# Patient Record
Sex: FEMALE | Race: WHITE | NOT HISPANIC OR LATINO | Employment: OTHER | URBAN - NONMETROPOLITAN AREA
[De-identification: names, ages, dates, MRNs, and addresses within clinical notes are randomized per-mention and may not be internally consistent; named-entity substitution may affect disease eponyms.]

---

## 2023-01-18 PROBLEM — M79.18 GLUTEAL PAIN: Status: ACTIVE | Noted: 2023-01-18

## 2023-01-18 PROBLEM — Z86.39 HISTORY OF VITAMIN D DEFICIENCY: Status: ACTIVE | Noted: 2023-01-18

## 2023-01-18 PROBLEM — B35.1 ONYCHOMYCOSIS: Status: ACTIVE | Noted: 2023-01-18

## 2023-01-18 PROBLEM — G57.00 PIRIFORMIS SYNDROME: Status: ACTIVE | Noted: 2023-01-18

## 2023-01-18 PROBLEM — M19.019 OSTEOARTHRITIS OF SHOULDER: Status: ACTIVE | Noted: 2023-01-18

## 2023-01-18 PROBLEM — M17.11 PRIMARY OSTEOARTHRITIS OF RIGHT KNEE: Status: ACTIVE | Noted: 2023-01-18

## 2023-01-18 PROBLEM — Z96.652 HISTORY OF TOTAL LEFT KNEE REPLACEMENT: Status: ACTIVE | Noted: 2023-01-18

## 2023-01-18 PROBLEM — K22.70 BARRETT'S ESOPHAGUS: Status: ACTIVE | Noted: 2023-01-18

## 2023-01-18 PROBLEM — G56.03 BILATERAL CARPAL TUNNEL SYNDROME: Status: ACTIVE | Noted: 2023-01-18

## 2023-01-18 PROBLEM — G89.29 CHRONIC PAIN: Status: ACTIVE | Noted: 2023-01-18

## 2023-01-18 PROBLEM — M54.50 LOW BACK PAIN: Status: ACTIVE | Noted: 2023-01-18

## 2023-01-18 PROBLEM — I10 ESSENTIAL HYPERTENSION: Status: ACTIVE | Noted: 2023-01-18

## 2023-01-18 PROBLEM — E66.812 CLASS 2 OBESITY WITH BODY MASS INDEX (BMI) OF 38.0 TO 38.9 IN ADULT: Status: ACTIVE | Noted: 2023-01-18

## 2023-01-18 PROBLEM — G47.33 OBSTRUCTIVE SLEEP APNEA HYPOPNEA, SEVERE: Status: ACTIVE | Noted: 2023-01-18

## 2023-01-18 PROBLEM — I70.0 ATHEROSCLEROSIS OF AORTA (CMS-HCC): Status: ACTIVE | Noted: 2023-01-18

## 2023-01-18 PROBLEM — M51.26 LUMBAR DISC HERNIATION: Status: ACTIVE | Noted: 2023-01-18

## 2023-01-18 PROBLEM — M19.90 ARTHRITIS: Status: ACTIVE | Noted: 2023-01-18

## 2023-01-18 PROBLEM — C50.912 BILATERAL BREAST CANCER (MULTI): Status: ACTIVE | Noted: 2023-01-18

## 2023-01-18 PROBLEM — E03.9 HYPOTHYROIDISM: Status: ACTIVE | Noted: 2023-01-18

## 2023-01-18 PROBLEM — M43.16 SPONDYLOLISTHESIS OF LUMBAR REGION: Status: ACTIVE | Noted: 2023-01-18

## 2023-01-18 PROBLEM — M25.541 JOINT PAIN IN BOTH HANDS: Status: ACTIVE | Noted: 2023-01-18

## 2023-01-18 PROBLEM — Z15.09 BRCA2 GENETIC CARRIER: Status: ACTIVE | Noted: 2023-01-18

## 2023-01-18 PROBLEM — M17.0 ARTHRITIS OF BOTH KNEES: Status: ACTIVE | Noted: 2023-01-18

## 2023-01-18 PROBLEM — E66.9 CLASS 2 OBESITY WITH BODY MASS INDEX (BMI) OF 38.0 TO 38.9 IN ADULT: Status: ACTIVE | Noted: 2023-01-18

## 2023-01-18 PROBLEM — C50.911 BILATERAL BREAST CANCER (MULTI): Status: ACTIVE | Noted: 2023-01-18

## 2023-01-18 PROBLEM — I50.30 (HFPEF) HEART FAILURE WITH PRESERVED EJECTION FRACTION (MULTI): Status: ACTIVE | Noted: 2023-01-18

## 2023-01-18 PROBLEM — Z15.01 BRCA2 GENETIC CARRIER: Status: ACTIVE | Noted: 2023-01-18

## 2023-01-18 PROBLEM — I25.10 ARTERIOSCLEROTIC CORONARY ARTERY DISEASE: Status: ACTIVE | Noted: 2023-01-18

## 2023-01-18 PROBLEM — T81.81XA COMPLICATION OF VENTILATION THERAPY: Status: ACTIVE | Noted: 2023-01-18

## 2023-01-18 PROBLEM — E11.9 DIABETES MELLITUS (MULTI): Status: ACTIVE | Noted: 2023-01-18

## 2023-01-18 PROBLEM — R94.31 ABNORMAL EKG: Status: ACTIVE | Noted: 2023-01-18

## 2023-01-18 PROBLEM — E78.5 HYPERLIPIDEMIA: Status: ACTIVE | Noted: 2023-01-18

## 2023-01-18 PROBLEM — M25.542 JOINT PAIN IN BOTH HANDS: Status: ACTIVE | Noted: 2023-01-18

## 2023-01-18 RX ORDER — CICLOPIROX OLAMINE 7.7 MG/100ML
SUSPENSION TOPICAL
COMMUNITY
Start: 2021-11-17 | End: 2023-06-02 | Stop reason: WASHOUT

## 2023-01-18 RX ORDER — ROSUVASTATIN CALCIUM 5 MG/1
1 TABLET, COATED ORAL DAILY
COMMUNITY
Start: 2019-06-21 | End: 2023-12-06

## 2023-01-18 RX ORDER — HYDROCHLOROTHIAZIDE 12.5 MG/1
2 TABLET ORAL DAILY
COMMUNITY
Start: 2022-03-10 | End: 2023-04-10

## 2023-01-18 RX ORDER — DULOXETIN HYDROCHLORIDE 30 MG/1
2 CAPSULE, DELAYED RELEASE ORAL DAILY
COMMUNITY
Start: 2019-07-01 | End: 2023-05-16 | Stop reason: SDUPTHER

## 2023-01-18 RX ORDER — CLOTRIMAZOLE AND BETAMETHASONE DIPROPIONATE 10; .64 MG/G; MG/G
CREAM TOPICAL
COMMUNITY
Start: 2022-04-20 | End: 2023-06-02 | Stop reason: WASHOUT

## 2023-01-18 RX ORDER — ESOMEPRAZOLE MAGNESIUM 40 MG/1
1 CAPSULE, DELAYED RELEASE ORAL DAILY
COMMUNITY
End: 2024-03-22 | Stop reason: WASHOUT

## 2023-01-18 RX ORDER — LISINOPRIL 10 MG/1
1 TABLET ORAL 2 TIMES DAILY
COMMUNITY
Start: 2019-10-22 | End: 2023-06-09

## 2023-01-18 RX ORDER — ASPIRIN 81 MG/1
1 TABLET ORAL DAILY
COMMUNITY
End: 2023-06-02 | Stop reason: WASHOUT

## 2023-01-18 RX ORDER — BLOOD SUGAR DIAGNOSTIC
1 STRIP MISCELLANEOUS DAILY
COMMUNITY
Start: 2022-03-01

## 2023-01-18 RX ORDER — LEVOTHYROXINE SODIUM 125 UG/1
1 TABLET ORAL DAILY
COMMUNITY
Start: 2014-11-07

## 2023-01-18 RX ORDER — CYCLOBENZAPRINE HCL 5 MG
1-2 TABLET ORAL NIGHTLY PRN
COMMUNITY
Start: 2022-08-11 | End: 2023-06-02 | Stop reason: WASHOUT

## 2023-01-18 RX ORDER — GABAPENTIN 600 MG/1
1 TABLET ORAL 3 TIMES DAILY
COMMUNITY
Start: 2021-09-08 | End: 2024-03-22 | Stop reason: WASHOUT

## 2023-01-18 RX ORDER — HYDROCORTISONE 25 MG/G
OINTMENT TOPICAL
COMMUNITY
Start: 2021-06-03 | End: 2023-06-02 | Stop reason: WASHOUT

## 2023-01-18 RX ORDER — LANCETS
2 EACH MISCELLANEOUS 2 TIMES DAILY
COMMUNITY

## 2023-01-18 RX ORDER — EXEMESTANE 25 MG/1
1 TABLET ORAL DAILY
COMMUNITY
End: 2023-06-02 | Stop reason: WASHOUT

## 2023-02-23 PROBLEM — M25.541 JOINT PAIN IN BOTH HANDS: Status: RESOLVED | Noted: 2023-01-18 | Resolved: 2023-02-23

## 2023-02-23 PROBLEM — M25.542 JOINT PAIN IN BOTH HANDS: Status: RESOLVED | Noted: 2023-01-18 | Resolved: 2023-02-23

## 2023-02-23 PROBLEM — I50.22 CHRONIC SYSTOLIC CONGESTIVE HEART FAILURE (MULTI): Status: ACTIVE | Noted: 2023-02-23

## 2023-02-23 PROBLEM — M79.18 GLUTEAL PAIN: Status: RESOLVED | Noted: 2023-01-18 | Resolved: 2023-02-23

## 2023-03-06 ENCOUNTER — OFFICE VISIT (OUTPATIENT)
Dept: PRIMARY CARE | Facility: CLINIC | Age: 73
End: 2023-03-06
Payer: MEDICARE

## 2023-03-06 VITALS
HEIGHT: 60 IN | SYSTOLIC BLOOD PRESSURE: 102 MMHG | RESPIRATION RATE: 14 BRPM | OXYGEN SATURATION: 96 % | DIASTOLIC BLOOD PRESSURE: 69 MMHG | TEMPERATURE: 97.2 F | WEIGHT: 174.7 LBS | HEART RATE: 77 BPM | BODY MASS INDEX: 34.3 KG/M2

## 2023-03-06 DIAGNOSIS — M43.16 SPONDYLOLISTHESIS OF LUMBAR REGION: ICD-10-CM

## 2023-03-06 DIAGNOSIS — I25.10 ARTERIOSCLEROTIC CORONARY ARTERY DISEASE: ICD-10-CM

## 2023-03-06 DIAGNOSIS — E11.9 TYPE 2 DIABETES MELLITUS WITHOUT COMPLICATION, WITHOUT LONG-TERM CURRENT USE OF INSULIN (MULTI): Primary | ICD-10-CM

## 2023-03-06 DIAGNOSIS — I10 ESSENTIAL HYPERTENSION: ICD-10-CM

## 2023-03-06 DIAGNOSIS — E03.8 OTHER SPECIFIED HYPOTHYROIDISM: ICD-10-CM

## 2023-03-06 PROBLEM — Z86.39 HISTORY OF VITAMIN D DEFICIENCY: Status: RESOLVED | Noted: 2023-01-18 | Resolved: 2023-03-06

## 2023-03-06 LAB — POC HEMOGLOBIN A1C: 6.1 % (ref 4.2–6.5)

## 2023-03-06 PROCEDURE — 1036F TOBACCO NON-USER: CPT | Performed by: FAMILY MEDICINE

## 2023-03-06 PROCEDURE — 3074F SYST BP LT 130 MM HG: CPT | Performed by: FAMILY MEDICINE

## 2023-03-06 PROCEDURE — 83036 HEMOGLOBIN GLYCOSYLATED A1C: CPT | Performed by: FAMILY MEDICINE

## 2023-03-06 PROCEDURE — 4010F ACE/ARB THERAPY RXD/TAKEN: CPT | Performed by: FAMILY MEDICINE

## 2023-03-06 PROCEDURE — 99214 OFFICE O/P EST MOD 30 MIN: CPT | Performed by: FAMILY MEDICINE

## 2023-03-06 PROCEDURE — 3078F DIAST BP <80 MM HG: CPT | Performed by: FAMILY MEDICINE

## 2023-03-06 PROCEDURE — 1159F MED LIST DOCD IN RCRD: CPT | Performed by: FAMILY MEDICINE

## 2023-03-06 NOTE — PROGRESS NOTES
"Subjective   Patient ID: Raiza Nguyễn is a 72 y.o. female who presents for Coronary Artery Disease, Diabetes, Depression, GERD, Hypothyroidism, Hypertension, and Anxiety.    HPI   Concerns: burning in fingers and wants to know if she should be talking vit D and vit C  Review of Systems    Objective   /69 (BP Location: Right arm, Patient Position: Sitting, BP Cuff Size: Large adult)   Pulse 77   Temp 36.2 °C (97.2 °F) (Temporal)   Resp 14   Ht 1.511 m (4' 11.5\")   Wt 79.2 kg (174 lb 11.2 oz)   SpO2 96%   BMI 34.69 kg/m²     Physical Exam    Assessment/Plan   {Assess/PlanSmartLinks:61537}       "

## 2023-03-06 NOTE — PROGRESS NOTES
"Subjective   Reason for Visit: Raiza Nguyễn is an 72 y.o. female here for a routine follow up visit     Past Medical, Surgical, and Family History reviewed and updated in chart.         HPI     DM -  no numbness, no polyuria, doing well                         Patient Care Team:  Shoshana Olivarez DO as PCP - General  Shoshana Olivarez DO as PCP - [facility] Attributed Provider     Review of Systems    Objective   Vitals:  /69 (BP Location: Right arm, Patient Position: Sitting, BP Cuff Size: Large adult)   Pulse 77   Temp 36.2 °C (97.2 °F) (Temporal)   Resp 14   Ht 1.511 m (4' 11.5\")   Wt 79.2 kg (174 lb 11.2 oz)   SpO2 96%   BMI 34.69 kg/m²       Physical Exam    Assessment/Plan   Problem List Items Addressed This Visit          Endocrine/Metabolic    Type 2 diabetes mellitus, without long-term current use of insulin (CMS/[facility]) - Primary    Relevant Orders    POCT glycosylated hemoglobin (Hb A1C) manually resulted (Completed)          "

## 2023-03-27 ENCOUNTER — TELEMEDICINE (OUTPATIENT)
Dept: PRIMARY CARE | Facility: CLINIC | Age: 73
End: 2023-03-27
Payer: MEDICARE

## 2023-03-27 DIAGNOSIS — R19.7 DIARRHEA, UNSPECIFIED TYPE: Primary | ICD-10-CM

## 2023-03-27 DIAGNOSIS — R10.9 ABDOMINAL CRAMPING: ICD-10-CM

## 2023-03-27 PROCEDURE — 99213 OFFICE O/P EST LOW 20 MIN: CPT | Performed by: FAMILY MEDICINE

## 2023-03-27 RX ORDER — DICYCLOMINE HYDROCHLORIDE 10 MG/1
10 CAPSULE ORAL 3 TIMES DAILY PRN
Qty: 15 CAPSULE | Refills: 0 | Status: SHIPPED | OUTPATIENT
Start: 2023-03-27 | End: 2023-04-01

## 2023-03-27 ASSESSMENT — ENCOUNTER SYMPTOMS
ARTHRALGIAS: 0
HEADACHES: 0
HEMATURIA: 0
ABDOMINAL PAIN: 1
VOMITING: 0
ANOREXIA: 1
DYSURIA: 0
FEVER: 0
FLATUS: 1
DIARRHEA: 1
CONSTIPATION: 0
WEIGHT LOSS: 0
BELCHING: 1
FREQUENCY: 0
HEMATOCHEZIA: 0
MYALGIAS: 0
NAUSEA: 1

## 2023-03-27 NOTE — PROGRESS NOTES
Abdominal Pain  The problem has been unchanged. The pain is located in the generalized abdominal region. The pain is at a severity of 5/10. The quality of the pain is cramping. The abdominal pain does not radiate. Associated symptoms include anorexia, belching, diarrhea, flatus and nausea. Pertinent negatives include no arthralgias, constipation, dysuria, fever, frequency, headaches, hematochezia, hematuria, melena, myalgias, vomiting or weight loss. Nothing aggravates the pain. The pain is relieved by Nothing.      Answers submitted by the patient for this visit:  Abdominal Pain Questionnaire (Submitted on 3/27/2023)  Chief Complaint: Abdominal pain  Progression since onset: unchanged  Pain location: generalized abdominal region  Pain - numeric: 5/10  Pain quality: cramping  Radiates to: does not radiate  anorexia: Yes  arthralgias: No  belching: Yes  constipation: No  diarrhea: Yes  dysuria: No  fever: No  flatus: Yes  frequency: No  headaches: No  hematochezia: No  hematuria: No  melena: No  myalgias: No  nausea: Yes  weight loss: No  vomiting: No  Aggravated by: nothing  Relieved by: nothing    Patient reports no vomiting. Woke up with no generalized abdominal pain today but had a piece of toast and started having cramping and discomfort. Discomfort is generalized,not localized.   Has not taken a covid-19 test yet and no sick contacts  Has taken Tylenol without relief, never taken bentyl before  She reports 6-8 diarrhea episodes/day, no blood  Has been able to hydrate well with water, but is concerned because her symptoms are lasting longer than they typical GI bug of 24 hours      Review of Systems   Constitutional:  Negative for fever and weight loss.   Gastrointestinal:  Positive for abdominal pain, anorexia, diarrhea, flatus and nausea. Negative for constipation, hematochezia, melena and vomiting.   Genitourinary:  Negative for dysuria, frequency and hematuria.   Musculoskeletal:  Negative for  arthralgias and myalgias.   Neurological:  Negative for headaches.       Objective   There were no vitals taken for this visit.    Physical Exam  Constitutional: Well developed, well nourished, alert and in no acute distress   Eyes: Normal external exam.  Pulmonary: No respiratory distress  Skin: Warm, well perfused, normal skin turgor and color.   Neurologic: Cranial nerves II-XII grossly intact.   Psychiatric: Mood calm and affect normal. Answers questions appropriately.       Assessment/Plan     We discussed being sure to hydrate well and if you are not able to keep up with hydration to please proceed to the ED.    We also discussed that if your pain worsens or localizes that you need to be evaluated either in our office or the ED in person.     Continue to follow a bland diet    Avoid drinks high in sugar    START Bentyl (antispasmodic) 20-30 minutes prior to meals. This medication may cause constipation.

## 2023-04-10 DIAGNOSIS — I10 ESSENTIAL HYPERTENSION: Primary | ICD-10-CM

## 2023-04-10 RX ORDER — HYDROCHLOROTHIAZIDE 12.5 MG/1
TABLET ORAL
Qty: 180 TABLET | Refills: 3 | Status: SHIPPED | OUTPATIENT
Start: 2023-04-10 | End: 2023-09-01 | Stop reason: SDUPTHER

## 2023-05-16 DIAGNOSIS — G89.29 OTHER CHRONIC PAIN: ICD-10-CM

## 2023-05-16 RX ORDER — DULOXETIN HYDROCHLORIDE 30 MG/1
30 CAPSULE, DELAYED RELEASE ORAL 2 TIMES DAILY
Qty: 180 CAPSULE | Refills: 3 | Status: SHIPPED | OUTPATIENT
Start: 2023-05-16 | End: 2024-01-18 | Stop reason: SDUPTHER

## 2023-05-16 NOTE — TELEPHONE ENCOUNTER
Pt calling needs refill of Duloxetine 30 mg 2 daily   one in the am and one at night.   Medication was from Dr Wilson but she is no longer there. Please advise?

## 2023-05-31 ENCOUNTER — TELEPHONE (OUTPATIENT)
Dept: PRIMARY CARE | Facility: CLINIC | Age: 73
End: 2023-05-31
Payer: MEDICARE

## 2023-05-31 PROBLEM — M81.0 OSTEOPOROSIS: Status: ACTIVE | Noted: 2023-05-31

## 2023-05-31 PROBLEM — M54.12 BRACHIAL NEURITIS: Status: ACTIVE | Noted: 2023-05-31

## 2023-05-31 PROBLEM — R26.81 GAIT INSTABILITY: Status: ACTIVE | Noted: 2023-05-31

## 2023-05-31 PROBLEM — M46.1 SACROILIITIS, NOT ELSEWHERE CLASSIFIED (CMS-HCC): Status: ACTIVE | Noted: 2023-05-31

## 2023-05-31 PROBLEM — M47.817 LUMBOSACRAL SPONDYLOSIS: Status: ACTIVE | Noted: 2023-05-31

## 2023-05-31 PROBLEM — M25.511 BILATERAL SHOULDER PAIN: Status: ACTIVE | Noted: 2023-05-31

## 2023-05-31 PROBLEM — Z85.3 HISTORY OF CANCER OF LEFT BREAST: Status: ACTIVE | Noted: 2023-05-31

## 2023-05-31 PROBLEM — M54.2 CERVICALGIA: Status: ACTIVE | Noted: 2023-05-31

## 2023-05-31 PROBLEM — M47.812 CERVICAL SPONDYLOSIS: Status: ACTIVE | Noted: 2023-05-31

## 2023-05-31 PROBLEM — R79.83 HOMOCYSTINEMIA: Status: ACTIVE | Noted: 2023-05-31

## 2023-05-31 PROBLEM — M25.512 BILATERAL SHOULDER PAIN: Status: ACTIVE | Noted: 2023-05-31

## 2023-05-31 PROBLEM — M48.02 CERVICAL SPINAL STENOSIS: Status: ACTIVE | Noted: 2023-05-31

## 2023-05-31 PROBLEM — M54.17 LUMBOSACRAL NEURITIS: Status: ACTIVE | Noted: 2023-05-31

## 2023-05-31 PROBLEM — R92.8 ABNORMAL MRI, BREAST: Status: ACTIVE | Noted: 2023-05-31

## 2023-05-31 NOTE — TELEPHONE ENCOUNTER
Patient fell three weeks ago, injuring her left forearm.  She states there was a scab there, but it has gone away.  She is experiencing pain still however.    Would you like her to make

## 2023-05-31 NOTE — PROGRESS NOTES
Subjective:    Raiza Nguyễn is a 72 y.o. female who presents for   Chief Complaint   Patient presents with    Fall     Fell about a month ago, LT arm was hurting. As of two days ago her pain has subsided    Does have pain in her belly button      SVN pt     HPI:      What concern/ problem/pain/symptom  brings you here today? Originally came in for pain and tenderness in her left forearm post fall but this has now since subsided     Denies any pain or problems with her left arm     Pt does want to talk about pain at her umbilicus       how long has pt had sxs?  3 weeks     describe symptoms-  Redness  Pain - burning inside   No itching     how often do symptoms occur?  Redness comes and goes I having pain currently     has pt tried anything for current symptoms, including medications (OTC or prescription)  ?    Has tried amerigel which helped with the redness but still burning on the inside       has pt been seen recently for this problem ( within past 2-3 weeks) ? no        No fever    No injuery       Social History     Tobacco Use   Smoking Status Former    Packs/day: 1.00    Years: 15.00    Pack years: 15.00    Types: Cigarettes    Quit date: 1983    Years since quittin.4    Passive exposure: Past   Smokeless Tobacco Never   Vaping Use   Vaping Status Never Used         Review of Systems:      Objective:    Vitals:    23 1005   BP: 134/77   Pulse: 78   Temp: 36.8 °C (98.2 °F)   SpO2: 97%   Weight: 79.5 kg (175 lb 3.2 oz)       Patient is alert and oriented x 3 , NAD  SKIN- red area marginal umbilicus and central , no vesicles, no drainage , no swelling                 Assessment/Plan:    1. Rash  ketoconazole (NIZOral) 2 % cream          No orders of the defined types were placed in this encounter.                Call if no better or if symptoms worsen

## 2023-06-02 ENCOUNTER — OFFICE VISIT (OUTPATIENT)
Dept: PRIMARY CARE | Facility: CLINIC | Age: 73
End: 2023-06-02
Payer: MEDICARE

## 2023-06-02 VITALS
WEIGHT: 175.2 LBS | HEART RATE: 78 BPM | OXYGEN SATURATION: 97 % | BODY MASS INDEX: 34.79 KG/M2 | TEMPERATURE: 98.2 F | DIASTOLIC BLOOD PRESSURE: 77 MMHG | SYSTOLIC BLOOD PRESSURE: 134 MMHG

## 2023-06-02 DIAGNOSIS — R21 RASH: Primary | ICD-10-CM

## 2023-06-02 PROCEDURE — 1036F TOBACCO NON-USER: CPT | Performed by: FAMILY MEDICINE

## 2023-06-02 PROCEDURE — 1160F RVW MEDS BY RX/DR IN RCRD: CPT | Performed by: FAMILY MEDICINE

## 2023-06-02 PROCEDURE — 3075F SYST BP GE 130 - 139MM HG: CPT | Performed by: FAMILY MEDICINE

## 2023-06-02 PROCEDURE — 1159F MED LIST DOCD IN RCRD: CPT | Performed by: FAMILY MEDICINE

## 2023-06-02 PROCEDURE — 3078F DIAST BP <80 MM HG: CPT | Performed by: FAMILY MEDICINE

## 2023-06-02 PROCEDURE — 99212 OFFICE O/P EST SF 10 MIN: CPT | Performed by: FAMILY MEDICINE

## 2023-06-02 PROCEDURE — 4010F ACE/ARB THERAPY RXD/TAKEN: CPT | Performed by: FAMILY MEDICINE

## 2023-06-02 RX ORDER — KETOCONAZOLE 20 MG/G
CREAM TOPICAL 2 TIMES DAILY
Qty: 30 G | Refills: 0 | Status: SHIPPED | OUTPATIENT
Start: 2023-06-02 | End: 2023-06-12

## 2023-06-08 DIAGNOSIS — I10 ESSENTIAL HYPERTENSION: ICD-10-CM

## 2023-06-09 RX ORDER — LISINOPRIL 10 MG/1
TABLET ORAL
Qty: 180 TABLET | Refills: 3 | Status: SHIPPED | OUTPATIENT
Start: 2023-06-09

## 2023-07-03 ENCOUNTER — TELEPHONE (OUTPATIENT)
Dept: PRIMARY CARE | Facility: CLINIC | Age: 73
End: 2023-07-03
Payer: MEDICARE

## 2023-07-03 NOTE — TELEPHONE ENCOUNTER
Patient was seen by Dr. Beebe on 6/2 for a rash and given Ketoconazole. Told that she has a fungal infection in her naval.  The patient states that the area is not getting any better. The pain is more internal.  She also saw Derm and was given Hydrocortizone but not helping.  The patient is aware the office is full today, and closed tomorrow.  She is asking what PCP would suggest.

## 2023-07-06 ENCOUNTER — OFFICE VISIT (OUTPATIENT)
Dept: PRIMARY CARE | Facility: CLINIC | Age: 73
End: 2023-07-06
Payer: MEDICARE

## 2023-07-06 ENCOUNTER — LAB (OUTPATIENT)
Dept: LAB | Facility: LAB | Age: 73
End: 2023-07-06
Payer: MEDICARE

## 2023-07-06 VITALS
DIASTOLIC BLOOD PRESSURE: 70 MMHG | WEIGHT: 178.4 LBS | HEART RATE: 80 BPM | OXYGEN SATURATION: 96 % | SYSTOLIC BLOOD PRESSURE: 111 MMHG | RESPIRATION RATE: 12 BRPM | BODY MASS INDEX: 35.43 KG/M2 | TEMPERATURE: 96.1 F

## 2023-07-06 DIAGNOSIS — E11.9 CONTROLLED TYPE 2 DIABETES MELLITUS WITHOUT COMPLICATION, WITHOUT LONG-TERM CURRENT USE OF INSULIN (MULTI): ICD-10-CM

## 2023-07-06 DIAGNOSIS — Z00.00 HEALTHCARE MAINTENANCE: ICD-10-CM

## 2023-07-06 DIAGNOSIS — R16.0 HEPATOMEGALY: ICD-10-CM

## 2023-07-06 DIAGNOSIS — J31.0 CHRONIC RHINITIS: ICD-10-CM

## 2023-07-06 DIAGNOSIS — R68.81 EARLY SATIETY: ICD-10-CM

## 2023-07-06 DIAGNOSIS — R10.84 GENERALIZED ABDOMINAL PAIN: ICD-10-CM

## 2023-07-06 DIAGNOSIS — E11.9 TYPE 2 DIABETES MELLITUS WITHOUT COMPLICATION, WITHOUT LONG-TERM CURRENT USE OF INSULIN (MULTI): ICD-10-CM

## 2023-07-06 DIAGNOSIS — R53.81 MALAISE: ICD-10-CM

## 2023-07-06 DIAGNOSIS — R10.84 GENERALIZED ABDOMINAL PAIN: Primary | ICD-10-CM

## 2023-07-06 LAB
ALANINE AMINOTRANSFERASE (SGPT) (U/L) IN SER/PLAS: 18 U/L (ref 7–45)
ALBUMIN (G/DL) IN SER/PLAS: 4.2 G/DL (ref 3.4–5)
ALKALINE PHOSPHATASE (U/L) IN SER/PLAS: 66 U/L (ref 33–136)
ANION GAP IN SER/PLAS: 13 MMOL/L (ref 10–20)
ASPARTATE AMINOTRANSFERASE (SGOT) (U/L) IN SER/PLAS: 20 U/L (ref 9–39)
BILIRUBIN TOTAL (MG/DL) IN SER/PLAS: 0.5 MG/DL (ref 0–1.2)
CALCIUM (MG/DL) IN SER/PLAS: 10 MG/DL (ref 8.6–10.6)
CARBON DIOXIDE, TOTAL (MMOL/L) IN SER/PLAS: 31 MMOL/L (ref 21–32)
CHLORIDE (MMOL/L) IN SER/PLAS: 102 MMOL/L (ref 98–107)
CREATININE (MG/DL) IN SER/PLAS: 0.78 MG/DL (ref 0.5–1.05)
GFR FEMALE: 81 ML/MIN/1.73M2
GLUCOSE (MG/DL) IN SER/PLAS: 120 MG/DL (ref 74–99)
LIPASE (U/L) IN SER/PLAS: 36 U/L (ref 9–82)
POTASSIUM (MMOL/L) IN SER/PLAS: 4.5 MMOL/L (ref 3.5–5.3)
PROTEIN TOTAL: 6.6 G/DL (ref 6.4–8.2)
SODIUM (MMOL/L) IN SER/PLAS: 141 MMOL/L (ref 136–145)
UREA NITROGEN (MG/DL) IN SER/PLAS: 15 MG/DL (ref 6–23)

## 2023-07-06 PROCEDURE — 80053 COMPREHEN METABOLIC PANEL: CPT

## 2023-07-06 PROCEDURE — 4010F ACE/ARB THERAPY RXD/TAKEN: CPT | Performed by: FAMILY MEDICINE

## 2023-07-06 PROCEDURE — 36415 COLL VENOUS BLD VENIPUNCTURE: CPT

## 2023-07-06 PROCEDURE — 83690 ASSAY OF LIPASE: CPT

## 2023-07-06 PROCEDURE — 1160F RVW MEDS BY RX/DR IN RCRD: CPT | Performed by: FAMILY MEDICINE

## 2023-07-06 PROCEDURE — 1036F TOBACCO NON-USER: CPT | Performed by: FAMILY MEDICINE

## 2023-07-06 PROCEDURE — 3074F SYST BP LT 130 MM HG: CPT | Performed by: FAMILY MEDICINE

## 2023-07-06 PROCEDURE — 83036 HEMOGLOBIN GLYCOSYLATED A1C: CPT

## 2023-07-06 PROCEDURE — 99214 OFFICE O/P EST MOD 30 MIN: CPT | Performed by: FAMILY MEDICINE

## 2023-07-06 PROCEDURE — 85025 COMPLETE CBC W/AUTO DIFF WBC: CPT

## 2023-07-06 PROCEDURE — 1159F MED LIST DOCD IN RCRD: CPT | Performed by: FAMILY MEDICINE

## 2023-07-06 PROCEDURE — 3078F DIAST BP <80 MM HG: CPT | Performed by: FAMILY MEDICINE

## 2023-07-06 RX ORDER — MONTELUKAST SODIUM 10 MG/1
10 TABLET ORAL NIGHTLY
Qty: 30 TABLET | Refills: 5 | Status: SHIPPED | OUTPATIENT
Start: 2023-07-06 | End: 2023-09-01

## 2023-07-06 RX ORDER — EXEMESTANE 25 MG/1
25 TABLET ORAL DAILY
COMMUNITY
End: 2024-03-27

## 2023-07-06 RX ORDER — HYDROCORTISONE 25 MG/G
CREAM TOPICAL
COMMUNITY
Start: 2023-06-14 | End: 2023-09-01 | Stop reason: WASHOUT

## 2023-07-06 NOTE — PROGRESS NOTES
Subjective   Patient ID: Raiza Nguyễn is a 72 y.o. female who presents for Rash (Pt states that she has had this rash for about 6 weeks, and has been using hydrocortisone cream- states that this is not helping at all, rash in in her naval, dull, achy feeling, red, irritated, feels like it is pulling like it is attached to something on the inside ).    HPI     Noticed a sensation of discomfort in umbilicus about 6 weeks ago.      Going for [facility] screening next month (an aside)     Felt like tugging in her umbilicus,   derm gave ketoconazole and hydrocortisone      Neither worked     Feels like something's not right overall            Feels like when her bowels are moving it feels painful under umbilicus.       Does not have blood in stool,  bowels always messed up,   at her baseline of messed up (constip, diarrhea)     No unexplained wt change, swollen glands, night sweats.      Feels different than usual     Will be doing lower scope as a routine screen     Hx abd surg:  logan     Had oophorectomy        Abdominal pain, burning       Does have early satiety     Weight slightly down, but has been trying     No first degree relatives with colon cancer/ abdominal cancers     Review of Systems    Allergies poorly controlled on flonase and antihistamine      Has never been on singulair         Objective   /70 (BP Location: Right arm, Patient Position: Sitting, BP Cuff Size: Small adult)   Pulse 80   Temp 35.6 °C (96.1 °F) (Temporal)   Resp 12   Wt 80.9 kg (178 lb 6.4 oz)   SpO2 96%   BMI 35.43 kg/m²     Physical Exam  Constitutional:       General: She is not in acute distress.     Appearance: Normal appearance. She is not ill-appearing.   HENT:      Mouth/Throat:      Mouth: Mucous membranes are moist.      Pharynx: Oropharynx is clear. No oropharyngeal exudate or posterior oropharyngeal erythema.   Eyes:      Comments: Conjunctival injection bilat    Cardiovascular:      Rate and Rhythm:  Normal rate and regular rhythm.      Heart sounds: Normal heart sounds. No murmur heard.  Pulmonary:      Effort: No respiratory distress.      Breath sounds: No wheezing, rhonchi or rales.   Abdominal:      Comments: Mild generalized abdominal discomfort    Abdomen soft, positive bowel sounds.    Fullness right upper quadrant, mildly tender, some guarding.  Negative Almodovar sign.    Tender to palpation left lower quadrant, guarding present.    More superficial feeling mass epigastric region, feels subcutaneous, freely mobile, nontender     Musculoskeletal:      Cervical back: No tenderness.   Lymphadenopathy:      Cervical: No cervical adenopathy.   Skin:     Comments: No rash observed on umbilicus   Neurological:      Mental Status: She is alert.         Assessment/Plan   Problem List Items Addressed This Visit       Diabetes mellitus (CMS/HCC)     A1c ordered          Generalized abdominal pain - Primary     X 6 weeks with some early satiety and malaise;    due to tenderness with some guarding in ruq and llq will obtain ct abd pelvis with contrast     Labs ordered     Differential could include neoplasm, adhesions, hepatitis, diverticulitis, constipation, other.         Relevant Orders    CT abdomen pelvis w IV contrast    CBC and Auto Differential    Lipase    Early satiety     CT abd/pelvis          Relevant Orders    CT abdomen pelvis w IV contrast    Chronic rhinitis     Cont flonase, antihist, add singulair (sent in)          Relevant Medications    montelukast (Singulair) 10 mg tablet     Other Visit Diagnoses       Hepatomegaly        Relevant Orders    CT abdomen pelvis w IV contrast    Comprehensive Metabolic Panel    Malaise        Relevant Orders    CT abdomen pelvis w IV contrast    CBC and Auto Differential    Healthcare maintenance        Relevant Orders    CBC and Auto Differential    Controlled type 2 diabetes mellitus without complication, without long-term current use of insulin (CMS/HCC)         Relevant Orders    Hemoglobin A1C

## 2023-07-06 NOTE — ASSESSMENT & PLAN NOTE
X 6 weeks with some early satiety and malaise;    due to tenderness with some guarding in ruq and llq will obtain ct abd pelvis with contrast     Labs ordered     Differential could include neoplasm, adhesions, hepatitis, diverticulitis, constipation, other.

## 2023-07-07 LAB
BASOPHILS (10*3/UL) IN BLOOD BY AUTOMATED COUNT: 0.05 X10E9/L (ref 0–0.1)
BASOPHILS/100 LEUKOCYTES IN BLOOD BY AUTOMATED COUNT: 0.9 % (ref 0–2)
EOSINOPHILS (10*3/UL) IN BLOOD BY AUTOMATED COUNT: 0.46 X10E9/L (ref 0–0.7)
EOSINOPHILS/100 LEUKOCYTES IN BLOOD BY AUTOMATED COUNT: 8.2 % (ref 0–6)
ERYTHROCYTE DISTRIBUTION WIDTH (RATIO) BY AUTOMATED COUNT: 12.8 % (ref 11.5–14.5)
ERYTHROCYTE MEAN CORPUSCULAR HEMOGLOBIN CONCENTRATION (G/DL) BY AUTOMATED: 32.9 G/DL (ref 32–36)
ERYTHROCYTE MEAN CORPUSCULAR VOLUME (FL) BY AUTOMATED COUNT: 98 FL (ref 80–100)
ERYTHROCYTES (10*6/UL) IN BLOOD BY AUTOMATED COUNT: 4.4 X10E12/L (ref 4–5.2)
ESTIMATED AVERAGE GLUCOSE FOR HBA1C: 131 MG/DL
HEMATOCRIT (%) IN BLOOD BY AUTOMATED COUNT: 42.9 % (ref 36–46)
HEMOGLOBIN (G/DL) IN BLOOD: 14.1 G/DL (ref 12–16)
HEMOGLOBIN A1C/HEMOGLOBIN TOTAL IN BLOOD: 6.2 %
IMMATURE GRANULOCYTES/100 LEUKOCYTES IN BLOOD BY AUTOMATED COUNT: 0.2 % (ref 0–0.9)
LEUKOCYTES (10*3/UL) IN BLOOD BY AUTOMATED COUNT: 5.6 X10E9/L (ref 4.4–11.3)
LYMPHOCYTES (10*3/UL) IN BLOOD BY AUTOMATED COUNT: 1.73 X10E9/L (ref 1.2–4.8)
LYMPHOCYTES/100 LEUKOCYTES IN BLOOD BY AUTOMATED COUNT: 30.8 % (ref 13–44)
MONOCYTES (10*3/UL) IN BLOOD BY AUTOMATED COUNT: 0.56 X10E9/L (ref 0.1–1)
MONOCYTES/100 LEUKOCYTES IN BLOOD BY AUTOMATED COUNT: 10 % (ref 2–10)
NEUTROPHILS (10*3/UL) IN BLOOD BY AUTOMATED COUNT: 2.8 X10E9/L (ref 1.2–7.7)
NEUTROPHILS/100 LEUKOCYTES IN BLOOD BY AUTOMATED COUNT: 49.9 % (ref 40–80)
NRBC (PER 100 WBCS) BY AUTOMATED COUNT: 0 /100 WBC (ref 0–0)
PLATELETS (10*3/UL) IN BLOOD AUTOMATED COUNT: 205 X10E9/L (ref 150–450)

## 2023-08-28 ENCOUNTER — APPOINTMENT (OUTPATIENT)
Dept: PRIMARY CARE | Facility: CLINIC | Age: 73
End: 2023-08-28
Payer: MEDICARE

## 2023-08-31 RX ORDER — INFLUENZA A VIRUS A/VICTORIA/4897/2022 IVR-238 (H1N1) ANTIGEN (FORMALDEHYDE INACTIVATED), INFLUENZA A VIRUS A/DARWIN/9/2021 SAN-010 (H3N2) ANTIGEN (FORMALDEHYDE INACTIVATED), INFLUENZA B VIRUS B/PHUKET/3073/2013 ANTIGEN (FORMALDEHYDE INACTIVATED), AND INFLUENZA B VIRUS B/MICHIGAN/01/2021 ANTIGEN (FORMALDEHYDE INACTIVATED) 60; 60; 60; 60 UG/.7ML; UG/.7ML; UG/.7ML; UG/.7ML
0.7 INJECTION, SUSPENSION INTRAMUSCULAR ONCE
Qty: 0.7 ML | Refills: 0 | Status: CANCELLED | OUTPATIENT
Start: 2023-08-31 | End: 2023-08-31

## 2023-08-31 NOTE — PROGRESS NOTES
Subjective   Patient ID: Raiza Nguyễn is a 72 y.o. female who presents for Follow-up (Pt presents for 6 month follow up- pt states no concerns at this time. ) and Flu Vaccine (Pt declines flu vaccine at this time. ).    HPI     LAST MWV DONE 2022 -- CAN ADD TO NEXT OV    Patient presents today for routine 6 month follow-up.  Patient declines flu vaccine.    Reports sister has moved out her house, living 6 minutes away.  Ex- recently passed while at home recovering from knee surgery.  Pt found him  in his appt   attemped cpr     She is doing well overall given these circumstances.  She is feeling very tired, fatigued.      CHRONIC CONDITIONS:  -Chronic pain  Taking Duloxetine 60 mg + Gabapentin daily.  S/p evaluation with [facility] rheumatology.  Plan per rheum pre 10/2022 OV NOTE:  --For Pseudogout flares of the wrist - Prednisone 12 day taper  --Continue Gapabentin 600 mg three times a day  --Tylenol 1000 mg up to 3x/day  --Voltaren gel up to 4x/day  --Continue home occupational therapy exercises   --Follow up as needed      -Hypothyroidism  CURRENT DOSE: Synthroid 125 mcg daily.  2022 TSH normal.    Medication is being taken as directed and is well-tolerated.   Patient denies heat or cold intolerance, diarrhea or constipation, coarsening of hair, and palpitations.      -DM  Lifestyle managed, A1c most recently in prediabetic range    Hgb A1c: 6.2 in 2023, 6.1 in 3/2023, 6.9 in 2022  Albumin: negative 2022  Foot exam: 2023    The patient denies polyuria, polydipsia, or polyphagia.   The patient denies numbness and tingling in their feet.     -HTN  Taking Lisinopril 10 mg BID + HCTZ 12.5 mg daily.  She reports HCTZ written at BID but she does not always take 2 unless swelling is bad.    Medications are being taken and tolerated well.   No side effects are reported.  Patient is taking blood pressure outside of office and reports good control.  Patient denies chest pain, shortness of breath  with exertion, palpitations, headache, or dizziness.     -HLD/CAD  Taking Rosuvastatin 5 mg daily.  10/2020 CT A/P showed mild aortic atherosclerosis, no CACS.  7/2022 TC/HDL ratio of 2.7    -GERD  Taking Esomeprazole 40 mg daily.    -BOLIVAR,   12/2021 sleep study (severe with SpO2 88% and AHI 36.1).  Intolerant to CPAP x several tries  5/2022 referral to sleep medicine placed.  Today, reports will be seeing ENT that specializes in sleep medicine.     -Former smoker  Smoked ~20 years, no longer smoking but around second hand smoke.  Previously lived with sister who smokes inside.    -Allergies  Prescribed Singulair 10 mg.  Taking Allegra as needed.  Uses Flonase occasionally.    -History of melanoma, early stage  Managed/followed by dermatology.     -BRCA2+ breast cancer  Followed by heme/onc  & breast surgeon (Dr. Jeniffer Langley)  Stage IB right breast cancer 7/2018.   Oncotype 17. S/P WBRT 10/11/2018 - 11/1/2018.   Presently taking Exemestane.  Stage II left breast cancer in 2003. L.pm/ALND. ACx4 + Tx4. WBRT.  Anastrozole x 5yr.  BRCA2+, recommend MRI screening  4/2023 DEXA was normal  Dr. Langley manages imaging. Stable at this time.    Review of Systems   All other systems reviewed and are negative.      Objective   /73 (BP Location: Right arm, Patient Position: Sitting, BP Cuff Size: Small adult)   Pulse 80   Temp 36.2 °C (97.1 °F) (Temporal)   Resp 12   Wt 81.1 kg (178 lb 12.8 oz)   SpO2 97%   BMI 35.51 kg/m²     Physical Exam  Vitals and nursing note reviewed.   Constitutional:       General: She is not in acute distress.     Appearance: Normal appearance. She is not toxic-appearing.   HENT:      Head: Normocephalic and atraumatic.   Eyes:      Extraocular Movements: Extraocular movements intact.      Pupils: Pupils are equal, round, and reactive to light.   Neck:      Thyroid: No thyromegaly.      Vascular: No hepatojugular reflux or JVD.   Cardiovascular:      Rate and Rhythm: Normal rate and  regular rhythm.      Heart sounds: No murmur heard.     No friction rub. No gallop.   Pulmonary:      Effort: Pulmonary effort is normal.      Breath sounds: Normal breath sounds. No wheezing, rhonchi or rales.   Abdominal:      General: Bowel sounds are normal. There is no distension.      Palpations: Abdomen is soft. There is no mass.      Tenderness: There is no abdominal tenderness. There is no guarding.   Musculoskeletal:      Right lower leg: No edema.      Left lower leg: No edema.   Feet:      Comments:   Diabetic Foot Exam:  Right Foot Findings: Normal visual exam. Toes were normal. No skin breakdown.  Left Foot Findings: Normal visual exam. Toes were normal. No skin breakdown.  Monofilament Testing: Normal tactile sensation with monofilament testing throughout both feet.  Lymphadenopathy:      Cervical: No cervical adenopathy.   Skin:     General: Skin is warm and dry.   Neurological:      General: No focal deficit present.      Mental Status: She is alert and oriented to person, place, and time.   Psychiatric:         Mood and Affect: Mood normal.         Behavior: Behavior normal.         Assessment/Plan   Problem List Items Addressed This Visit       Atherosclerosis of aorta (CMS/HCC)     10/2020 CT A/P showed mild aortic atherosclerosis, no CACS.  7/2022 TC/HDL ratio of 2.7, good control, continue Rosuvastatin 5 mg daily.  Lipid panel to be monitored routinely.         Bilateral breast cancer (CMS/[facility])     Followed by heme/onc (Dr. Wilson) & breast surgeon (Dr. Jeniffer Langley)  Stage IB right breast cancer 7/2018.   Oncotype 17. S/P WBRT 10/11/2018 - 11/1/2018.   Presently taking Exemestane.  Stage II left breast cancer in 2003. L.pm/ALND. ACx4 + Tx4. WBRT.  Anastrozole x 5yr.  BRCA2+, recommend MRI screening  4/2023 DEXA was normal  Dr. Langley manages imaging. Stable at this time.         Chronic pain     Stable, continue Duloxetine 60 mg + Gabapentin daily.  Continue to follow-up with specialist as  recommended.         Diabetes mellitus (CMS/[facility])     Lifestyle managed, A1c most recently in prediabetic range, good control.  Goal A1c is 7.0 or less.    Hgb A1c: 6.2 in 7/2023, 6.1 in 3/2023, 6.9 in 7/2022  Albumin: negative 2/2022  Foot exam: 9/2023         Essential hypertension     BP is 113/73 in office today, good control.  Goal BP is 130/80 or less, ideally 120/80 or less.   Continue Lisinopril 10 mg BID   Continue HCTZ 12.5 mg 11-2 tablets daily.         Relevant Medications    hydroCHLOROthiazide (HYDRODiuril) 12.5 mg tablet    Hyperlipidemia     10/2020 CT A/P showed mild aortic atherosclerosis, no CACS.  7/2022 TC/HDL ratio of 2.7, good control, continue Rosuvastatin 5 mg daily.  Lipid panel to be monitored routinely.         Hypothyroidism     CURRENT DOSE: Synthroid 125 mcg daily.  7/2022 TSH normal, repeat TSH with reflex to be done prior to next visit.         Obstructive sleep apnea hypopnea, severe - Primary     12/2021 sleep study (severe with SpO2 88% and AHI 36.1).  Intolerant to CPAP x several tries.  8/2023, reports will be seeing ENT that specializes in sleep medicine.         Complication of ventilation therapy    Class 2 obesity without serious comorbidity with body mass index (BMI) of 35.0 to 35.9 in adult    Chronic rhinitis     No change with Singulair, patient may discontinue this.  Continue OTC Flonase or Nasacort - 1 squirt each nostril twice daily  Consider using Navage or Netipot to irrigate sinuses after possible exposures.            Follow-up in 6 months for routine care + MWV.  Labs to be done prior.   Call for sooner follow-up if needed.     Scribe Attestation  By signing my name below, I, John Ely   attest that this documentation has been prepared under the direction and in the presence of Shoshana Olivarez DO.

## 2023-09-01 ENCOUNTER — OFFICE VISIT (OUTPATIENT)
Dept: PRIMARY CARE | Facility: CLINIC | Age: 73
End: 2023-09-01
Payer: MEDICARE

## 2023-09-01 VITALS
RESPIRATION RATE: 12 BRPM | HEART RATE: 80 BPM | OXYGEN SATURATION: 97 % | SYSTOLIC BLOOD PRESSURE: 113 MMHG | WEIGHT: 178.8 LBS | TEMPERATURE: 97.1 F | DIASTOLIC BLOOD PRESSURE: 73 MMHG | BODY MASS INDEX: 35.51 KG/M2

## 2023-09-01 DIAGNOSIS — T81.81XS: ICD-10-CM

## 2023-09-01 DIAGNOSIS — E03.9 HYPOTHYROIDISM, UNSPECIFIED TYPE: ICD-10-CM

## 2023-09-01 DIAGNOSIS — G89.29 OTHER CHRONIC PAIN: ICD-10-CM

## 2023-09-01 DIAGNOSIS — E66.9 CLASS 2 OBESITY WITHOUT SERIOUS COMORBIDITY WITH BODY MASS INDEX (BMI) OF 35.0 TO 35.9 IN ADULT, UNSPECIFIED OBESITY TYPE: ICD-10-CM

## 2023-09-01 DIAGNOSIS — C50.912 BILATERAL MALIGNANT NEOPLASM OF BREAST IN FEMALE, UNSPECIFIED ESTROGEN RECEPTOR STATUS, UNSPECIFIED SITE OF BREAST (MULTI): ICD-10-CM

## 2023-09-01 DIAGNOSIS — G47.33 OBSTRUCTIVE SLEEP APNEA HYPOPNEA, SEVERE: Primary | ICD-10-CM

## 2023-09-01 DIAGNOSIS — C50.911 BILATERAL MALIGNANT NEOPLASM OF BREAST IN FEMALE, UNSPECIFIED ESTROGEN RECEPTOR STATUS, UNSPECIFIED SITE OF BREAST (MULTI): ICD-10-CM

## 2023-09-01 DIAGNOSIS — E78.5 HYPERLIPIDEMIA, UNSPECIFIED HYPERLIPIDEMIA TYPE: ICD-10-CM

## 2023-09-01 DIAGNOSIS — J31.0 CHRONIC RHINITIS: ICD-10-CM

## 2023-09-01 DIAGNOSIS — E11.9 TYPE 2 DIABETES MELLITUS WITHOUT COMPLICATION, WITHOUT LONG-TERM CURRENT USE OF INSULIN (MULTI): ICD-10-CM

## 2023-09-01 DIAGNOSIS — I70.0 ATHEROSCLEROSIS OF AORTA (CMS-HCC): ICD-10-CM

## 2023-09-01 DIAGNOSIS — I10 ESSENTIAL HYPERTENSION: ICD-10-CM

## 2023-09-01 PROBLEM — I50.22 CHRONIC SYSTOLIC CONGESTIVE HEART FAILURE (MULTI): Status: RESOLVED | Noted: 2023-02-23 | Resolved: 2023-09-01

## 2023-09-01 PROBLEM — I50.30 (HFPEF) HEART FAILURE WITH PRESERVED EJECTION FRACTION (MULTI): Status: RESOLVED | Noted: 2023-01-18 | Resolved: 2023-09-01

## 2023-09-01 PROCEDURE — 4010F ACE/ARB THERAPY RXD/TAKEN: CPT | Performed by: FAMILY MEDICINE

## 2023-09-01 PROCEDURE — 3008F BODY MASS INDEX DOCD: CPT | Performed by: FAMILY MEDICINE

## 2023-09-01 PROCEDURE — 99214 OFFICE O/P EST MOD 30 MIN: CPT | Performed by: FAMILY MEDICINE

## 2023-09-01 PROCEDURE — 3044F HG A1C LEVEL LT 7.0%: CPT | Performed by: FAMILY MEDICINE

## 2023-09-01 PROCEDURE — 1159F MED LIST DOCD IN RCRD: CPT | Performed by: FAMILY MEDICINE

## 2023-09-01 PROCEDURE — 1160F RVW MEDS BY RX/DR IN RCRD: CPT | Performed by: FAMILY MEDICINE

## 2023-09-01 PROCEDURE — 3074F SYST BP LT 130 MM HG: CPT | Performed by: FAMILY MEDICINE

## 2023-09-01 PROCEDURE — 1036F TOBACCO NON-USER: CPT | Performed by: FAMILY MEDICINE

## 2023-09-01 PROCEDURE — 3078F DIAST BP <80 MM HG: CPT | Performed by: FAMILY MEDICINE

## 2023-09-01 RX ORDER — HYDROCHLOROTHIAZIDE 12.5 MG/1
25 TABLET ORAL DAILY
Qty: 180 TABLET | Refills: 3
Start: 2023-09-01 | End: 2024-02-28 | Stop reason: DRUGHIGH

## 2023-09-01 NOTE — ASSESSMENT & PLAN NOTE
No change with Singulair, patient may discontinue this.  Continue OTC Flonase or Nasacort - 1 squirt each nostril twice daily  Consider using Navage or Netipot to irrigate sinuses after possible exposures.

## 2023-09-01 NOTE — ASSESSMENT & PLAN NOTE
CURRENT DOSE: Synthroid 125 mcg daily.  7/2022 TSH normal, repeat TSH with reflex to be done prior to next visit.

## 2023-09-01 NOTE — ASSESSMENT & PLAN NOTE
12/2021 sleep study (severe with SpO2 88% and AHI 36.1).  Intolerant to CPAP x several tries.  8/2023, reports will be seeing ENT that specializes in sleep medicine.

## 2023-09-01 NOTE — ASSESSMENT & PLAN NOTE
Stable, continue Duloxetine 60 mg + Gabapentin daily.  Continue to follow-up with specialist as recommended.

## 2023-09-01 NOTE — ASSESSMENT & PLAN NOTE
Followed by heme/onc (Dr. Wilson) & breast surgeon (Dr. Jeniffer Langley)  Stage IB right breast cancer 7/2018.   Oncotype 17. S/P WBRT 10/11/2018 - 11/1/2018.   Presently taking Exemestane.  Stage II left breast cancer in 2003. L.pm/ALND. ACx4 + Tx4. WBRT.  Anastrozole x 5yr.  BRCA2+, recommend MRI screening  4/2023 DEXA was normal  Dr. Langley manages imaging. Stable at this time.

## 2023-09-01 NOTE — ASSESSMENT & PLAN NOTE
10/2020 CT A/P showed mild aortic atherosclerosis, no CACS.  7/2022 TC/HDL ratio of 2.7, good control, continue Rosuvastatin 5 mg daily.  Lipid panel to be monitored routinely.

## 2023-09-01 NOTE — ASSESSMENT & PLAN NOTE
Lifestyle managed, A1c most recently in prediabetic range, good control.  Goal A1c is 7.0 or less.    Hgb A1c: 6.2 in 7/2023, 6.1 in 3/2023, 6.9 in 7/2022  Albumin: negative 2/2022  Foot exam: 9/2023

## 2023-09-01 NOTE — ASSESSMENT & PLAN NOTE
BP is 113/73 in office today, good control.  Goal BP is 130/80 or less, ideally 120/80 or less.   Continue Lisinopril 10 mg BID   Continue HCTZ 12.5 mg 11-2 tablets daily.

## 2023-09-06 ENCOUNTER — APPOINTMENT (OUTPATIENT)
Dept: PRIMARY CARE | Facility: CLINIC | Age: 73
End: 2023-09-06
Payer: MEDICARE

## 2023-09-16 ENCOUNTER — HOSPITAL ENCOUNTER (OUTPATIENT)
Facility: HOSPITAL | Age: 73
Setting detail: OUTPATIENT SURGERY
End: 2023-09-16
Payer: MEDICARE

## 2023-10-04 ENCOUNTER — APPOINTMENT (OUTPATIENT)
Dept: PREADMISSION TESTING | Facility: HOSPITAL | Age: 73
End: 2023-10-04
Payer: MEDICARE

## 2023-10-04 ENCOUNTER — OFFICE VISIT (OUTPATIENT)
Dept: PRIMARY CARE | Facility: CLINIC | Age: 73
End: 2023-10-04
Payer: MEDICARE

## 2023-10-04 VITALS
TEMPERATURE: 97.9 F | SYSTOLIC BLOOD PRESSURE: 109 MMHG | OXYGEN SATURATION: 96 % | DIASTOLIC BLOOD PRESSURE: 69 MMHG | HEART RATE: 85 BPM | BODY MASS INDEX: 35.67 KG/M2 | WEIGHT: 179.6 LBS | RESPIRATION RATE: 16 BRPM

## 2023-10-04 DIAGNOSIS — Z23 IMMUNIZATION DUE: ICD-10-CM

## 2023-10-04 DIAGNOSIS — L03.011 PARONYCHIA OF RIGHT INDEX FINGER: Primary | ICD-10-CM

## 2023-10-04 PROCEDURE — 3044F HG A1C LEVEL LT 7.0%: CPT | Performed by: FAMILY MEDICINE

## 2023-10-04 PROCEDURE — 1160F RVW MEDS BY RX/DR IN RCRD: CPT | Performed by: FAMILY MEDICINE

## 2023-10-04 PROCEDURE — G0008 ADMIN INFLUENZA VIRUS VAC: HCPCS | Performed by: FAMILY MEDICINE

## 2023-10-04 PROCEDURE — 1036F TOBACCO NON-USER: CPT | Performed by: FAMILY MEDICINE

## 2023-10-04 PROCEDURE — 3008F BODY MASS INDEX DOCD: CPT | Performed by: FAMILY MEDICINE

## 2023-10-04 PROCEDURE — 3078F DIAST BP <80 MM HG: CPT | Performed by: FAMILY MEDICINE

## 2023-10-04 PROCEDURE — 1159F MED LIST DOCD IN RCRD: CPT | Performed by: FAMILY MEDICINE

## 2023-10-04 PROCEDURE — 99213 OFFICE O/P EST LOW 20 MIN: CPT | Performed by: FAMILY MEDICINE

## 2023-10-04 PROCEDURE — 1126F AMNT PAIN NOTED NONE PRSNT: CPT | Performed by: FAMILY MEDICINE

## 2023-10-04 PROCEDURE — 90662 IIV NO PRSV INCREASED AG IM: CPT | Performed by: FAMILY MEDICINE

## 2023-10-04 PROCEDURE — 3074F SYST BP LT 130 MM HG: CPT | Performed by: FAMILY MEDICINE

## 2023-10-04 PROCEDURE — 4010F ACE/ARB THERAPY RXD/TAKEN: CPT | Performed by: FAMILY MEDICINE

## 2023-10-04 RX ORDER — TRAMADOL HYDROCHLORIDE 50 MG/1
TABLET ORAL
COMMUNITY
Start: 2023-09-19 | End: 2024-05-29 | Stop reason: ALTCHOICE

## 2023-10-04 RX ORDER — AMOXICILLIN 875 MG/1
875 TABLET, FILM COATED ORAL 2 TIMES DAILY
Qty: 10 TABLET | Refills: 0 | Status: SHIPPED | OUTPATIENT
Start: 2023-10-04 | End: 2023-10-09

## 2023-10-04 ASSESSMENT — PAIN SCALES - GENERAL: PAINLEVEL: 0-NO PAIN

## 2023-10-04 NOTE — PROGRESS NOTES
Subjective   Patient ID: Raiza Nguyễn is a 72 y.o. female who presents for Infection (Possible fingernail infection on right hand index finger).    HPI   Raiza was seen today with concerns over a possible right index finger infection.  Within the last day or so.  He has no feeling in that particular finger she has had neuropathy since chemotherapy.  She has no fevers, other constitutional symptoms.  Medication(s) are being taken and tolerated as prescribed, without concerns, list reconciled today.    Review of Systems  The full, 10+ multi-organ review of systems, is within normal limits with the exception of what is noted above in HPI.  Objective   /69 (BP Location: Right arm, Patient Position: Sitting, BP Cuff Size: Adult)   Pulse 85   Temp 36.6 °C (97.9 °F) (Temporal)   Resp 16   Wt 81.5 kg (179 lb 9.6 oz)   SpO2 96%   BMI 35.67 kg/m²     Physical Exam  Constitutional/General appearance: alert, oriented, well-appearing, in no distress  Head and face exam is normal  No scleral icterus or conjunctival erythema present  Hearing is grossly normal  Respiratory effort is normal, no dyspnea noted  Cortical function is normal  Mood, affect, are pleasant, appropriate, and interactive.  Insight is normal  Right index finger reveals the distal third of her nail partly removed, erythema and edema at the proximal nail bed, no discharge present.  Subungual skin is erythematou  Assessment/Plan        Infected right index finger/paronychia, soaks discussed, antibiotics as prescribed, she should do fine, call if symptoms do not improve sufficiently.    **Portions of this medical record have been created using voice recognition software and may have minor errors which are inherent in voice recognition systems. It has not been fully edited for typographical or grammatical errors**

## 2023-10-24 ENCOUNTER — TELEPHONE (OUTPATIENT)
Dept: OTOLARYNGOLOGY | Facility: HOSPITAL | Age: 73
End: 2023-10-24
Payer: MEDICARE

## 2023-10-24 NOTE — TELEPHONE ENCOUNTER
Topic: Pt called stating she was told she could not use her right arm after Inspire surgery.    Dr Stephens aware and clarified that pt can use her arm for daily care, just cannot lift more than 10lbs with the right arm.  I called pt back and gave her this message. Pt verbalized understanding. Pt states she recently lost her sister and she will be calling the office to reschedule her appts.

## 2023-10-27 ENCOUNTER — APPOINTMENT (OUTPATIENT)
Dept: OTOLARYNGOLOGY | Facility: CLINIC | Age: 73
End: 2023-10-27
Payer: MEDICARE

## 2023-11-06 ENCOUNTER — OFFICE VISIT (OUTPATIENT)
Dept: DERMATOLOGY | Facility: CLINIC | Age: 73
End: 2023-11-06
Payer: MEDICARE

## 2023-11-06 DIAGNOSIS — L03.011 PARONYCHIA OF FINGER OF RIGHT HAND: Primary | ICD-10-CM

## 2023-11-06 PROCEDURE — 1036F TOBACCO NON-USER: CPT | Performed by: NURSE PRACTITIONER

## 2023-11-06 PROCEDURE — 99213 OFFICE O/P EST LOW 20 MIN: CPT | Performed by: NURSE PRACTITIONER

## 2023-11-06 PROCEDURE — 1160F RVW MEDS BY RX/DR IN RCRD: CPT | Performed by: NURSE PRACTITIONER

## 2023-11-06 PROCEDURE — 1159F MED LIST DOCD IN RCRD: CPT | Performed by: NURSE PRACTITIONER

## 2023-11-06 PROCEDURE — 3078F DIAST BP <80 MM HG: CPT | Performed by: NURSE PRACTITIONER

## 2023-11-06 PROCEDURE — 1126F AMNT PAIN NOTED NONE PRSNT: CPT | Performed by: NURSE PRACTITIONER

## 2023-11-06 PROCEDURE — 3074F SYST BP LT 130 MM HG: CPT | Performed by: NURSE PRACTITIONER

## 2023-11-06 PROCEDURE — 3008F BODY MASS INDEX DOCD: CPT | Performed by: NURSE PRACTITIONER

## 2023-11-06 PROCEDURE — 4010F ACE/ARB THERAPY RXD/TAKEN: CPT | Performed by: NURSE PRACTITIONER

## 2023-11-06 PROCEDURE — 3044F HG A1C LEVEL LT 7.0%: CPT | Performed by: NURSE PRACTITIONER

## 2023-11-06 RX ORDER — BETAMETHASONE DIPROPIONATE 0.5 MG/G
CREAM TOPICAL 2 TIMES DAILY
Qty: 45 G | Refills: 2 | Status: SHIPPED | OUTPATIENT
Start: 2023-11-06 | End: 2024-04-11 | Stop reason: WASHOUT

## 2023-11-06 NOTE — PROGRESS NOTES
Subjective     Raiza Nguyễn is a 72 y.o. female who presents for the following: Skin Check (Presents to office today for examination of right hand, 2nd digit. TX oral ABX x 5 days. Onset 8 weeks ago. Lymph node removal on right side 2/2 breast cancer.).     Review of Systems:  No other skin or systemic complaints other than what is documented elsewhere in the note.    The following portions of the chart were reviewed this encounter and updated as appropriate:  Tobacco  Allergies  Problems  Med Hx  Surg Hx  Fam Hx         Skin Cancer History  No skin cancer on file.      Specialty Problems          Dermatology Problems    Onychomycosis        Objective   Well appearing patient in no apparent distress; mood and affect are within normal limits.    A full examination was performed including scalp, head, eyes, ears, nose, lips, neck, chest, axillae, abdomen, back, buttocks, bilateral upper extremities, bilateral lower extremities, hands, feet, fingers, toes, fingernails, and toenails. All findings within normal limits unless otherwise noted below.    Assessment/Plan   1. Paronychia of finger of right hand  Right 2nd Finger Nail Plate  Mild erythema of nailfold, appears to be healing well. PMH of breast CA, mild lymphedema on right side. No signs of infection.     - Initial injury opening a can of cat food. Improved with oral abx, but not fully healed.   - Discussed delayed healing with lymphedema.   - Start betamethasone, use as directed.   - Please call the office if the condition worsens.   - Return in one month for re-evaluation.     Follow Up In Dermatology - Established Patient - Right 2nd Finger Nail Plate

## 2023-11-14 ENCOUNTER — APPOINTMENT (OUTPATIENT)
Dept: DERMATOLOGY | Facility: CLINIC | Age: 73
End: 2023-11-14
Payer: MEDICARE

## 2023-12-06 ENCOUNTER — TELEPHONE (OUTPATIENT)
Dept: PRIMARY CARE | Facility: CLINIC | Age: 73
End: 2023-12-06

## 2023-12-06 DIAGNOSIS — U07.1 COVID-19: Primary | ICD-10-CM

## 2023-12-06 NOTE — TELEPHONE ENCOUNTER
Onset of Covid illness- 12/4  Date & Location of positive covid 19 test-12/5 at  home  Vaccinated for covid 19- Yes and Boosters   Current Symptoms  - Cough, Congestion, Body aches, Lose of Taste, Drainage   Temperature-N/A  Current Pulse O2 - N/A     I have asked patient and they report they have NO increased SOB, No CP, YES confusion, No change in skin color ( blue or gray).   Pt is calling to see if she qualifies for Paxlovid.     Provider if you can please review patient's chart and medical history and provide detailed instructions on next steps in this task.   Thank you

## 2024-01-03 VITALS
TEMPERATURE: 97.6 F | DIASTOLIC BLOOD PRESSURE: 62 MMHG | SYSTOLIC BLOOD PRESSURE: 126 MMHG | HEIGHT: 59 IN | HEART RATE: 86 BPM | SYSTOLIC BLOOD PRESSURE: 100 MMHG | RESPIRATION RATE: 8 BRPM | DIASTOLIC BLOOD PRESSURE: 64 MMHG | HEART RATE: 76 BPM | OXYGEN SATURATION: 100 % | WEIGHT: 175 LBS | HEART RATE: 109 BPM | SYSTOLIC BLOOD PRESSURE: 124 MMHG | RESPIRATION RATE: 16 BRPM | RESPIRATION RATE: 7 BRPM | SYSTOLIC BLOOD PRESSURE: 131 MMHG | SYSTOLIC BLOOD PRESSURE: 124 MMHG | HEART RATE: 111 BPM | TEMPERATURE: 97.6 F | SYSTOLIC BLOOD PRESSURE: 100 MMHG | HEART RATE: 103 BPM | RESPIRATION RATE: 4 BRPM | HEART RATE: 117 BPM | DIASTOLIC BLOOD PRESSURE: 108 MMHG | HEART RATE: 98 BPM | RESPIRATION RATE: 13 BRPM | HEART RATE: 111 BPM | SYSTOLIC BLOOD PRESSURE: 132 MMHG | RESPIRATION RATE: 14 BRPM | DIASTOLIC BLOOD PRESSURE: 76 MMHG | OXYGEN SATURATION: 98 % | HEIGHT: 59 IN | RESPIRATION RATE: 13 BRPM | RESPIRATION RATE: 12 BRPM | DIASTOLIC BLOOD PRESSURE: 62 MMHG | OXYGEN SATURATION: 97 % | RESPIRATION RATE: 7 BRPM | RESPIRATION RATE: 11 BRPM | RESPIRATION RATE: 4 BRPM | OXYGEN SATURATION: 97 % | WEIGHT: 175 LBS | DIASTOLIC BLOOD PRESSURE: 80 MMHG | HEART RATE: 86 BPM | SYSTOLIC BLOOD PRESSURE: 164 MMHG | OXYGEN SATURATION: 100 % | DIASTOLIC BLOOD PRESSURE: 64 MMHG | OXYGEN SATURATION: 100 % | OXYGEN SATURATION: 98 % | RESPIRATION RATE: 16 BRPM | SYSTOLIC BLOOD PRESSURE: 131 MMHG | OXYGEN SATURATION: 96 % | RESPIRATION RATE: 8 BRPM | SYSTOLIC BLOOD PRESSURE: 132 MMHG | HEART RATE: 100 BPM | DIASTOLIC BLOOD PRESSURE: 108 MMHG | OXYGEN SATURATION: 93 % | HEART RATE: 109 BPM | OXYGEN SATURATION: 98 % | DIASTOLIC BLOOD PRESSURE: 108 MMHG | OXYGEN SATURATION: 94 % | HEART RATE: 98 BPM | DIASTOLIC BLOOD PRESSURE: 62 MMHG | RESPIRATION RATE: 12 BRPM | RESPIRATION RATE: 14 BRPM | HEART RATE: 103 BPM | SYSTOLIC BLOOD PRESSURE: 140 MMHG | DIASTOLIC BLOOD PRESSURE: 64 MMHG | HEART RATE: 98 BPM | DIASTOLIC BLOOD PRESSURE: 77 MMHG | DIASTOLIC BLOOD PRESSURE: 80 MMHG | OXYGEN SATURATION: 96 % | SYSTOLIC BLOOD PRESSURE: 140 MMHG | SYSTOLIC BLOOD PRESSURE: 131 MMHG | RESPIRATION RATE: 11 BRPM | HEART RATE: 94 BPM | SYSTOLIC BLOOD PRESSURE: 132 MMHG | DIASTOLIC BLOOD PRESSURE: 92 MMHG | DIASTOLIC BLOOD PRESSURE: 80 MMHG | DIASTOLIC BLOOD PRESSURE: 77 MMHG | DIASTOLIC BLOOD PRESSURE: 68 MMHG | HEART RATE: 94 BPM | DIASTOLIC BLOOD PRESSURE: 92 MMHG | HEART RATE: 76 BPM | RESPIRATION RATE: 14 BRPM | DIASTOLIC BLOOD PRESSURE: 92 MMHG | OXYGEN SATURATION: 96 % | RESPIRATION RATE: 13 BRPM | WEIGHT: 175 LBS | HEART RATE: 103 BPM | OXYGEN SATURATION: 94 % | RESPIRATION RATE: 4 BRPM | DIASTOLIC BLOOD PRESSURE: 68 MMHG | OXYGEN SATURATION: 94 % | HEART RATE: 111 BPM | HEIGHT: 59 IN | HEART RATE: 117 BPM | SYSTOLIC BLOOD PRESSURE: 164 MMHG | DIASTOLIC BLOOD PRESSURE: 76 MMHG | HEART RATE: 86 BPM | SYSTOLIC BLOOD PRESSURE: 100 MMHG | RESPIRATION RATE: 16 BRPM | DIASTOLIC BLOOD PRESSURE: 76 MMHG | HEART RATE: 76 BPM | HEART RATE: 94 BPM | DIASTOLIC BLOOD PRESSURE: 77 MMHG | RESPIRATION RATE: 11 BRPM | SYSTOLIC BLOOD PRESSURE: 126 MMHG | RESPIRATION RATE: 8 BRPM | TEMPERATURE: 97.6 F | SYSTOLIC BLOOD PRESSURE: 140 MMHG | OXYGEN SATURATION: 97 % | RESPIRATION RATE: 7 BRPM | DIASTOLIC BLOOD PRESSURE: 68 MMHG | HEART RATE: 100 BPM | HEART RATE: 100 BPM | HEART RATE: 109 BPM | HEART RATE: 117 BPM | SYSTOLIC BLOOD PRESSURE: 164 MMHG | OXYGEN SATURATION: 93 % | OXYGEN SATURATION: 93 % | SYSTOLIC BLOOD PRESSURE: 124 MMHG | RESPIRATION RATE: 12 BRPM | SYSTOLIC BLOOD PRESSURE: 126 MMHG

## 2024-01-10 ENCOUNTER — AMBULATORY SURGICAL CENTER (OUTPATIENT)
Dept: URBAN - METROPOLITAN AREA SURGERY 12 | Facility: SURGERY | Age: 74
End: 2024-01-10
Payer: MEDICARE

## 2024-01-10 DIAGNOSIS — Z80.0 FAMILY HISTORY OF MALIGNANT NEOPLASM OF DIGESTIVE ORGANS: ICD-10-CM

## 2024-01-10 DIAGNOSIS — K57.30 DIVERTICULOSIS OF LARGE INTESTINE WITHOUT PERFORATION OR ABS: ICD-10-CM

## 2024-01-10 DIAGNOSIS — Z09 ENCOUNTER FOR FOLLOW-UP EXAMINATION AFTER COMPLETED TREATMEN: ICD-10-CM

## 2024-01-10 DIAGNOSIS — K64.8 OTHER HEMORRHOIDS: ICD-10-CM

## 2024-01-10 DIAGNOSIS — Z86.010 PERSONAL HISTORY OF COLONIC POLYPS: ICD-10-CM

## 2024-01-10 DIAGNOSIS — E78.5 HYPERLIPIDEMIA, UNSPECIFIED HYPERLIPIDEMIA TYPE: ICD-10-CM

## 2024-01-10 PROCEDURE — G0105 COLORECTAL SCRN; HI RISK IND: HCPCS | Performed by: INTERNAL MEDICINE

## 2024-01-11 RX ORDER — ROSUVASTATIN CALCIUM 5 MG/1
5 TABLET, COATED ORAL DAILY
Qty: 90 TABLET | Refills: 0 | Status: SHIPPED | OUTPATIENT
Start: 2024-01-11 | End: 2024-04-07

## 2024-01-18 DIAGNOSIS — E11.9 TYPE 2 DIABETES MELLITUS WITHOUT COMPLICATION, WITHOUT LONG-TERM CURRENT USE OF INSULIN (MULTI): Primary | ICD-10-CM

## 2024-01-18 DIAGNOSIS — G89.29 OTHER CHRONIC PAIN: ICD-10-CM

## 2024-01-18 RX ORDER — DULOXETIN HYDROCHLORIDE 30 MG/1
CAPSULE, DELAYED RELEASE ORAL
Qty: 270 CAPSULE | Refills: 0 | Status: SHIPPED | OUTPATIENT
Start: 2024-01-18 | End: 2024-04-11 | Stop reason: SDUPTHER

## 2024-01-30 ENCOUNTER — LAB (OUTPATIENT)
Dept: LAB | Facility: LAB | Age: 74
End: 2024-01-30
Payer: MEDICARE

## 2024-01-30 ENCOUNTER — OFFICE VISIT (OUTPATIENT)
Dept: DERMATOLOGY | Facility: CLINIC | Age: 74
End: 2024-01-30
Payer: MEDICARE

## 2024-01-30 DIAGNOSIS — I10 ESSENTIAL (PRIMARY) HYPERTENSION: ICD-10-CM

## 2024-01-30 DIAGNOSIS — E11.9 TYPE 2 DIABETES MELLITUS WITHOUT COMPLICATION, WITHOUT LONG-TERM CURRENT USE OF INSULIN (MULTI): ICD-10-CM

## 2024-01-30 DIAGNOSIS — L03.011 PARONYCHIA OF FINGER OF RIGHT HAND: Primary | ICD-10-CM

## 2024-01-30 DIAGNOSIS — R00.2 PALPITATIONS: Primary | ICD-10-CM

## 2024-01-30 DIAGNOSIS — L60.9 NAIL PROBLEM: ICD-10-CM

## 2024-01-30 PROCEDURE — 1157F ADVNC CARE PLAN IN RCRD: CPT | Performed by: NURSE PRACTITIONER

## 2024-01-30 PROCEDURE — 1160F RVW MEDS BY RX/DR IN RCRD: CPT | Performed by: NURSE PRACTITIONER

## 2024-01-30 PROCEDURE — 99213 OFFICE O/P EST LOW 20 MIN: CPT | Performed by: NURSE PRACTITIONER

## 2024-01-30 PROCEDURE — 1159F MED LIST DOCD IN RCRD: CPT | Performed by: NURSE PRACTITIONER

## 2024-01-30 PROCEDURE — 80048 BASIC METABOLIC PNL TOTAL CA: CPT

## 2024-01-30 PROCEDURE — 83036 HEMOGLOBIN GLYCOSYLATED A1C: CPT

## 2024-01-30 PROCEDURE — 1126F AMNT PAIN NOTED NONE PRSNT: CPT | Performed by: NURSE PRACTITIONER

## 2024-01-30 PROCEDURE — 3008F BODY MASS INDEX DOCD: CPT | Performed by: NURSE PRACTITIONER

## 2024-01-30 PROCEDURE — 1036F TOBACCO NON-USER: CPT | Performed by: NURSE PRACTITIONER

## 2024-01-30 PROCEDURE — 36415 COLL VENOUS BLD VENIPUNCTURE: CPT

## 2024-01-30 PROCEDURE — 84443 ASSAY THYROID STIM HORMONE: CPT

## 2024-01-30 PROCEDURE — 4010F ACE/ARB THERAPY RXD/TAKEN: CPT | Performed by: NURSE PRACTITIONER

## 2024-01-30 PROCEDURE — 83735 ASSAY OF MAGNESIUM: CPT

## 2024-01-30 NOTE — PROGRESS NOTES
Subjective     Raiza Nguyễn is a 73 y.o. female who presents for the following: Suspicious Skin Lesion (Repeat examination of index fingers, notes improvement - nail is now discolored, looking for reassurance. ).     Review of Systems:  No other skin or systemic complaints other than what is documented elsewhere in the note.    The following portions of the chart were reviewed this encounter and updated as appropriate:   Tobacco  Allergies  Meds  Problems  Med Hx  Surg Hx  Fam Hx         Skin Cancer History  No skin cancer on file.      Specialty Problems          Dermatology Problems    Onychomycosis        Objective   Well appearing patient in no apparent distress; mood and affect are within normal limits.    A focused skin examination was performed. All findings within normal limits unless otherwise noted below.    Assessment/Plan   1. Paronychia of finger of right hand  Right 2nd Finger Proximal Nail Fold  Mild erythema, improved from last visit. Fissure of lateral DIP. Hobbies include watercolors.     - The condition has improved, will continue to monitor.   - Please call the office if the condition flares.   - Can continue betamethasone on your right hand as needed for inflammation/irritation.     Related Medications  betamethasone dipropionate 0.05 % cream  Apply topically 2 times a day.    2. Nail problem    Related Procedures  Follow Up In Dermatology - Established Patient

## 2024-01-31 LAB
ANION GAP SERPL CALC-SCNC: 14 MMOL/L (ref 10–20)
BUN SERPL-MCNC: 20 MG/DL (ref 6–23)
CALCIUM SERPL-MCNC: 10 MG/DL (ref 8.6–10.6)
CHLORIDE SERPL-SCNC: 100 MMOL/L (ref 98–107)
CO2 SERPL-SCNC: 30 MMOL/L (ref 21–32)
CREAT SERPL-MCNC: 0.82 MG/DL (ref 0.5–1.05)
EGFRCR SERPLBLD CKD-EPI 2021: 77 ML/MIN/1.73M*2
EST. AVERAGE GLUCOSE BLD GHB EST-MCNC: 140 MG/DL
GLUCOSE SERPL-MCNC: 121 MG/DL (ref 74–99)
HBA1C MFR BLD: 6.5 %
MAGNESIUM SERPL-MCNC: 1.9 MG/DL (ref 1.6–2.4)
POTASSIUM SERPL-SCNC: 4.2 MMOL/L (ref 3.5–5.3)
SODIUM SERPL-SCNC: 140 MMOL/L (ref 136–145)
TSH SERPL-ACNC: 3.57 MIU/L (ref 0.44–3.98)

## 2024-02-02 ENCOUNTER — APPOINTMENT (OUTPATIENT)
Dept: RADIOLOGY | Facility: CLINIC | Age: 74
End: 2024-02-02
Payer: MEDICARE

## 2024-02-02 DIAGNOSIS — Z15.01 GENETIC SUSCEPTIBILITY TO MALIGNANT NEOPLASM OF BREAST: ICD-10-CM

## 2024-02-02 DIAGNOSIS — Z15.09 BRCA2 POSITIVE: ICD-10-CM

## 2024-02-02 DIAGNOSIS — Z85.3 PERSONAL HISTORY OF MALIGNANT NEOPLASM OF BREAST: ICD-10-CM

## 2024-02-02 DIAGNOSIS — Z15.01 BRCA2 POSITIVE: ICD-10-CM

## 2024-02-19 ENCOUNTER — APPOINTMENT (OUTPATIENT)
Dept: RADIOLOGY | Facility: CLINIC | Age: 74
End: 2024-02-19
Payer: MEDICARE

## 2024-02-19 ENCOUNTER — APPOINTMENT (OUTPATIENT)
Dept: HEMATOLOGY/ONCOLOGY | Facility: CLINIC | Age: 74
End: 2024-02-19
Payer: MEDICARE

## 2024-02-23 ENCOUNTER — OFFICE VISIT (OUTPATIENT)
Dept: SURGICAL ONCOLOGY | Facility: HOSPITAL | Age: 74
End: 2024-02-23
Payer: MEDICARE

## 2024-02-23 VITALS
SYSTOLIC BLOOD PRESSURE: 118 MMHG | BODY MASS INDEX: 36.29 KG/M2 | HEIGHT: 59 IN | HEART RATE: 80 BPM | WEIGHT: 180 LBS | RESPIRATION RATE: 16 BRPM | DIASTOLIC BLOOD PRESSURE: 80 MMHG

## 2024-02-23 DIAGNOSIS — Z15.09 BRCA2 POSITIVE: ICD-10-CM

## 2024-02-23 DIAGNOSIS — C50.911 INVASIVE DUCTAL CARCINOMA OF BREAST, RIGHT (MULTI): ICD-10-CM

## 2024-02-23 DIAGNOSIS — Z15.01 BRCA2 POSITIVE: ICD-10-CM

## 2024-02-23 DIAGNOSIS — Z12.39 BREAST CANCER SCREENING, HIGH RISK PATIENT: Primary | ICD-10-CM

## 2024-02-23 DIAGNOSIS — Z12.31 BREAST CANCER SCREENING BY MAMMOGRAM: ICD-10-CM

## 2024-02-23 PROCEDURE — 1126F AMNT PAIN NOTED NONE PRSNT: CPT | Performed by: NURSE PRACTITIONER

## 2024-02-23 PROCEDURE — 3079F DIAST BP 80-89 MM HG: CPT | Performed by: NURSE PRACTITIONER

## 2024-02-23 PROCEDURE — 3074F SYST BP LT 130 MM HG: CPT | Performed by: NURSE PRACTITIONER

## 2024-02-23 PROCEDURE — 3008F BODY MASS INDEX DOCD: CPT | Performed by: NURSE PRACTITIONER

## 2024-02-23 PROCEDURE — 4010F ACE/ARB THERAPY RXD/TAKEN: CPT | Performed by: NURSE PRACTITIONER

## 2024-02-23 PROCEDURE — 1160F RVW MEDS BY RX/DR IN RCRD: CPT | Performed by: NURSE PRACTITIONER

## 2024-02-23 PROCEDURE — 99213 OFFICE O/P EST LOW 20 MIN: CPT | Performed by: NURSE PRACTITIONER

## 2024-02-23 PROCEDURE — 1157F ADVNC CARE PLAN IN RCRD: CPT | Performed by: NURSE PRACTITIONER

## 2024-02-23 PROCEDURE — 1036F TOBACCO NON-USER: CPT | Performed by: NURSE PRACTITIONER

## 2024-02-23 PROCEDURE — 1159F MED LIST DOCD IN RCRD: CPT | Performed by: NURSE PRACTITIONER

## 2024-02-23 PROCEDURE — 3044F HG A1C LEVEL LT 7.0%: CPT | Performed by: NURSE PRACTITIONER

## 2024-02-23 NOTE — PROGRESS NOTES
[facility] [place]  Raiza Nguyễn female   1950 73 y.o.   [id number]      Chief Complaint  Follow up, right breast changes, history bilateral breast cancer.    History Of Present Illness  Raiza Nguyễn is a 73 y.o. female diagnosed in  with left breast invasive ductal carcinoma (IDC), grade 3, ER+/HI+/HER2 not defined, s/p left partial mastectomy and lymph node dissection with positive lymph nodes, completed chemotherapy, radiation, and 5 years of Anastrozole. She was found to have the BRCA2 mutation. 2018 she was diagnosed with right breast IDC, grade 2, ER+95%, HI+95%, HER2-. On 2018 Dr. Marcia Griffin performed a right magseed partial mastectomy and sentinel lymph node biopsy (2mi/3) and negative margins. Oncotype low score of 17. She completed adjuvant whole breast radiation 10/11/2018- 2018 and was started on Exemestane and tolerating it well (10 year plan) and sees heme/onc. She has a personal history of melanoma and follows with dermatology. She presents today for concern of the right breast. She feels it doesn't look right and would like it evaluated. No skin lesions, nipple discharge, annual mammogram and exam. She notice that her skin puckers for the past month.  Right breast Stage IA [id number]  Left breast Stage II in      BREAST IMAGING: 3/2023 Bilateral full breast MRI, indicates BI-RADS Category 2. Indeterminate enhancing lesion posterior to left pectoralis muscle and rec FU CT chest. 2023 CT chest and mediastinum, no CT evidence of metastatic disease. If further evaluation is desired, a PET/CT may be considered. 2023 Bilateral diagnostic mammogram with right breast ultrasound, indicates BI-RADS Category 2.     REPRODUCTIVE HISTORY: menarche age 11, , first birth age 25,  x 3 months, OCP's x 1.5 years, natural menopause age 52, no HRT, scattered fibroglandular tissue     FAMILY CANCER HISTORY:  Mother with breast cancer age 36, became  metastatic,  age 62.  Father with lung cancer, smoker  MGM with ovarian cancer  Mat uncle with stomach cancer  Mat uncle with colon cancer  Mat cousin with stomach cancer      Review of Systems  Constitutional:  Negative for appetite change, fatigue, fever and unexpected weight change.   HENT:  Negative for ear pain, hearing loss, nosebleeds, sore throat and trouble swallowing.    Eyes:  Negative for discharge, itching and visual disturbance.   Breast: As stated in HPI.  Respiratory:  Negative for cough, chest tightness and shortness of breath.    Cardiovascular:  Negative for chest pain, palpitations and leg swelling.   Gastrointestinal:  Negative for abdominal pain, constipation, diarrhea and nausea.   Endocrine: Negative for cold intolerance and heat intolerance.   Genitourinary:  Negative for dysuria, frequency, hematuria, pelvic pain and vaginal bleeding.   Musculoskeletal:  Negative for arthralgias, back pain, gait problem, joint swelling and myalgias.   Skin:  Negative for color change and rash.   Allergic/Immunologic: Negative for environmental allergies and food allergies.   Neurological:  Negative for dizziness, tremors, speech difficulty, weakness, numbness and headaches.   Hematological:  Does not bruise/bleed easily.   Psychiatric/Behavioral:  Negative for agitation, dysphoric mood and sleep disturbance. The patient is not nervous/anxious.      Past Medical History  She has a past medical history of Anxiety, Depression, Diabetes mellitus (CMS/[facility]), GERD (gastroesophageal reflux disease), Heart disease, Hypersomnia, unspecified (2021), Other conditions influencing health status, Other intervertebral disc degeneration, lumbar region (2014), Other intervertebral disc displacement, lumbar region (2014), Personal history of malignant melanoma of skin (10/16/2019), Personal history of other diseases of the digestive system, Personal history of other endocrine, nutritional and metabolic  disease (02/20/2022), Personal history of other endocrine, nutritional and metabolic disease, and Thyrotoxicosis, unspecified without thyrotoxic crisis or storm.    Surgical History  She has a past surgical history that includes Breast lumpectomy (07/17/2013); Ovary surgery (07/17/2013); Other surgical history (05/14/2021); and Cholecystectomy (10/03/2017).    Family History  Cancer-related family history includes Breast cancer in her mother; Colon cancer in an other family member; Lung cancer in her father; Stomach cancer in her maternal cousin and another family member.     Social History  She reports that she quit smoking about 41 years ago. Her smoking use included cigarettes. She has a 15.00 pack-year smoking history. She has been exposed to tobacco smoke. She has never used smokeless tobacco. She reports current alcohol use. She reports that she does not use drugs.    Allergies  Adhesive, Cephalexin, Erythromycin, Glucosamine, and Tetracycline    Medications  Current Outpatient Medications   Medication Instructions   • betamethasone dipropionate 0.05 % cream Topical, 2 times daily   • CALCIUM ORAL 1 capsule, oral, Daily   • DULoxetine (Cymbalta) 30 mg DR capsule Take 3 capsules all at once,once daily   • esomeprazole (NexIUM) 40 mg DR capsule 1 capsule, oral, Daily   • exemestane (AROMASIN) 25 mg, oral, Daily   • gabapentin (Neurontin) 600 mg tablet 1 tablet, oral, 3 times daily   • hydroCHLOROthiazide (HYDRODIURIL) 25 mg, oral, Daily   • lancets misc 2 Lancets, 2 times daily   • levothyroxine (Synthroid, Levoxyl) 125 mcg tablet 1 tablet, oral, Daily   • lisinopril 10 mg tablet TAKE 1 TABLET TWICE DAILY   • OneTouch Ultra Test strip 1 strip, in vitro, Daily   • rosuvastatin (CRESTOR) 5 mg, oral, Daily   • traMADol (Ultram) 50 mg tablet TAKE 1 TABLET BY MOUTH TWICE A DAY AS NEEDED FOR MODERATE TO SEVERE PAIN FOR 7 DAYS       Last Recorded Vitals  Vitals:    02/23/24 0809   BP: 118/80   Pulse: 80   Resp: 16         Physical Exam  Chest:       Patient is alert and oriented x3 and in a relaxed and appropriate mood. Her gait is steady and hand grasps are equal. Sclera is clear. The breasts are nearly symmetrical. Bilateral breasts have well healed partial mastectomy incisions with axillary incisions. The tissue is soft without palpable abnormalities, discrete nodules or masses. The skin and nipples appear normal. There are 2 right chest wall incisions s/p port placement. There is no cervical, supraclavicular or axillary lymphadenopathy. Heart rate and rhythm normal, S1 and S2 appreciated. The lungs are clear to auscultation bilaterally. Abdomen is soft and non-tender.     Orders  Orders Placed This Encounter   Procedures   • BI mammo bilateral screening tomosynthesis     Standing Status:   Future     Standing Expiration Date:   4/23/2025     Order Specific Question:   Reason for exam:     Answer:   annual screening mammogram, history right breast cancer     Order Specific Question:   Radiologist to Determine Optimal Study     Answer:   Yes     Order Specific Question:   Release result to [employer]     Answer:   Immediate     Order Specific Question:   Is this exam part of a Research Study? If Yes, link this order to the research study     Answer:   No   • MR breast bilateral w contrast full protocol     Standing Status:   Future     Standing Expiration Date:   4/23/2025     Order Specific Question:   Has the Patient had a prior MRI with contrast and had an allergic reaction?     Answer:   No     Order Specific Question:   Reason for exam:     Answer:   history right breast cancer, high risk surveillance care, BRCA2+     Order Specific Question:   Is the patient pregnant or breast feeding?     Answer:   No     Order Specific Question:   What is the patient's sedation requirement?     Answer:   No Sedation     Order Specific Question:   Does the patient have a Cochlear Implant, Pacemaker, Defibrillator, Pacing Wire, Brain Aneurysm  Clip, Implanted Nerve or Bone Graft Simulator, Implanted Breast Tissue Expander, Glucose Monitor or Neulasta Device?     Answer:   No     Order Specific Question:   Radiologist to Determine Optimal Study     Answer:   Yes     Order Specific Question:   Release result to [employer]     Answer:   Immediate     Order Specific Question:   Is this exam part of a Research Study? If Yes, link this order to the research study     Answer:   No       Visit Diagnosis  1. Breast cancer screening, high risk patient  Clinic Appointment Request Follow Up    MR breast bilateral w contrast full protocol      2. BRCA2 positive  MR breast bilateral w contrast full protocol      3. Invasive ductal carcinoma of breast, right (CMS/HCC)  MR breast bilateral w contrast full protocol      4. Breast cancer screening by mammogram  BI mammo bilateral screening tomosynthesis        Assessment/Plan  Breast cancer surveillance, normal clinical exam, remote history of hormone positive left breast cancer, s/p left partial mastectomy, completed adjuvant chemotherapy, radiation, and 5 years of Anastrazole, history of right breast cancer, hormone positive, s/p right partial mastectomy, completed adjuvant radiation, took Exemestane therapy, BRCA2+, family history of breast cancer, scattered fibroglandular tissue     Plan:  Return September 2024 for bilateral screening mammogram and office visit. Proceed with full breast MRI March 2024.     Patient Discussion/Summary  Your clinical examination and imaging are normal. Please return in September 2024 for mammogram and office visit or sooner if you have any problems or concerns.     You can see your health information, review clinical summaries from office visits & test results online when you follow your health with MY  Chart, a personal health record. To sign up go to [email or web address]. If you need assistance with signing up or trouble getting into your account call [employer] Patient Line 24/7 at  [phone].    My office phone number is [phone] if you need to get in touch with me or have additional questions or concerns. Thank you for choosing [facility] and trusting me as your healthcare provider. I look forward to seeing you again at your next office visit. I am honored to be a provider on your health care team and I remain dedicated to helping you achieve your health goals.    Arielle Mcdermott, AARON-CNP

## 2024-02-26 ENCOUNTER — OFFICE VISIT (OUTPATIENT)
Dept: HEMATOLOGY/ONCOLOGY | Facility: CLINIC | Age: 74
End: 2024-02-26
Payer: MEDICARE

## 2024-02-26 VITALS
HEART RATE: 91 BPM | TEMPERATURE: 97.3 F | SYSTOLIC BLOOD PRESSURE: 161 MMHG | DIASTOLIC BLOOD PRESSURE: 88 MMHG | BODY MASS INDEX: 37.4 KG/M2 | RESPIRATION RATE: 14 BRPM | WEIGHT: 185.19 LBS | OXYGEN SATURATION: 95 %

## 2024-02-26 DIAGNOSIS — Z85.3 HISTORY OF CANCER OF LEFT BREAST: Primary | ICD-10-CM

## 2024-02-26 DIAGNOSIS — I10 ESSENTIAL HYPERTENSION: ICD-10-CM

## 2024-02-26 PROBLEM — K57.30 DIVERTICULOSIS OF LARGE INTESTINE WITHOUT PERFORATION OR ABSCESS WITHOUT BLEEDING: Status: ACTIVE | Noted: 2023-07-07

## 2024-02-26 PROBLEM — D22.39 MELANOCYTIC NEVI OF OTHER PARTS OF FACE: Status: ACTIVE | Noted: 2023-06-14

## 2024-02-26 PROBLEM — L85.3 XEROSIS CUTIS: Status: ACTIVE | Noted: 2023-03-08

## 2024-02-26 PROBLEM — I95.9 HYPOTENSION: Status: ACTIVE | Noted: 2024-02-26

## 2024-02-26 PROBLEM — M79.89 SWELLING OF LOWER EXTREMITY: Status: ACTIVE | Noted: 2024-02-26

## 2024-02-26 PROBLEM — D22.9 MELANOCYTIC NEVI, UNSPECIFIED: Status: ACTIVE | Noted: 2023-06-14

## 2024-02-26 PROBLEM — C50.919 MALIGNANT NEOPLASM OF BREAST (MULTI): Status: ACTIVE | Noted: 2023-01-18

## 2024-02-26 PROBLEM — N63.0 MASS OF BREAST: Status: ACTIVE | Noted: 2024-02-26

## 2024-02-26 PROBLEM — D22.20 MELANOCYTIC NEVI OF EAR AND EXTERNAL AURICULAR CANAL: Status: ACTIVE | Noted: 2023-06-14

## 2024-02-26 PROBLEM — L60.9 NAIL PROBLEM: Status: ACTIVE | Noted: 2024-02-26

## 2024-02-26 PROBLEM — R21 RASH: Status: ACTIVE | Noted: 2021-09-28

## 2024-02-26 PROBLEM — Z00.00 MEDICARE ANNUAL WELLNESS VISIT, INITIAL: Status: ACTIVE | Noted: 2024-02-26

## 2024-02-26 PROBLEM — R00.2 PALPITATIONS: Status: ACTIVE | Noted: 2024-01-30

## 2024-02-26 PROBLEM — D18.01 HEMANGIOMA OF SKIN AND SUBCUTANEOUS TISSUE: Status: ACTIVE | Noted: 2023-06-14

## 2024-02-26 PROBLEM — R06.81 APNEA: Status: ACTIVE | Noted: 2024-02-26

## 2024-02-26 PROBLEM — B07.8 OTHER VIRAL WARTS: Status: ACTIVE | Noted: 2023-06-14

## 2024-02-26 PROBLEM — E66.9 OBESITY WITH BODY MASS INDEX 30 OR GREATER: Status: ACTIVE | Noted: 2024-02-26

## 2024-02-26 PROBLEM — D22.5 MELANOCYTIC NEVI OF TRUNK: Status: ACTIVE | Noted: 2023-06-14

## 2024-02-26 PROBLEM — Z01.818 PREOPERATIVE CLEARANCE: Status: ACTIVE | Noted: 2024-02-26

## 2024-02-26 PROBLEM — R22.32 MASS OF LEFT AXILLA: Status: ACTIVE | Noted: 2024-02-26

## 2024-02-26 PROBLEM — R16.0 HEPATOMEGALY: Status: ACTIVE | Noted: 2023-07-06

## 2024-02-26 PROBLEM — F41.8 MIXED ANXIETY DEPRESSIVE DISORDER: Status: ACTIVE | Noted: 2024-02-26

## 2024-02-26 PROBLEM — B35.1 TINEA UNGUIUM: Status: ACTIVE | Noted: 2023-06-14

## 2024-02-26 PROBLEM — Z15.01 GENETIC SUSCEPTIBILITY TO MALIGNANT NEOPLASM OF BREAST: Status: ACTIVE | Noted: 2023-08-14

## 2024-02-26 PROBLEM — R22.2 MASS ON BACK: Status: ACTIVE | Noted: 2024-02-26

## 2024-02-26 PROBLEM — B07.9 VIRAL WART, UNSPECIFIED: Status: ACTIVE | Noted: 2023-06-14

## 2024-02-26 PROBLEM — D72.819 LEUKOPENIA: Status: ACTIVE | Noted: 2024-02-26

## 2024-02-26 PROBLEM — E05.90 HYPERTHYROIDISM: Status: ACTIVE | Noted: 2023-01-18

## 2024-02-26 PROBLEM — Z85.820 HISTORY OF MALIGNANT MELANOMA OF SKIN: Status: ACTIVE | Noted: 2023-06-14

## 2024-02-26 PROBLEM — R26.9 ABNORMAL GAIT: Status: ACTIVE | Noted: 2023-05-31

## 2024-02-26 PROBLEM — Z13.29 SCREENING FOR ENDOCRINE, METABOLIC, AND IMMUNITY DISORDER: Status: ACTIVE | Noted: 2024-02-26

## 2024-02-26 PROBLEM — Z17.0 ESTROGEN RECEPTOR POSITIVE STATUS (ER+): Status: ACTIVE | Noted: 2023-04-10

## 2024-02-26 PROBLEM — L03.119 CELLULITIS OF LOWER EXTREMITY: Status: ACTIVE | Noted: 2024-02-26

## 2024-02-26 PROBLEM — S40.029A: Status: ACTIVE | Noted: 2024-02-26

## 2024-02-26 PROBLEM — B07.8 OTHER VIRAL WARTS: Status: ACTIVE | Noted: 2022-01-05

## 2024-02-26 PROBLEM — M25.569 KNEE PAIN: Status: ACTIVE | Noted: 2024-02-26

## 2024-02-26 PROBLEM — R93.89 ABNORMAL FINDING OF DIAGNOSTIC IMAGING: Status: ACTIVE | Noted: 2024-02-26

## 2024-02-26 PROBLEM — Z13.0 SCREENING FOR ENDOCRINE, METABOLIC, AND IMMUNITY DISORDER: Status: ACTIVE | Noted: 2024-02-26

## 2024-02-26 PROBLEM — E55.9 VITAMIN D DEFICIENCY: Status: ACTIVE | Noted: 2024-02-26

## 2024-02-26 PROBLEM — Z15.01 BRCA2 GENE MUTATION POSITIVE: Status: ACTIVE | Noted: 2023-01-18

## 2024-02-26 PROBLEM — M48.062 SPINAL STENOSIS OF LUMBAR REGION WITH NEUROGENIC CLAUDICATION: Status: ACTIVE | Noted: 2023-06-13

## 2024-02-26 PROBLEM — M17.9 OSTEOARTHRITIS OF KNEE: Status: ACTIVE | Noted: 2024-02-26

## 2024-02-26 PROBLEM — Z85.820 PERSONAL HISTORY OF MALIGNANT MELANOMA OF SKIN: Status: ACTIVE | Noted: 2023-06-14

## 2024-02-26 PROBLEM — R53.83 FATIGUE: Status: ACTIVE | Noted: 2023-07-06

## 2024-02-26 PROBLEM — G47.9 DIFFICULTY SLEEPING: Status: ACTIVE | Noted: 2024-02-26

## 2024-02-26 PROBLEM — E66.9 OBESITY: Status: ACTIVE | Noted: 2023-01-18

## 2024-02-26 PROBLEM — N81.4 CYSTOCELE WITH PROLAPSE: Status: ACTIVE | Noted: 2024-02-26

## 2024-02-26 PROBLEM — Z15.09 BRCA2 GENE MUTATION POSITIVE: Status: ACTIVE | Noted: 2023-01-18

## 2024-02-26 PROBLEM — D48.5 NEOPLASM OF UNCERTAIN BEHAVIOR OF SKIN: Status: ACTIVE | Noted: 2021-09-28

## 2024-02-26 PROBLEM — M25.519 SHOULDER PAIN: Status: ACTIVE | Noted: 2023-05-31

## 2024-02-26 PROBLEM — L65.9 LOSS OF HAIR: Status: ACTIVE | Noted: 2024-02-26

## 2024-02-26 PROBLEM — Z79.811 LONG TERM (CURRENT) USE OF AROMATASE INHIBITORS: Status: ACTIVE | Noted: 2023-04-10

## 2024-02-26 PROBLEM — Z23 IMMUNIZATION DUE: Status: ACTIVE | Noted: 2024-02-26

## 2024-02-26 PROBLEM — R40.0 DAYTIME SOMNOLENCE: Status: ACTIVE | Noted: 2024-02-26

## 2024-02-26 PROBLEM — L85.3 DRY SKIN: Status: ACTIVE | Noted: 2024-02-26

## 2024-02-26 PROBLEM — R41.3 AMNESIA: Status: ACTIVE | Noted: 2024-02-26

## 2024-02-26 PROBLEM — Z96.659 HISTORY OF TOTAL KNEE REPLACEMENT: Status: ACTIVE | Noted: 2024-02-26

## 2024-02-26 PROBLEM — M17.11 OSTEOARTHRITIS OF RIGHT KNEE: Status: ACTIVE | Noted: 2024-02-26

## 2024-02-26 PROBLEM — M25.539 PAIN IN WRIST: Status: ACTIVE | Noted: 2024-02-26

## 2024-02-26 PROBLEM — I25.10 ARTERIOSCLEROSIS OF CORONARY ARTERY: Status: ACTIVE | Noted: 2023-01-18

## 2024-02-26 PROBLEM — K80.20 CHOLELITHIASIS: Status: ACTIVE | Noted: 2024-02-26

## 2024-02-26 PROBLEM — G47.33 OBSTRUCTIVE SLEEP APNEA SYNDROME: Status: ACTIVE | Noted: 2023-01-18

## 2024-02-26 PROBLEM — J01.00 ACUTE MAXILLARY SINUSITIS: Status: ACTIVE | Noted: 2024-02-26

## 2024-02-26 PROBLEM — G56.00 CARPAL TUNNEL SYNDROME: Status: ACTIVE | Noted: 2024-02-26

## 2024-02-26 PROBLEM — L30.4 ERYTHEMA INTERTRIGO: Status: ACTIVE | Noted: 2023-06-14

## 2024-02-26 PROBLEM — M65.312 TRIGGER FINGER OF LEFT THUMB: Status: ACTIVE | Noted: 2024-02-26

## 2024-02-26 PROBLEM — L57.8 OTHER SKIN CHANGES DUE TO CHRONIC EXPOSURE TO NONIONIZING RADIATION: Status: ACTIVE | Noted: 2023-06-14

## 2024-02-26 PROBLEM — Z96.60 HISTORY OF ARTIFICIAL JOINT: Status: ACTIVE | Noted: 2024-02-26

## 2024-02-26 PROBLEM — L57.0 ACTINIC KERATOSIS: Status: ACTIVE | Noted: 2023-03-08

## 2024-02-26 PROBLEM — M51.379 DDD (DEGENERATIVE DISC DISEASE), LUMBOSACRAL: Status: ACTIVE | Noted: 2023-06-13

## 2024-02-26 PROBLEM — L24.89 IRRITANT CONTACT DERMATITIS DUE TO OTHER AGENTS: Status: ACTIVE | Noted: 2023-06-14

## 2024-02-26 PROBLEM — Z78.0 ASYMPTOMATIC POSTMENOPAUSAL STATUS: Status: ACTIVE | Noted: 2024-02-26

## 2024-02-26 PROBLEM — Z12.39 BREAST SCREENING: Status: ACTIVE | Noted: 2023-01-18

## 2024-02-26 PROBLEM — G47.30 BREATHING-RELATED SLEEP DISORDER: Status: ACTIVE | Noted: 2024-02-26

## 2024-02-26 PROBLEM — Z13.228 SCREENING FOR ENDOCRINE, METABOLIC, AND IMMUNITY DISORDER: Status: ACTIVE | Noted: 2024-02-26

## 2024-02-26 PROBLEM — M51.37 DDD (DEGENERATIVE DISC DISEASE), LUMBOSACRAL: Status: ACTIVE | Noted: 2023-06-13

## 2024-02-26 PROBLEM — M17.10 ARTHRITIS OF KNEE: Status: ACTIVE | Noted: 2024-02-26

## 2024-02-26 PROBLEM — L57.9 SKIN CHANGES DUE TO CHRONIC EXPOSURE TO NONIONIZING RADIATION: Status: ACTIVE | Noted: 2022-01-31

## 2024-02-26 PROBLEM — G54.1 LUMBOSACRAL PLEXUS LESION: Status: ACTIVE | Noted: 2020-04-01

## 2024-02-26 PROCEDURE — 3044F HG A1C LEVEL LT 7.0%: CPT | Performed by: STUDENT IN AN ORGANIZED HEALTH CARE EDUCATION/TRAINING PROGRAM

## 2024-02-26 PROCEDURE — 3079F DIAST BP 80-89 MM HG: CPT | Performed by: STUDENT IN AN ORGANIZED HEALTH CARE EDUCATION/TRAINING PROGRAM

## 2024-02-26 PROCEDURE — 1160F RVW MEDS BY RX/DR IN RCRD: CPT | Performed by: STUDENT IN AN ORGANIZED HEALTH CARE EDUCATION/TRAINING PROGRAM

## 2024-02-26 PROCEDURE — 99215 OFFICE O/P EST HI 40 MIN: CPT | Performed by: STUDENT IN AN ORGANIZED HEALTH CARE EDUCATION/TRAINING PROGRAM

## 2024-02-26 PROCEDURE — 1159F MED LIST DOCD IN RCRD: CPT | Performed by: STUDENT IN AN ORGANIZED HEALTH CARE EDUCATION/TRAINING PROGRAM

## 2024-02-26 PROCEDURE — 1157F ADVNC CARE PLAN IN RCRD: CPT | Performed by: STUDENT IN AN ORGANIZED HEALTH CARE EDUCATION/TRAINING PROGRAM

## 2024-02-26 PROCEDURE — 3008F BODY MASS INDEX DOCD: CPT | Performed by: STUDENT IN AN ORGANIZED HEALTH CARE EDUCATION/TRAINING PROGRAM

## 2024-02-26 PROCEDURE — 3077F SYST BP >= 140 MM HG: CPT | Performed by: STUDENT IN AN ORGANIZED HEALTH CARE EDUCATION/TRAINING PROGRAM

## 2024-02-26 PROCEDURE — 4010F ACE/ARB THERAPY RXD/TAKEN: CPT | Performed by: STUDENT IN AN ORGANIZED HEALTH CARE EDUCATION/TRAINING PROGRAM

## 2024-02-26 PROCEDURE — 1036F TOBACCO NON-USER: CPT | Performed by: STUDENT IN AN ORGANIZED HEALTH CARE EDUCATION/TRAINING PROGRAM

## 2024-02-26 PROCEDURE — 1125F AMNT PAIN NOTED PAIN PRSNT: CPT | Performed by: STUDENT IN AN ORGANIZED HEALTH CARE EDUCATION/TRAINING PROGRAM

## 2024-02-26 RX ORDER — GABAPENTIN 100 MG/1
CAPSULE ORAL
COMMUNITY
Start: 2024-01-10 | End: 2024-05-13

## 2024-02-26 RX ORDER — HYDROCHLOROTHIAZIDE 12.5 MG/1
TABLET ORAL
Qty: 180 TABLET | Refills: 0 | OUTPATIENT
Start: 2024-02-26

## 2024-02-26 RX ORDER — GABAPENTIN 300 MG/1
600 CAPSULE ORAL 3 TIMES DAILY
COMMUNITY
Start: 2023-11-22 | End: 2024-05-13 | Stop reason: SDUPTHER

## 2024-02-26 ASSESSMENT — PAIN SCALES - GENERAL: PAINLEVEL: 2

## 2024-02-26 NOTE — PROGRESS NOTES
Breast Medical Oncology Clinic  Location: [place]    Visit Type: New Patient Visit    Oncologic History:    She was diagnosed in January 2003 with a left-sided 1.5 cm infiltrating ductal carcinoma with positive axillary lymph nodes and extranodal extension. It was a grade 3 tumor with hormone receptors positive and HER-2/anabell not defined.   Four cycles of Adriamycin and cyclophosphamide were followed by 4 cycles of paclitaxel in March 2004.   Left lumpectomy and axillary node dissection followed by left breast radiation is also completed in 2004.   She had bilateral oophorectomies in May 2008.   Anastrozole through March 2009.   She had a lesion in her left lower back removed, and confirmed to be a 0.35 mm melanoma. It was not ulcerative and she did not require a sentinel node evaluation. She has since had multiple excisions of atypical myelomonocytic lesions without disease  discovered.   She did well until screening mammogram 8/2018 confirmed finding on PET (done related melanoma dx) .  She had excision of 1.5cm IDC with 1/2 SN with microscopic involvement. She did not want to receive chemotherapy and the lesion was strongly ER and OR  positive, HER 2 negative.   Started on exemestane November 2018.   She is BRCA2 positive     Subjective: History of Present Illness    Patient presents today for follow-up new to doctor visit.    Denies interval events since last follow-up- no recent hospitalizations, new medical diagnoses, or new medications.     Takes cymbalta and gabapentin for chronic pain.     Remains on exemestane. Tolerating well. Rare hot flashes. Has gained 10 lbs. Feels more stiffness in core, feels related to weight gain.     Chronic constipation- recent colonoscopy; due for EGD in march, GERD    Mammogram and MRI scheduled.    Denies weight loss, changes in the breast and/or chest wall, new aches or pains, changes in appetite or energy level. No current concerns at this time.     Pertinent Family  history:    Mother having bilateral breast cancer and a first cousin having breast cancer later in life. Her sister had a GYN malignancy, now metastatic and a maternal aunt with stomach cancer. A maternal uncle had colon  cancer.     Social History  Raiza Nguyễn  reports that she quit smoking about 41 years ago. Her smoking use included cigarettes. She has a 15.00 pack-year smoking history. She has been exposed to tobacco smoke. She has never used smokeless tobacco.  She  reports current alcohol use.  She  reports no history of drug use.    ROS:     Review of Systems   All other systems reviewed and are negative.       Physical Examination:    /88   Pulse 91   Temp 36.3 °C (97.3 °F)   Resp 14   Wt 84 kg (185 lb 3 oz)   SpO2 95%   BMI 37.40 kg/m²     Physical Exam  Vitals and nursing note reviewed.   Constitutional:       General: She is not in acute distress.     Appearance: Normal appearance. She is not toxic-appearing.   HENT:      Head: Normocephalic and atraumatic.   Eyes:      Conjunctiva/sclera: Conjunctivae normal.   Cardiovascular:      Rate and Rhythm: Normal rate.   Pulmonary:      Effort: Pulmonary effort is normal. No respiratory distress.   Abdominal:      General: Abdomen is flat.      Palpations: Abdomen is soft.   Musculoskeletal:         General: No swelling. Normal range of motion.      Cervical back: Normal range of motion.   Skin:     General: Skin is warm and dry.   Neurological:      General: No focal deficit present.      Mental Status: She is alert.   Psychiatric:         Mood and Affect: Mood normal.         Breast Examination:    Left breast: 2:00 6 CM from under incision nodularity unchanged per patient, presumed scar tissue.   Right breast: No masses, skin or nipple changes  Axillary: No significant examination findings    ECOG Performance Status:     [x] 0 Fully active, able to carry on all pre-disease performance without restriction  [] 1 Restricted in physically  strenuous activity but ambulatory and able to carry out work of a light or sedentary nature, e.g., light house work, office work  [] 2 Ambulatory and capable of all selfcare but unable to carry out any work activities; up and about more than 50% of waking hours  [] 3 Capable of only limited selfcare; confined to bed or chair more than 50% of waking hours  [] 4 Completely disabled; cannot carry on any selfcare; totally confined to bed or chair  [] 5 Dead     Results:    Labs:      Lab Results   Component Value Date    WBC 5.6 07/06/2023    HGB 14.1 07/06/2023    HCT 42.9 07/06/2023    MCV 98 07/06/2023     07/06/2023       Chemistry    Lab Results   Component Value Date/Time     01/30/2024 1020    K 4.2 01/30/2024 1020     01/30/2024 1020    CO2 30 01/30/2024 1020    BUN 20 01/30/2024 1020    CREATININE 0.82 01/30/2024 1020    Lab Results   Component Value Date/Time    CALCIUM 10.0 01/30/2024 1020    ALKPHOS 66 07/06/2023 0842    AST 20 07/06/2023 0842    ALT 18 07/06/2023 0842    BILITOT 0.5 07/06/2023 0842             Imaging:  No new pertinent results since last visit    Pathology:  Reviewed above in Onc History    Assessment:     2003: Stage II Left breast cancer in 2003, grade 3 IDC with positive LNs and NASH, ER+ W[place]+; S/p L PM and SLNBx; S/p AC+T; S/p radiation; anastrozole x5 years    2018: Stage IB (Prognostic Stage IA), [id number], grade 2 IDC, ER+95% [place]+95% HER2 equivocal, FISH-. S/p R PM and SLNBx; Oncotype 17; S/p radiation; Exemestane since 11/2028    BRCA2 germline mutation      Plan:    Continue exemestane  DEXA due 4/2015  Yearly mammogram and MRI- ordered by breast surgery team  Referral to genetics for gBRCA2 mutation- pt reports is she s/p BSO  No evidence of breast cancer recurrence per patient history, physical examination and imaging findings.     Follow-up: 1 year    Patient expressed understanding of the plan outlined above. She had ample time to ask questions. She understands  she can contact us should she have additional questions or issues arise in the interim.

## 2024-02-27 ENCOUNTER — APPOINTMENT (OUTPATIENT)
Dept: DERMATOLOGY | Facility: CLINIC | Age: 74
End: 2024-02-27
Payer: MEDICARE

## 2024-02-27 DIAGNOSIS — I10 ESSENTIAL HYPERTENSION: ICD-10-CM

## 2024-02-27 RX ORDER — HYDROCHLOROTHIAZIDE 12.5 MG/1
25 TABLET ORAL DAILY
Qty: 180 TABLET | Refills: 3 | Status: CANCELLED | OUTPATIENT
Start: 2024-02-27

## 2024-02-28 RX ORDER — HYDROCHLOROTHIAZIDE 25 MG/1
25 TABLET ORAL DAILY
Qty: 90 TABLET | Refills: 3 | Status: SHIPPED | OUTPATIENT
Start: 2024-02-28 | End: 2024-04-29 | Stop reason: SDUPTHER

## 2024-03-04 ENCOUNTER — APPOINTMENT (OUTPATIENT)
Dept: PRIMARY CARE | Facility: CLINIC | Age: 74
End: 2024-03-04
Payer: MEDICARE

## 2024-03-22 ENCOUNTER — APPOINTMENT (OUTPATIENT)
Dept: SURGICAL ONCOLOGY | Facility: HOSPITAL | Age: 74
End: 2024-03-22
Payer: MEDICARE

## 2024-03-22 ENCOUNTER — OFFICE VISIT (OUTPATIENT)
Dept: PRIMARY CARE | Facility: CLINIC | Age: 74
End: 2024-03-22
Payer: MEDICARE

## 2024-03-22 VITALS
OXYGEN SATURATION: 98 % | HEART RATE: 83 BPM | TEMPERATURE: 96.7 F | WEIGHT: 182.6 LBS | SYSTOLIC BLOOD PRESSURE: 107 MMHG | DIASTOLIC BLOOD PRESSURE: 70 MMHG | BODY MASS INDEX: 36.88 KG/M2

## 2024-03-22 DIAGNOSIS — L72.9 SUBCUTANEOUS CYST: Primary | ICD-10-CM

## 2024-03-22 PROCEDURE — 99213 OFFICE O/P EST LOW 20 MIN: CPT | Performed by: FAMILY MEDICINE

## 2024-03-22 PROCEDURE — 3008F BODY MASS INDEX DOCD: CPT | Performed by: FAMILY MEDICINE

## 2024-03-22 PROCEDURE — 1036F TOBACCO NON-USER: CPT | Performed by: FAMILY MEDICINE

## 2024-03-22 PROCEDURE — 3078F DIAST BP <80 MM HG: CPT | Performed by: FAMILY MEDICINE

## 2024-03-22 PROCEDURE — 1159F MED LIST DOCD IN RCRD: CPT | Performed by: FAMILY MEDICINE

## 2024-03-22 PROCEDURE — 3074F SYST BP LT 130 MM HG: CPT | Performed by: FAMILY MEDICINE

## 2024-03-22 PROCEDURE — 1160F RVW MEDS BY RX/DR IN RCRD: CPT | Performed by: FAMILY MEDICINE

## 2024-03-22 PROCEDURE — 3044F HG A1C LEVEL LT 7.0%: CPT | Performed by: FAMILY MEDICINE

## 2024-03-22 PROCEDURE — 1157F ADVNC CARE PLAN IN RCRD: CPT | Performed by: FAMILY MEDICINE

## 2024-03-22 PROCEDURE — 4010F ACE/ARB THERAPY RXD/TAKEN: CPT | Performed by: FAMILY MEDICINE

## 2024-03-22 RX ORDER — HYDROCORTISONE 25 MG/G
CREAM TOPICAL
COMMUNITY
Start: 2024-03-12

## 2024-03-22 NOTE — PROGRESS NOTES
Subjective   Patient ID: Raiza Nguyễn is a 73 y.o. female who presents for Mass (Lump under the skin that pt states is pea size and itches. Lump is hard. Pt just found it yesterday. ).    HPI  Pt noticed lump on left forearm . No pain  . Can move it around , no overlaying bruising/ skin lesion  Wanted to have it checked.      Review of Systems    Objective   /70 (BP Location: Right arm, Patient Position: Sitting, BP Cuff Size: Adult)   Pulse 83   Temp 35.9 °C (96.7 °F) (Temporal)   Wt 82.8 kg (182 lb 9.6 oz)   SpO2 98%   BMI 36.88 kg/m²     Physical Exam    Dry skin  , bilateral arms .  Evidence of scratching.  Small soft mobile subuct nodule . Size of half a pea    Assessment/Plan   Problem List Items Addressed This Visit    None  Visit Diagnoses       Subcutaneous cyst    -  Primary          Benign.  Reassurance.      ALBERT Eldridge MD

## 2024-04-05 DIAGNOSIS — E78.5 HYPERLIPIDEMIA, UNSPECIFIED HYPERLIPIDEMIA TYPE: ICD-10-CM

## 2024-04-07 RX ORDER — ROSUVASTATIN CALCIUM 5 MG/1
5 TABLET, COATED ORAL DAILY
Qty: 90 TABLET | Refills: 3 | Status: SHIPPED | OUTPATIENT
Start: 2024-04-07

## 2024-04-10 PROBLEM — D22.39 MELANOCYTIC NEVI OF OTHER PARTS OF FACE: Status: RESOLVED | Noted: 2023-06-14 | Resolved: 2024-04-10

## 2024-04-10 PROBLEM — R21 RASH: Status: RESOLVED | Noted: 2021-09-28 | Resolved: 2024-04-10

## 2024-04-10 PROBLEM — N63.0 MASS OF BREAST: Status: RESOLVED | Noted: 2024-02-26 | Resolved: 2024-04-10

## 2024-04-10 PROBLEM — L60.9 NAIL PROBLEM: Status: RESOLVED | Noted: 2024-02-26 | Resolved: 2024-04-10

## 2024-04-10 PROBLEM — D22.20 MELANOCYTIC NEVI OF EAR AND EXTERNAL AURICULAR CANAL: Status: RESOLVED | Noted: 2023-06-14 | Resolved: 2024-04-10

## 2024-04-10 PROBLEM — D22.5 MELANOCYTIC NEVI OF TRUNK: Status: RESOLVED | Noted: 2023-06-14 | Resolved: 2024-04-10

## 2024-04-10 PROBLEM — D22.9 MELANOCYTIC NEVI, UNSPECIFIED: Status: RESOLVED | Noted: 2023-06-14 | Resolved: 2024-04-10

## 2024-04-10 PROBLEM — Z00.00 MEDICARE ANNUAL WELLNESS VISIT, INITIAL: Status: RESOLVED | Noted: 2024-02-26 | Resolved: 2024-04-10

## 2024-04-10 PROBLEM — D48.5 NEOPLASM OF UNCERTAIN BEHAVIOR OF SKIN: Status: RESOLVED | Noted: 2021-09-28 | Resolved: 2024-04-10

## 2024-04-10 PROBLEM — R22.32 MASS OF LEFT AXILLA: Status: RESOLVED | Noted: 2024-02-26 | Resolved: 2024-04-10

## 2024-04-10 PROBLEM — Z01.818 PREOPERATIVE CLEARANCE: Status: RESOLVED | Noted: 2024-02-26 | Resolved: 2024-04-10

## 2024-04-10 PROBLEM — R22.2 MASS ON BACK: Status: RESOLVED | Noted: 2024-02-26 | Resolved: 2024-04-10

## 2024-04-10 PROBLEM — B07.8 OTHER VIRAL WARTS: Status: RESOLVED | Noted: 2022-01-05 | Resolved: 2024-04-10

## 2024-04-10 NOTE — PROGRESS NOTES
Subjective   Patient ID: Raiza Nguyễn is a 73 y.o. female who presents for Follow-up (Pt presents for 6 month follow up- pt states no new concerns at this time. ).    HPI     CONDENSE PROBLEM LIST    Patient presents today for routine 6 month follow-up.  Patient is doing well overall, they have no new concerns or issues.     States she went in counseling and this was helping for her relationships and establishing that her feelings are valid.    She notes she continues to gain weight. Currently some sort of activity about 3 days per week.      ROUTINE VISIT  CHRONIC CONDITIONS:     Diabetes mellitus, type 2  Lifestyle managed, A1c most recently in prediabetic range in past but occasionally in diabetic range but below 7.0    Hgb A1c: 6.5 in 1/2024, 6.2 in 7/2023, 6.1 in 3/2023, 6.9 in 7/2022  Albumin: 2/2022 negative  Foot exam: 9/2023 via PCP    Essential hypertension  Taking Lisinopril 10 mg BID + HCTZ 25 mg 2 tablets daily.  She confirms she is taking the HCTZ 25 mg 2 tablets every day.    Medications are being taken and tolerated well.   No side effects are reported.    Patient is taking blood pressure outside of office every few weeks.  States sometimes low, sometimes in 130s.  Reports some dizziness.  Patient denies chest pain, shortness of breath with exertion, palpitations, lower extremity edema, headache.     Hyperlipidemia/CAD/Atherosclerosis of aorta  Taking Rosuvastatin 5 mg daily.  10/2020 CT A/P showed mild aortic atherosclerosis, no CACS.  7/2022 TC/HDL ratio of 2.7    Bilateral breast cancer  Followed by heme/onc (Dr. Wilson)  Followed by breast surgeon (Dr. Jeniffer Langley)    Stage IB right breast cancer 7/2018.   Oncotype 17. S/P WBRT 10/11/2018 - 11/1/2018.   Presently taking Exemestane.  Stage II left breast cancer in 2003. L.pm/ALND. ACx4 + Tx4. WBRT.  Anastrozole x 5yr.  BRCA2+, recommend MRI screening  4/2023 DEXA was normal  Dr. Langley manages imaging. Stable at this time.    Chronic  pain  Taking Duloxetine 60 mg + Gabapentin daily.  S/p evaluation with [facility] rheumatology.  Plan per rheum pre 10/2022 OV NOTE:  --For Pseudogout flares of the wrist - Prednisone 12 day taper  --Continue Gapabentin 600 mg three times a day  --Tylenol 1000 mg up to 3x/day  --Voltaren gel up to 4x/day  --Continue home occupational therapy exercises   --Follow up as needed     She follows with spinal specialist @ [facility], seeing him in 2 weeks  Not currently using assistive device  He is aware of limited mobility/ambulation  They have discussed she will need new imaging.  She states she is unable to stay active at this level of mobility  Her lack of ability to be as active as she wishes is depressing some.    Ortho advised that she needed a new knee replacement but deferred due to things ongoing in her life.    7/2023 CT A/P showed multilevel degenerative changes of the lower cervical and thoracic spine are present, with likely at least mild-to-moderate spinal canal narrowing present in the thoracic spine,: The least moderate to severe spinal canal stenosis present at L3-L4 and L4-L5 due to bulging disc, hypertrophic facet changes and ligamentum flavum thickening, incompletely characterized on current exam.     Hypothyroidism  CURRENT DOSE: Synthroid 125 mcg daily.  1/2024 TSH normal, to be checked annually unless symptomatic.    Obstructive sleep apnea hypopnea, severe  12/2021 sleep study (severe with SpO2 88% and AHI 36.1).  Intolerant to CPAP x several tries.  8/2023, reports will be seeing ENT that specializes in sleep medicine.    She had inspire consult with ENT Dr. Stephens in September 2023 and was found to be a candidate for the inspire device. However she joined a group with people who had the inspire and based off this experience she chose not to proceed with this.      Review of Systems   All other systems reviewed and are negative.      Objective   /68 (BP Location: Right arm, Patient Position:  Sitting, BP Cuff Size: Adult)   Pulse 96   Temp 36.2 °C (97.2 °F) (Temporal)   Resp 14   Wt 83.2 kg (183 lb 6.4 oz)   SpO2 96%   BMI 37.04 kg/m²     Physical Exam  Vitals and nursing note reviewed.   Constitutional:       General: She is not in acute distress.     Appearance: Normal appearance. She is not toxic-appearing.   HENT:      Head: Normocephalic and atraumatic.   Eyes:      Extraocular Movements: Extraocular movements intact.      Pupils: Pupils are equal, round, and reactive to light.   Neck:      Thyroid: No thyromegaly.      Vascular: No hepatojugular reflux or JVD.   Cardiovascular:      Rate and Rhythm: Normal rate and regular rhythm.      Heart sounds: No murmur heard.     No friction rub. No gallop.   Pulmonary:      Effort: Pulmonary effort is normal.      Breath sounds: Normal breath sounds. No wheezing, rhonchi or rales.   Abdominal:      General: Bowel sounds are normal. There is no distension.      Palpations: Abdomen is soft. There is no mass.      Tenderness: There is no abdominal tenderness. There is no guarding.   Musculoskeletal:      Right lower leg: No edema.      Left lower leg: No edema.   Lymphadenopathy:      Cervical: No cervical adenopathy.   Skin:     General: Skin is warm and dry.   Neurological:      General: No focal deficit present.      Mental Status: She is alert and oriented to person, place, and time.   Psychiatric:         Mood and Affect: Mood normal.         Behavior: Behavior normal.         Assessment/Plan   Problem List Items Addressed This Visit             ICD-10-CM    Atherosclerosis of aorta (CMS/HCC) I70.0     10/2020 CT A/P showed mild aortic atherosclerosis, no CACS.  7/2022 TC/HDL ratio of 2.7, good control, continue Rosuvastatin 5 mg daily.  Lipid panel to be monitored routinely.         Bilateral breast cancer (CMS/HCC) C50.911, C50.912     Previously followed by heme/onc (Dr. Wilson), no longer at .  Follows with breast surgeon (Dr. Swift  Shivam)  Stage IB right breast cancer 7/2018.   Oncotype 17. S/P WBRT 10/11/2018 - 11/1/2018.   Presently taking Exemestane.  Stage II left breast cancer in 2003. L.pm/ALND. ACx4 + Tx4. WBRT.  Anastrozole x 5yr.  BRCA2+, recommend MRI screening  4/2023 DEXA was normal  Dr. Langley manages imaging. Stable at this time.         Chronic pain G89.29     Patient with limited mobility secondary to low back pain, multiple ortho issues.    Reviewed CT A/P pelvis from July 2023 as noted below. Patient advised to follow-up with her ortho @ [facility] regarding latest imaging and current state of mobility as it is worsening and very limiting for ADLs. Copy of CT A/P provided for patient to take to her ortho to review as well. She has appointment with ortho in 2 weeks per patient. Continue current medication regimen as previously prescribed other than the diabetic meds that were added today.         Diabetes mellitus (CMS/[facility]) E11.9     Hgb A1c: 6.5 in 1/2024, 6.2 in 7/2023, 6.1 in 3/2023, 6.9 in 7/2022  Albumin: 2/2022 negative  Foot exam: 9/2023 via PCP    Most recent A1c has progressed into diabetic range, currently lifestyle managed. Given trend upward and comorbidities I have advised that she start on medications for better control of sugars.    Start Metformin 500 mg daily, take WITH your biggest meal.   -->R/B discussion held, side effects reviewed, most common are GI side effects.  -->Patient reports has tried this in past but was stopped due to sugars going low.    Start GLP-1 injectable (Mounjaro) once weekly.   -->Injection given once weekly and titrated up monthly if tolerating well.  -->R/B discussion held, side effects reviewed, most common nausea, constipation, and other GI side effects.  -->Given progression into diabetes and comorbidities of obesity, hypertension, hyperlipidemia, coronary artery disease, chronic pain due to numerous ortho issues, and obstructive sleep apnea intolerant to CPAP I do feel patient  would benefit from GPL-1 for both better control of sugars and weight reduction.         Relevant Medications    tirzepatide (Mounjaro) 2.5 mg/0.5 mL pen injector (Start on 4/14/2024)    metFORMIN XR (Glucophage-XR) 500 mg 24 hr tablet    Essential hypertension - Primary I10     BP is 105/68 in office today, goal BP is 130/80 or less, ideally 120/80 or less.   BP adequately controlled in office, however due to being on lower end and reports of dizziness we will decrease her dose of HCTZ    Continue Lisinopril 10 mg BID   Decrease HCTZ from 25 mg 2 tablets daily to 1 tablet daily.    Please check you BP a few times per week and keep a BP diary  If you find you are consistently running 140/80 you can go back to the HCTZ 25 mg 2 tabs daily.    64 oz of water is recommended daily minimum.    Patient to check BP regularly at home and keep a diary - DO THIS 1 HOUR AFTER TAKING MEDS.   This should be taken after sitting with feet flat on floor and resting for 5 minutes.   Arm should be level with your heart.   New guidelines recommend goal for blood pressure less than 130/80.   Ideally for stroke and heart attack risk reduction the systolic, or top, blood pressure number should be in the 110's or 120's.    PLEASE BRING BP CUFF IN TO NEXT VISIT FOR VALIDATION - TAKE YOUR BP @ HOME BEFORE YOU COME!          Hyperlipidemia E78.5     10/2020 CT A/P showed mild aortic atherosclerosis, no CACS.  7/2022 TC/HDL ratio of 2.7, good control, continue Rosuvastatin 5 mg daily.  Lipid panel to be monitored routinely.         Hypothyroidism E03.9     CURRENT DOSE: Synthroid 125 mcg daily.  1/2024 TSH normal, to be checked annually unless symptomatic.         Obstructive sleep apnea hypopnea, severe G47.33     12/2021 sleep study (severe with SpO2 88% and AHI 36.1).  Intolerant to CPAP x several tries.    Had consult for inspire device but has opted to pursue conservative measure of weight reduction prior to having this done.    Given  progression into diabetes and comorbidities of obesity, hypertension, hyperlipidemia, coronary artery disease, chronic pain due to numerous ortho issues, and obstructive sleep apnea intolerant to CPAP I do feel patient would benefit from GPL-1 for both better control of sugars and weight reduction.     Prescription for mounjaro sent to pharmacy, see DM section    Diet and exercise recommendations revisited.         Complication of ventilation therapy T81.81XA       Follow-up in 3 months for recheck DM/ med tolerance.  Call for sooner follow-up if needed.         Scribe Attestation  By signing my name below, IClaudia Scribe   attest that this documentation has been prepared under the direction and in the presence of Shoshana Olivarez DO.

## 2024-04-11 ENCOUNTER — OFFICE VISIT (OUTPATIENT)
Dept: PRIMARY CARE | Facility: CLINIC | Age: 74
End: 2024-04-11
Payer: MEDICARE

## 2024-04-11 VITALS
WEIGHT: 183.4 LBS | TEMPERATURE: 97.2 F | DIASTOLIC BLOOD PRESSURE: 68 MMHG | RESPIRATION RATE: 14 BRPM | SYSTOLIC BLOOD PRESSURE: 105 MMHG | HEART RATE: 96 BPM | BODY MASS INDEX: 37.04 KG/M2 | OXYGEN SATURATION: 96 %

## 2024-04-11 DIAGNOSIS — E03.9 HYPOTHYROIDISM, UNSPECIFIED TYPE: ICD-10-CM

## 2024-04-11 DIAGNOSIS — I10 ESSENTIAL HYPERTENSION: Primary | ICD-10-CM

## 2024-04-11 DIAGNOSIS — I70.0 ATHEROSCLEROSIS OF AORTA (CMS-HCC): ICD-10-CM

## 2024-04-11 DIAGNOSIS — T81.81XS: ICD-10-CM

## 2024-04-11 DIAGNOSIS — G89.29 OTHER CHRONIC PAIN: ICD-10-CM

## 2024-04-11 DIAGNOSIS — E78.5 HYPERLIPIDEMIA, UNSPECIFIED HYPERLIPIDEMIA TYPE: ICD-10-CM

## 2024-04-11 DIAGNOSIS — G47.33 OBSTRUCTIVE SLEEP APNEA HYPOPNEA, SEVERE: ICD-10-CM

## 2024-04-11 DIAGNOSIS — C50.912 BILATERAL MALIGNANT NEOPLASM OF BREAST IN FEMALE, UNSPECIFIED ESTROGEN RECEPTOR STATUS, UNSPECIFIED SITE OF BREAST (MULTI): ICD-10-CM

## 2024-04-11 DIAGNOSIS — C50.911 BILATERAL MALIGNANT NEOPLASM OF BREAST IN FEMALE, UNSPECIFIED ESTROGEN RECEPTOR STATUS, UNSPECIFIED SITE OF BREAST (MULTI): ICD-10-CM

## 2024-04-11 DIAGNOSIS — E11.9 TYPE 2 DIABETES MELLITUS WITHOUT COMPLICATION, WITHOUT LONG-TERM CURRENT USE OF INSULIN (MULTI): ICD-10-CM

## 2024-04-11 PROBLEM — M17.9 OSTEOARTHRITIS OF KNEE: Status: RESOLVED | Noted: 2024-02-26 | Resolved: 2024-04-11

## 2024-04-11 PROBLEM — R41.3 AMNESIA: Status: RESOLVED | Noted: 2024-02-26 | Resolved: 2024-04-11

## 2024-04-11 PROBLEM — M51.26 LUMBAR DISC HERNIATION: Status: RESOLVED | Noted: 2023-01-18 | Resolved: 2024-04-11

## 2024-04-11 PROBLEM — M17.11 OSTEOARTHRITIS OF RIGHT KNEE: Status: RESOLVED | Noted: 2024-02-26 | Resolved: 2024-04-11

## 2024-04-11 PROBLEM — Z13.0 SCREENING FOR ENDOCRINE, METABOLIC, AND IMMUNITY DISORDER: Status: RESOLVED | Noted: 2024-02-26 | Resolved: 2024-04-11

## 2024-04-11 PROBLEM — Z12.39 BREAST SCREENING: Status: RESOLVED | Noted: 2023-01-18 | Resolved: 2024-04-11

## 2024-04-11 PROBLEM — G57.00 PIRIFORMIS SYNDROME: Status: RESOLVED | Noted: 2023-01-18 | Resolved: 2024-04-11

## 2024-04-11 PROBLEM — B35.1 TINEA UNGUIUM: Status: RESOLVED | Noted: 2023-06-14 | Resolved: 2024-04-11

## 2024-04-11 PROBLEM — M54.2 CERVICALGIA: Status: RESOLVED | Noted: 2023-05-31 | Resolved: 2024-04-11

## 2024-04-11 PROBLEM — L24.89 IRRITANT CONTACT DERMATITIS DUE TO OTHER AGENTS: Status: RESOLVED | Noted: 2023-06-14 | Resolved: 2024-04-11

## 2024-04-11 PROBLEM — Z23 IMMUNIZATION DUE: Status: RESOLVED | Noted: 2024-02-26 | Resolved: 2024-04-11

## 2024-04-11 PROBLEM — M17.11 PRIMARY OSTEOARTHRITIS OF RIGHT KNEE: Status: RESOLVED | Noted: 2023-01-18 | Resolved: 2024-04-11

## 2024-04-11 PROBLEM — M25.512 BILATERAL SHOULDER PAIN: Status: RESOLVED | Noted: 2023-05-31 | Resolved: 2024-04-11

## 2024-04-11 PROBLEM — M46.1 SACROILIITIS, NOT ELSEWHERE CLASSIFIED (CMS-HCC): Status: RESOLVED | Noted: 2023-05-31 | Resolved: 2024-04-11

## 2024-04-11 PROBLEM — R26.9 ABNORMAL GAIT: Status: RESOLVED | Noted: 2023-05-31 | Resolved: 2024-04-11

## 2024-04-11 PROBLEM — R26.81 GAIT INSTABILITY: Status: RESOLVED | Noted: 2023-05-31 | Resolved: 2024-04-11

## 2024-04-11 PROBLEM — L03.119 CELLULITIS OF LOWER EXTREMITY: Status: RESOLVED | Noted: 2024-02-26 | Resolved: 2024-04-11

## 2024-04-11 PROBLEM — E05.90 HYPERTHYROIDISM: Status: RESOLVED | Noted: 2023-01-18 | Resolved: 2024-04-11

## 2024-04-11 PROBLEM — M19.90 ARTHRITIS: Status: RESOLVED | Noted: 2023-01-18 | Resolved: 2024-04-11

## 2024-04-11 PROBLEM — Z13.29 SCREENING FOR ENDOCRINE, METABOLIC, AND IMMUNITY DISORDER: Status: RESOLVED | Noted: 2024-02-26 | Resolved: 2024-04-11

## 2024-04-11 PROBLEM — L65.9 LOSS OF HAIR: Status: RESOLVED | Noted: 2024-02-26 | Resolved: 2024-04-11

## 2024-04-11 PROBLEM — S40.029A: Status: RESOLVED | Noted: 2024-02-26 | Resolved: 2024-04-11

## 2024-04-11 PROBLEM — M25.519 SHOULDER PAIN: Status: RESOLVED | Noted: 2023-05-31 | Resolved: 2024-04-11

## 2024-04-11 PROBLEM — G47.30 BREATHING-RELATED SLEEP DISORDER: Status: RESOLVED | Noted: 2024-02-26 | Resolved: 2024-04-11

## 2024-04-11 PROBLEM — M25.511 BILATERAL SHOULDER PAIN: Status: RESOLVED | Noted: 2023-05-31 | Resolved: 2024-04-11

## 2024-04-11 PROBLEM — J01.00 ACUTE MAXILLARY SINUSITIS: Status: RESOLVED | Noted: 2024-02-26 | Resolved: 2024-04-11

## 2024-04-11 PROBLEM — Z13.228 SCREENING FOR ENDOCRINE, METABOLIC, AND IMMUNITY DISORDER: Status: RESOLVED | Noted: 2024-02-26 | Resolved: 2024-04-11

## 2024-04-11 PROBLEM — D18.01 HEMANGIOMA OF SKIN AND SUBCUTANEOUS TISSUE: Status: RESOLVED | Noted: 2023-06-14 | Resolved: 2024-04-11

## 2024-04-11 PROBLEM — E55.9 VITAMIN D DEFICIENCY: Status: RESOLVED | Noted: 2024-02-26 | Resolved: 2024-04-11

## 2024-04-11 PROBLEM — L85.3 XEROSIS CUTIS: Status: RESOLVED | Noted: 2023-03-08 | Resolved: 2024-04-11

## 2024-04-11 PROBLEM — M79.89 SWELLING OF LOWER EXTREMITY: Status: RESOLVED | Noted: 2024-02-26 | Resolved: 2024-04-11

## 2024-04-11 PROBLEM — R53.83 FATIGUE: Status: RESOLVED | Noted: 2023-07-06 | Resolved: 2024-04-11

## 2024-04-11 PROBLEM — Z96.60 HISTORY OF ARTIFICIAL JOINT: Status: RESOLVED | Noted: 2024-02-26 | Resolved: 2024-04-11

## 2024-04-11 PROBLEM — R93.89 ABNORMAL FINDING OF DIAGNOSTIC IMAGING: Status: RESOLVED | Noted: 2024-02-26 | Resolved: 2024-04-11

## 2024-04-11 PROBLEM — R06.81 APNEA: Status: RESOLVED | Noted: 2024-02-26 | Resolved: 2024-04-11

## 2024-04-11 PROBLEM — L30.4 ERYTHEMA INTERTRIGO: Status: RESOLVED | Noted: 2023-06-14 | Resolved: 2024-04-11

## 2024-04-11 PROBLEM — B07.9 VIRAL WART, UNSPECIFIED: Status: RESOLVED | Noted: 2023-06-14 | Resolved: 2024-04-11

## 2024-04-11 PROBLEM — M25.539 PAIN IN WRIST: Status: RESOLVED | Noted: 2024-02-26 | Resolved: 2024-04-11

## 2024-04-11 PROBLEM — M54.17 LUMBOSACRAL NEURITIS: Status: RESOLVED | Noted: 2023-05-31 | Resolved: 2024-04-11

## 2024-04-11 PROBLEM — R40.0 DAYTIME SOMNOLENCE: Status: RESOLVED | Noted: 2024-02-26 | Resolved: 2024-04-11

## 2024-04-11 PROBLEM — R68.81 EARLY SATIETY: Status: RESOLVED | Noted: 2023-07-06 | Resolved: 2024-04-11

## 2024-04-11 PROBLEM — R10.84 GENERALIZED ABDOMINAL PAIN: Status: RESOLVED | Noted: 2023-07-06 | Resolved: 2024-04-11

## 2024-04-11 PROBLEM — L85.3 DRY SKIN: Status: RESOLVED | Noted: 2024-02-26 | Resolved: 2024-04-11

## 2024-04-11 PROBLEM — G47.9 DIFFICULTY SLEEPING: Status: RESOLVED | Noted: 2024-02-26 | Resolved: 2024-04-11

## 2024-04-11 PROBLEM — I95.9 HYPOTENSION: Status: RESOLVED | Noted: 2024-02-26 | Resolved: 2024-04-11

## 2024-04-11 PROBLEM — L57.0 ACTINIC KERATOSIS: Status: RESOLVED | Noted: 2023-03-08 | Resolved: 2024-04-11

## 2024-04-11 PROBLEM — D72.819 LEUKOPENIA: Status: RESOLVED | Noted: 2024-02-26 | Resolved: 2024-04-11

## 2024-04-11 PROBLEM — B35.1 ONYCHOMYCOSIS: Status: RESOLVED | Noted: 2023-01-18 | Resolved: 2024-04-11

## 2024-04-11 PROCEDURE — 1160F RVW MEDS BY RX/DR IN RCRD: CPT | Performed by: FAMILY MEDICINE

## 2024-04-11 PROCEDURE — 1159F MED LIST DOCD IN RCRD: CPT | Performed by: FAMILY MEDICINE

## 2024-04-11 PROCEDURE — 99214 OFFICE O/P EST MOD 30 MIN: CPT | Performed by: FAMILY MEDICINE

## 2024-04-11 PROCEDURE — 1157F ADVNC CARE PLAN IN RCRD: CPT | Performed by: FAMILY MEDICINE

## 2024-04-11 PROCEDURE — 3008F BODY MASS INDEX DOCD: CPT | Performed by: FAMILY MEDICINE

## 2024-04-11 PROCEDURE — 3074F SYST BP LT 130 MM HG: CPT | Performed by: FAMILY MEDICINE

## 2024-04-11 PROCEDURE — 1036F TOBACCO NON-USER: CPT | Performed by: FAMILY MEDICINE

## 2024-04-11 PROCEDURE — 3044F HG A1C LEVEL LT 7.0%: CPT | Performed by: FAMILY MEDICINE

## 2024-04-11 PROCEDURE — 4010F ACE/ARB THERAPY RXD/TAKEN: CPT | Performed by: FAMILY MEDICINE

## 2024-04-11 PROCEDURE — 3078F DIAST BP <80 MM HG: CPT | Performed by: FAMILY MEDICINE

## 2024-04-11 RX ORDER — TIRZEPATIDE 2.5 MG/.5ML
2.5 INJECTION, SOLUTION SUBCUTANEOUS
Qty: 2 ML | Refills: 3 | Status: SHIPPED | OUTPATIENT
Start: 2024-04-14 | End: 2024-04-23 | Stop reason: WASHOUT

## 2024-04-11 RX ORDER — DULOXETIN HYDROCHLORIDE 30 MG/1
30 CAPSULE, DELAYED RELEASE ORAL 2 TIMES DAILY
Start: 2024-04-11

## 2024-04-11 RX ORDER — METFORMIN HYDROCHLORIDE 500 MG/1
500 TABLET, EXTENDED RELEASE ORAL
Qty: 100 TABLET | Refills: 3 | Status: SHIPPED | OUTPATIENT
Start: 2024-04-11 | End: 2025-05-16

## 2024-04-11 ASSESSMENT — PATIENT HEALTH QUESTIONNAIRE - PHQ9
SUM OF ALL RESPONSES TO PHQ9 QUESTIONS 1 AND 2: 0
2. FEELING DOWN, DEPRESSED OR HOPELESS: NOT AT ALL
1. LITTLE INTEREST OR PLEASURE IN DOING THINGS: NOT AT ALL

## 2024-04-11 NOTE — ASSESSMENT & PLAN NOTE
BP is 105/68 in office today, goal BP is 130/80 or less, ideally 120/80 or less.   BP adequately controlled in office, however due to being on lower end and reports of dizziness we will decrease her dose of HCTZ    Continue Lisinopril 10 mg BID   Decrease HCTZ from 25 mg 2 tablets daily to 1 tablet daily.    Please check you BP a few times per week and keep a BP diary  If you find you are consistently running 140/80 you can go back to the HCTZ 25 mg 2 tabs daily.    64 oz of water is recommended daily minimum.    Patient to check BP regularly at home and keep a diary - DO THIS 1 HOUR AFTER TAKING MEDS.   This should be taken after sitting with feet flat on floor and resting for 5 minutes.   Arm should be level with your heart.   New guidelines recommend goal for blood pressure less than 130/80.   Ideally for stroke and heart attack risk reduction the systolic, or top, blood pressure number should be in the 110's or 120's.    PLEASE BRING BP CUFF IN TO NEXT VISIT FOR VALIDATION - TAKE YOUR BP @ HOME BEFORE YOU COME!

## 2024-04-11 NOTE — ASSESSMENT & PLAN NOTE
CURRENT DOSE: Synthroid 125 mcg daily.  1/2024 TSH normal, to be checked annually unless symptomatic.

## 2024-04-11 NOTE — ASSESSMENT & PLAN NOTE
Previously followed by heme/onc (Dr. Wilson), no longer at .  Follows with breast surgeon (Dr. Jeniffer Langley)  Stage IB right breast cancer 7/2018.   Oncotype 17. S/P WBRT 10/11/2018 - 11/1/2018.   Presently taking Exemestane.  Stage II left breast cancer in 2003. L.pm/ALND. ACx4 + Tx4. WBRT.  Anastrozole x 5yr.  BRCA2+, recommend MRI screening  4/2023 DEXA was normal  Dr. Langley manages imaging. Stable at this time.

## 2024-04-11 NOTE — ASSESSMENT & PLAN NOTE
Patient with limited mobility secondary to low back pain, multiple ortho issues.    Reviewed CT A/P pelvis from July 2023 as noted below. Patient advised to follow-up with her ortho @ [facility] regarding latest imaging and current state of mobility as it is worsening and very limiting for ADLs. Copy of CT A/P provided for patient to take to her ortho to review as well. She has appointment with ortho in 2 weeks per patient. Continue current medication regimen as previously prescribed other than the diabetic meds that were added today.

## 2024-04-11 NOTE — ASSESSMENT & PLAN NOTE
Hgb A1c: 6.5 in 1/2024, 6.2 in 7/2023, 6.1 in 3/2023, 6.9 in 7/2022  Albumin: 2/2022 negative  Foot exam: 9/2023 via PCP    Most recent A1c has progressed into diabetic range, currently lifestyle managed. Given trend upward and comorbidities I have advised that she start on medications for better control of sugars.    Start Metformin 500 mg daily, take WITH your biggest meal.   -->R/B discussion held, side effects reviewed, most common are GI side effects.  -->Patient reports has tried this in past but was stopped due to sugars going low.    Start GLP-1 injectable (Mounjaro) once weekly.   -->Injection given once weekly and titrated up monthly if tolerating well.  -->R/B discussion held, side effects reviewed, most common nausea, constipation, and other GI side effects.  -->Given progression into diabetes and comorbidities of obesity, hypertension, hyperlipidemia, coronary artery disease, chronic pain due to numerous ortho issues, and obstructive sleep apnea intolerant to CPAP I do feel patient would benefit from GPL-1 for both better control of sugars and weight reduction.

## 2024-04-11 NOTE — PATIENT INSTRUCTIONS
Decrease hydrochlorothiazide 25 mg from 2 tabs daily to 1 tab daily due to dizziness,     resume if sbp over 140's on one tab  only    Diabetes -   sent in metformin and mounjaro      Follow up for A1C in 3 mo       Low back/ hip/ gait instability -    CT printed, pls take to your ortho   see if he wants new mri

## 2024-04-11 NOTE — ASSESSMENT & PLAN NOTE
12/2021 sleep study (severe with SpO2 88% and AHI 36.1).  Intolerant to CPAP x several tries.    Had consult for inspire device but has opted to pursue conservative measure of weight reduction prior to having this done.    Given progression into diabetes and comorbidities of obesity, hypertension, hyperlipidemia, coronary artery disease, chronic pain due to numerous ortho issues, and obstructive sleep apnea intolerant to CPAP I do feel patient would benefit from GPL-1 for both better control of sugars and weight reduction.     Prescription for mounjaro sent to pharmacy, see DM section    Diet and exercise recommendations revisited.

## 2024-04-18 ENCOUNTER — HOSPITAL ENCOUNTER (OUTPATIENT)
Dept: RADIOLOGY | Facility: HOSPITAL | Age: 74
Discharge: HOME | End: 2024-04-18
Payer: MEDICARE

## 2024-04-18 DIAGNOSIS — C50.911 INVASIVE DUCTAL CARCINOMA OF BREAST, RIGHT (MULTI): ICD-10-CM

## 2024-04-18 DIAGNOSIS — Z15.09 BRCA2 POSITIVE: ICD-10-CM

## 2024-04-18 DIAGNOSIS — Z12.39 BREAST CANCER SCREENING, HIGH RISK PATIENT: ICD-10-CM

## 2024-04-18 DIAGNOSIS — Z15.01 BRCA2 POSITIVE: ICD-10-CM

## 2024-04-18 PROCEDURE — A9575 INJ GADOTERATE MEGLUMI 0.1ML: HCPCS | Performed by: NURSE PRACTITIONER

## 2024-04-18 PROCEDURE — 77049 MRI BREAST C-+ W/CAD BI: CPT | Performed by: RADIOLOGY

## 2024-04-18 PROCEDURE — 2500000004 HC RX 250 GENERAL PHARMACY W/ HCPCS (ALT 636 FOR OP/ED): Performed by: NURSE PRACTITIONER

## 2024-04-18 PROCEDURE — 77049 MRI BREAST C-+ W/CAD BI: CPT

## 2024-04-18 RX ORDER — GADOTERATE MEGLUMINE 376.9 MG/ML
17 INJECTION INTRAVENOUS
Status: COMPLETED | OUTPATIENT
Start: 2024-04-18 | End: 2024-04-18

## 2024-04-18 RX ADMIN — GADOTERATE MEGLUMINE 17 ML: 376.9 INJECTION INTRAVENOUS at 12:43

## 2024-04-19 ENCOUNTER — TELEPHONE (OUTPATIENT)
Dept: SURGICAL ONCOLOGY | Facility: HOSPITAL | Age: 74
End: 2024-04-19
Payer: MEDICARE

## 2024-04-19 NOTE — TELEPHONE ENCOUNTER
Result Communication    Patient has her second look ultrasounds scheduled for 4/23/2024    Resulted Orders   MR breast bilateral w contrast full protocol    Narrative    Interpreted By:  Afia Dewitt  and Lonnie Barrett   STUDY:  BI MR BREAST BILATERAL WITH CONTRAST FULL PROTOCOL;  4/18/2024 12:42  pm      ACCESSION NUMBER(S):  [id number]      ORDERING CLINICIAN:  LORETA DAVIS      INDICATION:  High-risk screening. BRCA 2 gene mutation. Remote history of left  breast invasive ductal carcinoma in 2003 status post lumpectomy,  axillary lymph node dissection, chemoradiation, and 5 years of  anastrozole. Diagnosed with right breast invasive ductal carcinoma in  2018 status post lumpectomy, sentinel lymph node biopsy, radiation,  and currently on Exemestane. Strong family history of breast cancer  with her mother diagnosed at age 36.      COMPARISON:  Comparison to MRI 10/03/2016 and correlation with mammogram  10/03/2016.      TECHNIQUE:  Using a dedicated breast coil, STIR axial and T1-weighted fat  saturation axial images of the breasts were obtained, the latter both  before and after intravenous administration of Gadolinium DTPA. On an  independent workstation, 3-D images were formulated using Oilex  including time enhancement curves, subtraction images and MIP images.      Intravenous contrast:  17 mL of Dotarem      FINDINGS:  There is symmetric minimal bilateral background enhancement.      Scattered fibroglandular tissue.      RIGHT BREAST: Postsurgical scarring with associated susceptibility  artifact is seen in the superomedial breast from middle to posterior  depth and axilla. No suspicious mass or nonmass enhancement is  identified.      No axillary or internal mammary lymphadenopathy is appreciated.      LEFT BREAST: An irregular rim enhancing mass with irregular margins  measuring 0.8 x 0.8 x 0.9 cm is seen in the superolateral breast at  middle depth (series 100, image 164 of 232).       Postsurgical scarring with multiple foci of susceptibility artifact  seen in the superolateral breast at anterior depth and within the  axilla.      A prominent level III axillary lymph node measures 1.3 x 0.9 x 0.8 cm  (series 401, image 44 of 232), and may have slightly increased in  size when compared to breast MRI 10/03/2016 (previously measuring 1.0  x 0.8 x 0.8 cm). An internal mammary lymph node measuring 0.4 cm is  noted and demonstrates a fatty hilum (series 401, image 136 of 232).  This was not definitively seen on the prior MRI.      NON-BREAST FINDINGS:  None.        Impression    1. Suspicious enhancing left breast mass. A surgical consultation and  MRI directed ultrasound is recommended for possible biopsy under  ultrasound guidance. If not already recently obtained, a bilateral  mammogram should also be performed. A message was sent to the  referring practitioner at the time of this dictation regarding these  critical findings using the Epic notification system. A pre-procedure  form was filled out.  2. Indeterminate left level III axillary lymph node with possible  increase in size when compared to the prior MRI. An MRI directed  ultrasound is recommended for further evaluation.  3. Right post treatment changes. No MRI evidence of malignancy in the  right breast.      Method of Detection: Category Sdbt - 3D Screening      BI-RADS CATEGORY:  BI-RADS Category:  4 Suspicious.  Recommendation:  Surgical Consultation and Biopsy.  Recommended Date:  Immediate.  Laterality:  Left.      For any future breast imaging appointments, please call [phone]).      I personally reviewed the images/study and I agree with the findings  as stated by fellow physician, Dr. Arnold Fleming.      MACRO:  Critical Finding:  Breast Imaging Abnormality. Notification was  initiated on 4/18/2024 at 2:33 pm by Dr. Arnold Fleming.  (**-[facility]-**)  Instructions:  Surgical Consultation and Imaging Guided Biopsy.      Signed by:  Afia Dewitt 4/18/2024 3:05 PM  Dictation workstation:   [id number]       1:53 PM

## 2024-04-22 ENCOUNTER — HOSPITAL ENCOUNTER (OUTPATIENT)
Dept: RADIOLOGY | Facility: EXTERNAL LOCATION | Age: 74
Discharge: HOME | End: 2024-04-22
Payer: MEDICARE

## 2024-04-22 NOTE — PROGRESS NOTES
Raiza Nguyễn female   1950 73 y.o.   [id number]      Chief Complaint  Biopsy consultation    History Of Present Illness  Raiza Nguyễn is a 73 y.o. female diagnosed in  with left breast invasive ductal carcinoma (IDC), grade 3, ER+/TX+/HER2 not defined, s/p left partial mastectomy and lymph node dissection with positive lymph nodes, completed chemotherapy, radiation, and 5 years of Anastrozole. She was found to have the BRCA2 mutation. 2018 she was diagnosed with right breast IDC, grade 2, ER+95%, TX+95%, HER2-. On 2018 Dr. Marcia Griffin performed a right magseed partial mastectomy and sentinel lymph node biopsy (2mi/3) and negative margins. Oncotype low score of 17. She completed adjuvant whole breast radiation 10/11/2018- 2018 and was started on Exemestane and tolerating it well (10 year plan). She follows with heme/onc. She has a personal history of melanoma and follows with Dermatology. She presents today for biopsy consultation following abnormal MRI.   Right breast Stage IA [id number]  Left breast Stage II in      BREAST IMAGIN2024 Bilateral full breast MRI, indicates BI-RADS Category 4. Left breast mass and left axillary level III lymph nodes indeterminate and increased in size when compared to previous MRI. MRI directed ultrasound and biopsy as indicated is recommended. 2024 Left breast and left axillary ultrasound, indicates BI-RADS Category 4. 2023 Bilateral diagnostic mammogram with right breast ultrasound, indicates BI-RADS Category 2.      REPRODUCTIVE HISTORY: menarche age 11, , first birth age 25,  x 3 months, OCP's x 1.5 years, natural menopause age 52, no HRT, scattered fibroglandular tissue     FAMILY CANCER HISTORY:  Mother with breast cancer age 36, became metastatic,  age 62.  Father with lung cancer, smoker  MGM with ovarian cancer  Mat uncle with stomach cancer  Mat uncle with colon cancer  Mat cousin with stomach cancer      Surgical History  She has a past surgical history that includes Breast lumpectomy (07/17/2013); Ovary surgery (07/17/2013); Other surgical history (05/14/2021); and Cholecystectomy (10/03/2017).     Social History  She reports that she quit smoking about 41 years ago. Her smoking use included cigarettes. She started smoking about 56 years ago. She has a 15 pack-year smoking history. She has been exposed to tobacco smoke. She has never used smokeless tobacco. She reports that she does not currently use alcohol. She reports that she does not use drugs.    Family History  Family History   Problem Relation Name Age of Onset    Breast cancer Mother      Lung cancer Father      Ovarian cysts Maternal Grandmother      Colon cancer Other Materal Uncle     Stomach cancer Other Materal Uncle     Stomach cancer Maternal Cousin          Allergies  Adhesive, Cephalexin, Erythromycin, Glucosamine, and Tetracycline    Medications  Current Outpatient Medications   Medication Instructions    CALCIUM ORAL 1 capsule, oral, Daily    DULoxetine (CYMBALTA) 30 mg, oral, 2 times daily    exemestane (AROMASIN) 25 mg, oral, Daily    gabapentin (Neurontin) 100 mg capsule     gabapentin (Neurontin) 300 mg capsule Take 2 capsules (600 mg) by mouth 2 times a day.    hydroCHLOROthiazide (HYDRODIURIL) 25 mg, oral, Daily    hydrocortisone (Anusol-HC) 2.5 % rectal cream APPLY AT BEDTIME FOR 2 WEEKS THEN AS NEEDED    lancets misc 2 Lancets, 2 times daily    levothyroxine (Synthroid, Levoxyl) 125 mcg tablet 1 tablet, oral, Daily    lisinopril 10 mg tablet TAKE 1 TABLET TWICE DAILY    metFORMIN XR (GLUCOPHAGE-XR) 500 mg, oral, Daily with breakfast, Do not crush, chew, or split.    Mounjaro 2.5 mg, subcutaneous, Once Weekly    multivitamin (DAILY VITAMIN FORMULA ORAL) oral, Daily RT    OneTouch Ultra Test strip 1 strip, in vitro, Daily    rosuvastatin (CRESTOR) 5 mg, oral, Daily    traMADol (Ultram) 50 mg tablet TAKE 1 TABLET BY MOUTH TWICE A DAY AS  NEEDED FOR MODERATE TO SEVERE PAIN FOR 7 DAYS         REVIEW OF SYSTEMS    Constitutional:  Negative for appetite change, fatigue, fever and unexpected weight change.   HENT:  Negative for ear pain, hearing loss, nosebleeds, sore throat and trouble swallowing.    Eyes:  Negative for discharge, itching and visual disturbance.   Respiratory:  Negative for cough, chest tightness and shortness of breath.    Cardiovascular:  Negative for chest pain, palpitations and leg swelling.   Breast: as indicated in HPI  Gastrointestinal:  Negative for abdominal pain, constipation, diarrhea and nausea.   Endocrine: Negative for cold intolerance and heat intolerance.   Genitourinary:  Negative for dysuria, frequency, hematuria, pelvic pain and vaginal bleeding.   Musculoskeletal:  Negative for arthralgias, back pain, gait problem, joint swelling and myalgias.   Skin:  Negative for color change and rash.   Allergic/Immunologic: Negative for environmental allergies and food allergies.   Neurological:  Negative for dizziness, tremors, speech difficulty, weakness, numbness and headaches.   Hematological:  Does not bruise/bleed easily.   Psychiatric/Behavioral:  Negative for agitation, dysphoric mood and sleep disturbance. The patient is not nervous/anxious.         Past Medical History  She has a past medical history of Amnesia (02/26/2024), Anxiety, Depression, Diabetes mellitus (Multi), GERD (gastroesophageal reflux disease), Heart disease, Hypersomnia, unspecified (11/30/2021), Lumbar disc herniation (01/18/2023), Other conditions influencing health status, Other intervertebral disc degeneration, lumbar region (06/27/2014), Other intervertebral disc displacement, lumbar region (06/27/2014), Personal history of malignant melanoma of skin (10/16/2019), Personal history of other diseases of the digestive system, Personal history of other endocrine, nutritional and metabolic disease (02/20/2022), Personal history of other endocrine,  nutritional and metabolic disease, and Thyrotoxicosis, unspecified without thyrotoxic crisis or storm.     Physical Exam  Patient is alert and oriented x3 and in a relaxed and appropriate mood. Her gait is steady and hand grasps are equal. Sclera is clear. The breasts are nearly symmetrical. Bilateral breasts have well healed partial mastectomy incisions with axillary incisions. The tissue is soft without palpable abnormalities, discrete nodules or masses. The skin and nipples appear normal. There are 2 right chest wall incisions s/p port placement. There is no cervical, supraclavicular or axillary lymphadenopathy. Heart rate and rhythm normal, S1 and S2 appreciated. The lungs are clear to auscultation bilaterally. Abdomen is soft and non-tender.     Physical Exam     Last Recorded Vitals  There were no vitals filed for this visit.    Relevant Results   Time was spent discussing digital images of the radiology testing with the patient. I explained the results in depth, along with suggested explanation for follow up recommendations based on the testing results. BI-RADS Category 4    Imaging      MR breast bilateral w contrast full protocol 04/18/2024    Narrative  Interpreted By:  Afia Dewitt  and Lonnie Barrett  STUDY:  BI MR BREAST BILATERAL WITH CONTRAST FULL PROTOCOL;  4/18/2024 12:42  pm    ACCESSION NUMBER(S):  [id number]    ORDERING CLINICIAN:  LORETA DAVIS    INDICATION:  High-risk screening. BRCA 2 gene mutation. Remote history of left  breast invasive ductal carcinoma in 2003 status post lumpectomy,  axillary lymph node dissection, chemoradiation, and 5 years of  anastrozole. Diagnosed with right breast invasive ductal carcinoma in  2018 status post lumpectomy, sentinel lymph node biopsy, radiation,  and currently on Exemestane. Strong family history of breast cancer  with her mother diagnosed at age 36.    COMPARISON:  Comparison to MRI 10/03/2016 and correlation with  mammogram  10/03/2016.    TECHNIQUE:  Using a dedicated breast coil, STIR axial and T1-weighted fat  saturation axial images of the breasts were obtained, the latter both  before and after intravenous administration of Gadolinium DTPA. On an  independent workstation, 3-D images were formulated using [employer]  including time enhancement curves, subtraction images and MIP images.    Intravenous contrast:  17 mL of Dotarem    FINDINGS:  There is symmetric minimal bilateral background enhancement.    Scattered fibroglandular tissue.    RIGHT BREAST: Postsurgical scarring with associated susceptibility  artifact is seen in the superomedial breast from middle to posterior  depth and axilla. No suspicious mass or nonmass enhancement is  identified.    No axillary or internal mammary lymphadenopathy is appreciated.    LEFT BREAST: An irregular rim enhancing mass with irregular margins  measuring 0.8 x 0.8 x 0.9 cm is seen in the superolateral breast at  middle depth (series 100, image 164 of 232).    Postsurgical scarring with multiple foci of susceptibility artifact  seen in the superolateral breast at anterior depth and within the  axilla.    A prominent level III axillary lymph node measures 1.3 x 0.9 x 0.8 cm  (series 401, image 44 of 232), and may have slightly increased in  size when compared to breast MRI 10/03/2016 (previously measuring 1.0  x 0.8 x 0.8 cm). An internal mammary lymph node measuring 0.4 cm is  noted and demonstrates a fatty hilum (series 401, image 136 of 232).  This was not definitively seen on the prior MRI.    NON-BREAST FINDINGS:  None.    Impression  1. Suspicious enhancing left breast mass. A surgical consultation and  MRI directed ultrasound is recommended for possible biopsy under  ultrasound guidance. If not already recently obtained, a bilateral  mammogram should also be performed. A message was sent to the  referring practitioner at the time of this dictation regarding these  critical  findings using the Epic notification system. A pre-procedure  form was filled out.  2. Indeterminate left level III axillary lymph node with possible  increase in size when compared to the prior MRI. An MRI directed  ultrasound is recommended for further evaluation.  3. Right post treatment changes. No MRI evidence of malignancy in the  right breast.    Method of Detection: Category Sdbt - 3D Screening    BI-RADS CATEGORY:  BI-RADS Category:  4 Suspicious.  Recommendation:  Surgical Consultation and Biopsy.  Recommended Date:  Immediate.  Laterality:  Left.    For any future breast imaging appointments, please call [phone]  [phone]).    I personally reviewed the images/study and I agree with the findings  as stated by fellow physician, Dr. Arnold Fleming.    MACRO:  Critical Finding:  Breast Imaging Abnormality. Notification was  initiated on 4/18/2024 at 2:33 pm by Dr. Arnold Fleming.  (**-YCF-**)  Instructions:  Surgical Consultation and Imaging Guided Biopsy.    Signed by: Afia Dewitt 4/18/2024 3:05 PM  Dictation workstation:   [id number]       Assessment/Plan   Abnormal MRI, left breast mass, history of bilateral breast cancer, BRCA 2 positive, Exemestane therapy, family history of breast cancer, scattered fibroglandular tissue    Plan: Left breast ultrasound guided biopsy    Patient Discussion/Summary  I recommend a left breast ultrasound guided biopsy. A breast radiology physician will perform the procedure. Possible diagnoses include benign, atypia or cancer. Bruising and mild discomfort after the biopsy is normal and will improve. I typically have results in 3-5 business days. I will call you with the results, please have your phone handy to take my call. If you receive medical information from [facility] Personal Health Record, it is possible to view or see results of your biopsy or procedure before I contact you directly. I will provide recommendations for future follow up based on your biopsy results.      You can see your health information, review clinical summaries from office visits & test results online when you follow your health with MY  Chart, a personal health record. To sign up go to [email or web address]. If you need assistance with signing up or trouble getting into your account call [employer] Patient Line 24/7 at [phone].    Should you have any questions or concerns after biopsy, please do not hesitate to call my office at [phone]. If it has been more than a week since your biopsy was performed and you have not received results, please call my office at [phone]. Thank you for choosing [facility] and trusting me as your healthcare provider. I am honored to be a provider on your health care team and I remain dedicated to helping you achieve your health goals.      Ember Moe, APRN-CNP

## 2024-04-23 ENCOUNTER — HOSPITAL ENCOUNTER (OUTPATIENT)
Dept: RADIOLOGY | Facility: HOSPITAL | Age: 74
Discharge: HOME | End: 2024-04-23
Payer: MEDICARE

## 2024-04-23 ENCOUNTER — APPOINTMENT (OUTPATIENT)
Dept: SURGICAL ONCOLOGY | Facility: HOSPITAL | Age: 74
End: 2024-04-23
Payer: MEDICARE

## 2024-04-23 ENCOUNTER — OFFICE VISIT (OUTPATIENT)
Dept: SURGICAL ONCOLOGY | Facility: HOSPITAL | Age: 74
End: 2024-04-23
Payer: MEDICARE

## 2024-04-23 VITALS
DIASTOLIC BLOOD PRESSURE: 72 MMHG | HEIGHT: 59 IN | HEART RATE: 72 BPM | RESPIRATION RATE: 16 BRPM | WEIGHT: 184 LBS | SYSTOLIC BLOOD PRESSURE: 120 MMHG | BODY MASS INDEX: 37.09 KG/M2

## 2024-04-23 DIAGNOSIS — N63.21 MASS OF UPPER OUTER QUADRANT OF LEFT BREAST: ICD-10-CM

## 2024-04-23 DIAGNOSIS — R92.8 ABNORMAL MRI, BREAST: Primary | ICD-10-CM

## 2024-04-23 DIAGNOSIS — R92.8 ABNORMAL MAMMOGRAM: ICD-10-CM

## 2024-04-23 DIAGNOSIS — R93.89 ABNORMAL MRI: ICD-10-CM

## 2024-04-23 DIAGNOSIS — N63.0 PALPABLE MASS OF BREAST: ICD-10-CM

## 2024-04-23 PROCEDURE — 99214 OFFICE O/P EST MOD 30 MIN: CPT | Performed by: NURSE PRACTITIONER

## 2024-04-23 PROCEDURE — 77065 DX MAMMO INCL CAD UNI: CPT | Mod: LEFT SIDE | Performed by: STUDENT IN AN ORGANIZED HEALTH CARE EDUCATION/TRAINING PROGRAM

## 2024-04-23 PROCEDURE — 77065 DX MAMMO INCL CAD UNI: CPT | Mod: LT

## 2024-04-23 PROCEDURE — 3044F HG A1C LEVEL LT 7.0%: CPT | Performed by: NURSE PRACTITIONER

## 2024-04-23 PROCEDURE — 3008F BODY MASS INDEX DOCD: CPT | Performed by: NURSE PRACTITIONER

## 2024-04-23 PROCEDURE — 1159F MED LIST DOCD IN RCRD: CPT | Performed by: NURSE PRACTITIONER

## 2024-04-23 PROCEDURE — C1819 TISSUE LOCALIZATION-EXCISION: HCPCS

## 2024-04-23 PROCEDURE — 1157F ADVNC CARE PLAN IN RCRD: CPT | Performed by: NURSE PRACTITIONER

## 2024-04-23 PROCEDURE — 88305 TISSUE EXAM BY PATHOLOGIST: CPT | Performed by: PATHOLOGY

## 2024-04-23 PROCEDURE — 4010F ACE/ARB THERAPY RXD/TAKEN: CPT | Performed by: NURSE PRACTITIONER

## 2024-04-23 PROCEDURE — 2720000007 HC OR 272 NO HCPCS

## 2024-04-23 PROCEDURE — 19083 BX BREAST 1ST LESION US IMAG: CPT | Mod: LT

## 2024-04-23 PROCEDURE — 3074F SYST BP LT 130 MM HG: CPT | Performed by: NURSE PRACTITIONER

## 2024-04-23 PROCEDURE — 88360 TUMOR IMMUNOHISTOCHEM/MANUAL: CPT | Performed by: PATHOLOGY

## 2024-04-23 PROCEDURE — 2500000005 HC RX 250 GENERAL PHARMACY W/O HCPCS: Performed by: NURSE PRACTITIONER

## 2024-04-23 PROCEDURE — 1160F RVW MEDS BY RX/DR IN RCRD: CPT | Performed by: NURSE PRACTITIONER

## 2024-04-23 PROCEDURE — A4648 IMPLANTABLE TISSUE MARKER: HCPCS

## 2024-04-23 PROCEDURE — 19083 BX BREAST 1ST LESION US IMAG: CPT | Mod: LEFT SIDE | Performed by: STUDENT IN AN ORGANIZED HEALTH CARE EDUCATION/TRAINING PROGRAM

## 2024-04-23 PROCEDURE — 2780000003 HC OR 278 NO HCPCS

## 2024-04-23 PROCEDURE — 19084 BX BREAST ADD LESION US IMAG: CPT | Mod: LEFT SIDE | Performed by: STUDENT IN AN ORGANIZED HEALTH CARE EDUCATION/TRAINING PROGRAM

## 2024-04-23 PROCEDURE — 76642 ULTRASOUND BREAST LIMITED: CPT | Mod: LEFT SIDE | Performed by: STUDENT IN AN ORGANIZED HEALTH CARE EDUCATION/TRAINING PROGRAM

## 2024-04-23 PROCEDURE — 88305 TISSUE EXAM BY PATHOLOGIST: CPT | Mod: 59 | Performed by: NURSE PRACTITIONER

## 2024-04-23 PROCEDURE — 76982 USE 1ST TARGET LESION: CPT | Mod: LT

## 2024-04-23 PROCEDURE — 19084 BX BREAST ADD LESION US IMAG: CPT | Mod: LT

## 2024-04-23 PROCEDURE — 76642 ULTRASOUND BREAST LIMITED: CPT | Mod: LT

## 2024-04-23 PROCEDURE — 3078F DIAST BP <80 MM HG: CPT | Performed by: NURSE PRACTITIONER

## 2024-04-23 PROCEDURE — 1036F TOBACCO NON-USER: CPT | Performed by: NURSE PRACTITIONER

## 2024-04-23 RX ADMIN — Medication 20 ML: at 14:40

## 2024-04-23 NOTE — PROGRESS NOTES
Raiza Nguyễn female   1950 73 y.o.   [id number]      Chief Complaint  Biopsy consultation    History Of Present Illness  Raiza Nguyễn is a pleasant 73 y.o. female diagnosed in  with left breast invasive ductal carcinoma (IDC), grade 3, ER+/MA+/HER2 not defined, s/p left partial mastectomy and lymph node dissection with positive lymph nodes, completed chemotherapy, radiation, and 5 years of Anastrozole. She was found to have the BRCA2 mutation. 2018 she was diagnosed with right breast IDC, grade 2, ER+95%, MA+95%, HER2-. On 2018 Dr. Marcia Griffin performed a right magseed partial mastectomy and sentinel lymph node biopsy (2mi/3) and negative margins. Oncotype low score of 17. She completed adjuvant whole breast radiation 10/11/2018- 2018 and was started on Exemestane and tolerating it well (10 year plan). She follows with heme/onc. She has a personal history of melanoma and follows with Dermatology. She presents today for biopsy consultation following abnormal MRI.   Right breast Stage IA [id number]  Left breast Stage II in      BREAST IMAGIN2024 Bilateral full breast MRI, indicates BI-RADS Category 4. Left breast mass and left axillary level III lymph nodes indeterminate and increased in size when compared to previous MRI. MRI directed ultrasound and biopsy as indicated is recommended. 2023 Bilateral diagnostic mammogram with right breast ultrasound, indicates BI-RADS Category 2.      REPRODUCTIVE HISTORY: menarche age 11, , first birth age 25,  x 3 months, OCP's x 1.5 years, natural menopause age 52, no HRT, scattered fibroglandular tissue     FAMILY CANCER HISTORY:  Mother with breast cancer age 36, became metastatic,  age 62.  Father with lung cancer, smoker  MGM with ovarian cancer  Mat uncle with stomach cancer  Mat uncle with colon cancer  Mat cousin with stomach cancer     Surgical History  She has a past surgical history that includes Breast  lumpectomy (07/17/2013); Ovary surgery (07/17/2013); Other surgical history (05/14/2021); and Cholecystectomy (10/03/2017).     Social History  She reports that she quit smoking about 41 years ago. Her smoking use included cigarettes. She started smoking about 56 years ago. She has a 15 pack-year smoking history. She has been exposed to tobacco smoke. She has never used smokeless tobacco. She reports that she does not currently use alcohol. She reports that she does not use drugs.    Family History  Family History   Problem Relation Name Age of Onset    Breast cancer Mother      Lung cancer Father      Ovarian cysts Maternal Grandmother      Colon cancer Other Materal Uncle     Stomach cancer Other Materal Uncle     Stomach cancer Maternal Cousin          Allergies  Adhesive, Cephalexin, Erythromycin, Glucosamine, and Tetracycline    Medications  Current Outpatient Medications   Medication Instructions    CALCIUM ORAL 1 capsule, oral, Daily    DULoxetine (CYMBALTA) 30 mg, oral, 2 times daily    exemestane (AROMASIN) 25 mg, oral, Daily    gabapentin (Neurontin) 100 mg capsule     gabapentin (Neurontin) 300 mg capsule Take 2 capsules (600 mg) by mouth 2 times a day.    hydroCHLOROthiazide (HYDRODIURIL) 25 mg, oral, Daily    hydrocortisone (Anusol-HC) 2.5 % rectal cream APPLY AT BEDTIME FOR 2 WEEKS THEN AS NEEDED    lancets misc 2 Lancets, 2 times daily    levothyroxine (Synthroid, Levoxyl) 125 mcg tablet 1 tablet, oral, Daily    lisinopril 10 mg tablet TAKE 1 TABLET TWICE DAILY    metFORMIN XR (GLUCOPHAGE-XR) 500 mg, oral, Daily with breakfast, Do not crush, chew, or split.    Mounjaro 2.5 mg, subcutaneous, Once Weekly    multivitamin (DAILY VITAMIN FORMULA ORAL) oral, Daily RT    OneTouch Ultra Test strip 1 strip, in vitro, Daily    rosuvastatin (CRESTOR) 5 mg, oral, Daily    traMADol (Ultram) 50 mg tablet TAKE 1 TABLET BY MOUTH TWICE A DAY AS NEEDED FOR MODERATE TO SEVERE PAIN FOR 7 DAYS         REVIEW OF  SYSTEMS    Constitutional:  Negative for appetite change, fatigue, fever and unexpected weight change.   HENT:  Negative for ear pain, hearing loss, nosebleeds, sore throat and trouble swallowing.    Eyes:  Negative for discharge, itching and visual disturbance.   Respiratory:  Negative for cough, chest tightness and shortness of breath.    Cardiovascular:  Negative for chest pain, palpitations and leg swelling.   Breast: as indicated in HPI  Gastrointestinal:  Negative for abdominal pain, constipation, diarrhea and nausea.   Endocrine: Negative for cold intolerance and heat intolerance.   Genitourinary:  Negative for dysuria, frequency, hematuria, pelvic pain and vaginal bleeding.   Musculoskeletal:  Negative for arthralgias, back pain, gait problem, joint swelling and myalgias.   Skin:  Negative for color change and rash.   Allergic/Immunologic: Negative for environmental allergies and food allergies.   Neurological:  Negative for dizziness, tremors, speech difficulty, weakness, numbness and headaches.   Hematological:  Does not bruise/bleed easily.   Psychiatric/Behavioral:  Negative for agitation, dysphoric mood and sleep disturbance. The patient is not nervous/anxious.         Past Medical History  She has a past medical history of Amnesia (02/26/2024), Anxiety, Depression, Diabetes mellitus (Multi), GERD (gastroesophageal reflux disease), Heart disease, Hypersomnia, unspecified (11/30/2021), Lumbar disc herniation (01/18/2023), Other conditions influencing health status, Other intervertebral disc degeneration, lumbar region (06/27/2014), Other intervertebral disc displacement, lumbar region (06/27/2014), Personal history of malignant melanoma of skin (10/16/2019), Personal history of other diseases of the digestive system, Personal history of other endocrine, nutritional and metabolic disease (02/20/2022), Personal history of other endocrine, nutritional and metabolic disease, and Thyrotoxicosis, unspecified  without thyrotoxic crisis or storm.     Physical Exam  Patient is alert and oriented x3 and in a relaxed and appropriate mood. Her gait is steady and hand grasps are equal. Sclera is clear. The breasts are nearly symmetrical. Bilateral breasts have well-healed partial mastectomy incisions and well-healed axillary incisions. The right breast tissue is soft without palpable abnormalities, discrete nodules or masses. The left breast, 2:00, 5 cm from the nipple, overlying incision, 1 x 1 cm round hard mass, possibly calcified fat necrosis. The remaining left breast tissue is soft without palpable abnormalities, discrete nodules or masses. The skin and nipples appear normal. There are two right chest wall incisions from previous port placement. There is no cervical, supraclavicular or palpable axillary lymphadenopathy. Heart rate and rhythm normal, S1 and S2 appreciated. The lungs are clear to auscultation bilaterally. Abdomen is soft and non-tender.     Physical Exam  Chest:              Last Recorded Vitals  Vitals:    04/23/24 1224   BP: 120/72   Pulse: 72   Resp: 16       Relevant Results   Time was spent discussing digital images of the radiology testing with the patient. I explained the results in depth, along with suggested explanation for follow up recommendations based on the testing results. BI-RADS Category 4    Imaging  MR breast bilateral w contrast full protocol 04/18/2024    Narrative  Interpreted By:  Afia Dewitt and Avery Ross  STUDY:  BI MR BREAST BILATERAL WITH CONTRAST FULL PROTOCOL;  4/18/2024 12:42  pm    ACCESSION NUMBER(S):  [id number]    ORDERING CLINICIAN:  LORETA DAVIS    INDICATION:  High-risk screening. BRCA 2 gene mutation. Remote history of left  breast invasive ductal carcinoma in 2003 status post lumpectomy,  axillary lymph node dissection, chemoradiation, and 5 years of  anastrozole. Diagnosed with right breast invasive ductal carcinoma in  2018 status post lumpectomy,  sentinel lymph node biopsy, radiation,  and currently on Exemestane. Strong family history of breast cancer  with her mother diagnosed at age 36.    COMPARISON:  Comparison to MRI 10/03/2016 and correlation with mammogram  10/03/2016.    TECHNIQUE:  Using a dedicated breast coil, STIR axial and T1-weighted fat  saturation axial images of the breasts were obtained, the latter both  before and after intravenous administration of Gadolinium DTPA. On an  independent workstation, 3-D images were formulated using Cogn[employer]  including time enhancement curves, subtraction images and MIP images.    Intravenous contrast:  17 mL of Dotarem    FINDINGS:  There is symmetric minimal bilateral background enhancement.    Scattered fibroglandular tissue.    RIGHT BREAST: Postsurgical scarring with associated susceptibility  artifact is seen in the superomedial breast from middle to posterior  depth and axilla. No suspicious mass or nonmass enhancement is  identified.    No axillary or internal mammary lymphadenopathy is appreciated.    LEFT BREAST: An irregular rim enhancing mass with irregular margins  measuring 0.8 x 0.8 x 0.9 cm is seen in the superolateral breast at  middle depth (series 100, image 164 of 232).    Postsurgical scarring with multiple foci of susceptibility artifact  seen in the superolateral breast at anterior depth and within the  axilla.    A prominent level III axillary lymph node measures 1.3 x 0.9 x 0.8 cm  (series 401, image 44 of 232), and may have slightly increased in  size when compared to breast MRI 10/03/2016 (previously measuring 1.0  x 0.8 x 0.8 cm). An internal mammary lymph node measuring 0.4 cm is  noted and demonstrates a fatty hilum (series 401, image 136 of 232).  This was not definitively seen on the prior MRI.    NON-BREAST FINDINGS:  None.    Impression  1. Suspicious enhancing left breast mass. A surgical consultation and  MRI directed ultrasound is recommended for possible biopsy  under  ultrasound guidance. If not already recently obtained, a bilateral  mammogram should also be performed. A message was sent to the  referring practitioner at the time of this dictation regarding these  critical findings using the Epic notification system. A pre-procedure  form was filled out.  2. Indeterminate left level III axillary lymph node with possible  increase in size when compared to the prior MRI. An MRI directed  ultrasound is recommended for further evaluation.  3. Right post treatment changes. No MRI evidence of malignancy in the  right breast.    Method of Detection: Category Sdbt - 3D Screening    BI-RADS CATEGORY:  BI-RADS Category:  4 Suspicious.  Recommendation:  Surgical Consultation and Biopsy.  Recommended Date:  Immediate.  Laterality:  Left.    For any future breast imaging appointments, please call [phone] ([phone]).    I personally reviewed the images/study and I agree with the findings  as stated by fellow physician, Dr. Arnold Fleming.    MACRO:  Critical Finding:  Breast Imaging Abnormality. Notification was  initiated on 4/18/2024 at 2:33 pm by Dr. Arnold Fleming.  (**-YCF-**)  Instructions:  Surgical Consultation and Imaging Guided Biopsy.    Signed by: Afia Dewitt 4/18/2024 3:05 PM  Dictation workstation:   [id number]       Assessment/Plan   Abnormal MRI, left breast mass, history of bilateral breast cancer, BRCA 2 positive, Exemestane therapy, family history of breast cancer, scattered fibroglandular tissue     Plan: Left breast and left axillary ultrasound and biopsy as indicated    Patient Discussion/Summary  I recommend a left breast and left axillary ultrasound and biopsy as indicated. A breast radiology physician will perform the procedure. Possible diagnoses include benign, atypia or cancer. Bruising and mild discomfort after the biopsy is normal and will improve. I typically have results in 3-5 business days. I will call you with the results, please have your phone  handy to take my call. If you receive medical information from [facility] Personal Health Record, it is possible to view or see results of your biopsy or procedure before I contact you directly. I will provide recommendations for future follow up based on your biopsy results.      You can see your health information, review clinical summaries from office visits & test results online when you follow your health with MY  Chart, a personal health record. To sign up go to [email or web address]. If you need assistance with signing up or trouble getting into your account call [employer] Patient Line 24/7 at [phone].     Should you have any questions or concerns after biopsy, please do not hesitate to call my office at [phone]. If it has been more than a week since your biopsy was performed and you have not received results, please call my office at [phone]. Thank you for choosing [facility] and trusting me as your healthcare provider. I am honored to be a provider on your health care team and I remain dedicated to helping you achieve your health goals.      Ember Moe, APRN-CNP

## 2024-04-23 NOTE — PATIENT INSTRUCTIONS
I recommend a left breast and left axillary ultrasound and biopsy as indicated. A breast radiology physician will perform the procedure. Possible diagnoses include benign, atypia or cancer. Bruising and mild discomfort after the biopsy is normal and will improve. I typically have results in 3-5 business days. I will call you with the results, please have your phone handy to take my call. If you receive medical information from [facility] Personal Health Record, it is possible to view or see results of your biopsy or procedure before I contact you directly. I will provide recommendations for future follow up based on your biopsy results.      You can see your health information, review clinical summaries from office visits & test results online when you follow your health with MY  Chart, a personal health record. To sign up go to [email or web address]. If you need assistance with signing up or trouble getting into your account call [employer] Patient Line 24/7 at [phone].     Should you have any questions or concerns after biopsy, please do not hesitate to call my office at [phone]. If it has been more than a week since your biopsy was performed and you have not received results, please call my office at [phone]. Thank you for choosing [facility] and trusting me as your healthcare provider. I am honored to be a provider on your health care team and I remain dedicated to helping you achieve your health goals.

## 2024-04-29 DIAGNOSIS — I10 ESSENTIAL HYPERTENSION: ICD-10-CM

## 2024-04-29 RX ORDER — HYDROCHLOROTHIAZIDE 25 MG/1
25 TABLET ORAL 2 TIMES DAILY
Qty: 180 TABLET | Refills: 0 | Status: SHIPPED | OUTPATIENT
Start: 2024-04-29 | End: 2024-07-28

## 2024-04-30 LAB
LAB AP ASR DISCLAIMER: NORMAL
LABORATORY COMMENT REPORT: NORMAL
PATH REPORT.ADDENDUM SPEC: NORMAL
PATH REPORT.FINAL DX SPEC: NORMAL
PATH REPORT.GROSS SPEC: NORMAL
PATH REPORT.RELEVANT HX SPEC: NORMAL
PATH REPORT.TOTAL CANCER: NORMAL

## 2024-05-01 ENCOUNTER — APPOINTMENT (OUTPATIENT)
Dept: SURGICAL ONCOLOGY | Facility: HOSPITAL | Age: 74
End: 2024-05-01
Payer: MEDICARE

## 2024-05-01 ENCOUNTER — TELEPHONE (OUTPATIENT)
Dept: SURGERY | Facility: HOSPITAL | Age: 74
End: 2024-05-01
Payer: MEDICARE

## 2024-05-01 NOTE — TELEPHONE ENCOUNTER
Spoke with Angie regarding left breast biopsy results. Surgical excision recommended. Referred to Jeniffer Langley.

## 2024-05-03 ENCOUNTER — TELEPHONE (OUTPATIENT)
Dept: HEMATOLOGY/ONCOLOGY | Facility: CLINIC | Age: 74
End: 2024-05-03
Payer: MEDICARE

## 2024-05-03 NOTE — TELEPHONE ENCOUNTER
"Patient given number of Dr. Moy's office by PCP to schedule consult for recent breast diagnosis.  [facility] because she would like to be seen there.  This  advised that Dr. Moy only sees at [facility] in [place].  Patient would like to be seen downtown.  This  provided patient scheduling number for [facility] in order to schedule this visit.  Patient aware and appreciative.  "

## 2024-05-03 NOTE — TELEPHONE ENCOUNTER
Patient called Dr. Moy's office back and left voicemail- asking to schedule with Dr. Moy.    This  called patient back. Patient saw Dr. Fuller for breast cancer in February at [facility].  Advised patient that she would follow up with this provider since she is established with her.  Patient states she was confused with all of the different providers.  This  asked patient if she was refusing to see Dr. Fuller and asking to see Dr. Moy, but patient stated she would do more investigating and call office back.  This  advised patient that this office would have to get an approval to switch doctors within system if that is her preference.

## 2024-05-06 DIAGNOSIS — C50.911 MALIGNANT NEOPLASM OF BOTH BREASTS IN FEMALE, ESTROGEN RECEPTOR NEGATIVE, UNSPECIFIED SITE OF BREAST (MULTI): Primary | ICD-10-CM

## 2024-05-06 DIAGNOSIS — C50.912 MALIGNANT NEOPLASM OF BOTH BREASTS IN FEMALE, ESTROGEN RECEPTOR NEGATIVE, UNSPECIFIED SITE OF BREAST (MULTI): Primary | ICD-10-CM

## 2024-05-06 DIAGNOSIS — Z17.1 MALIGNANT NEOPLASM OF BOTH BREASTS IN FEMALE, ESTROGEN RECEPTOR NEGATIVE, UNSPECIFIED SITE OF BREAST (MULTI): Primary | ICD-10-CM

## 2024-05-06 NOTE — PROGRESS NOTES
BREAST SURGICAL ONCOLOGY NEW CANCER DIAGNOSIS    Assessment/Plan     Left breast IDC g3 ER0% CO 0% HER2 positive at 3:00 10cmFN measuring 0.9cm on MRI  -concerning level 3 lymph node not amenable to biopsy.  PET CT recommended for further assessment by radiology.    2.  History of bilateral breast cancer and known BRCA2 mutation    Stage IB (Prognostic Stage IA) Right breast cancer 7/2018, [id number], grade 2 IDC, ER+95% CO+95% HER2 equivocal, FISH-.   - s/p R. magseed pm / SLNB (2mi/3) on 8/27/2018 with a 1.5cm IDC and negative margins. She had 2 out of 3 LNs with micrometastases measuring 0.4mm and 1.1mm, no NASH.   - Oncotype 17   - s/p WBRT 10/11/2018 - 11/1/2018   - taking Exemestane      Stage II Left breast cancer in 2003, 1.5 cm grade 3 IDC with positive LNs and NASH, ER+ CO+ HER2 not defined.   - L.pm/ALND   - ACx4 + Tx4   - WBRT   - Anastrozole x5yr      Concern for level 3 lymph node involvement and PET was recommended for further evaluation.  Will also present at tumor board for recommendations.    Discussed options for surgery- appears to be a small mass in breast.  If breast conservation were pursued she would need to be evaluated for the possibility of re-irradiation.  Raiza has indicated she would most likely pursue mastectomy.    Will follow up after PET CT and tumor board discussion with recommendations for next steps.     Subjective   Raiza Nguyễn is a 73 y.o. female presents today for evaluation of recently diagnosed carcinoma of the left breast.     The patient was initially referred for evaluation of  abnormal breast MRI  on 4/18/2024 with a mass in the left lateral breat and an abnormal level 3 lymph node.    Follow-up diagnostic imaging on 4/23/2024 reveals a mass at 3:00 10cmFN measuring 0.9cm corresponding to the MRI mass. There is also an enlarged lymph node under the pectoralis muscle inferior to the clavicle with 4mm of cortical thickening.  Due to proximity of surrounding vessels  this node does not appear amenable to biopsy.     ultrasound guided biopsy was performed on 4/23/2024:    FINAL DIAGNOSIS   A. Left breast mass, 3:00, 10 cm from nipple, core needle biopsy:  -- Invasive ductal carcinoma, grade 3, see note.     Note:  In this limited sample, the invasive carcinoma measures up to 5.0 mm in greatest dimension.  ER, [place] and HER2 will be reported in an addendum.     B. Left breast mass, 2:00, 5 cm from nipple, core needle biopsy:  -- Fibrous scar with associated calcifications, see note.     Estrogen Receptor (clone SP1):                     Negative                                                                               Progesterone Receptor (clone SP2):              Negative        HER2 (clone 4B5):                                          Positive (3+)    Review of Systems   Constitutional symptoms: Denies generalized fatigue.  Denies weight change, fevers/chills, difficulty sleeping   Eyes: Denies double vision, glaucoma, cataracts.  Ear/nose/throat/mouth: Denies hearing changes, sore throat, sinus problems.  Cardiovascular: No chest pain.  Denies irregular heartbeat.  Denies ankle swelling.  Respiratory: No wheezing, cough, or shortness of breath.  Gastrointestinal: No abdominal pain,  No nausea/vomiting.  No indigestion/heartburn.  No change in bowel habits.  No constipation or diarrhea.   Genitourinary: No urinary incontinence.  No urinary frequency.  No painful urination.  Musculoskeletal: No bone pain, no muscle pain, no joint pain.   Integumentary: No rash. No masses.  No changes in moles.  No easy bruising.  Neurological: No headaches.  No tremors. No numbness/tingling.  Psychiatric: No anxiety. No depression.  Endocrine: No excessive thirst.  Not too hot or too cold.  Not tired or fatigued.    Hematological/lymphatic: No swollen glands or blood clotting problems.  No bruising.    Objective   Physical Exam  General: Alert and oriented x 3.  Mood and affect are  appropriate.  HEENT: EOMI, PERRLA.   Neck: supple, no masses, no cervical adenopathy.  Cardiovascular: no lower extremity edema.  Pulmonary: breathing non labored on room air.  GI: Abdomen soft, no masses. No hepatomegaly or splenomegaly.  Lymph nodes: No supraclavicular or axillary adenopathy bilaterally.  Musculoskeletal: Full range of motion in the upper extremities bilaterally.  Neuro: denies dizziness, tremors    Breasts: Right breast periareolar incision without abnormality. Left breast slightly smaller than right. Lumpectomy scar noted in Left lateral breast with some lumpy fibroglandular tissue noted bilaterally.  Axillae: No palpable adenopathy in either axilla    Radiology review: All images and reports were personally reviewed.         Jeniffer Langley, DO

## 2024-05-07 ENCOUNTER — OFFICE VISIT (OUTPATIENT)
Dept: SURGICAL ONCOLOGY | Facility: CLINIC | Age: 74
End: 2024-05-07
Payer: MEDICARE

## 2024-05-07 DIAGNOSIS — Z17.1 MALIGNANT NEOPLASM OF CENTRAL PORTION OF LEFT BREAST IN FEMALE, ESTROGEN RECEPTOR NEGATIVE (MULTI): ICD-10-CM

## 2024-05-07 DIAGNOSIS — C50.112 MALIGNANT NEOPLASM OF CENTRAL PORTION OF LEFT BREAST IN FEMALE, ESTROGEN RECEPTOR NEGATIVE (MULTI): ICD-10-CM

## 2024-05-07 PROCEDURE — 4010F ACE/ARB THERAPY RXD/TAKEN: CPT | Performed by: SURGERY

## 2024-05-07 PROCEDURE — 3008F BODY MASS INDEX DOCD: CPT | Performed by: SURGERY

## 2024-05-07 PROCEDURE — 99215 OFFICE O/P EST HI 40 MIN: CPT | Performed by: SURGERY

## 2024-05-07 PROCEDURE — 1159F MED LIST DOCD IN RCRD: CPT | Performed by: SURGERY

## 2024-05-07 PROCEDURE — 1157F ADVNC CARE PLAN IN RCRD: CPT | Performed by: SURGERY

## 2024-05-07 PROCEDURE — 1160F RVW MEDS BY RX/DR IN RCRD: CPT | Performed by: SURGERY

## 2024-05-07 PROCEDURE — 3044F HG A1C LEVEL LT 7.0%: CPT | Performed by: SURGERY

## 2024-05-08 ENCOUNTER — PATIENT MESSAGE (OUTPATIENT)
Dept: SURGICAL ONCOLOGY | Facility: CLINIC | Age: 74
End: 2024-05-08
Payer: MEDICARE

## 2024-05-08 ENCOUNTER — HOSPITAL ENCOUNTER (OUTPATIENT)
Dept: RADIOLOGY | Facility: HOSPITAL | Age: 74
Discharge: HOME | End: 2024-05-08
Payer: MEDICARE

## 2024-05-08 DIAGNOSIS — C50.112 MALIGNANT NEOPLASM OF CENTRAL PORTION OF LEFT BREAST IN FEMALE, ESTROGEN RECEPTOR NEGATIVE (MULTI): ICD-10-CM

## 2024-05-08 DIAGNOSIS — Z17.1 MALIGNANT NEOPLASM OF CENTRAL PORTION OF LEFT BREAST IN FEMALE, ESTROGEN RECEPTOR NEGATIVE (MULTI): ICD-10-CM

## 2024-05-08 LAB — GLUCOSE BLD MANUAL STRIP-MCNC: 149 MG/DL (ref 74–99)

## 2024-05-08 PROCEDURE — A9552 F18 FDG: HCPCS | Performed by: SURGERY

## 2024-05-08 PROCEDURE — 82947 ASSAY GLUCOSE BLOOD QUANT: CPT

## 2024-05-08 PROCEDURE — 3430000001 HC RX 343 DIAGNOSTIC RADIOPHARMACEUTICALS: Performed by: SURGERY

## 2024-05-08 PROCEDURE — 78816 PET IMAGE W/CT FULL BODY: CPT | Mod: PS

## 2024-05-08 RX ORDER — FLUDEOXYGLUCOSE F 18 200 MCI/ML
10.19 INJECTION, SOLUTION INTRAVENOUS
Status: COMPLETED | OUTPATIENT
Start: 2024-05-08 | End: 2024-05-08

## 2024-05-08 RX ADMIN — FLUDEOXYGLUCOSE F 18 10.19 MILLICURIE: 200 INJECTION, SOLUTION INTRAVENOUS at 08:24

## 2024-05-09 ENCOUNTER — TUMOR BOARD CONFERENCE (OUTPATIENT)
Dept: HEMATOLOGY/ONCOLOGY | Facility: HOSPITAL | Age: 74
End: 2024-05-09
Payer: MEDICARE

## 2024-05-13 DIAGNOSIS — G89.29 OTHER CHRONIC PAIN: Primary | ICD-10-CM

## 2024-05-13 DIAGNOSIS — M47.817 SPONDYLOSIS OF LUMBOSACRAL REGION, UNSPECIFIED SPINAL OSTEOARTHRITIS COMPLICATION STATUS: ICD-10-CM

## 2024-05-14 DIAGNOSIS — Z15.09 BRCA2 GENE MUTATION POSITIVE IN FEMALE: ICD-10-CM

## 2024-05-14 DIAGNOSIS — Z15.02 BRCA2 GENE MUTATION POSITIVE IN FEMALE: ICD-10-CM

## 2024-05-14 DIAGNOSIS — C50.412 MALIGNANT NEOPLASM OF UPPER-OUTER QUADRANT OF LEFT BREAST IN FEMALE, ESTROGEN RECEPTOR NEGATIVE (MULTI): ICD-10-CM

## 2024-05-14 DIAGNOSIS — Z15.01 BRCA2 GENE MUTATION POSITIVE IN FEMALE: ICD-10-CM

## 2024-05-14 DIAGNOSIS — Z17.1 MALIGNANT NEOPLASM OF UPPER-OUTER QUADRANT OF LEFT BREAST IN FEMALE, ESTROGEN RECEPTOR NEGATIVE (MULTI): ICD-10-CM

## 2024-05-15 ENCOUNTER — PREP FOR PROCEDURE (OUTPATIENT)
Dept: SURGICAL ONCOLOGY | Facility: HOSPITAL | Age: 74
End: 2024-05-15
Payer: MEDICARE

## 2024-05-15 DIAGNOSIS — C50.412 MALIGNANT NEOPLASM OF UPPER-OUTER QUADRANT OF LEFT BREAST IN FEMALE, ESTROGEN RECEPTOR NEGATIVE (MULTI): ICD-10-CM

## 2024-05-15 DIAGNOSIS — Z17.1 MALIGNANT NEOPLASM OF UPPER-OUTER QUADRANT OF LEFT BREAST IN FEMALE, ESTROGEN RECEPTOR NEGATIVE (MULTI): ICD-10-CM

## 2024-05-16 ENCOUNTER — TELEPHONE (OUTPATIENT)
Dept: PRIMARY CARE | Facility: CLINIC | Age: 74
End: 2024-05-16

## 2024-05-16 ENCOUNTER — OFFICE VISIT (OUTPATIENT)
Dept: HEMATOLOGY/ONCOLOGY | Facility: CLINIC | Age: 74
End: 2024-05-16
Payer: MEDICARE

## 2024-05-16 ENCOUNTER — LAB (OUTPATIENT)
Dept: LAB | Facility: LAB | Age: 74
End: 2024-05-16
Payer: MEDICARE

## 2024-05-16 VITALS
DIASTOLIC BLOOD PRESSURE: 77 MMHG | SYSTOLIC BLOOD PRESSURE: 115 MMHG | BODY MASS INDEX: 37.09 KG/M2 | HEIGHT: 59 IN | WEIGHT: 184 LBS | HEART RATE: 105 BPM

## 2024-05-16 DIAGNOSIS — C50.112 MALIGNANT NEOPLASM OF CENTRAL PORTION OF LEFT BREAST IN FEMALE, ESTROGEN RECEPTOR NEGATIVE (MULTI): ICD-10-CM

## 2024-05-16 DIAGNOSIS — Z17.1 MALIGNANT NEOPLASM OF CENTRAL PORTION OF LEFT BREAST IN FEMALE, ESTROGEN RECEPTOR NEGATIVE (MULTI): ICD-10-CM

## 2024-05-16 DIAGNOSIS — Z85.3 HISTORY OF CANCER OF LEFT BREAST: ICD-10-CM

## 2024-05-16 DIAGNOSIS — Z13.6 ENCOUNTER FOR SCREENING FOR CARDIOVASCULAR DISORDERS: ICD-10-CM

## 2024-05-16 DIAGNOSIS — E11.9 TYPE 2 DIABETES MELLITUS WITHOUT COMPLICATION, WITHOUT LONG-TERM CURRENT USE OF INSULIN (MULTI): Primary | ICD-10-CM

## 2024-05-16 LAB
ALBUMIN SERPL BCP-MCNC: 5.1 G/DL (ref 3.4–5)
ALP SERPL-CCNC: 70 U/L (ref 33–136)
ALT SERPL W P-5'-P-CCNC: 19 U/L (ref 7–45)
AMYLASE SERPL-CCNC: 46 U/L (ref 29–103)
ANION GAP SERPL CALC-SCNC: 14 MMOL/L (ref 10–20)
AST SERPL W P-5'-P-CCNC: 19 U/L (ref 9–39)
BASOPHILS # BLD AUTO: 0.04 X10*3/UL (ref 0–0.1)
BASOPHILS NFR BLD AUTO: 0.7 %
BILIRUB SERPL-MCNC: 0.6 MG/DL (ref 0–1.2)
BUN SERPL-MCNC: 29 MG/DL (ref 6–23)
CALCIUM SERPL-MCNC: 9.8 MG/DL (ref 8.6–10.3)
CHLORIDE SERPL-SCNC: 97 MMOL/L (ref 98–107)
CO2 SERPL-SCNC: 29 MMOL/L (ref 21–32)
CREAT SERPL-MCNC: 0.82 MG/DL (ref 0.5–1.05)
EGFRCR SERPLBLD CKD-EPI 2021: 77 ML/MIN/1.73M*2
EOSINOPHIL # BLD AUTO: 0.19 X10*3/UL (ref 0–0.4)
EOSINOPHIL NFR BLD AUTO: 3.4 %
ERYTHROCYTE [DISTWIDTH] IN BLOOD BY AUTOMATED COUNT: 12.6 % (ref 11.5–14.5)
GLUCOSE SERPL-MCNC: 131 MG/DL (ref 74–99)
HCT VFR BLD AUTO: 42.8 % (ref 36–46)
HGB BLD-MCNC: 14.6 G/DL (ref 12–16)
IMM GRANULOCYTES # BLD AUTO: 0.02 X10*3/UL (ref 0–0.5)
IMM GRANULOCYTES NFR BLD AUTO: 0.4 % (ref 0–0.9)
LIPASE SERPL-CCNC: 42 U/L (ref 9–82)
LYMPHOCYTES # BLD AUTO: 1.79 X10*3/UL (ref 0.8–3)
LYMPHOCYTES NFR BLD AUTO: 32.1 %
MCH RBC QN AUTO: 31.9 PG (ref 26–34)
MCHC RBC AUTO-ENTMCNC: 34.1 G/DL (ref 32–36)
MCV RBC AUTO: 93 FL (ref 80–100)
MONOCYTES # BLD AUTO: 0.56 X10*3/UL (ref 0.05–0.8)
MONOCYTES NFR BLD AUTO: 10 %
NEUTROPHILS # BLD AUTO: 2.98 X10*3/UL (ref 1.6–5.5)
NEUTROPHILS NFR BLD AUTO: 53.4 %
NRBC BLD-RTO: 0 /100 WBCS (ref 0–0)
PLATELET # BLD AUTO: 239 X10*3/UL (ref 150–450)
POTASSIUM SERPL-SCNC: 4.4 MMOL/L (ref 3.5–5.3)
PROT SERPL-MCNC: 7.1 G/DL (ref 6.4–8.2)
RBC # BLD AUTO: 4.58 X10*6/UL (ref 4–5.2)
SODIUM SERPL-SCNC: 136 MMOL/L (ref 136–145)
WBC # BLD AUTO: 5.6 X10*3/UL (ref 4.4–11.3)

## 2024-05-16 PROCEDURE — 82150 ASSAY OF AMYLASE: CPT

## 2024-05-16 PROCEDURE — 3008F BODY MASS INDEX DOCD: CPT | Performed by: STUDENT IN AN ORGANIZED HEALTH CARE EDUCATION/TRAINING PROGRAM

## 2024-05-16 PROCEDURE — 3078F DIAST BP <80 MM HG: CPT | Performed by: STUDENT IN AN ORGANIZED HEALTH CARE EDUCATION/TRAINING PROGRAM

## 2024-05-16 PROCEDURE — 85025 COMPLETE CBC W/AUTO DIFF WBC: CPT

## 2024-05-16 PROCEDURE — 1157F ADVNC CARE PLAN IN RCRD: CPT | Performed by: STUDENT IN AN ORGANIZED HEALTH CARE EDUCATION/TRAINING PROGRAM

## 2024-05-16 PROCEDURE — 1159F MED LIST DOCD IN RCRD: CPT | Performed by: STUDENT IN AN ORGANIZED HEALTH CARE EDUCATION/TRAINING PROGRAM

## 2024-05-16 PROCEDURE — 1126F AMNT PAIN NOTED NONE PRSNT: CPT | Performed by: STUDENT IN AN ORGANIZED HEALTH CARE EDUCATION/TRAINING PROGRAM

## 2024-05-16 PROCEDURE — 4010F ACE/ARB THERAPY RXD/TAKEN: CPT | Performed by: STUDENT IN AN ORGANIZED HEALTH CARE EDUCATION/TRAINING PROGRAM

## 2024-05-16 PROCEDURE — 99215 OFFICE O/P EST HI 40 MIN: CPT | Performed by: STUDENT IN AN ORGANIZED HEALTH CARE EDUCATION/TRAINING PROGRAM

## 2024-05-16 PROCEDURE — 83690 ASSAY OF LIPASE: CPT

## 2024-05-16 PROCEDURE — 1160F RVW MEDS BY RX/DR IN RCRD: CPT | Performed by: STUDENT IN AN ORGANIZED HEALTH CARE EDUCATION/TRAINING PROGRAM

## 2024-05-16 PROCEDURE — 3074F SYST BP LT 130 MM HG: CPT | Performed by: STUDENT IN AN ORGANIZED HEALTH CARE EDUCATION/TRAINING PROGRAM

## 2024-05-16 PROCEDURE — 36415 COLL VENOUS BLD VENIPUNCTURE: CPT

## 2024-05-16 PROCEDURE — 3044F HG A1C LEVEL LT 7.0%: CPT | Performed by: STUDENT IN AN ORGANIZED HEALTH CARE EDUCATION/TRAINING PROGRAM

## 2024-05-16 PROCEDURE — 80053 COMPREHEN METABOLIC PANEL: CPT

## 2024-05-16 RX ORDER — TIRZEPATIDE 2.5 MG/.5ML
2.5 INJECTION, SOLUTION SUBCUTANEOUS
Qty: 2 ML | Refills: 3 | Status: SHIPPED | OUTPATIENT
Start: 2024-05-19

## 2024-05-16 ASSESSMENT — PAIN SCALES - GENERAL: PAINLEVEL: 0-NO PAIN

## 2024-05-16 NOTE — TELEPHONE ENCOUNTER
----- Message from Raiza Nguyễn sent at 5/12/2024  8:32 PM EDT -----  Regarding: HEMATOLOGY   Contact: [phone]  Hi Shoshana  I'm glad you're getting the treatment you need it has been a long time for you to be heal and not worry about everything.  I wish you the very best of luck and the best skill of the doctor all lead by the Lord. I will miss your messages, ideas and encouragement.    My scan results are in but I haven't heard from Dr. Langley for her to explain them. I understand she's busy and I'm not the only one but I'm disappointed.    I will definitely take some time to explore while I'm in [place].  You have given me a few great ideas and looking forward to doing them. I am having a terrible time with pain in my feet and even the gabapentin isn't helping much. I don't understand what is happening with my body maybe its my back getting worse.  Dr. Langley had me stop the exemestane since this new cancer is not hormone receptive.  Do you think I could start the Mounjaro at this time or should I wait? I had a great mothers day we celebrated as if it was our last because none if us know for certain.  Hope you had a great one yourself and I'm off to hopefully catch the northern lights again.  Talk soon and you're in my prayers and thoughts as always.   Kath

## 2024-05-16 NOTE — PATIENT INSTRUCTIONS
Have labs drawn today.  Schedule MRI and echocardiogram.  Referrals placed to Onco Cardiology, Nutrition, Genetics and Integrative Oncology.  Follow up with Dr. Bourne on 7/2/24.  Please call [phone] with questions or concerns.

## 2024-05-16 NOTE — TELEPHONE ENCOUNTER
Pls call pt mounjaro sent in     Rf at same dose provided-   if tolerating well can call in to office to send 5 mg (if any available,  pharmacy can let pt know,      out of stock many places)    Will be following her chart as she follows up on her breast cancer     Sending my love!

## 2024-05-16 NOTE — PROGRESS NOTES
Breast Medical Oncology Clinic  Location: [facility]    Visit Type: Follow-up Visit    Oncologic History:    She was diagnosed in January 2003 with a left-sided 1.5 cm infiltrating ductal carcinoma with positive axillary lymph nodes and extranodal extension. It was a grade 3 tumor with hormone receptors positive and HER-2/anabell not defined.   Four cycles of Adriamycin and cyclophosphamide were followed by 4 cycles of paclitaxel in March 2004.   Left lumpectomy and axillary node dissection followed by left breast radiation is also completed in 2004.   She had bilateral oophorectomies in May 2008.   Anastrozole through March 2009.   She had a lesion in her left lower back removed, and confirmed to be a 0.35 mm melanoma. It was not ulcerative and she did not require a sentinel node evaluation. She has since had multiple excisions of atypical myelomonocytic lesions without disease  discovered.   She did well until screening mammogram 8/2018 confirmed finding on PET (done related melanoma dx) .  She had excision of 1.5cm IDC with 1/2 SN with microscopic involvement. She did not want to receive chemotherapy and the lesion was strongly ER and CA  positive, HER 2 negative.   Started on exemestane November 2018.   She is BRCA2 positive   4/18/24: MRI breast screening: An irregular rim enhancing mass with irregular margins measuring 0.8 x 0.8 x 0.9 cm is seen in the superolateral breast at middle depth A prominent level III axillary lymph node measures 1.3 x 0.9 x 0.8 cm, and may have slightly increased in size when compared to breast MRI 10/03/2016 (previously measuring 1.0 x 0.8 x 0.8 cm). An internal mammary lymph node measuring 0.4 cm is noted and demonstrates a fatty hilum (series 401, image 136 of 232). This was not definitively seen on the prior MRI.  4/23/24: L breast mass 3:00 bx IDC G3, ER/CA negative, HER2 positive. L breast mass 2:00 bx: fibrous scar with associated calcifications.   5/8/24: PET scan: mildly  "hypermetabolic soft tissue density measuring 1.6 x 0.8 cm at left outer breast.  Mild heterogeneous activity is noted in the region of the liver with a suggestion of a small focus along the medial tip of segment 2 of the left hepatic lobe.   5/9/24: TB discussion: Plan for surgery first approach.     Subjective: Interval History    Patient presents today for treatment planning after recently diagnosed L breast cancer. She has met with her surgeon and tentative plan for bilateral mastectomy in place 6/12.     Interval events reviewed. Has otherwise been in her usual state of health.     Reports after her PET scan has had abdominal discomfort. Denies nausea/vomiting. Reports thought it was related to constipation but did not resolve after a bowel movement.     Denies weight loss, changes in appetite or energy level.     Pertinent Family history:    Mother having bilateral breast cancer and a first cousin having breast cancer later in life. Her sister had a GYN malignancy, now metastatic and a maternal aunt with stomach cancer. A maternal uncle had colon  cancer.     Social History  Raiza Schustersergeyniles  reports that she quit smoking about 41 years ago. Her smoking use included cigarettes. She started smoking about 56 years ago. She has a 15 pack-year smoking history. She has been exposed to tobacco smoke. She has never used smokeless tobacco.  She  reports that she does not currently use alcohol.  She  reports no history of drug use.    ROS:     Review of Systems   All other systems reviewed and are negative.       Physical Examination:    /77 (BP Location: Right arm, Patient Position: Sitting, BP Cuff Size: Adult)   Pulse 105   Ht (S) 1.504 m (4' 11.21\")   Wt 83.5 kg (184 lb)   BMI 36.90 kg/m²     Physical Exam  Vitals and nursing note reviewed.   Constitutional:       General: She is not in acute distress.     Appearance: Normal appearance. She is not toxic-appearing.   HENT:      Head: Normocephalic and " atraumatic.   Eyes:      Conjunctiva/sclera: Conjunctivae normal.   Cardiovascular:      Rate and Rhythm: Normal rate.   Pulmonary:      Effort: Pulmonary effort is normal. No respiratory distress.   Abdominal:      General: Abdomen is flat.      Palpations: Abdomen is soft.      Tenderness: There is abdominal tenderness (diffuse).   Musculoskeletal:         General: No swelling. Normal range of motion.      Cervical back: Normal range of motion.   Skin:     General: Skin is warm and dry.   Neurological:      General: No focal deficit present.      Mental Status: She is alert.   Psychiatric:         Mood and Affect: Mood normal.         Breast Examination:    Left breast: 2:00 6 CM from under incision there is 2 areas of nodularity  Right breast: No masses, skin or nipple changes  Axillary: No significant examination findings    ECOG Performance Status:     [x] 0 Fully active, able to carry on all pre-disease performance without restriction  [] 1 Restricted in physically strenuous activity but ambulatory and able to carry out work of a light or sedentary nature, e.g., light house work, office work  [] 2 Ambulatory and capable of all selfcare but unable to carry out any work activities; up and about more than 50% of waking hours  [] 3 Capable of only limited selfcare; confined to bed or chair more than 50% of waking hours  [] 4 Completely disabled; cannot carry on any selfcare; totally confined to bed or chair  [] 5 Dead     Results:    Labs:      Lab Results   Component Value Date    WBC 5.6 07/06/2023    HGB 14.1 07/06/2023    HCT 42.9 07/06/2023    MCV 98 07/06/2023     07/06/2023       Chemistry    Lab Results   Component Value Date/Time     01/30/2024 1020    K 4.2 01/30/2024 1020     01/30/2024 1020    CO2 30 01/30/2024 1020    BUN 20 01/30/2024 1020    CREATININE 0.82 01/30/2024 1020    Lab Results   Component Value Date/Time    CALCIUM 10.0 01/30/2024 1020    ALKPHOS 66 07/06/2023 0842     AST 20 07/06/2023 0842    ALT 18 07/06/2023 0842    BILITOT 0.5 07/06/2023 0842             Imaging:  Reviewed above in Onc History    Pathology:  Reviewed above in Onc History    Assessment:     2003: Stage II Left IDC grade 3 with positive LNs and NASH, ER+ OH+; S/p L PM and SLNBx; S/p AC+T; S/p radiation; anastrozole x5 years  2018: Stage IB (Prognostic Stage IA), [id number], grade 2 IDC, ER+95% OH+95% HER2 equivocal, FISH-. S/p R PM and SLNBx; Oncotype 17; S/p radiation; Exemestane since 11/2028, currently on hold  2024: cT1 cN0 L IDC, G3, ER/OH negative and HER2 positive. Surgical planning underway  BRCA2 germline mutation      Plan:    MRI liver obtained to further characterize findings in liver noted on staging PET scan, will call patient with results once this is available.  Bilateral mastectomy planned for 6/12/24 with Dr. Langley as per discussion at breast multidisciplinary tumor board  Referral to onco-cardiology and onco-echocardiogram in anticipation of HER2-directed therapy. She will require echocardiogram every 3 months while receiving HER2 directed therapy.   We briefly discussed possible treatment regimens, however final plan will be guided by her surgical pathology.   Referral to integrative oncology  Referral to nutrition services  Referral to genetics for gBRCA2 mutation- pt reports is she s/p BSO  Baseline labs obtained today including amylase/lipase. She is describing abdominal discomfort since her PET scan. Will assess on above imaging ordered. Patient also instructed to call should this worsen or not improve.     Follow-up: 2-3 post-op for systemic treatment planning    Patient expressed understanding of the plan outlined above. She had ample time to ask questions. She understands she can contact us should she have additional questions or issues arise in the interim.

## 2024-05-17 ENCOUNTER — HOSPITAL ENCOUNTER (OUTPATIENT)
Dept: RADIOLOGY | Facility: HOSPITAL | Age: 74
Discharge: HOME | End: 2024-05-17
Payer: MEDICARE

## 2024-05-17 ENCOUNTER — TELEPHONE (OUTPATIENT)
Dept: HEMATOLOGY/ONCOLOGY | Facility: CLINIC | Age: 74
End: 2024-05-17
Payer: MEDICARE

## 2024-05-17 DIAGNOSIS — C50.112 MALIGNANT NEOPLASM OF CENTRAL PORTION OF LEFT BREAST IN FEMALE, ESTROGEN RECEPTOR NEGATIVE (MULTI): ICD-10-CM

## 2024-05-17 DIAGNOSIS — Z17.1 MALIGNANT NEOPLASM OF CENTRAL PORTION OF LEFT BREAST IN FEMALE, ESTROGEN RECEPTOR NEGATIVE (MULTI): ICD-10-CM

## 2024-05-17 DIAGNOSIS — Z15.01 BRCA2 POSITIVE: Primary | ICD-10-CM

## 2024-05-17 DIAGNOSIS — Z15.09 BRCA2 POSITIVE: Primary | ICD-10-CM

## 2024-05-17 PROCEDURE — 2500000004 HC RX 250 GENERAL PHARMACY W/ HCPCS (ALT 636 FOR OP/ED): Performed by: STUDENT IN AN ORGANIZED HEALTH CARE EDUCATION/TRAINING PROGRAM

## 2024-05-17 PROCEDURE — 74183 MRI ABD W/O CNTR FLWD CNTR: CPT

## 2024-05-17 PROCEDURE — A9575 INJ GADOTERATE MEGLUMI 0.1ML: HCPCS | Performed by: STUDENT IN AN ORGANIZED HEALTH CARE EDUCATION/TRAINING PROGRAM

## 2024-05-17 PROCEDURE — 74183 MRI ABD W/O CNTR FLWD CNTR: CPT | Performed by: STUDENT IN AN ORGANIZED HEALTH CARE EDUCATION/TRAINING PROGRAM

## 2024-05-17 RX ORDER — GADOTERATE MEGLUMINE 376.9 MG/ML
17 INJECTION INTRAVENOUS
Status: COMPLETED | OUTPATIENT
Start: 2024-05-17 | End: 2024-05-17

## 2024-05-17 RX ADMIN — GADOTERATE MEGLUMINE 17 ML: 376.9 INJECTION INTRAVENOUS at 08:02

## 2024-05-17 NOTE — TELEPHONE ENCOUNTER
ASSESMENT AND PLAN:  Diagnoses and all orders for this visit:    Type 2 diabetes mellitus with hyperosmolarity without coma, unspecified whether long term insulin use (H)  They have had a lot of difficulty getting the right coverage of his diabetes monitoring supplies.  Clarified as detailed below.  Will be due for A1c again in August.  Reviewed the note from diabetes education, he has made some significant progress on his blood sugar control.  -     blood glucose test strips; Use 1 each As Directed 3 (three) times a day. dispence one touch ultra meter with test strips and lancets for three times a day testing for uncontrolled type II diabetes, on insulin  Dispense: 100 strip; Refill: 11    Bipolar Disorder  There is some signs that he might be stabilizing.  Please see previous notes for details, we have tried multiple treatment therapies and multiple efforts to convince him to see a psychiatrist.  Continue quetiapine for now and recheck again in about 2 months.    Chronic bilateral low back pain without sciatica  Again reviewed options with the patient, offered him referral to a spine specialist, he refuses.    Coronary artery disease involving native coronary artery of native heart without angina pectoris  Stable.  Medical management.  Medication review and counseling done with the patient and his wife over the telephone today.Continue aspirin, atorvastatin, isosorbide, and atenolol.      Reviewed the risks and benefits of the treatment plan with the patient and/or caregiver and we discussed indications for routine and emergent follow-up.    Duration of telephone call 20 minutes.      SUBJECTIVE: 75-year-old male with a telephone visit for follow-up on multiple issues.  He is able to carry on a fairly normal conversation with me over the phone today.  The tangential thinking and perseveration and paranoid thinking that he had displayed severely in the past has improved.  His wife reports that he is sleeping very  I called patient to review results of MRI Liver. We discussed these findings in detail. I have recommended GI referral for monitoring pancreatic cystic lesions in light of her BRCA2 mutation. Liver lesion seen on PET was not visualized on this MRI. There is note of a uterine lesion partially visualized thought to be a fibroid but not seen on PET- this can be monitored in the future. Patient was appreciative of the call. All of the patient's questions were answered and concerns were addressed. The patient has no further needs at this time. Follow-up with me post-op breast surgery.      well at night, there have been no problems at night but he continues to get some agitation during the daytime.  Patient has had some good improvement in his blood sugar since his diabetes education visits.  No hypoglycemia.  They continue to have problems with the insurance not covering the type of blood sugar monitoring supplies that have been prescribed.  Patient reports that he still gets an intermittent feeling of diffuse heaviness in his chest.  This is not changed over the last few years, this has been stable and-see previous notes for details-he has been on ongoing medical management for his coronary artery disease.  Back pain continues to be across the low back without radiation down the legs.  Waxes and wanes.    Past Medical History:   Diagnosis Date     Arthritis      Asthma      Bipolar affective disorder (H)      BPH (benign prostatic hyperplasia) 01/06/2015     CAD (coronary artery disease) 07/20/2015     Diabetes mellitus (H)      Dyslipidemia, goal LDL below 70 07/20/2015     Eczema      Essential hypertension      Tardive dyskinesia      Patient Active Problem List   Diagnosis     Subacute dyskinesia due to drug     Dyslipidemia, goal LDL below 70     Essential hypertension     Type 2 diabetes mellitus (H)     Bipolar Disorder     Enlarged prostate with urinary obstruction     Coronary artery disease     Erectile dysfunction     Gait abnormality     Gastroesophageal reflux disease without esophagitis     Chronic back pain     Neuropathic pain     Primary osteoarthritis involving multiple joints     Obesity (BMI 35.0-39.9) with comorbidity (H)     Schizoaffective disorder, bipolar type (H)     Chronic bilateral low back pain without sciatica     Current Outpatient Medications   Medication Sig Dispense Refill     acetaminophen (TYLENOL) 500 MG tablet Take 500 mg by mouth 2 (two) times a day.       aspirin 81 MG EC tablet Take 81 mg by mouth daily.       atenoloL (TENORMIN) 50 MG tablet TAKE ONE TABLET  BY MOUTH EVERY NIGHT AT BEDTIME. 90 tablet 3     atorvastatin (LIPITOR) 20 MG tablet Take 1 tablet by mouth daily.  90 tablet 3     blood glucose meter (GLUCOMETER) OneTouch Ultra. Dispense glucometer brand per patient's insurance at pharmacy discretion. Pt testing 3 times per day. 1 each 0     blood glucose test strips Use 1 each As Directed 3 (three) times a day. dispence one touch ultra meter with test strips and lancets for three times a day testing for uncontrolled type II diabetes, on insulin 100 strip 11     cholecalciferol, vitamin D3, 1,000 unit tablet Take 2,000 Units by mouth every other day. Takes at 1200       diclofenac sodium (VOLTAREN) 1 % Gel Apply 4 GRAMS three times a day as needed TO RIGHT KNEE 100 g 4     flash glucose scanning reader (FREESTYLE SALUD 14 DAY READER) Misc Use 1 Units As Directed every 8 (eight) hours. 6 each 6     FREESTYLE SALUD 14 DAY SENSOR Kit Use 1 Units As Directed every 14 (fourteen) days. 1 kit 11     generic lancets Dispense brand per patient's insurance at pharmacy discretion. TESTS 3 times per day 350 each 3     insulin needles, disposable, 31 Ndle 3 (three) times a day. For insulin injections.       insulin NPH-insulin regular (NOVOLIN 70/30 U-100 INSULIN) 100 unit/mL (70-30) injection Inject 85-90 Units under the skin 2 (two) times a day before meals. 50 mL 11     insulin syringe-needle U-100 1 mL 31 gauge x 5/16 Syrg USE AS DIRECTED ONCE DAILY 100 each 2     isosorbide mononitrate (IMDUR) 30 MG 24 hr tablet Take 1 tablet (30 mg total) by mouth daily. 90 tablet 3     lisinopriL (PRINIVIL,ZESTRIL) 40 MG tablet Take 1 tablet (40 mg total) by mouth daily. 90 tablet 3     metFORMIN (GLUCOPHAGE) 1000 MG tablet TAKE ONE TABLET BY MOUTH 2 TIMES A DAY WITH MEALS. 180 tablet 3     nitroglycerin (NITROSTAT) 0.4 MG SL tablet PLACE ONE TABLET UNDER THE TONGUE EVERY 5 MINUTES AS NEEDED FOR CHEST PAIN.  MAXIMUM DOSE OF 3 TABLETS IN 15 MINUTES. 25 tablet 3     omeprazole (PRILOSEC)  20 MG capsule Take 1 capsule (20 mg total) by mouth daily before breakfast. 90 capsule 3     pregabalin (LYRICA) 50 MG capsule Take 1 capsule (50 mg total) by mouth 2 (two) times a day. 60 capsule 1     QUEtiapine (SEROQUEL) 100 MG tablet Take 1-3 tablets (100-300 mg total) by mouth every morning. Continue to take 400 mg at bedtime (seperate rx) 90 tablet 11     QUEtiapine (SEROQUEL) 400 MG tablet TAKE ONE TABLET BY MOUTH AT BEDTIME  30 tablet 11     tamsulosin (FLOMAX) 0.4 mg cap Take 1 capsule (0.4 mg total) by mouth daily. 90 capsule 3     No current facility-administered medications for this visit.      Social History     Tobacco Use   Smoking Status Never Smoker   Smokeless Tobacco Never Used   Tobacco Comment    no passive exposure       OBJECTICE: There were no vitals taken for this visit.     No results found for this or any previous visit (from the past 24 hour(s)).       Mahad Morales

## 2024-05-21 ENCOUNTER — APPOINTMENT (OUTPATIENT)
Dept: HEMATOLOGY/ONCOLOGY | Facility: CLINIC | Age: 74
End: 2024-05-21
Payer: MEDICARE

## 2024-05-22 ENCOUNTER — OFFICE VISIT (OUTPATIENT)
Dept: PRIMARY CARE | Facility: CLINIC | Age: 74
End: 2024-05-22
Payer: MEDICARE

## 2024-05-22 ENCOUNTER — PATIENT MESSAGE (OUTPATIENT)
Dept: SURGICAL ONCOLOGY | Facility: CLINIC | Age: 74
End: 2024-05-22

## 2024-05-22 ENCOUNTER — APPOINTMENT (OUTPATIENT)
Dept: CARDIOLOGY | Facility: CLINIC | Age: 74
End: 2024-05-22
Payer: MEDICARE

## 2024-05-22 VITALS
OXYGEN SATURATION: 97 % | RESPIRATION RATE: 16 BRPM | HEART RATE: 86 BPM | SYSTOLIC BLOOD PRESSURE: 100 MMHG | WEIGHT: 185.1 LBS | BODY MASS INDEX: 37.12 KG/M2 | TEMPERATURE: 98.1 F | DIASTOLIC BLOOD PRESSURE: 61 MMHG

## 2024-05-22 DIAGNOSIS — R07.89 ACUTE CHEST WALL PAIN: Primary | ICD-10-CM

## 2024-05-22 PROCEDURE — 99213 OFFICE O/P EST LOW 20 MIN: CPT | Performed by: FAMILY MEDICINE

## 2024-05-22 PROCEDURE — 3044F HG A1C LEVEL LT 7.0%: CPT | Performed by: FAMILY MEDICINE

## 2024-05-22 PROCEDURE — 1159F MED LIST DOCD IN RCRD: CPT | Performed by: FAMILY MEDICINE

## 2024-05-22 PROCEDURE — 1157F ADVNC CARE PLAN IN RCRD: CPT | Performed by: FAMILY MEDICINE

## 2024-05-22 PROCEDURE — 1160F RVW MEDS BY RX/DR IN RCRD: CPT | Performed by: FAMILY MEDICINE

## 2024-05-22 PROCEDURE — 3074F SYST BP LT 130 MM HG: CPT | Performed by: FAMILY MEDICINE

## 2024-05-22 PROCEDURE — 3008F BODY MASS INDEX DOCD: CPT | Performed by: FAMILY MEDICINE

## 2024-05-22 PROCEDURE — 3078F DIAST BP <80 MM HG: CPT | Performed by: FAMILY MEDICINE

## 2024-05-22 PROCEDURE — 1125F AMNT PAIN NOTED PAIN PRSNT: CPT | Performed by: FAMILY MEDICINE

## 2024-05-22 PROCEDURE — 4010F ACE/ARB THERAPY RXD/TAKEN: CPT | Performed by: FAMILY MEDICINE

## 2024-05-22 PROCEDURE — 1036F TOBACCO NON-USER: CPT | Performed by: FAMILY MEDICINE

## 2024-05-22 RX ORDER — LIDOCAINE 50 MG/G
1 PATCH TOPICAL DAILY
Qty: 14 PATCH | Refills: 0 | Status: SHIPPED | OUTPATIENT
Start: 2024-05-22 | End: 2024-05-29

## 2024-05-22 ASSESSMENT — ENCOUNTER SYMPTOMS
SHORTNESS OF BREATH: 0
ABDOMINAL PAIN: 1
NAUSEA: 0
DYSURIA: 0
COUGH: 0
BACK PAIN: 1
CHEST TIGHTNESS: 0

## 2024-05-22 ASSESSMENT — PAIN SCALES - GENERAL: PAINLEVEL: 7

## 2024-05-22 NOTE — TELEPHONE ENCOUNTER
From: Raiza Nguyễn  To: Jeniffer Langley  Sent: 5/22/2024 2:43 PM EDT  Subject: MOUNJARO    I have not begun using it yet. Is it ok from your standpoint? Dr. GREENBERG said to check w you. Thank you

## 2024-05-22 NOTE — PROGRESS NOTES
Subjective   Patient ID: Raiza Nguyễn is a 73 y.o. female who presents for lower abdominal pain  and Back Pain. Patient states that there was no injury that occurred. Patient does not have any UTI symptoms at this time had imaging (CT/ MRI) due to upcoming breast surgery and nothing was noted.     HPI   Right upper chest/rib pain radiates into back x 2 weeks  Denies shortness of breath, cough, fevers, recent illness  No longer has her gallbladder  Denies urinary symptoms  Patient has h/o breast cancer (BRCA gene 2 mutation), pending surgery in June  Cscope-1/10/2024 (diverticulosis and internal hemorrhoids)  Patient reports the pain is constant, but varies in intensity with position  No changes with eating currently, but did feel better when she ate  Reports acid reflux-taking esomeprazole (takes this consistently BID)  GI specialist-next appt end of July   Nothing else worsens or improves the pain  Tylenol does improve the pain  Pain is worse with leaning forward and holding her weight when positioning to get out of bed  Pain started after having arms up for an hour in an imaging exam 2 weeks ago, noticed the pain the next day  She does have tenderness to touch along her chest/rib, side and back but no specific back pain    Review of Systems   Respiratory:  Negative for cough, chest tightness and shortness of breath.    Cardiovascular:  Negative for chest pain.   Gastrointestinal:  Positive for abdominal pain. Negative for nausea.   Genitourinary:  Negative for dysuria.   Musculoskeletal:  Positive for back pain.     Objective   /61 (BP Location: Right arm, Patient Position: Sitting, BP Cuff Size: Large adult)   Pulse 86   Temp 36.7 °C (98.1 °F) (Temporal)   Resp 16   Wt 84 kg (185 lb 1.6 oz)   SpO2 97%   BMI 37.12 kg/m²     Physical Exam  Constitutional: Well developed, well nourished, alert and in no acute distress   Eyes: Normal external exam.   Cardiovascular: Regular rate and rhythm, normal S1  and S2, no murmurs, gallops, or rubs. Radial pulses normal. No peripheral edema.  Pulmonary: No respiratory distress, lungs clear to auscultation bilaterally. No wheezes, rhonchi, rales.  Chest: normal shape and expansion, +TTP anterior/lateral/posterior right rib 7  Skin: Warm, well perfused, normal skin turgor and color. No rashes.   Neurologic: Cranial nerves II-XII grossly intact.   Psychiatric: Mood calm and affect normal.    Assessment/Plan   Apply heat to affected area for 15 minutes as needed     May take tylenol per the bottle as needed,OTC    Do gentle stretching as we discussed     Apply lidocaine patch on affected area as needed    Patient declines muscle relaxer    Please contact us if your symptoms do not change

## 2024-05-24 ENCOUNTER — NUTRITION (OUTPATIENT)
Dept: HEMATOLOGY/ONCOLOGY | Facility: CLINIC | Age: 74
End: 2024-05-24
Payer: MEDICARE

## 2024-05-24 NOTE — PROGRESS NOTES
NUTRITION COMMUNICATION NOTE    Raiza Nguyễn     REASON FOR COMMUNICATION: Patient phoned RDN as she is interested in weight loss counseling.    Provided referral to [facility] dietitian Kelsey Jesus for nutrition survivorship guidelines and weight loss teaching.  Reviewed with patient that if she is to resume treatment, here at [facility], will follow her at that time.   Also discussed that Kelsey will reach out to her to set up an appointment in-person or virtual.  Reviewed  with pt that Kelsey also provides group classes and cooking demonstrations.   Patient appreciative

## 2024-05-27 RX ORDER — GABAPENTIN 300 MG/1
600 CAPSULE ORAL 3 TIMES DAILY
Qty: 180 CAPSULE | Refills: 1 | Status: SHIPPED | OUTPATIENT
Start: 2024-05-27

## 2024-05-27 RX ORDER — TIRZEPATIDE 2.5 MG/.5ML
2.5 INJECTION, SOLUTION SUBCUTANEOUS
Qty: 2 ML | Refills: 1 | Status: SHIPPED | OUTPATIENT
Start: 2024-06-02

## 2024-05-29 ENCOUNTER — LAB (OUTPATIENT)
Dept: LAB | Facility: LAB | Age: 74
End: 2024-05-29
Payer: MEDICARE

## 2024-05-29 ENCOUNTER — PRE-ADMISSION TESTING (OUTPATIENT)
Dept: PREADMISSION TESTING | Facility: HOSPITAL | Age: 74
End: 2024-05-29
Payer: MEDICARE

## 2024-05-29 VITALS
DIASTOLIC BLOOD PRESSURE: 81 MMHG | HEIGHT: 59 IN | HEART RATE: 80 BPM | BODY MASS INDEX: 37.33 KG/M2 | RESPIRATION RATE: 15 BRPM | SYSTOLIC BLOOD PRESSURE: 142 MMHG | OXYGEN SATURATION: 98 % | TEMPERATURE: 97.5 F | WEIGHT: 185.19 LBS

## 2024-05-29 DIAGNOSIS — Z15.02 BRCA2 GENE MUTATION POSITIVE IN FEMALE: ICD-10-CM

## 2024-05-29 DIAGNOSIS — Z15.01 BRCA2 GENE MUTATION POSITIVE IN FEMALE: ICD-10-CM

## 2024-05-29 DIAGNOSIS — Z15.09 BRCA2 GENE MUTATION POSITIVE IN FEMALE: ICD-10-CM

## 2024-05-29 DIAGNOSIS — C50.412 MALIGNANT NEOPLASM OF UPPER-OUTER QUADRANT OF LEFT BREAST IN FEMALE, ESTROGEN RECEPTOR NEGATIVE (MULTI): ICD-10-CM

## 2024-05-29 DIAGNOSIS — E11.69 TYPE 2 DIABETES MELLITUS WITH OTHER SPECIFIED COMPLICATION, WITHOUT LONG-TERM CURRENT USE OF INSULIN (MULTI): ICD-10-CM

## 2024-05-29 DIAGNOSIS — Z17.1 MALIGNANT NEOPLASM OF UPPER-OUTER QUADRANT OF LEFT BREAST IN FEMALE, ESTROGEN RECEPTOR NEGATIVE (MULTI): ICD-10-CM

## 2024-05-29 DIAGNOSIS — Z01.818 PRE-OP TESTING: ICD-10-CM

## 2024-05-29 DIAGNOSIS — Z01.818 PRE-OP TESTING: Primary | ICD-10-CM

## 2024-05-29 LAB
EST. AVERAGE GLUCOSE BLD GHB EST-MCNC: 151 MG/DL
HBA1C MFR BLD: 6.9 %

## 2024-05-29 PROCEDURE — 93005 ELECTROCARDIOGRAM TRACING: CPT

## 2024-05-29 PROCEDURE — 87081 CULTURE SCREEN ONLY: CPT | Mod: STJLAB | Performed by: SURGERY

## 2024-05-29 PROCEDURE — 36415 COLL VENOUS BLD VENIPUNCTURE: CPT

## 2024-05-29 PROCEDURE — 83036 HEMOGLOBIN GLYCOSYLATED A1C: CPT

## 2024-05-29 PROCEDURE — 93010 ELECTROCARDIOGRAM REPORT: CPT | Performed by: INTERNAL MEDICINE

## 2024-05-29 RX ORDER — ASPIRIN 81 MG/1
81 TABLET ORAL EVERY EVENING
COMMUNITY

## 2024-05-29 RX ORDER — CHLORHEXIDINE GLUCONATE ORAL RINSE 1.2 MG/ML
SOLUTION DENTAL
Qty: 473 ML | Refills: 0 | Status: SHIPPED | OUTPATIENT
Start: 2024-05-29

## 2024-05-29 ASSESSMENT — DUKE ACTIVITY SCORE INDEX (DASI)
CAN YOU DO LIGHT WORK AROUND THE HOUSE LIKE DUSTING OR WASHING DISHES: YES
CAN YOU WALK A BLOCK OR TWO ON LEVEL GROUND: NO
CAN YOU HAVE SEXUAL RELATIONS: NO
CAN YOU CLIMB A FLIGHT OF STAIRS OR WALK UP A HILL: NO
CAN YOU DO HEAVY WORK AROUND THE HOUSE LIKE SCRUBBING FLOORS OR LIFTING AND MOVING HEAVY FURNITURE: NO
CAN YOU PARTICIPATE IN MODERATE RECREATIONAL ACTIVITIES LIKE GOLF, BOWLING, DANCING, DOUBLES TENNIS OR THROWING A BASEBALL OR FOOTBALL: NO
CAN YOU RUN A SHORT DISTANCE: NO
DASI METS SCORE: 4.1
CAN YOU DO MODERATE WORK AROUND THE HOUSE LIKE VACUUMING, SWEEPING FLOORS OR CARRYING GROCERIES: YES
TOTAL_SCORE: 10.7
CAN YOU WALK INDOORS, SUCH AS AROUND YOUR HOUSE: YES
CAN YOU PARTICIPATE IN STRENOUS SPORTS LIKE SWIMMING, SINGLES TENNIS, FOOTBALL, BASKETBALL, OR SKIING: NO
CAN YOU TAKE CARE OF YOURSELF (EAT, DRESS, BATHE, OR USE TOILET): YES
CAN YOU DO YARD WORK LIKE RAKING LEAVES, WEEDING OR PUSHING A MOWER: NO

## 2024-05-29 ASSESSMENT — LIFESTYLE VARIABLES: SMOKING_STATUS: NONSMOKER

## 2024-05-29 ASSESSMENT — ACTIVITIES OF DAILY LIVING (ADL): ADL_SCORE: 1

## 2024-05-29 NOTE — H&P (VIEW-ONLY)
CPM/PAT Evaluation       Name: Raiza Nguyễn (Raiza Nguyễn)  /Age: 1950/73 y.o.     In-Person       Chief Complaint: recurrent breast cancer    HPI     is a 74 yo female who has had recurrent history of breast cancer- initially had left breast cancer in  with chemotherapy and lumpectomy, took anastrozole for 5 years, then right breast cancer in 2018 with lumpectomy and chemotherapy and BRCA2+.  Now she underwent recent routine mammogram  in 2024 with abnormal findings found in left breast followed by PET scan with suggestion of small focus on left hepatic lobe. She has followed up with onc/hem breast surgeon and options discussed- subsequently scheduled for bilateral simple mastectomy. Overall she feels okay, it is a lot to take in, but denies any recent illness, fever/chills, chest pains ans shortness of breath.     Past Medical History:   Diagnosis Date    Amnesia 2024    Anxiety     Arthritis     Smith esophagus 2015    Breast cancer (Multi)     Cancer (Multi)     Chronic pain disorder     Colon polyp 2016    Coronary artery disease     Depression     Diabetes mellitus (Multi)     Diverticulitis of colon     Diverticulosis     GERD (gastroesophageal reflux disease)     Heart disease     Heart failure (Multi)     Hyperlipidemia     Hypersomnia, unspecified 2021    Hypersomnolence disorder, persistent    Hypertension     Hypothyroid     Lumbar disc herniation 2023    History of    Melanoma (Multi) 2016    Osteoporosis     Other conditions influencing health status     Breast Cancer    Other intervertebral disc degeneration, lumbar region 2014    Disc degeneration, lumbar    Other intervertebral disc displacement, lumbar region 2014    Lumbar disc herniation    Personal history of malignant melanoma of skin 10/16/2019    History of malignant melanoma of skin    Personal history of other diseases of the digestive system     History of  esophageal reflux    Personal history of other endocrine, nutritional and metabolic disease 02/20/2022    History of vitamin D deficiency    Personal history of other endocrine, nutritional and metabolic disease     History of hyperglycemia    PONV (postoperative nausea and vomiting) 2002    They give me a shot to prevent vomiting    Sleep apnea     Thrombosis 2004    [facility] [place] [facility] [place]    Thyrotoxicosis, unspecified without thyrotoxic crisis or storm     Hyperthyroidism    Type 2 diabetes mellitus (Multi)      PCP: Dr. Olivarez   Onc/hem (breast): Dr. Bourne   Cards: Dr. PHI Rubio on 6/5/2024 (She will require echocardiogram every 3 months while receiving HER2 directed therapy)  Pain management: Dr. Cabral    Stress test in 2022  IMPRESSION:  1. No nuclear evidence of pharmacologic stress-induced ischemia.  Apical thinning artifact suggested.  2. Vigorous LV function, EF 84% with no significant wall motion  abnormality.  3. Based on these findings low likelihood of flow-limiting coronary  artery disease.     Hemoglobin A1C   Date Value Ref Range Status   01/30/2024 6.5 (H) see below % Final        Past Surgical History:   Procedure Laterality Date    APPENDECTOMY      BREAST BIOPSY  2010    BREAST LUMPECTOMY  07/17/2013    Breast Surgery Lumpectomy    CHOLECYSTECTOMY  10/03/2017    Cholecystectomy Laparoscopic    COLONOSCOPY      LYMPH NODE BIOPSY  2004    OTHER SURGICAL HISTORY  05/14/2021    Knee replacement    OVARY SURGERY  07/17/2013    Ovarian Surgery    SKIN CANCER EXCISION         Patient  reports that she is not currently sexually active and has had partner(s) who are male.    Family History   Problem Relation Name Age of Onset    Breast cancer Mother Dorothea     Alcohol abuse Mother Dorothea     Lung cancer Father Yuriy     Cancer Father Yuriy     Ovarian cysts Maternal Grandmother Maddy Ferrara     Diabetes Maternal Grandmother Maddy Ferrara     Colon cancer Other Materal Uncle     Stomach  cancer Other Materal Uncle     Stomach cancer Maternal Cousin      Cancer Mother's Brother Kyrie Valadez     Cancer Sister Korin     Colon cancer Mother's Brother Igor Valadez        Allergies   Allergen Reactions    Adhesive Itching and Other     Per pt develops itching and redness.    Cephalexin Unknown    Erythromycin GI bleeding    Glucosamine Unknown    Tetracycline GI bleeding       Prior to Admission medications    Medication Sig Start Date End Date Taking? Authorizing Provider   CALCIUM ORAL Take 1 capsule by mouth in the morning.    Historical Provider, MD   DULoxetine (Cymbalta) 30 mg DR capsule Take 1 capsule (30 mg) by mouth 2 times a day. 4/11/24   Shoshana Olivarez, DO   gabapentin (Neurontin) 300 mg capsule Take 2 capsules (600 mg) by mouth 3 times a day. 5/27/24   Babak Hung MD   hydroCHLOROthiazide (HYDRODiuril) 25 mg tablet Take 1 tablet (25 mg) by mouth 2 times a day. 4/29/24 7/28/24  Shoshana Olivarez DO   hydrocortisone (Anusol-HC) 2.5 % rectal cream APPLY AT BEDTIME FOR 2 WEEKS THEN AS NEEDED 3/12/24   Historical Provider, MD   lancets misc 2 Lancets in the morning and at bedtime.    Historical Provider, MD   levothyroxine (Synthroid, Levoxyl) 125 mcg tablet Take 1 tablet (125 mcg) by mouth once daily. 11/7/14   Historical Provider, MD   lidocaine (Lidoderm) 5 % patch Place 1 patch over 12 hours on the skin once daily for 7 days. Apply to painful area 12 hours per day, remove for 12 hours. 5/22/24 5/29/24  Kelsey Larkin, DO   lisinopril 10 mg tablet TAKE 1 TABLET TWICE DAILY 6/9/23   Shoshana Olivarez, DO   metFORMIN XR (Glucophage-XR) 500 mg 24 hr tablet Take 1 tablet (500 mg) by mouth once daily with breakfast. Do not crush, chew, or split. 4/11/24 5/16/25  Shoshana Olivarez, DO   multivitamin (DAILY VITAMIN FORMULA ORAL) Take by mouth once daily.    Historical Provider, MD   OneTouch Ultra Test strip 1 strip by in vitro route 1 (one) time each day. 3/1/22    Historical Provider, MD   rosuvastatin (Crestor) 5 mg tablet TAKE 1 TABLET ONE TIME DAILY 4/7/24   Shoshana Olivarez DO   tirzepatide (Mounjaro) 2.5 mg/0.5 mL pen injector Inject 2.5 mg under the skin 1 (one) time per week. 6/2/24   Babak Hung MD   tirzepatide (Mounjaro) 2.5 mg/0.5 mL pen injector Inject 2.5 mg under the skin 1 (one) time per week. 5/19/24   Shoshana Olivarez DO   traMADol (Ultram) 50 mg tablet TAKE 1 TABLET BY MOUTH TWICE A DAY AS NEEDED FOR MODERATE TO SEVERE PAIN FOR 7 DAYS 9/19/23   Historical Provider, MD ABDIRIZAK MORAN   Constitutional: Negative for fever, chills, or sweats   ENMT: Negative for nasal discharge, congestion, ear pain, mouth pain, throat pain   Respiratory: Negative for cough, wheezing, shortness of breath   Cardiac: Negative for chest pain, dyspnea on exertion, palpitations   Gastrointestinal: Negative for nausea, vomiting, diarrhea, constipation, abdominal pain  Genitourinary: Negative for dysuria, flank pain, frequency, hematuria     Musculoskeletal: Negative for decreased ROM, swelling;   + for weakness and pains in spine, she uses a cane     Neurological: Negative for dizziness, confusion, headache  Psychiatric: Negative for mood changes   Skin: Negative for itching, rash, ulcer    Hematologic/Lymph: Negative for bruising, easy bleeding  Allergic/Immunologic: Negative itching, sneezing, swelling      Physical Exam  Constitutional:       Appearance: Normal appearance. She is obese.   HENT:      Head: Normocephalic.      Mouth/Throat:      Mouth: Mucous membranes are moist.   Eyes:      Extraocular Movements: Extraocular movements intact.   Cardiovascular:      Rate and Rhythm: Normal rate and regular rhythm.   Pulmonary:      Effort: Pulmonary effort is normal.      Breath sounds: Normal breath sounds.   Abdominal:      General: Abdomen is flat.      Palpations: Abdomen is soft.   Musculoskeletal:         General: Normal range of motion.      Cervical back: Normal  range of motion.   Skin:     General: Skin is warm and dry.   Neurological:      General: No focal deficit present.      Mental Status: She is alert.   Psychiatric:         Mood and Affect: Mood normal.        PAT AIRWAY:   Airway:     Neck ROM::  Full  normal      Anesthesia:  Patient endorses PONV    Visit Vitals  /81   Pulse 80   Temp 36.4 °C (97.5 °F) (Temporal)   Resp 15       DASI Risk Score    No data to display       Caprini DVT Assessment      Flowsheet Row Most Recent Value   DVT Score 13   Current Status Major surgery planned, lasting 2-3 hours, Present cancer or chemotherapy   History Prior major surgery, Previous malignancy   Age 60-75 years   BMI 31-40 (Obesity)          Modified Frailty Index    No data to display       CHADS2 Stroke Risk  Current as of 31 minutes ago        N/A 3 to 100%: High Risk   2 to < 3%: Medium Risk   0 to < 2%: Low Risk     Last Change: N/A          This score determines the patient's risk of having a stroke if the patient has atrial fibrillation.        This score is not applicable to this patient. Components are not calculated.          Revised Cardiac Risk Index    No data to display       Apfel Simplified Score    No data to display       Risk Analysis Index Results This Encounter    No data found in the last 1 encounters.       Stop Bang Score      Flowsheet Row Most Recent Value   Do you snore loudly? 1   Do you often feel tired or fatigued after your sleep? 1   Has anyone ever observed you stop breathing in your sleep? 1   Do you have or are you being treated for high blood pressure? 1   Recent BMI (Calculated) 37.4   Is BMI greater than 35 kg/m2? 1=Yes   Age older than 50 years old? 1=Yes   Is your neck circumference greater than 17 inches (Male) or 16 inches (Female)? 0   Gender - Male 0=No   STOP-BANG Total Score 6            Assessment and Plan:     74 yo female scheduled for bilateral simple mastectomy on 6/12/2024 with Dr. Langley. Blood work and MRSA  ordered- oral chlorahexidine prescribed. EKG shows NSR with abnormalities in anterior leads, v rate of 71 bpm- comparable EKG on file. Otherwise no further orders indicated.     HTN/ HLD/ CAD- has upcoming appointment with Dr. PHI Rubio on 6/5/2024  -managed on aspirin, ace-I, hydrochlorothiazide,   -will require echocardiogram every 3 months while receiving HER2 directed therapy    DM type 2  Hgba1c 6.4% from Jan 2024  -managed with metformin; has not started on Mounjaro    Hypothyroidism  CURRENT DOSE: Synthroid 125 mcg daily.  1/2024 TSH normal, to be checked annually unless symptomatic.     Obstructive sleep apnea hypopnea, severe  Intolerant to CPAP     Bilateral breast cancer  Followed by heme/onc (Dr. Wilson)  Followed by breast surgeon (Dr. Jeniffer Langley)  - initial left breast cancer in 2003 then right breast cancer in 2018, took anastrozole for 5 years  - BRCA2+    Chronic pain  -follows with rheumatology [facility]  -managing on Tylenol prn, Duloxetine, Neurontin, Voltaren gel     Anesthesia:  Patient endorses PONV  ASA 3- A to review    See risk scores as previously documented.

## 2024-05-29 NOTE — CPM/PAT H&P
CPM/PAT Evaluation       Name: Raiza Nguyễn (Raiza Nguyễn)  /Age: 1950/73 y.o.     In-Person       Chief Complaint: recurrent breast cancer    HPI     is a 74 yo female who has had recurrent history of breast cancer- initially had left breast cancer in  with chemotherapy and lumpectomy, took anastrozole for 5 years, then right breast cancer in 2018 with lumpectomy and chemotherapy and BRCA2+.  Now she underwent recent routine mammogram  in 2024 with abnormal findings found in left breast followed by PET scan with suggestion of small focus on left hepatic lobe. She has followed up with onc/hem breast surgeon and options discussed- subsequently scheduled for bilateral simple mastectomy. Overall she feels okay, it is a lot to take in, but denies any recent illness, fever/chills, chest pains ans shortness of breath.     Past Medical History:   Diagnosis Date    Amnesia 2024    Anxiety     Arthritis     Smith esophagus 2015    Breast cancer (Multi)     Cancer (Multi)     Chronic pain disorder     Colon polyp 2016    Coronary artery disease     Depression     Diabetes mellitus (Multi)     Diverticulitis of colon     Diverticulosis     GERD (gastroesophageal reflux disease)     Heart disease     Heart failure (Multi)     Hyperlipidemia     Hypersomnia, unspecified 2021    Hypersomnolence disorder, persistent    Hypertension     Hypothyroid     Lumbar disc herniation 2023    History of    Melanoma (Multi) 2016    Osteoporosis     Other conditions influencing health status     Breast Cancer    Other intervertebral disc degeneration, lumbar region 2014    Disc degeneration, lumbar    Other intervertebral disc displacement, lumbar region 2014    Lumbar disc herniation    Personal history of malignant melanoma of skin 10/16/2019    History of malignant melanoma of skin    Personal history of other diseases of the digestive system     History of  esophageal reflux    Personal history of other endocrine, nutritional and metabolic disease 02/20/2022    History of vitamin D deficiency    Personal history of other endocrine, nutritional and metabolic disease     History of hyperglycemia    PONV (postoperative nausea and vomiting) 2002    They give me a shot to prevent vomiting    Sleep apnea     Thrombosis 2004    [facility] [place] [facility]    Thyrotoxicosis, unspecified without thyrotoxic crisis or storm     Hyperthyroidism    Type 2 diabetes mellitus (Multi)      PCP: Dr. Olivarez   Onc/hem (breast): Dr. Bourne   Cards: Dr. PHI Rubio on 6/5/2024 (She will require echocardiogram every 3 months while receiving HER2 directed therapy)  Pain management: Dr. Cabral    Stress test in 2022  IMPRESSION:  1. No nuclear evidence of pharmacologic stress-induced ischemia.  Apical thinning artifact suggested.  2. Vigorous LV function, EF 84% with no significant wall motion  abnormality.  3. Based on these findings low likelihood of flow-limiting coronary  artery disease.     Hemoglobin A1C   Date Value Ref Range Status   01/30/2024 6.5 (H) see below % Final        Past Surgical History:   Procedure Laterality Date    APPENDECTOMY      BREAST BIOPSY  2010    BREAST LUMPECTOMY  07/17/2013    Breast Surgery Lumpectomy    CHOLECYSTECTOMY  10/03/2017    Cholecystectomy Laparoscopic    COLONOSCOPY      LYMPH NODE BIOPSY  2004    OTHER SURGICAL HISTORY  05/14/2021    Knee replacement    OVARY SURGERY  07/17/2013    Ovarian Surgery    SKIN CANCER EXCISION         Patient  reports that she is not currently sexually active and has had partner(s) who are male.    Family History   Problem Relation Name Age of Onset    Breast cancer Mother Dorothea     Alcohol abuse Mother Dorothea     Lung cancer Father Yuriy     Cancer Father Yuriy     Ovarian cysts Maternal Grandmother Maddy Ferrara     Diabetes Maternal Grandmother Maddy Ferrara     Colon cancer Other Materal Uncle     Stomach  cancer Other Materal Uncle     Stomach cancer Maternal Cousin      Cancer Mother's Brother Kyrie Valadez     Cancer Sister Korin     Colon cancer Mother's Brother Igor Valadez        Allergies   Allergen Reactions    Adhesive Itching and Other     Per pt develops itching and redness.    Cephalexin Unknown    Erythromycin GI bleeding    Glucosamine Unknown    Tetracycline GI bleeding       Prior to Admission medications    Medication Sig Start Date End Date Taking? Authorizing Provider   CALCIUM ORAL Take 1 capsule by mouth in the morning.    Historical Provider, MD   DULoxetine (Cymbalta) 30 mg DR capsule Take 1 capsule (30 mg) by mouth 2 times a day. 4/11/24   Shoshana Olivarez, DO   gabapentin (Neurontin) 300 mg capsule Take 2 capsules (600 mg) by mouth 3 times a day. 5/27/24   Babak Hung MD   hydroCHLOROthiazide (HYDRODiuril) 25 mg tablet Take 1 tablet (25 mg) by mouth 2 times a day. 4/29/24 7/28/24  Shoshana Olivarez DO   hydrocortisone (Anusol-HC) 2.5 % rectal cream APPLY AT BEDTIME FOR 2 WEEKS THEN AS NEEDED 3/12/24   Historical Provider, MD   lancets misc 2 Lancets in the morning and at bedtime.    Historical Provider, MD   levothyroxine (Synthroid, Levoxyl) 125 mcg tablet Take 1 tablet (125 mcg) by mouth once daily. 11/7/14   Historical Provider, MD   lidocaine (Lidoderm) 5 % patch Place 1 patch over 12 hours on the skin once daily for 7 days. Apply to painful area 12 hours per day, remove for 12 hours. 5/22/24 5/29/24  Kelsey Larkin, DO   lisinopril 10 mg tablet TAKE 1 TABLET TWICE DAILY 6/9/23   Shoshana Olivarez, DO   metFORMIN XR (Glucophage-XR) 500 mg 24 hr tablet Take 1 tablet (500 mg) by mouth once daily with breakfast. Do not crush, chew, or split. 4/11/24 5/16/25  Shoshana Olivarez, DO   multivitamin (DAILY VITAMIN FORMULA ORAL) Take by mouth once daily.    Historical Provider, MD   OneTouch Ultra Test strip 1 strip by in vitro route 1 (one) time each day. 3/1/22    Historical Provider, MD   rosuvastatin (Crestor) 5 mg tablet TAKE 1 TABLET ONE TIME DAILY 4/7/24   Shoshana Olivarez DO   tirzepatide (Mounjaro) 2.5 mg/0.5 mL pen injector Inject 2.5 mg under the skin 1 (one) time per week. 6/2/24   Babak Hung MD   tirzepatide (Mounjaro) 2.5 mg/0.5 mL pen injector Inject 2.5 mg under the skin 1 (one) time per week. 5/19/24   Shoshana Olivarez DO   traMADol (Ultram) 50 mg tablet TAKE 1 TABLET BY MOUTH TWICE A DAY AS NEEDED FOR MODERATE TO SEVERE PAIN FOR 7 DAYS 9/19/23   Historical Provider, MD ABDIRIZAK MORAN   Constitutional: Negative for fever, chills, or sweats   ENMT: Negative for nasal discharge, congestion, ear pain, mouth pain, throat pain   Respiratory: Negative for cough, wheezing, shortness of breath   Cardiac: Negative for chest pain, dyspnea on exertion, palpitations   Gastrointestinal: Negative for nausea, vomiting, diarrhea, constipation, abdominal pain  Genitourinary: Negative for dysuria, flank pain, frequency, hematuria     Musculoskeletal: Negative for decreased ROM, swelling;   + for weakness and pains in spine, she uses a cane     Neurological: Negative for dizziness, confusion, headache  Psychiatric: Negative for mood changes   Skin: Negative for itching, rash, ulcer    Hematologic/Lymph: Negative for bruising, easy bleeding  Allergic/Immunologic: Negative itching, sneezing, swelling      Physical Exam  Constitutional:       Appearance: Normal appearance. She is obese.   HENT:      Head: Normocephalic.      Mouth/Throat:      Mouth: Mucous membranes are moist.   Eyes:      Extraocular Movements: Extraocular movements intact.   Cardiovascular:      Rate and Rhythm: Normal rate and regular rhythm.   Pulmonary:      Effort: Pulmonary effort is normal.      Breath sounds: Normal breath sounds.   Abdominal:      General: Abdomen is flat.      Palpations: Abdomen is soft.   Musculoskeletal:         General: Normal range of motion.      Cervical back: Normal  range of motion.   Skin:     General: Skin is warm and dry.   Neurological:      General: No focal deficit present.      Mental Status: She is alert.   Psychiatric:         Mood and Affect: Mood normal.        PAT AIRWAY:   Airway:     Neck ROM::  Full  normal      Anesthesia:  Patient endorses PONV    Visit Vitals  /81   Pulse 80   Temp 36.4 °C (97.5 °F) (Temporal)   Resp 15       DASI Risk Score    No data to display       Caprini DVT Assessment      Flowsheet Row Most Recent Value   DVT Score 13   Current Status Major surgery planned, lasting 2-3 hours, Present cancer or chemotherapy   History Prior major surgery, Previous malignancy   Age 60-75 years   BMI 31-40 (Obesity)          Modified Frailty Index    No data to display       CHADS2 Stroke Risk  Current as of 31 minutes ago        N/A 3 to 100%: High Risk   2 to < 3%: Medium Risk   0 to < 2%: Low Risk     Last Change: N/A          This score determines the patient's risk of having a stroke if the patient has atrial fibrillation.        This score is not applicable to this patient. Components are not calculated.          Revised Cardiac Risk Index    No data to display       Apfel Simplified Score    No data to display       Risk Analysis Index Results This Encounter    No data found in the last 1 encounters.       Stop Bang Score      Flowsheet Row Most Recent Value   Do you snore loudly? 1   Do you often feel tired or fatigued after your sleep? 1   Has anyone ever observed you stop breathing in your sleep? 1   Do you have or are you being treated for high blood pressure? 1   Recent BMI (Calculated) 37.4   Is BMI greater than 35 kg/m2? 1=Yes   Age older than 50 years old? 1=Yes   Is your neck circumference greater than 17 inches (Male) or 16 inches (Female)? 0   Gender - Male 0=No   STOP-BANG Total Score 6            Assessment and Plan:     72 yo female scheduled for bilateral simple mastectomy on 6/12/2024 with Dr. Langley. Blood work and MRSA  ordered- oral chlorahexidine prescribed. EKG shows NSR with abnormalities in anterior leads, v rate of 71 bpm- comparable EKG on file. Otherwise no further orders indicated.     HTN/ HLD/ CAD- has upcoming appointment with Dr. PHI Rubio on 6/5/2024  -managed on aspirin, ace-I, hydrochlorothiazide,   -will require echocardiogram every 3 months while receiving HER2 directed therapy    DM type 2  Hgba1c 6.4% from Jan 2024  -managed with metformin; has not started on Mounjaro    Hypothyroidism  CURRENT DOSE: Synthroid 125 mcg daily.  1/2024 TSH normal, to be checked annually unless symptomatic.     Obstructive sleep apnea hypopnea, severe  Intolerant to CPAP     Bilateral breast cancer  Followed by heme/onc (Dr. Wilson)  Followed by breast surgeon (Dr. Jeniffer Langley)  - initial left breast cancer in 2003 then right breast cancer in 2018, took anastrozole for 5 years  - BRCA2+    Chronic pain  -follows with rheumatology [facility]  -managing on Tylenol prn, Duloxetine, Neurontin, Voltaren gel     Anesthesia:  Patient endorses PONV  ASA 3- A to review    See risk scores as previously documented.

## 2024-05-29 NOTE — PREPROCEDURE INSTRUCTIONS
Medication List            Accurate as of May 29, 2024 10:18 AM. Always use your most recent med list.                aspirin 81 mg EC tablet     CALCIUM ORAL  Medication Adjustments for Surgery: Stop 7 days before surgery     DAILY VITAMIN FORMULA ORAL  Medication Adjustments for Surgery: Stop 7 days before surgery     DULoxetine 30 mg DR capsule  Commonly known as: Cymbalta  Take 1 capsule (30 mg) by mouth 2 times a day.  Medication Adjustments for Surgery: Take morning of surgery with sip of water, no other fluids     gabapentin 300 mg capsule  Commonly known as: Neurontin  Take 2 capsules (600 mg) by mouth 3 times a day.     hydroCHLOROthiazide 25 mg tablet  Commonly known as: HYDRODiuril  Take 1 tablet (25 mg) by mouth 2 times a day.     hydrocortisone 2.5 % rectal cream  Commonly known as: Anusol-HC  Medication Adjustments for Surgery: Stop 7 days before surgery     lancets misc     levothyroxine 125 mcg tablet  Commonly known as: Synthroid, Levoxyl  Medication Adjustments for Surgery: Take morning of surgery with sip of water, no other fluids     lidocaine 5 % patch  Commonly known as: Lidoderm  Place 1 patch over 12 hours on the skin once daily for 7 days. Apply to painful area 12 hours per day, remove for 12 hours.  Medication Adjustments for Surgery: Continue until night before surgery     lisinopril 10 mg tablet  TAKE 1 TABLET TWICE DAILY  Medication Adjustments for Surgery: Other (Comment)  Notes to patient: HOLD any evening dose the night before the day of surgery  HOLD the day of surgery  -should be discontinued for a minimum of 24 hours before surgery     metFORMIN  mg 24 hr tablet  Commonly known as: Glucophage-XR  Take 1 tablet (500 mg) by mouth once daily with breakfast. Do not crush, chew, or split.  Medication Adjustments for Surgery: Other (Comment)  Notes to patient: HOLD any evening dose the night before the day of surgery  HOLD the day of surgery     * Mounjaro 2.5 mg/0.5 mL pen  injector  Generic drug: tirzepatide  Inject 2.5 mg under the skin 1 (one) time per week.  Medication Adjustments for Surgery: Stop 7 days before surgery     * Mounjaro 2.5 mg/0.5 mL pen injector  Generic drug: tirzepatide  Inject 2.5 mg under the skin 1 (one) time per week.  Start taking on: June 2, 2024  Medication Adjustments for Surgery: Stop 7 days before surgery     OneTouch Ultra Test strip  Generic drug: blood sugar diagnostic     rosuvastatin 5 mg tablet  Commonly known as: Crestor  TAKE 1 TABLET ONE TIME DAILY  Medication Adjustments for Surgery: Other (Comment)  Notes to patient: May take the morning of surgery if this medication is prescribed to take in the mornings     traMADol 50 mg tablet  Commonly known as: Ultram  Medication Adjustments for Surgery: Take morning of surgery with sip of water, no other fluids           * This list has 2 medication(s) that are the same as other medications prescribed for you. Read the directions carefully, and ask your doctor or other care provider to review them with you.                                               PRE-OPERATIVE INSTRUCTIONS    You will receive notification one business day prior to your procedure to confirm your arrival time. It is important that you answer your phone and/or check your messages during this time. If you do not hear from the surgery center by 5 pm. the day before your procedure, please call [phone].     Please enter the building through the Outpatient entrance and take the elevator off the lobby to the 2nd floor then check in at the Outpatient Surgery desk on the 2nd floor.    INSTRUCTIONS:  Talk to your surgeon for instructions if you should stop your aspirin, blood thinner, or diabetes medicines.  DO NOT take any multivitamins or over the counter supplements for 7-10 days before surgery.  If not being admitted, you must have an adult immediately available to drive you home after surgery. We also highly recommend you have  someone stay with you for the entire day and night of your surgery.  For children having surgery, a parent or legal guardian must accompany them to the surgery center. If this is not possible, please call [phone] to make additional arrangements.  For adults who are unable to consent or make medical decisions for themselves, a legal guardian or Power of  must accompany them to the surgery center. If this is not possible, please call [phone] to make additional arrangements.  Wear comfortable, loose fitting clothing.  All jewelry and piercings must be removed. If you are unable to remove an item or have a dermal piercing, please be sure to tell the nurse when you arrive for surgery.  Nail polish and make-up must be removed.  Avoid smoking or consuming alcohol for 24 hours before surgery.  To help prevent infection, please take a shower/bath and wash your hair the night before and/or morning of surgery (or follow other specific bathing instructions provided).    Preoperative Fasting Guidelines    Why must I stop eating and drinking near surgery time?  With sedation, food or liquid in your stomach can enter your lungs causing serious complications  Increases nausea and vomiting    When do I need to stop eating and drinking before my surgery?  Do not eat any solid food after midnight the night before your surgery/procedure unless otherwise instructed by your surgeon.   You may have up to 13.5 ounces of clear liquid until TWO hours before your instructed arrival time to the hospital.  This includes water, black tea/coffee, (no milk or cream) apple juice, and electrolyte drinks (Gatorade).   You may chew gum until TWO hours before your surgery/procedure      If applicable, notify your surgeons office immediately of any new skin changes that occur to the surgical limb.      If you have any questions or concerns, please call Pre-Admission Testing at [phone].                         Home Preoperative  Antibacterial Shower with Chlorhexidine gluconate (CHG)     What is a home preoperative antibacterial shower?  This shower is a way of cleaning the skin with a germ killing solution before surgery. The solution contains chlorhexidine gluconate, commonly known as CHG. CHG is a skin cleanser with germ killing ability. Let your doctor know if you are allergic to chlorhexidine.    Why do I need to take a preoperative antibacterial shower?  Skin is not sterile. It is best to try to make your skin as free of germs as possible before surgery. Proper cleansing with a germ killing soap before surgery can lower the number of germs on your skin. This helps to reduce the risk of infection at the surgical site. Following the instructions listed below will help you prepare your skin for surgery.    How do I use the solution?  Begin using your CHG soap the night before and again the morning of your procedure.   Do not shave the day before or day of surgery.  Remove all jewelry until after surgery. Take off rings and take out all body-piercing jewelry.  Wash your face and hair with normal soap and shampoo before you use the CHG soap.  Apply the CHG solution to a clean wet washcloth. Move away from the water to avoid premature rinsing of the CHG soap as you are applying. Firmly lather your entire body from the neck down. Do not use CHG on your face, eyes, ears, or genitals.   Pay special attention to the area where your incisions will be located.  Do not scrub your skin too hard.  It is important to allow the CHG soap to sit on your skin for 3-5 minutes.  Rinse the solution off your body completely. Do not wash with your normal soap after the CHG soap solution.  Pat yourself dry with a clean, soft towel.  Do not apply powders, lotions or deodorants as these might block how the CHG soap works.   Dress in clean clothing.  Be sure to sleep with clean, freshly laundered sheets.  Be aware that CHG can cause stains on fabric. Rinse your  washcloth and other linens that have contact with CHG completely. Use only non-chlorine detergents to launder the items used.

## 2024-05-29 NOTE — PREPROCEDURE INSTRUCTIONS
Medication List            Accurate as of May 29, 2024 10:36 AM. Always use your most recent med list.                aspirin 81 mg EC tablet  Medication Adjustments for Surgery: Stop 7 days before surgery     CALCIUM ORAL  Medication Adjustments for Surgery: Stop 7 days before surgery     chlorhexidine 0.12 % solution  Commonly known as: Peridex  Swish and spit 15 ml for 2 doses, 15mL the night before surgery and 15 ml morning of surgery - swish for 30 seconds -DO NOT SWALLOW, SPIT OUT  Medication Adjustments for Surgery: Other (Comment)  Notes to patient: As indicated     DAILY VITAMIN FORMULA ORAL  Medication Adjustments for Surgery: Stop 7 days before surgery     DULoxetine 30 mg DR capsule  Commonly known as: Cymbalta  Take 1 capsule (30 mg) by mouth 2 times a day.  Medication Adjustments for Surgery: Take morning of surgery with sip of water, no other fluids     gabapentin 300 mg capsule  Commonly known as: Neurontin  Take 2 capsules (600 mg) by mouth 3 times a day.  Medication Adjustments for Surgery: Take morning of surgery with sip of water, no other fluids     hydroCHLOROthiazide 25 mg tablet  Commonly known as: HYDRODiuril  Take 1 tablet (25 mg) by mouth 2 times a day.  Medication Adjustments for Surgery: Take morning of surgery with sip of water, no other fluids     hydrocortisone 2.5 % rectal cream  Commonly known as: Anusol-HC  Medication Adjustments for Surgery: Stop 7 days before surgery     lancets misc     levothyroxine 125 mcg tablet  Commonly known as: Synthroid, Levoxyl  Medication Adjustments for Surgery: Take morning of surgery with sip of water, no other fluids     lidocaine 5 % patch  Commonly known as: Lidoderm  Place 1 patch over 12 hours on the skin once daily for 7 days. Apply to painful area 12 hours per day, remove for 12 hours.  Medication Adjustments for Surgery: Continue until night before surgery     lisinopril 10 mg tablet  TAKE 1 TABLET TWICE DAILY  Medication Adjustments for  Surgery: Other (Comment)  Notes to patient: HOLD any evening dose the night before the day of surgery  HOLD the day of surgery  -should be discontinued for a minimum of 24 hours before surgery     metFORMIN  mg 24 hr tablet  Commonly known as: Glucophage-XR  Take 1 tablet (500 mg) by mouth once daily with breakfast. Do not crush, chew, or split.  Medication Adjustments for Surgery: Other (Comment)  Notes to patient: HOLD any evening dose the night before the day of surgery  HOLD the day of surgery     * Mounjaro 2.5 mg/0.5 mL pen injector  Generic drug: tirzepatide  Inject 2.5 mg under the skin 1 (one) time per week.  Medication Adjustments for Surgery: Other (Comment)  Notes to patient: Has not started this medication yet     * Mounjaro 2.5 mg/0.5 mL pen injector  Generic drug: tirzepatide  Inject 2.5 mg under the skin 1 (one) time per week.  Start taking on: June 2, 2024  Medication Adjustments for Surgery: Other (Comment)  Notes to patient: Has not started this medication yet     OneTouch Ultra Test strip  Generic drug: blood sugar diagnostic     rosuvastatin 5 mg tablet  Commonly known as: Crestor  TAKE 1 TABLET ONE TIME DAILY  Medication Adjustments for Surgery: Other (Comment)  Notes to patient: May take the morning of surgery if this medication is prescribed to take in the mornings           * This list has 2 medication(s) that are the same as other medications prescribed for you. Read the directions carefully, and ask your doctor or other care provider to review them with you.                                                  PRE-OPERATIVE INSTRUCTIONS    You will receive notification one business day prior to your procedure to confirm your arrival time. It is important that you answer your phone and/or check your messages during this time. If you do not hear from the surgery center by 5 pm. the day before your procedure, please call [phone].     Please enter the building through the Outpatient  entrance and take the elevator off the lobby to the 2nd floor then check in at the Outpatient Surgery desk on the 2nd floor.    INSTRUCTIONS:  Talk to your surgeon for instructions if you should stop your aspirin, blood thinner, or diabetes medicines.  DO NOT take any multivitamins or over the counter supplements for 7-10 days before surgery.  If not being admitted, you must have an adult immediately available to drive you home after surgery. We also highly recommend you have someone stay with you for the entire day and night of your surgery.  For children having surgery, a parent or legal guardian must accompany them to the surgery center. If this is not possible, please call [phone] to make additional arrangements.  For adults who are unable to consent or make medical decisions for themselves, a legal guardian or Power of  must accompany them to the surgery center. If this is not possible, please call [phone] to make additional arrangements.  Wear comfortable, loose fitting clothing.  All jewelry and piercings must be removed. If you are unable to remove an item or have a dermal piercing, please be sure to tell the nurse when you arrive for surgery.  Nail polish and make-up must be removed.  Avoid smoking or consuming alcohol for 24 hours before surgery.  To help prevent infection, please take a shower/bath and wash your hair the night before and/or morning of surgery (or follow other specific bathing instructions provided).    Preoperative Fasting Guidelines    Why must I stop eating and drinking near surgery time?  With sedation, food or liquid in your stomach can enter your lungs causing serious complications  Increases nausea and vomiting    When do I need to stop eating and drinking before my surgery?  Do not eat any solid food after midnight the night before your surgery/procedure unless otherwise instructed by your surgeon.   You may have up to 13.5 ounces of clear liquid until TWO hours  before your instructed arrival time to the hospital.  This includes water, black tea/coffee, (no milk or cream) apple juice, and electrolyte drinks (Gatorade).   You may chew gum until TWO hours before your surgery/procedure      If applicable, notify your surgeons office immediately of any new skin changes that occur to the surgical limb.      If you have any questions or concerns, please call Pre-Admission Testing at [phone].

## 2024-05-30 LAB
ATRIAL RATE: 71 BPM
P AXIS: 50 DEGREES
P OFFSET: 181 MS
P ONSET: 130 MS
PR INTERVAL: 172 MS
Q ONSET: 216 MS
QRS COUNT: 12 BEATS
QRS DURATION: 92 MS
QT INTERVAL: 392 MS
QTC CALCULATION(BAZETT): 425 MS
QTC FREDERICIA: 414 MS
R AXIS: 9 DEGREES
T AXIS: 49 DEGREES
T OFFSET: 412 MS
VENTRICULAR RATE: 71 BPM

## 2024-05-31 LAB — STAPHYLOCOCCUS SPEC CULT: ABNORMAL

## 2024-06-04 NOTE — NURSING NOTE
Received phone call from patient inquiring clarification on medication instruction prior to surgery. Assisted patient to find correct paperwork that was provided at [facility] appointment with specific instructions for medications, patient verbalized understanding and reviewed instructions in hand. Reassured patient to call back with any further questions.

## 2024-06-05 ENCOUNTER — OFFICE VISIT (OUTPATIENT)
Dept: CARDIOLOGY | Facility: CLINIC | Age: 74
End: 2024-06-05
Payer: MEDICARE

## 2024-06-05 ENCOUNTER — HOSPITAL ENCOUNTER (OUTPATIENT)
Dept: CARDIOLOGY | Facility: CLINIC | Age: 74
Discharge: HOME | End: 2024-06-05
Payer: MEDICARE

## 2024-06-05 VITALS
HEART RATE: 85 BPM | BODY MASS INDEX: 37.22 KG/M2 | RESPIRATION RATE: 16 BRPM | SYSTOLIC BLOOD PRESSURE: 124 MMHG | WEIGHT: 184.3 LBS | OXYGEN SATURATION: 97 % | DIASTOLIC BLOOD PRESSURE: 76 MMHG | TEMPERATURE: 97.5 F

## 2024-06-05 DIAGNOSIS — Z17.1 MALIGNANT NEOPLASM OF CENTRAL PORTION OF LEFT BREAST IN FEMALE, ESTROGEN RECEPTOR NEGATIVE (MULTI): ICD-10-CM

## 2024-06-05 DIAGNOSIS — Z09 ENCOUNTER FOR FOLLOW-UP SURVEILLANCE OF NEOPLASM OF UNCERTAIN BEHAVIOR: ICD-10-CM

## 2024-06-05 DIAGNOSIS — Z13.6 ENCOUNTER FOR SCREENING FOR CARDIOVASCULAR DISORDERS: ICD-10-CM

## 2024-06-05 DIAGNOSIS — Z86.03 ENCOUNTER FOR FOLLOW-UP SURVEILLANCE OF NEOPLASM OF UNCERTAIN BEHAVIOR: ICD-10-CM

## 2024-06-05 DIAGNOSIS — C50.912 MALIGNANT NEOPLASM OF UNSPECIFIED SITE OF LEFT FEMALE BREAST (MULTI): ICD-10-CM

## 2024-06-05 DIAGNOSIS — I42.8 OTHER CARDIOMYOPATHIES (MULTI): ICD-10-CM

## 2024-06-05 DIAGNOSIS — I70.0 ATHEROSCLEROSIS OF AORTA (CMS-HCC): Primary | ICD-10-CM

## 2024-06-05 DIAGNOSIS — Z01.810 PREOPERATIVE CARDIOVASCULAR EXAMINATION: ICD-10-CM

## 2024-06-05 DIAGNOSIS — C50.112 MALIGNANT NEOPLASM OF CENTRAL PORTION OF LEFT BREAST IN FEMALE, ESTROGEN RECEPTOR NEGATIVE (MULTI): ICD-10-CM

## 2024-06-05 DIAGNOSIS — Z92.21 STATUS POST ADMINISTRATION OF CARDIOTOXIC CHEMOTHERAPY: Primary | ICD-10-CM

## 2024-06-05 LAB
AORTIC VALVE PEAK VELOCITY: 0.92 M/S
AV PEAK GRADIENT: 3.4 MMHG
AVA (PEAK VEL): 3.46 CM2
EJECTION FRACTION APICAL 4 CHAMBER: 68.8
LEFT ATRIUM VOLUME AREA LENGTH INDEX BSA: 23.7 ML/M2
LEFT VENTRICLE INTERNAL DIMENSION DIASTOLE: 4.45 CM (ref 3.5–6)
LEFT VENTRICULAR OUTFLOW TRACT DIAMETER: 2.11 CM
LV EJECTION FRACTION BIPLANE: 70 %
MITRAL VALVE E/A RATIO: 0.61
MITRAL VALVE E/E' RATIO: 9.97
RIGHT VENTRICLE FREE WALL PEAK S': 13 CM/S
TRICUSPID ANNULAR PLANE SYSTOLIC EXCURSION: 2 CM

## 2024-06-05 PROCEDURE — 1159F MED LIST DOCD IN RCRD: CPT | Performed by: STUDENT IN AN ORGANIZED HEALTH CARE EDUCATION/TRAINING PROGRAM

## 2024-06-05 PROCEDURE — 99215 OFFICE O/P EST HI 40 MIN: CPT | Mod: 25 | Performed by: STUDENT IN AN ORGANIZED HEALTH CARE EDUCATION/TRAINING PROGRAM

## 2024-06-05 PROCEDURE — 93356 MYOCRD STRAIN IMG SPCKL TRCK: CPT | Performed by: INTERNAL MEDICINE

## 2024-06-05 PROCEDURE — 3078F DIAST BP <80 MM HG: CPT | Performed by: STUDENT IN AN ORGANIZED HEALTH CARE EDUCATION/TRAINING PROGRAM

## 2024-06-05 PROCEDURE — 93308 TTE F-UP OR LMTD: CPT | Performed by: INTERNAL MEDICINE

## 2024-06-05 PROCEDURE — 3074F SYST BP LT 130 MM HG: CPT | Performed by: STUDENT IN AN ORGANIZED HEALTH CARE EDUCATION/TRAINING PROGRAM

## 2024-06-05 PROCEDURE — 76376 3D RENDER W/INTRP POSTPROCES: CPT | Performed by: INTERNAL MEDICINE

## 2024-06-05 PROCEDURE — 93325 DOPPLER ECHO COLOR FLOW MAPG: CPT | Performed by: INTERNAL MEDICINE

## 2024-06-05 PROCEDURE — 3008F BODY MASS INDEX DOCD: CPT | Performed by: STUDENT IN AN ORGANIZED HEALTH CARE EDUCATION/TRAINING PROGRAM

## 2024-06-05 PROCEDURE — 1157F ADVNC CARE PLAN IN RCRD: CPT | Performed by: STUDENT IN AN ORGANIZED HEALTH CARE EDUCATION/TRAINING PROGRAM

## 2024-06-05 PROCEDURE — 4010F ACE/ARB THERAPY RXD/TAKEN: CPT | Performed by: STUDENT IN AN ORGANIZED HEALTH CARE EDUCATION/TRAINING PROGRAM

## 2024-06-05 PROCEDURE — 99205 OFFICE O/P NEW HI 60 MIN: CPT | Performed by: STUDENT IN AN ORGANIZED HEALTH CARE EDUCATION/TRAINING PROGRAM

## 2024-06-05 PROCEDURE — 93321 DOPPLER ECHO F-UP/LMTD STD: CPT | Performed by: INTERNAL MEDICINE

## 2024-06-05 PROCEDURE — 3044F HG A1C LEVEL LT 7.0%: CPT | Performed by: STUDENT IN AN ORGANIZED HEALTH CARE EDUCATION/TRAINING PROGRAM

## 2024-06-05 PROCEDURE — 76376 3D RENDER W/INTRP POSTPROCES: CPT

## 2024-06-05 PROCEDURE — 2500000004 HC RX 250 GENERAL PHARMACY W/ HCPCS (ALT 636 FOR OP/ED): Performed by: INTERNAL MEDICINE

## 2024-06-05 PROCEDURE — 1125F AMNT PAIN NOTED PAIN PRSNT: CPT | Performed by: STUDENT IN AN ORGANIZED HEALTH CARE EDUCATION/TRAINING PROGRAM

## 2024-06-05 RX ADMIN — PERFLUTREN 3 ML OF DILUTION: 6.52 INJECTION, SUSPENSION INTRAVENOUS at 11:00

## 2024-06-05 ASSESSMENT — ENCOUNTER SYMPTOMS
OCCASIONAL FEELINGS OF UNSTEADINESS: 0
LOSS OF SENSATION IN FEET: 0
DEPRESSION: 0

## 2024-06-05 ASSESSMENT — PAIN SCALES - GENERAL: PAINLEVEL: 7

## 2024-06-05 NOTE — PROGRESS NOTES
Subjective   Raiza Nguyễn is a 73 y.o. female     Cancer Diagnosis: Left Breast 2003, melanoma 2018     Treatment:   AC x 4 2004   Cumulative Dose: Assume 240 mg/m2   Radiation: Left breast 2004     Risk Factors: AC, DM, HLD, HTN   Social Hx: Former Smoker     Echo 10/27/2020 EF 60% - updated echo today   CT Chest  3/4/2022 - Nuc stress - normal   EKG 5/29/2024     BNP 10/17/2019 - 8   Lipid 7/22/2022 - Total 179, LDL 91      Briefly patient is a 73-year-old female with a history of breast cancer that was diagnosed January 2003 treated with 4 cycles of Adriamycin/cyclophosphamide followed by left lumpectomy and axillary lymph node dissection.  Had a lesion in her left lower back removed that was found to be melanoma.  Had recurrent breast cancer 2018 for which she underwent excision.  Patient is BRCA2 positive.  PET scan 5/8/2024 shows mildly hypermetabolic soft tissue density in the left outer breast and mild heterogeneous activity was noted in the region of the liver with a small focus in the left hepatic lobe.  Currently the plan is to pursue simple bilateral mastectomy.    Cardiac issues include:    Status post cardiotoxic chemotherapy  -As mentioned above has received anthracycline based chemotherapy in 2004.  Intent is to pursue Herceptin based chemotherapy in the future  -Echocardiogram from [facility], 2024 reviewed shows preserved biventricular function.  Left ventricular global longitudinal strain is -19.7%    Preoperative risk stratification  -Intent is to pursue bilateral simple mastectomy    She is mostly limited by back pain/orthopedic complaints.  Able to do water aerobics for about an hour without any exertional angina or shortness of breath.  Denies any orthopnea/PND.  Does not appear to have significant lower extremity edema    Review of Systems       Past Medical History:   Diagnosis Date    Amnesia 02/26/2024    Anxiety     Arthritis     Smith esophagus 2015    Breast cancer (Multi) 2002     Cancer (Multi)     Chronic pain disorder     Colon polyp 2016    Coronary artery disease     Depression     Diabetes mellitus (Multi)     Diverticulitis of colon 2022    Diverticulosis 2019    GERD (gastroesophageal reflux disease)     Heart disease     Heart failure (Multi)     Hyperlipidemia     Hypersomnia, unspecified 11/30/2021    Hypersomnolence disorder, persistent    Hypertension     Hypothyroid     Lumbar disc herniation 01/18/2023    History of    Melanoma (Multi) 2016    Osteoporosis     Other conditions influencing health status     Breast Cancer    Other intervertebral disc degeneration, lumbar region 06/27/2014    Disc degeneration, lumbar    Other intervertebral disc displacement, lumbar region 06/27/2014    Lumbar disc herniation    Personal history of malignant melanoma of skin 10/16/2019    History of malignant melanoma of skin    Personal history of other diseases of the digestive system     History of esophageal reflux    Personal history of other endocrine, nutritional and metabolic disease 02/20/2022    History of vitamin D deficiency    Personal history of other endocrine, nutritional and metabolic disease     History of hyperglycemia    PONV (postoperative nausea and vomiting) 2002    They give me a shot to prevent vomiting    Sleep apnea     Thrombosis 2004    [facility] to [facility]    Thyrotoxicosis, unspecified without thyrotoxic crisis or storm     Hyperthyroidism    Type 2 diabetes mellitus (Multi)        Past Surgical History:   Procedure Laterality Date    APPENDECTOMY      BREAST BIOPSY  2010    BREAST LUMPECTOMY  07/17/2013    Breast Surgery Lumpectomy    CHOLECYSTECTOMY  10/03/2017    Cholecystectomy Laparoscopic    COLONOSCOPY      LYMPH NODE BIOPSY  2004    OTHER SURGICAL HISTORY  05/14/2021    Knee replacement    OVARY SURGERY  07/17/2013    Ovarian Surgery    SKIN CANCER EXCISION         Allergies   Allergen Reactions    Adhesive Itching and Other     Per pt develops itching and  redness.    Cephalexin Unknown    Erythromycin GI bleeding    Glucosamine Unknown    Tetracycline GI bleeding       Family History   Problem Relation Name Age of Onset    Breast cancer Mother Dorothea     Alcohol abuse Mother Dorothea     Lung cancer Father Yuriy     Cancer Father Yuriy     Ovarian cysts Maternal Grandmother Maddy Ferrara     Diabetes Maternal Grandmother Maddy Ferrara     Colon cancer Other Materal Uncle     Stomach cancer Other Materal Uncle     Stomach cancer Maternal Cousin      Cancer Mother's Brother Kyrie Valadez     Cancer Sister Korin     Colon cancer Mother's Brother Igor Valadez        Objective   Visit Vitals  /76   Pulse 85   Temp 36.4 °C (97.5 °F) (Core)   Resp 16   Wt 83.6 kg (184 lb 4.9 oz)   SpO2 97%   BMI 37.22 kg/m²   OB Status Postmenopausal   Smoking Status Former   BSA 1.87 m²         General: awake, alert and oriented. No acute distress.   Skin: Skin is warm, dry and intact without rashes or lesions. Appropriate color for ethnicity. Nail beds pink with no cyanosis or clubbing  HEENT: normocephalic, atraumatic; conjunctivae are clear without exudates or hemorrhage. Sclera is non-icteric. Eyelids are normal in appearance without swelling or lesions. Hearing intact. Nares are patent bilaterally. Moist mucous membranes.   Cardiovascular: Regular. No murmurs, gallops, or rubs are auscultated. S1 and S2 are heard and are of normal intensity. No JVD, no carotid bruits  Respiratory: Thorax symmetric. CTAB, breath sounds vesicular. No crackles, wheezes or ronchi.   Gastrointestinal: soft, non-distended, BS + x 4  Genitourinary: exam deferred  Musculoskeletal: moves all extremities  Extremities: pulses palpable bilaterally; no swelling or erythema; no edema  Neurological: alert & oriented x 3; no focal deficits  Psychiatric: appropriate mood and affect    Current Outpatient Medications   Medication Instructions    aspirin 81 mg, oral, Every evening    CALCIUM ORAL 600 mg,  oral, Daily    chlorhexidine (Peridex) 0.12 % solution Swish and spit 15 ml for 2 doses, 15mL the night before surgery and 15 ml morning of surgery - swish for 30 seconds -DO NOT SWALLOW, SPIT OUT    DULoxetine (CYMBALTA) 30 mg, oral, 2 times daily    gabapentin (NEURONTIN) 600 mg, oral, 3 times daily    hydroCHLOROthiazide (HYDRODIURIL) 25 mg, oral, 2 times daily    hydrocortisone (Anusol-HC) 2.5 % rectal cream APPLY AT BEDTIME FOR 2 WEEKS THEN AS NEEDED    lancets misc 2 Lancets, 2 times daily    levothyroxine (Synthroid, Levoxyl) 125 mcg tablet 1 tablet, oral, Daily    lisinopril 10 mg tablet TAKE 1 TABLET TWICE DAILY    metFORMIN XR (GLUCOPHAGE-XR) 500 mg, oral, Daily with breakfast, Do not crush, chew, or split.    Mounjaro 2.5 mg, subcutaneous, Once Weekly    Mounjaro 2.5 mg, subcutaneous, Once Weekly    multivitamin (DAILY VITAMIN FORMULA ORAL) oral, Daily RT    OneTouch Ultra Test strip 1 strip, in vitro, Daily    rosuvastatin (CRESTOR) 5 mg, oral, Daily         Lab Review   Lab Results   Component Value Date    HGB 14.6 05/16/2024    HGB 14.1 07/06/2023    HGB 14.7 02/13/2023    HGB 14.9 07/22/2022     05/16/2024    WBC 5.6 05/16/2024     05/16/2024    K 4.4 05/16/2024    CREATININE 0.82 05/16/2024    CREATININE 0.82 01/30/2024    CREATININE 0.78 07/06/2023    CREATININE 0.80 02/13/2023    CREATININE 0.78 07/22/2022    BUN 29 (H) 05/16/2024    CALCIUM 9.8 05/16/2024    INR 1.1 10/21/2020    BNP 8 10/17/2019    LDLF 91 07/22/2022        Assessment/Plan   Briefly patient is a 73-year-old female with a history of breast cancer that was diagnosed January 2003 treated with 4 cycles of Adriamycin/cyclophosphamide followed by left lumpectomy and axillary lymph node dissection.  Had a lesion in her left lower back removed that was found to be melanoma.  Had recurrent breast cancer 2018 for which she underwent excision.  Patient is BRCA2 positive.  PET scan 5/8/2024 shows mildly hypermetabolic soft  tissue density in the left outer breast and mild heterogeneous activity was noted in the region of the liver with a small focus in the left hepatic lobe.  Currently the plan is to pursue simple bilateral mastectomy.    Cardiac issues include:    Status post cardiotoxic chemotherapy  -As mentioned above has received anthracycline based chemotherapy in 2004.  Intent is to pursue Herceptin based chemotherapy in the future  -Echocardiogram from [facility], 2024 reviewed shows preserved biventricular function.  Left ventricular global longitudinal strain is -19.7%  -No evidence of cardiotoxicity at present.  Will get baseline biomarkers troponin/BNP.  Echocardiograms every 3 months while on HER2/anabell based agents    Preoperative risk stratification  -Intent is to pursue bilateral simple mastectomy  -Moderate risk for perioperative cardiac events with surgery.  Okay to go for surgery without further cardiac testing.    Plan:  -Troponin/BNP  -Echo in 3 months with in person follow-up     Dev Rubio MD  Advanced Heart Failure/Transplant Cardiology  Cardio-Oncology  [facility]

## 2024-06-06 ENCOUNTER — PATIENT MESSAGE (OUTPATIENT)
Dept: SURGICAL ONCOLOGY | Facility: CLINIC | Age: 74
End: 2024-06-06
Payer: MEDICARE

## 2024-06-06 NOTE — TELEPHONE ENCOUNTER
From: Raiza Nguyễn  To: Jeniffer Langley  Sent: 6/6/2024 7:34 AM EDT  Subject: Paperwork     Hi Raine   Sorry to bother you but I have another paper to be filled in for my care giver.son Kurt. I will drop this off at the breast center desk. Have a safe day. I hope this surgery is not in vain but only time will tell. Thanks again for all the care.  Angie

## 2024-06-12 ENCOUNTER — ANESTHESIA (OUTPATIENT)
Dept: OPERATING ROOM | Facility: HOSPITAL | Age: 74
End: 2024-06-12
Payer: MEDICARE

## 2024-06-12 ENCOUNTER — ANESTHESIA EVENT (OUTPATIENT)
Dept: OPERATING ROOM | Facility: HOSPITAL | Age: 74
End: 2024-06-12
Payer: MEDICARE

## 2024-06-12 ENCOUNTER — HOSPITAL ENCOUNTER (OUTPATIENT)
Facility: HOSPITAL | Age: 74
Discharge: HOME | End: 2024-06-14
Attending: SURGERY | Admitting: SURGERY
Payer: MEDICARE

## 2024-06-12 DIAGNOSIS — Z15.01 BRCA2 GENE MUTATION POSITIVE IN FEMALE: ICD-10-CM

## 2024-06-12 DIAGNOSIS — Z15.09 BRCA2 GENE MUTATION POSITIVE IN FEMALE: ICD-10-CM

## 2024-06-12 DIAGNOSIS — Z17.0 MALIGNANT NEOPLASM OF UPPER-OUTER QUADRANT OF LEFT BREAST IN FEMALE, ESTROGEN RECEPTOR POSITIVE (MULTI): Primary | ICD-10-CM

## 2024-06-12 DIAGNOSIS — Z17.1 MALIGNANT NEOPLASM OF UPPER-OUTER QUADRANT OF LEFT BREAST IN FEMALE, ESTROGEN RECEPTOR NEGATIVE (MULTI): ICD-10-CM

## 2024-06-12 DIAGNOSIS — C50.412 MALIGNANT NEOPLASM OF UPPER-OUTER QUADRANT OF LEFT BREAST IN FEMALE, ESTROGEN RECEPTOR POSITIVE (MULTI): Primary | ICD-10-CM

## 2024-06-12 DIAGNOSIS — Z15.02 BRCA2 GENE MUTATION POSITIVE IN FEMALE: ICD-10-CM

## 2024-06-12 DIAGNOSIS — C50.412 MALIGNANT NEOPLASM OF UPPER-OUTER QUADRANT OF LEFT BREAST IN FEMALE, ESTROGEN RECEPTOR NEGATIVE (MULTI): ICD-10-CM

## 2024-06-12 LAB
GLUCOSE BLD MANUAL STRIP-MCNC: 132 MG/DL (ref 74–99)
GLUCOSE BLD MANUAL STRIP-MCNC: 137 MG/DL (ref 74–99)
GLUCOSE BLD MANUAL STRIP-MCNC: 234 MG/DL (ref 74–99)
GLUCOSE BLD MANUAL STRIP-MCNC: 238 MG/DL (ref 74–99)

## 2024-06-12 PROCEDURE — 2500000004 HC RX 250 GENERAL PHARMACY W/ HCPCS (ALT 636 FOR OP/ED): Performed by: ANESTHESIOLOGY

## 2024-06-12 PROCEDURE — 3600000009 HC OR TIME - EACH INCREMENTAL 1 MINUTE - PROCEDURE LEVEL FOUR: Performed by: SURGERY

## 2024-06-12 PROCEDURE — 82947 ASSAY GLUCOSE BLOOD QUANT: CPT

## 2024-06-12 PROCEDURE — 3600000004 HC OR TIME - INITIAL BASE CHARGE - PROCEDURE LEVEL FOUR: Performed by: SURGERY

## 2024-06-12 PROCEDURE — 3700000001 HC GENERAL ANESTHESIA TIME - INITIAL BASE CHARGE: Performed by: SURGERY

## 2024-06-12 PROCEDURE — 19303 MAST SIMPLE COMPLETE: CPT | Performed by: SURGERY

## 2024-06-12 PROCEDURE — 2780000003 HC OR 278 NO HCPCS: Performed by: SURGERY

## 2024-06-12 PROCEDURE — 7100000002 HC RECOVERY ROOM TIME - EACH INCREMENTAL 1 MINUTE: Performed by: SURGERY

## 2024-06-12 PROCEDURE — 2500000004 HC RX 250 GENERAL PHARMACY W/ HCPCS (ALT 636 FOR OP/ED): Performed by: SURGERY

## 2024-06-12 PROCEDURE — 88307 TISSUE EXAM BY PATHOLOGIST: CPT | Mod: TC,STJLAB | Performed by: SURGERY

## 2024-06-12 PROCEDURE — 2500000004 HC RX 250 GENERAL PHARMACY W/ HCPCS (ALT 636 FOR OP/ED): Performed by: NURSE ANESTHETIST, CERTIFIED REGISTERED

## 2024-06-12 PROCEDURE — 2500000005 HC RX 250 GENERAL PHARMACY W/O HCPCS: Performed by: ANESTHESIOLOGY

## 2024-06-12 PROCEDURE — 2500000001 HC RX 250 WO HCPCS SELF ADMINISTERED DRUGS (ALT 637 FOR MEDICARE OP): Performed by: SURGERY

## 2024-06-12 PROCEDURE — 7100000001 HC RECOVERY ROOM TIME - INITIAL BASE CHARGE: Performed by: SURGERY

## 2024-06-12 PROCEDURE — 2500000004 HC RX 250 GENERAL PHARMACY W/ HCPCS (ALT 636 FOR OP/ED): Performed by: INTERNAL MEDICINE

## 2024-06-12 PROCEDURE — 3700000002 HC GENERAL ANESTHESIA TIME - EACH INCREMENTAL 1 MINUTE: Performed by: SURGERY

## 2024-06-12 PROCEDURE — 7100000011 HC EXTENDED STAY RECOVERY HOURLY - NURSING UNIT

## 2024-06-12 PROCEDURE — 2720000007 HC OR 272 NO HCPCS: Performed by: SURGERY

## 2024-06-12 DEVICE — IMPLANTABLE DEVICE: Type: IMPLANTABLE DEVICE | Site: BREAST | Status: FUNCTIONAL

## 2024-06-12 RX ORDER — FAMOTIDINE 10 MG/ML
INJECTION INTRAVENOUS AS NEEDED
Status: DISCONTINUED | OUTPATIENT
Start: 2024-06-12 | End: 2024-06-12

## 2024-06-12 RX ORDER — HYDROCODONE BITARTRATE AND ACETAMINOPHEN 5; 325 MG/1; MG/1
1 TABLET ORAL EVERY 4 HOURS PRN
Status: DISCONTINUED | OUTPATIENT
Start: 2024-06-12 | End: 2024-06-12 | Stop reason: HOSPADM

## 2024-06-12 RX ORDER — MIDAZOLAM HYDROCHLORIDE 1 MG/ML
1 INJECTION, SOLUTION INTRAMUSCULAR; INTRAVENOUS ONCE AS NEEDED
Status: DISCONTINUED | OUTPATIENT
Start: 2024-06-12 | End: 2024-06-12 | Stop reason: HOSPADM

## 2024-06-12 RX ORDER — ONDANSETRON HYDROCHLORIDE 2 MG/ML
8 INJECTION, SOLUTION INTRAVENOUS EVERY 6 HOURS PRN
Status: DISCONTINUED | OUTPATIENT
Start: 2024-06-12 | End: 2024-06-13

## 2024-06-12 RX ORDER — MIDAZOLAM HYDROCHLORIDE 1 MG/ML
INJECTION, SOLUTION INTRAMUSCULAR; INTRAVENOUS AS NEEDED
Status: DISCONTINUED | OUTPATIENT
Start: 2024-06-12 | End: 2024-06-12

## 2024-06-12 RX ORDER — SODIUM CHLORIDE, SODIUM LACTATE, POTASSIUM CHLORIDE, CALCIUM CHLORIDE 600; 310; 30; 20 MG/100ML; MG/100ML; MG/100ML; MG/100ML
60 INJECTION, SOLUTION INTRAVENOUS CONTINUOUS
Status: DISCONTINUED | OUTPATIENT
Start: 2024-06-12 | End: 2024-06-14 | Stop reason: HOSPADM

## 2024-06-12 RX ORDER — MAGNESIUM SULFATE HEPTAHYDRATE 40 MG/ML
2 INJECTION, SOLUTION INTRAVENOUS ONCE
Status: COMPLETED | OUTPATIENT
Start: 2024-06-12 | End: 2024-06-12

## 2024-06-12 RX ORDER — SODIUM CHLORIDE, SODIUM LACTATE, POTASSIUM CHLORIDE, CALCIUM CHLORIDE 600; 310; 30; 20 MG/100ML; MG/100ML; MG/100ML; MG/100ML
INJECTION, SOLUTION INTRAVENOUS CONTINUOUS PRN
Status: DISCONTINUED | OUTPATIENT
Start: 2024-06-12 | End: 2024-06-12

## 2024-06-12 RX ORDER — PHENYLEPHRINE 10 MG/250 ML(40 MCG/ML)IN 0.9 % SOD.CHLORIDE INTRAVENOUS
CONTINUOUS PRN
Status: DISCONTINUED | OUTPATIENT
Start: 2024-06-12 | End: 2024-06-12

## 2024-06-12 RX ORDER — ACETAMINOPHEN 325 MG/1
975 TABLET ORAL ONCE
Status: DISCONTINUED | OUTPATIENT
Start: 2024-06-12 | End: 2024-06-12 | Stop reason: HOSPADM

## 2024-06-12 RX ORDER — DULOXETIN HYDROCHLORIDE 30 MG/1
30 CAPSULE, DELAYED RELEASE ORAL 2 TIMES DAILY
Status: DISCONTINUED | OUTPATIENT
Start: 2024-06-12 | End: 2024-06-14 | Stop reason: HOSPADM

## 2024-06-12 RX ORDER — PHENYLEPHRINE HCL IN 0.9% NACL 0.4MG/10ML
SYRINGE (ML) INTRAVENOUS AS NEEDED
Status: DISCONTINUED | OUTPATIENT
Start: 2024-06-12 | End: 2024-06-12

## 2024-06-12 RX ORDER — ACETAMINOPHEN 325 MG/1
650 TABLET ORAL EVERY 6 HOURS
Status: DISCONTINUED | OUTPATIENT
Start: 2024-06-12 | End: 2024-06-14 | Stop reason: HOSPADM

## 2024-06-12 RX ORDER — CALCIUM CARBONATE 600 MG
600 TABLET ORAL DAILY
COMMUNITY

## 2024-06-12 RX ORDER — OXYCODONE HYDROCHLORIDE 5 MG/1
5 TABLET ORAL EVERY 6 HOURS PRN
Status: DISCONTINUED | OUTPATIENT
Start: 2024-06-12 | End: 2024-06-14 | Stop reason: HOSPADM

## 2024-06-12 RX ORDER — DIPHENHYDRAMINE HYDROCHLORIDE 50 MG/ML
INJECTION INTRAMUSCULAR; INTRAVENOUS AS NEEDED
Status: DISCONTINUED | OUTPATIENT
Start: 2024-06-12 | End: 2024-06-12

## 2024-06-12 RX ORDER — GABAPENTIN 100 MG/1
100 CAPSULE ORAL DAILY PRN
COMMUNITY

## 2024-06-12 RX ORDER — SODIUM CHLORIDE, SODIUM LACTATE, POTASSIUM CHLORIDE, CALCIUM CHLORIDE 600; 310; 30; 20 MG/100ML; MG/100ML; MG/100ML; MG/100ML
100 INJECTION, SOLUTION INTRAVENOUS CONTINUOUS
Status: DISCONTINUED | OUTPATIENT
Start: 2024-06-12 | End: 2024-06-12 | Stop reason: HOSPADM

## 2024-06-12 RX ORDER — PROPOFOL 10 MG/ML
INJECTION, EMULSION INTRAVENOUS AS NEEDED
Status: DISCONTINUED | OUTPATIENT
Start: 2024-06-12 | End: 2024-06-12

## 2024-06-12 RX ORDER — HYDROMORPHONE HYDROCHLORIDE 0.2 MG/ML
0.1 INJECTION INTRAMUSCULAR; INTRAVENOUS; SUBCUTANEOUS EVERY 5 MIN PRN
Status: DISCONTINUED | OUTPATIENT
Start: 2024-06-12 | End: 2024-06-12 | Stop reason: HOSPADM

## 2024-06-12 RX ORDER — LIDOCAINE HYDROCHLORIDE 20 MG/ML
INJECTION, SOLUTION INFILTRATION; PERINEURAL AS NEEDED
Status: DISCONTINUED | OUTPATIENT
Start: 2024-06-12 | End: 2024-06-12

## 2024-06-12 RX ORDER — PROPOFOL 10 MG/ML
INJECTION, EMULSION INTRAVENOUS CONTINUOUS PRN
Status: DISCONTINUED | OUTPATIENT
Start: 2024-06-12 | End: 2024-06-12

## 2024-06-12 RX ORDER — MORPHINE SULFATE 4 MG/ML
4 INJECTION, SOLUTION INTRAMUSCULAR; INTRAVENOUS EVERY 4 HOURS PRN
Status: DISCONTINUED | OUTPATIENT
Start: 2024-06-12 | End: 2024-06-14 | Stop reason: HOSPADM

## 2024-06-12 RX ORDER — METFORMIN HYDROCHLORIDE 500 MG/1
500 TABLET, EXTENDED RELEASE ORAL
Status: DISCONTINUED | OUTPATIENT
Start: 2024-06-13 | End: 2024-06-14 | Stop reason: HOSPADM

## 2024-06-12 RX ORDER — HYDROMORPHONE HYDROCHLORIDE 1 MG/ML
1 INJECTION, SOLUTION INTRAMUSCULAR; INTRAVENOUS; SUBCUTANEOUS EVERY 5 MIN PRN
Status: DISCONTINUED | OUTPATIENT
Start: 2024-06-12 | End: 2024-06-12 | Stop reason: HOSPADM

## 2024-06-12 RX ORDER — ACETAMINOPHEN 325 MG/1
650 TABLET ORAL EVERY 4 HOURS PRN
Status: DISCONTINUED | OUTPATIENT
Start: 2024-06-12 | End: 2024-06-12 | Stop reason: HOSPADM

## 2024-06-12 RX ORDER — ONDANSETRON HYDROCHLORIDE 2 MG/ML
INJECTION, SOLUTION INTRAVENOUS AS NEEDED
Status: DISCONTINUED | OUTPATIENT
Start: 2024-06-12 | End: 2024-06-12

## 2024-06-12 RX ORDER — OXYCODONE HYDROCHLORIDE 10 MG/1
10 TABLET ORAL EVERY 4 HOURS PRN
Status: DISCONTINUED | OUTPATIENT
Start: 2024-06-12 | End: 2024-06-12 | Stop reason: HOSPADM

## 2024-06-12 RX ORDER — CLINDAMYCIN PHOSPHATE 900 MG/50ML
INJECTION, SOLUTION INTRAVENOUS AS NEEDED
Status: DISCONTINUED | OUTPATIENT
Start: 2024-06-12 | End: 2024-06-12

## 2024-06-12 RX ORDER — HYDRALAZINE HYDROCHLORIDE 20 MG/ML
5 INJECTION INTRAMUSCULAR; INTRAVENOUS EVERY 30 MIN PRN
Status: DISCONTINUED | OUTPATIENT
Start: 2024-06-12 | End: 2024-06-12 | Stop reason: HOSPADM

## 2024-06-12 RX ORDER — KETOROLAC TROMETHAMINE 30 MG/ML
INJECTION, SOLUTION INTRAMUSCULAR; INTRAVENOUS AS NEEDED
Status: DISCONTINUED | OUTPATIENT
Start: 2024-06-12 | End: 2024-06-12

## 2024-06-12 RX ORDER — GABAPENTIN 600 MG/1
600 TABLET ORAL 3 TIMES DAILY
Status: DISCONTINUED | OUTPATIENT
Start: 2024-06-12 | End: 2024-06-14 | Stop reason: HOSPADM

## 2024-06-12 RX ORDER — ALBUTEROL SULFATE 0.83 MG/ML
2.5 SOLUTION RESPIRATORY (INHALATION) ONCE AS NEEDED
Status: DISCONTINUED | OUTPATIENT
Start: 2024-06-12 | End: 2024-06-12 | Stop reason: HOSPADM

## 2024-06-12 RX ORDER — NALOXONE HYDROCHLORIDE 0.4 MG/ML
0.2 INJECTION, SOLUTION INTRAMUSCULAR; INTRAVENOUS; SUBCUTANEOUS EVERY 5 MIN PRN
Status: DISCONTINUED | OUTPATIENT
Start: 2024-06-12 | End: 2024-06-14 | Stop reason: HOSPADM

## 2024-06-12 RX ORDER — LEVOTHYROXINE SODIUM 125 UG/1
125 TABLET ORAL DAILY
Status: DISCONTINUED | OUTPATIENT
Start: 2024-06-13 | End: 2024-06-14 | Stop reason: HOSPADM

## 2024-06-12 RX ORDER — BUPIVACAINE HYDROCHLORIDE 2.5 MG/ML
INJECTION, SOLUTION INFILTRATION; PERINEURAL AS NEEDED
Status: DISCONTINUED | OUTPATIENT
Start: 2024-06-12 | End: 2024-06-12 | Stop reason: HOSPADM

## 2024-06-12 RX ORDER — FENTANYL CITRATE 50 UG/ML
INJECTION, SOLUTION INTRAMUSCULAR; INTRAVENOUS AS NEEDED
Status: DISCONTINUED | OUTPATIENT
Start: 2024-06-12 | End: 2024-06-12

## 2024-06-12 RX ORDER — ONDANSETRON HYDROCHLORIDE 2 MG/ML
4 INJECTION, SOLUTION INTRAVENOUS ONCE AS NEEDED
Status: DISCONTINUED | OUTPATIENT
Start: 2024-06-12 | End: 2024-06-12 | Stop reason: HOSPADM

## 2024-06-12 RX ORDER — DOCUSATE SODIUM 100 MG/1
100 CAPSULE, LIQUID FILLED ORAL 2 TIMES DAILY
Status: DISCONTINUED | OUTPATIENT
Start: 2024-06-12 | End: 2024-06-14 | Stop reason: HOSPADM

## 2024-06-12 SDOH — SOCIAL STABILITY: SOCIAL INSECURITY: ABUSE: ADULT

## 2024-06-12 SDOH — SOCIAL STABILITY: SOCIAL INSECURITY: DO YOU FEEL UNSAFE GOING BACK TO THE PLACE WHERE YOU ARE LIVING?: NO

## 2024-06-12 SDOH — HEALTH STABILITY: MENTAL HEALTH: CURRENT SMOKER: 0

## 2024-06-12 SDOH — SOCIAL STABILITY: SOCIAL INSECURITY: HAVE YOU HAD ANY THOUGHTS OF HARMING ANYONE ELSE?: NO

## 2024-06-12 SDOH — SOCIAL STABILITY: SOCIAL INSECURITY: DOES ANYONE TRY TO KEEP YOU FROM HAVING/CONTACTING OTHER FRIENDS OR DOING THINGS OUTSIDE YOUR HOME?: NO

## 2024-06-12 SDOH — SOCIAL STABILITY: SOCIAL INSECURITY: ARE THERE ANY APPARENT SIGNS OF INJURIES/BEHAVIORS THAT COULD BE RELATED TO ABUSE/NEGLECT?: NO

## 2024-06-12 SDOH — SOCIAL STABILITY: SOCIAL INSECURITY: DO YOU FEEL ANYONE HAS EXPLOITED OR TAKEN ADVANTAGE OF YOU FINANCIALLY OR OF YOUR PERSONAL PROPERTY?: NO

## 2024-06-12 SDOH — SOCIAL STABILITY: SOCIAL INSECURITY: ARE YOU OR HAVE YOU BEEN THREATENED OR ABUSED PHYSICALLY, EMOTIONALLY, OR SEXUALLY BY ANYONE?: NO

## 2024-06-12 SDOH — SOCIAL STABILITY: SOCIAL INSECURITY: HAS ANYONE EVER THREATENED TO HURT YOUR FAMILY OR YOUR PETS?: NO

## 2024-06-12 SDOH — SOCIAL STABILITY: SOCIAL INSECURITY: WERE YOU ABLE TO COMPLETE ALL THE BEHAVIORAL HEALTH SCREENINGS?: YES

## 2024-06-12 SDOH — SOCIAL STABILITY: SOCIAL INSECURITY: HAVE YOU HAD THOUGHTS OF HARMING ANYONE ELSE?: YES

## 2024-06-12 ASSESSMENT — COGNITIVE AND FUNCTIONAL STATUS - GENERAL
STANDING UP FROM CHAIR USING ARMS: A LITTLE
MOVING FROM LYING ON BACK TO SITTING ON SIDE OF FLAT BED WITH BEDRAILS: A LITTLE
TURNING FROM BACK TO SIDE WHILE IN FLAT BAD: A LITTLE
TOILETING: A LITTLE
TURNING FROM BACK TO SIDE WHILE IN FLAT BAD: A LITTLE
DRESSING REGULAR UPPER BODY CLOTHING: A LITTLE
PATIENT BASELINE BEDBOUND: NO
DAILY ACTIVITIY SCORE: 23
WALKING IN HOSPITAL ROOM: A LITTLE
DRESSING REGULAR LOWER BODY CLOTHING: A LITTLE
DAILY ACTIVITIY SCORE: 20
MOBILITY SCORE: 19
PERSONAL GROOMING: A LITTLE
MOVING TO AND FROM BED TO CHAIR: A LITTLE
CLIMB 3 TO 5 STEPS WITH RAILING: A LITTLE
DRESSING REGULAR LOWER BODY CLOTHING: A LITTLE
MOBILITY SCORE: 22

## 2024-06-12 ASSESSMENT — ACTIVITIES OF DAILY LIVING (ADL)
PATIENT'S MEMORY ADEQUATE TO SAFELY COMPLETE DAILY ACTIVITIES?: YES
HEARING - RIGHT EAR: FUNCTIONAL
GROOMING: INDEPENDENT
FEEDING YOURSELF: INDEPENDENT
LACK_OF_TRANSPORTATION: NO
JUDGMENT_ADEQUATE_SAFELY_COMPLETE_DAILY_ACTIVITIES: YES
WALKS IN HOME: INDEPENDENT
ADEQUATE_TO_COMPLETE_ADL: YES
HEARING - LEFT EAR: FUNCTIONAL
BATHING: INDEPENDENT
DRESSING YOURSELF: INDEPENDENT
TOILETING: INDEPENDENT

## 2024-06-12 ASSESSMENT — LIFESTYLE VARIABLES
HOW OFTEN DO YOU HAVE A DRINK CONTAINING ALCOHOL: NEVER
HOW OFTEN DO YOU HAVE 6 OR MORE DRINKS ON ONE OCCASION: NEVER
HOW MANY STANDARD DRINKS CONTAINING ALCOHOL DO YOU HAVE ON A TYPICAL DAY: PATIENT DOES NOT DRINK
SKIP TO QUESTIONS 9-10: 1
AUDIT-C TOTAL SCORE: 0
SUBSTANCE_ABUSE_PAST_12_MONTHS: NO
AUDIT-C TOTAL SCORE: 0
PRESCIPTION_ABUSE_PAST_12_MONTHS: NO

## 2024-06-12 ASSESSMENT — PAIN SCALES - GENERAL
PAINLEVEL_OUTOF10: 3
PAINLEVEL_OUTOF10: 0 - NO PAIN
PAINLEVEL_OUTOF10: 4
PAINLEVEL_OUTOF10: 0 - NO PAIN

## 2024-06-12 ASSESSMENT — PAIN - FUNCTIONAL ASSESSMENT
PAIN_FUNCTIONAL_ASSESSMENT: 0-10
PAIN_FUNCTIONAL_ASSESSMENT: UNABLE TO SELF-REPORT
PAIN_FUNCTIONAL_ASSESSMENT: 0-10
PAIN_FUNCTIONAL_ASSESSMENT: UNABLE TO SELF-REPORT

## 2024-06-12 ASSESSMENT — PAIN DESCRIPTION - LOCATION: LOCATION: BREAST

## 2024-06-12 ASSESSMENT — PATIENT HEALTH QUESTIONNAIRE - PHQ9
1. LITTLE INTEREST OR PLEASURE IN DOING THINGS: NOT AT ALL
SUM OF ALL RESPONSES TO PHQ9 QUESTIONS 1 & 2: 0
2. FEELING DOWN, DEPRESSED OR HOPELESS: NOT AT ALL

## 2024-06-12 ASSESSMENT — COLUMBIA-SUICIDE SEVERITY RATING SCALE - C-SSRS
1. IN THE PAST MONTH, HAVE YOU WISHED YOU WERE DEAD OR WISHED YOU COULD GO TO SLEEP AND NOT WAKE UP?: NO
2. HAVE YOU ACTUALLY HAD ANY THOUGHTS OF KILLING YOURSELF?: NO
6. HAVE YOU EVER DONE ANYTHING, STARTED TO DO ANYTHING, OR PREPARED TO DO ANYTHING TO END YOUR LIFE?: NO

## 2024-06-12 ASSESSMENT — PAIN DESCRIPTION - ORIENTATION: ORIENTATION: RIGHT;LEFT

## 2024-06-12 NOTE — OP NOTE
Date: 2024  OR Location: [facility]    Name: Raiza Nguyễn, : 1950, Age: 73 y.o., MRN: [id number], Sex: female    Diagnosis  Pre-op Diagnosis     * Malignant neoplasm of upper-outer quadrant of left breast in female, estrogen receptor negative (Multi) [C50.412, Z17.1]     * BRCA2 gene mutation positive in female [Z15.01, Z15.02, Z15.09] Post-op Diagnosis     * Malignant neoplasm of upper-outer quadrant of left breast in female, estrogen receptor negative (Multi) [C50.412, Z17.1]     * BRCA2 gene mutation positive in female [Z15.01, Z15.02, Z15.09]     Procedures  BILATERAL SIMPLE MASTECTOMY   - AL MASTECTOMY SIMPLE COMPLETE      Surgeons      * Jeniffer Langley - Primary    Resident/Fellow/Other Assistant:  Surgeons and Role:  * No surgeons found with a matching role *    Procedure Summary  Anesthesia: General  ASA: ASA status not filed in the log.  Anesthesia Staff: Anesthesiologist: Jaxon Vela MD  BHASKAR: Francis Sanabria  Estimated Blood Loss: 100mL    Staff:   Circulator: Anna Barnett RN; Taylor Bravo RN  Scrub Person: Chaparrita Mccord RN; Senait Benoit    Details of Procedure:    The patient was identified by name and birth date in pre-op and the surgical site was marked.  The bilateral breasts and axillae were prepped and draped in the usual sterile fashion.  The location of the known malignancy was marked on the skin of the left breast and an elliptical incision encompassing the nipple areolar complex was marked.  In a similar fashion an elliptical incision was marked on the right breast including the nipple areolar complex. Just prior to incision, a time-out was performed confirming patient identity, procedure and laterality.  Attention was first turned to the left. A 15-blade scalpel was used to make an elliptical incision.  Flaps were raised superiorly to the clavicle and laterally toward the axilla and latissimus dorsi muscle, medially to the sternum and inferiorly to the inframammary  fold.  The breast and pectoralis fascia were sharply dissected from the underlying pectoralis muscle.  The breast was oriented with a silk suture and the specimen was passed off the field to pathology. Meticulous hemostasis was achieved using electrocautery and suture ligatures.  Two 15 [place]ian channel drains were placed in the mastectomy bed and brought out via a lateral stab incisions and secured to the skin with a 2-0 silk sutures. The pectoralis muscle, serratus muscle and skin flaps were then infiltrated with local anesthetic to aide in post-operative analgesia. The perimeter of the incision was injected with 300mg of allograft reconstituted with 20ml of injectable saline to aide in wound healing given prior radiation history.  The skin was then temporarily re-approximated with staples.  The dermis was re-approximated with 2-0 Vicryl and the skin was closed with a running 4-0 Monocryl suture. Steri strips were applied.      Attention was then turned to the right breast.  A 15-blade scalpel was used to make an elliptical incision.  Flaps were raised superiorly to the clavicle and laterally toward the axilla and latissimus dorsi muscle, medially to the sternum and inferiorly to the inframammary fold.  The breast and pectoralis fascia were sharply dissected from the underlying pectoralis muscle.  The breast was oriented with a silk suture and the specimen was passed off the field to pathology. Meticulous hemostasis was achieved using electrocautery and suture ligatures.  Two 15 Lithuanian channel drains were placed in the mastectomy bed and brought out via a lateral stab incisions and secured to the skin with a 2-0 silk sutures. The pectoralis muscle, serratus muscle and skin flaps were then infiltrated with local anesthetic to aide in post-operative analgesia. The perimeter of the incision was injected with 300mg of allograft reconstituted with 20ml of injectable saline to aide in wound healing given prior radiation  history.  The skin was then temporarily re-approximated with staples.  The dermis was re-approximated with 2-0 Vicryl and the skin was closed with a running 4-0 Monocryl suture. Steri strips were applied.     The patient was then awoken from anesthesia and transported to the PACU in satisfactory condition.      SPECIMENS:    Left mastectomy- short suture superior, long lateral  Right mastectomy- short suture superior, long lateral    Jeniffer Langley DO

## 2024-06-12 NOTE — ANESTHESIA PREPROCEDURE EVALUATION
Patient: Raiza Nguyễn    Procedure Information       Anesthesia Start Date/Time: 06/12/24 1032    Procedure: BILATERAL SIMPLE MASTECTOMY (Bilateral)    Location: [facility] OR 07 / [facility]    Surgeons: Jeniffer Langley, DO            Relevant Problems   Cardiac   (+) Abnormal EKG   (+) Arteriosclerotic coronary artery disease   (+) Essential hypertension   (+) Hyperlipidemia      Pulmonary   (+) Obstructive sleep apnea hypopnea, severe      Neuro   (+) Bilateral carpal tunnel syndrome   (+) Brachial neuritis   (+) Lumbosacral plexus lesion   (+) Mixed anxiety depressive disorder      GI   (+) Gastroesophageal reflux disease      Liver   (+) Cholelithiasis   (+) Hepatomegaly      Endocrine   (+) Hypothyroidism      Musculoskeletal   (+) Bilateral carpal tunnel syndrome   (+) Cervical spinal stenosis   (+) Cervical spondylosis   (+) DDD (degenerative disc disease), lumbosacral   (+) Intervertebral disc disorder of lumbar region with myelopathy   (+) Lumbar stenosis   (+) Lumbosacral spondylosis   (+) Osteoarthritis of shoulder   (+) Spinal stenosis of lumbar region with neurogenic claudication      GYN   (+) Bilateral breast cancer (Multi)   (+) Malignant neoplasm of central portion of left breast in female, estrogen receptor negative (Multi)   (+) Malignant neoplasm of upper-outer quadrant of left breast in female, estrogen receptor negative (Multi)       Clinical information reviewed:   Tobacco  Allergies  Meds  Problems  Med Hx  Surg Hx   Fam Hx  Soc   Hx        NPO Detail:  NPO/Void Status  Carbohydrate Drink Given Prior to Surgery? : N  Date of Last Liquid: 06/12/24  Time of Last Liquid: 0630  Date of Last Solid: 06/11/24  Time of Last Solid: 1900  Last Intake Type: Clear fluids  Time of Last Void: 0930         Physical Exam    Airway  Mallampati: II  TM distance: >3 FB  Neck ROM: full     Cardiovascular - normal exam  Rhythm: regular  Rate: normal     Dental - normal exam     Pulmonary - normal  exam  Breath sounds clear to auscultation     Abdominal   (+) obese  Abdomen: soft  Bowel sounds: normal           Anesthesia Plan    History of general anesthesia?: yes  History of complications of general anesthesia?: yes    ASA 3     general   (Hx of PONV)  The patient is not a current smoker.  Patient was previously instructed to abstain from smoking on day of procedure.  Patient did not smoke on day of procedure.  Education provided regarding risk of obstructive sleep apnea.  intravenous induction   Postoperative administration of opioids is intended.  Anesthetic plan and risks discussed with patient.  Use of blood products discussed with patient who.    Plan discussed with CAA and CRNA.

## 2024-06-12 NOTE — NURSING NOTE
Pt arrived to the floor from pacu room [id number] pt sitting on the side of the bed, complaining of heartburn. There is some drainage from her right side of the posterior upper area on her back

## 2024-06-12 NOTE — ANESTHESIA PROCEDURE NOTES
Airway  Date/Time: 6/12/2024 10:39 AM  Urgency: elective    Airway not difficult    Staffing  Performed: attending   Authorized by: Jaxon Vela MD    Performed by: Jaxon Vela MD  Patient location during procedure: OR    Indications and Patient Condition  Indications for airway management: airway protection  Spontaneous ventilation: present  Sedation level: deep  Preoxygenated: yes  Patient position: sniffing  MILS maintained throughout  Mask difficulty assessment: 0 - not attempted    Final Airway Details  Final airway type: supraglottic airway      Successful airway: unique  Size 4     Number of attempts at approach: 1  Ventilation between attempts: spontaneous  Number of other approaches attempted: 0

## 2024-06-13 ENCOUNTER — APPOINTMENT (OUTPATIENT)
Dept: CARDIOLOGY | Facility: HOSPITAL | Age: 74
End: 2024-06-13
Payer: MEDICARE

## 2024-06-13 ENCOUNTER — HOME HEALTH ADMISSION (OUTPATIENT)
Dept: HOME HEALTH SERVICES | Facility: HOME HEALTH | Age: 74
End: 2024-06-13
Payer: MEDICARE

## 2024-06-13 ENCOUNTER — DOCUMENTATION (OUTPATIENT)
Dept: HOME HEALTH SERVICES | Facility: HOME HEALTH | Age: 74
End: 2024-06-13
Payer: MEDICARE

## 2024-06-13 LAB
GLUCOSE BLD MANUAL STRIP-MCNC: 121 MG/DL (ref 74–99)
GLUCOSE BLD MANUAL STRIP-MCNC: 128 MG/DL (ref 74–99)
GLUCOSE BLD MANUAL STRIP-MCNC: 150 MG/DL (ref 74–99)
GLUCOSE BLD MANUAL STRIP-MCNC: 158 MG/DL (ref 74–99)

## 2024-06-13 PROCEDURE — 7100000011 HC EXTENDED STAY RECOVERY HOURLY - NURSING UNIT

## 2024-06-13 PROCEDURE — 2500000001 HC RX 250 WO HCPCS SELF ADMINISTERED DRUGS (ALT 637 FOR MEDICARE OP): Performed by: SURGERY

## 2024-06-13 PROCEDURE — 2500000004 HC RX 250 GENERAL PHARMACY W/ HCPCS (ALT 636 FOR OP/ED): Performed by: SURGERY

## 2024-06-13 PROCEDURE — 82947 ASSAY GLUCOSE BLOOD QUANT: CPT | Mod: 91

## 2024-06-13 PROCEDURE — 93005 ELECTROCARDIOGRAM TRACING: CPT

## 2024-06-13 ASSESSMENT — PAIN DESCRIPTION - LOCATION: LOCATION: BREAST

## 2024-06-13 ASSESSMENT — PAIN SCALES - GENERAL
PAINLEVEL_OUTOF10: 3
PAINLEVEL_OUTOF10: 2
PAINLEVEL_OUTOF10: 5 - MODERATE PAIN
PAINLEVEL_OUTOF10: 4
PAINLEVEL_OUTOF10: 5 - MODERATE PAIN
PAINLEVEL_OUTOF10: 3
PAIN_LEVEL: 0

## 2024-06-13 ASSESSMENT — PAIN SCALES - PAIN ASSESSMENT IN ADVANCED DEMENTIA (PAINAD)
FACIALEXPRESSION: SMILING OR INEXPRESSIVE
CONSOLABILITY: NO NEED TO CONSOLE
TOTALSCORE: 0
BREATHING: NORMAL
BODYLANGUAGE: RELAXED

## 2024-06-13 ASSESSMENT — PAIN DESCRIPTION - ORIENTATION: ORIENTATION: RIGHT;LEFT

## 2024-06-13 ASSESSMENT — PAIN DESCRIPTION - DESCRIPTORS
DESCRIPTORS: ACHING
DESCRIPTORS: ACHING

## 2024-06-13 ASSESSMENT — PAIN - FUNCTIONAL ASSESSMENT
PAIN_FUNCTIONAL_ASSESSMENT: 0-10
PAIN_FUNCTIONAL_ASSESSMENT: 0-10

## 2024-06-13 ASSESSMENT — PAIN SCALES - WONG BAKER: WONGBAKER_NUMERICALRESPONSE: HURTS LITTLE MORE

## 2024-06-13 NOTE — HH CARE COORDINATION
Home Care received a Referral for Nursing. We have processed the referral for a Start of Care on 6/15.     If you have any questions or concerns, please feel free to contact us at [phone]. Follow the prompts, enter your five digit zip code, and you will be directed to your care team on [facility].

## 2024-06-13 NOTE — ANESTHESIA POSTPROCEDURE EVALUATION
Patient: Raiza Nguyễn    Procedure Summary       Date: 06/12/24 Room / Location: [facility] OR 07 / [facility]    Anesthesia Start: 1032 Anesthesia Stop: 1411    Procedure: BILATERAL SIMPLE MASTECTOMY (Bilateral: Breast) Diagnosis:       Malignant neoplasm of upper-outer quadrant of left breast in female, estrogen receptor negative (Multi)      BRCA2 gene mutation positive in female      (Malignant neoplasm of upper-outer quadrant of left breast in female, estrogen receptor negative (Multi) [C50.412, Z17.1])      (BRCA2 gene mutation positive in female [Z15.01, Z15.02, Z15.09])    Surgeons: Jeniffer Langley DO Responsible Provider: Jaxon Vela MD    Anesthesia Type: general ASA Status: 3            Anesthesia Type: general    Vitals Value Taken Time   /65 06/12/24 1545   Temp 36.2 °C (97.2 °F) 06/12/24 1500   Pulse 76 06/12/24 1553   Resp 14 06/12/24 1553   SpO2 97 % 06/12/24 1553   Vitals shown include unfiled device data.    Anesthesia Post Evaluation    Patient location during evaluation: PACU  Patient participation: complete - patient participated  Level of consciousness: sleepy but conscious, responsive to verbal stimuli, responsive to light touch, responsive to physical stimuli and sedated  Pain score: 0  Pain management: adequate  Airway patency: patent  Two or more strategies used to mitigate risk of obstructive sleep apnea  Cardiovascular status: acceptable and hemodynamically stable  Respiratory status: acceptable, unassisted, spontaneous ventilation and nonlabored ventilation  Hydration status: balanced  Postoperative Nausea and Vomiting: none        There were no known notable events for this encounter.

## 2024-06-13 NOTE — PROGRESS NOTES
06/13/24 1017   Discharge Planning   Living Arrangements Other (Comment)   Support Systems Children   Type of Residence Private residence   Home or Post Acute Services In home services   Type of Home Care Services Home nursing visits;Home PT   Patient expects to be discharged to: Home with [facility]   Does the patient need discharge transport arranged? No     Met with patient at bedside. Admitted for mastectomy. Pt lives alone and was independent PTA with no HHC. Pt has a cane. PCP is Shoshana Olivarez. Pt is able to manage her health and understands her medications. Pt plans to discharge home with [facility]. Internal referral requested. Sons will be staying with patient during recovery. Family will provide transport home.   7806 HC referral sent. HC intake notified, ADOD 6/14

## 2024-06-13 NOTE — DISCHARGE INSTR - OTHER ORDERS
Mastectomy Discharge Instructions:  Call surgeon for any problems and/or concerns.      -Walk around the house often when you get home Try to walk a little more each day  -Continue the coughing and deep breathing exercises that you learned in the hospital  -Avoid heavy lifting, straining, strenuous activity. No lifting greater than 10 pounds  -Keep the surgical incision(s) clean and dry  -If you have drainage tubes, empty the bulb when it gets full. Record the drain output on the form provided and bring to your follow-up appointment  -Avoid soaking in a bathtub, swimming pool, or hot tub until cleared by your surgeon  -Wear surgical bra as instructed; ask physician when you can resume wearing a regular bra      Call the surgeon right away for:  -Signs of infection. These include a fever of 100.4 or higher, shaking chills, wound that will not heal  -Signs of wound infection. These include swelling, redness, warmth around the wound, too much pain when touched,  yellowish, greenish, or blood discharge; foul smell coming from the incision or the incision opens up  -Drain seems clogged and there is fluid under your surgery incisions  -Stitches that hold the drain to your skin are coming loose or are missing  -Cough, shortness of breath, chest pain  -Upset stomach and vomiting that are not helped by nausea medications  -Redness, warmth, swelling, stiffness, or hardness in the arm or hand on the side where the lymph nodes were removed  -Lumps or skin changes in tissue left on the mastectomy side

## 2024-06-13 NOTE — CARE PLAN
The patient's goals for the shift include      The clinical goals for the shift include maintain hemodynamically stable

## 2024-06-13 NOTE — CARE PLAN
The patient's goals for the shift include  rest and pain control     The clinical goals for the shift include to walk to the bathroom    Over the shift, the patient did not make progress toward the following goals. Barriers to progression include. Recommendations to address these barriers include .

## 2024-06-13 NOTE — PROGRESS NOTES
"Raiza Nguyễn is a 73 y.o. female on day 0 of admission presenting with Breast cancer of upper-outer quadrant of left female breast (Multi).    Subjective   Pt with bloody drainage from axillary aspect of right breast incision.  Photos sent by nursing staff that evening.  Fullness of the right axilla noted.  This was somewhat present on table given body habitus.         Objective     Physical Exam  General: alert and oriented x 3.  No acute distress.  Resp: non labored breathing RA  CV: vital signs reviewed  Breasts:   RIGHT: surgically absent with no reconstruction.  Flaps viable.  Drains x 2 serosanguinous.  Fullness of posterior axilla at latissimus border.  Soft, no expanding hematoma. There is dependent ecchymosis.   LEFT: surgically absent with no reconstruction.  Flaps viable.  Drains x 2 serosanguinous.    Last Recorded Vitals  Blood pressure 96/53, pulse 91, temperature 36.1 °C (97 °F), temperature source Temporal, resp. rate 18, height 1.499 m (4' 11\"), weight 83.9 kg (185 lb), SpO2 97%.  Intake/Output last 3 Shifts:  I/O last 3 completed shifts:  In: 1774.4 (21.1 mL/kg) [I.V.:1724.4 (20.5 mL/kg); IV Piggyback:50]  Out: 1645 (19.6 mL/kg) [Urine:800 (0.3 mL/kg/hr); Drains:845]  Weight: 83.9 kg         Assessment/Plan   Principal Problem:    Breast cancer of upper-outer quadrant of left female breast (Multi)  Active Problems:    Malignant neoplasm of upper-outer quadrant of left breast in female, estrogen receptor negative (Multi)    BRCA2 gene mutation positive in female    Some soft blood pressures overnight responded to fluid bolus.    Fullness in the right axilla unchanged.  Circumferential ace wrap placed.    Will continue to monitor.  Resume metformin. Will engage Case Management for home health care referral to assist with drains / post op care.           Jeniffer Langley, DO      "

## 2024-06-14 VITALS
HEIGHT: 59 IN | HEART RATE: 93 BPM | WEIGHT: 185 LBS | RESPIRATION RATE: 16 BRPM | TEMPERATURE: 97 F | DIASTOLIC BLOOD PRESSURE: 54 MMHG | SYSTOLIC BLOOD PRESSURE: 104 MMHG | BODY MASS INDEX: 37.29 KG/M2 | OXYGEN SATURATION: 99 %

## 2024-06-14 PROBLEM — C50.412 BREAST CANCER OF UPPER-OUTER QUADRANT OF LEFT FEMALE BREAST (MULTI): Status: RESOLVED | Noted: 2024-06-12 | Resolved: 2024-06-14

## 2024-06-14 LAB
GLUCOSE BLD MANUAL STRIP-MCNC: 120 MG/DL (ref 74–99)
GLUCOSE BLD MANUAL STRIP-MCNC: 148 MG/DL (ref 74–99)

## 2024-06-14 PROCEDURE — 2500000004 HC RX 250 GENERAL PHARMACY W/ HCPCS (ALT 636 FOR OP/ED): Performed by: SURGERY

## 2024-06-14 PROCEDURE — 2500000001 HC RX 250 WO HCPCS SELF ADMINISTERED DRUGS (ALT 637 FOR MEDICARE OP): Performed by: SURGERY

## 2024-06-14 PROCEDURE — 82947 ASSAY GLUCOSE BLOOD QUANT: CPT | Mod: 91

## 2024-06-14 PROCEDURE — 7100000011 HC EXTENDED STAY RECOVERY HOURLY - NURSING UNIT

## 2024-06-14 RX ORDER — OXYCODONE HYDROCHLORIDE 5 MG/1
5 TABLET ORAL EVERY 6 HOURS PRN
Qty: 15 TABLET | Refills: 0 | Status: SHIPPED | OUTPATIENT
Start: 2024-06-14

## 2024-06-14 RX ORDER — DOCUSATE SODIUM 100 MG/1
100 CAPSULE, LIQUID FILLED ORAL 2 TIMES DAILY
Qty: 28 CAPSULE | Refills: 0 | Status: SHIPPED | OUTPATIENT
Start: 2024-06-14 | End: 2024-06-28

## 2024-06-14 ASSESSMENT — PAIN SCALES - GENERAL: PAINLEVEL_OUTOF10: 2

## 2024-06-14 NOTE — CARE PLAN
The patient's goals for the shift include      The clinical goals for the shift include Pt will have adequate pain control with less than 7/10 by end of shift 6/14/24    Pt met goals and will be discharged shortly

## 2024-06-14 NOTE — NURSING NOTE
1400 - Pt stable this shift. She had JOHN drains assessed and pt / family educated on how to drain them. Cups given for measurement. Pt medicated per orders for pain. Discharge instructions reviewed with pt and family. No questions at this time. Transport called for ride to main entrance

## 2024-06-14 NOTE — DISCHARGE SUMMARY
Discharge Diagnosis  Breast cancer of upper-outer quadrant of left female breast (Multi)    Issues Requiring Follow-Up  pathology    Test Results Pending At Discharge  Pending Labs       Order Current Status    Surgical Pathology Exam In process            Hospital Course   Presented on 6/12/2024 for bilateral mastectomy.  Admitted post op for monitoring and pain control.  Some increased output from right side and soft blood pressures.  Responded to fluid bolus.  Drain output normalized. Case management engaged to set up home health care to assist with post op needs. Discharged to home on POD #2.    Pertinent Physical Exam At Time of Discharge  Physical Exam  General: alert and oriented x 3.  No acute distress.  Resp: non labored breathing RA  CV: vital signs reviewed  Breasts:   RIGHT: surgically absent with no reconstruction.  Flaps viable.  Drains serosanguinous.   LEFT: surgically absent with no reconstruction.  Flaps viable.  Drains serosanguinous.    Home Medications     Medication List      START taking these medications     docusate sodium 100 mg capsule; Commonly known as: Colace; Take 1   capsule (100 mg) by mouth 2 times a day for 14 days.   oxyCODONE 5 mg immediate release tablet; Commonly known as: Roxicodone;   Take 1 tablet (5 mg) by mouth every 6 hours if needed for moderate pain (4   - 6).     CONTINUE taking these medications     aspirin 81 mg EC tablet   calcium carbonate 600 mg calcium (1,500 mg) tablet   chlorhexidine 0.12 % solution; Commonly known as: Peridex; Swish and   spit 15 ml for 2 doses, 15mL the night before surgery and 15 ml morning of   surgery - swish for 30 seconds -DO NOT SWALLOW, SPIT OUT   DULoxetine 30 mg DR capsule; Commonly known as: Cymbalta; Take 1 capsule   (30 mg) by mouth 2 times a day.   * gabapentin 100 mg capsule; Commonly known as: Neurontin   * gabapentin 300 mg capsule; Commonly known as: Neurontin; Take 2   capsules (600 mg) by mouth 3 times a day.    hydroCHLOROthiazide 25 mg tablet; Commonly known as: HYDRODiuril; Take 1   tablet (25 mg) by mouth 2 times a day.   levothyroxine 125 mcg tablet; Commonly known as: Synthroid, Levoxyl   lisinopril 10 mg tablet; TAKE 1 TABLET TWICE DAILY   metFORMIN  mg 24 hr tablet; Commonly known as: Glucophage-XR; Take   1 tablet (500 mg) by mouth once daily with breakfast. Do not crush, chew,   or split.   rosuvastatin 5 mg tablet; Commonly known as: Crestor; TAKE 1 TABLET ONE   TIME DAILY  * This list has 2 medication(s) that are the same as other medications   prescribed for you. Read the directions carefully, and ask your doctor or   other care provider to review them with you.     ASK your doctor about these medications     * Mounjaro 2.5 mg/0.5 mL pen injector; Generic drug: tirzepatide; Inject   2.5 mg under the skin 1 (one) time per week.   * Mounjaro 2.5 mg/0.5 mL pen injector; Generic drug: tirzepatide; Inject   2.5 mg under the skin 1 (one) time per week.  * This list has 2 medication(s) that are the same as other medications   prescribed for you. Read the directions carefully, and ask your doctor or   other care provider to review them with you.       Outpatient Follow-Up  Future Appointments   Date Time Provider Department Center   6/25/2024  1:00 PM Jeniffer Langley DO [id number] [place]   7/2/2024 11:10 AM Joanna Bourne MD [id number] [place]   7/8/2024  9:00 AM Ricardo Moody MD [id number] [facility]   7/23/2024  9:30 AM Shoshana Olivarez DO [id number] [facility]   7/29/2024  9:00 AM Mona Dodson MD [id number] [place]   8/28/2024 10:40 AM Verna Tabor DO [id number] [place]   9/26/2024 11:00 AM DIAMOND Teixeira STJONCS [place]   9/26/2024 12:30 PM Anaid Beverly [facility] [id number] [place]   10/2/2024  9:50 AM MIN ECHO [id number] [facility]   10/2/2024 11:15 AM Dev Rubio MD [id number] [facility]   10/17/2024  9:00 AM Shoshana Olivarez DO [id number] Anoop Langley DO

## 2024-06-14 NOTE — PROGRESS NOTES
06/14/24 1122   Discharge Planning   Home or Post Acute Services In home services   Type of Home Care Services Home nursing visits   Patient expects to be discharged to: Home with [facility]   Does the patient need discharge transport arranged? No     [facility] intake notified pt is medically ready for discharge.   1140 HC confirmed SOC 6/15

## 2024-06-15 ENCOUNTER — HOME CARE VISIT (OUTPATIENT)
Dept: HOME HEALTH SERVICES | Facility: HOME HEALTH | Age: 74
End: 2024-06-15
Payer: MEDICARE

## 2024-06-15 VITALS
RESPIRATION RATE: 18 BRPM | SYSTOLIC BLOOD PRESSURE: 120 MMHG | TEMPERATURE: 97.3 F | HEART RATE: 79 BPM | DIASTOLIC BLOOD PRESSURE: 60 MMHG | OXYGEN SATURATION: 98 %

## 2024-06-15 LAB
ATRIAL RATE: 92 BPM
ATRIAL RATE: 93 BPM
ATRIAL RATE: 96 BPM
P AXIS: 64 DEGREES
P AXIS: 64 DEGREES
P AXIS: 71 DEGREES
P OFFSET: 183 MS
P OFFSET: 186 MS
P OFFSET: 188 MS
P ONSET: 126 MS
P ONSET: 128 MS
P ONSET: 133 MS
PR INTERVAL: 166 MS
PR INTERVAL: 174 MS
PR INTERVAL: 184 MS
Q ONSET: 215 MS
Q ONSET: 216 MS
Q ONSET: 218 MS
QRS COUNT: 15 BEATS
QRS COUNT: 15 BEATS
QRS COUNT: 16 BEATS
QRS DURATION: 92 MS
QRS DURATION: 92 MS
QRS DURATION: 94 MS
QT INTERVAL: 376 MS
QT INTERVAL: 392 MS
QT INTERVAL: 410 MS
QTC CALCULATION(BAZETT): 475 MS
QTC CALCULATION(BAZETT): 487 MS
QTC CALCULATION(BAZETT): 507 MS
QTC FREDERICIA: 439 MS
QTC FREDERICIA: 454 MS
QTC FREDERICIA: 472 MS
R AXIS: -3 DEGREES
R AXIS: -3 DEGREES
R AXIS: 38 DEGREES
T AXIS: 50 DEGREES
T AXIS: 56 DEGREES
T AXIS: 63 DEGREES
T OFFSET: 403 MS
T OFFSET: 414 MS
T OFFSET: 421 MS
VENTRICULAR RATE: 92 BPM
VENTRICULAR RATE: 93 BPM
VENTRICULAR RATE: 96 BPM

## 2024-06-15 PROCEDURE — G0299 HHS/HOSPICE OF RN EA 15 MIN: HCPCS | Mod: HHH

## 2024-06-15 PROCEDURE — 169592 NO-PAY CLAIM PROCEDURE

## 2024-06-15 ASSESSMENT — ACTIVITIES OF DAILY LIVING (ADL)
OASIS_M1830: 03
ENTERING_EXITING_HOME: NEEDS ASSISTANCE

## 2024-06-15 ASSESSMENT — PAIN SCALES - PAIN ASSESSMENT IN ADVANCED DEMENTIA (PAINAD): BREATHING: 0

## 2024-06-15 ASSESSMENT — ENCOUNTER SYMPTOMS
LOWEST PAIN SEVERITY IN PAST 24 HOURS: 0/10
APPETITE LEVEL: FAIR
MUSCLE WEAKNESS: 1
HIGHEST PAIN SEVERITY IN PAST 24 HOURS: 2/10
PAIN SEVERITY GOAL: 0/10

## 2024-06-17 ENCOUNTER — OFFICE VISIT (OUTPATIENT)
Dept: SURGICAL ONCOLOGY | Facility: HOSPITAL | Age: 74
End: 2024-06-17
Payer: MEDICARE

## 2024-06-17 VITALS — WEIGHT: 185 LBS | BODY MASS INDEX: 36.32 KG/M2 | HEIGHT: 60 IN

## 2024-06-17 DIAGNOSIS — Z17.1 MALIGNANT NEOPLASM OF CENTRAL PORTION OF LEFT BREAST IN FEMALE, ESTROGEN RECEPTOR NEGATIVE (MULTI): Primary | ICD-10-CM

## 2024-06-17 DIAGNOSIS — C50.112 MALIGNANT NEOPLASM OF CENTRAL PORTION OF LEFT BREAST IN FEMALE, ESTROGEN RECEPTOR NEGATIVE (MULTI): Primary | ICD-10-CM

## 2024-06-17 PROCEDURE — 99024 POSTOP FOLLOW-UP VISIT: CPT | Performed by: SURGERY

## 2024-06-17 PROCEDURE — 1159F MED LIST DOCD IN RCRD: CPT | Performed by: SURGERY

## 2024-06-17 PROCEDURE — 3044F HG A1C LEVEL LT 7.0%: CPT | Performed by: SURGERY

## 2024-06-17 PROCEDURE — 1157F ADVNC CARE PLAN IN RCRD: CPT | Performed by: SURGERY

## 2024-06-17 PROCEDURE — 4010F ACE/ARB THERAPY RXD/TAKEN: CPT | Performed by: SURGERY

## 2024-06-17 PROCEDURE — 3008F BODY MASS INDEX DOCD: CPT | Performed by: SURGERY

## 2024-06-17 NOTE — PROGRESS NOTES
BREAST SURGERY POST OPERATIVE VISIT    Assessment/Plan     Left breast IDC g3 ER0% WA 0% HER2 positive at 3:00 10cmFN measuring 0.9cm on MRI  -concerning level 3 lymph node not amenable to biopsy.  PET CT recommended for further assessment by radiology.  PET negative.  -s/p bilateral completion mastectomy     2.  History of bilateral breast cancer and known BRCA2 mutation     Stage IB (Prognostic Stage IA) Right breast cancer 7/2018, [id number], grade 2 IDC, ER+95% WA+95% HER2 equivocal, FISH-.   - s/p R. magseed pm / SLNB (2mi/3) on 8/27/2018 with a 1.5cm IDC and negative margins. She had 2 out of 3 LNs with micrometastases measuring 0.4mm and 1.1mm, no NASH.   - Oncotype 17   - s/p WBRT 10/11/2018 - 11/1/2018   - taking Exemestane      Stage II Left breast cancer in 2003, 1.5 cm grade 3 IDC with positive LNs and NASH, ER+ WA+ HER2 not defined.   - L.pm/ALND   - ACx4 + Tx4   - WBRT   - Anastrozole x5yr    Follow up with path results.  Discussed importance of seeking evaluation if dizziness, chest pain, or shortness of breath occur.    Subjective   Raiza Nguyễn is a 73 y.o. female here for post op visit s/p bilateral mastectomy.    Issues over the weekend with drains.  1 drain on the right became dislodged.  Also endorses increased heart rate with dizziness over the weekend which has since subsided.  She did not seek medical care for this.  Date of surgery: 6/12/2024  Pathology: pending     Objective   Physical Exam  General: Alert and oriented x 3.  Mood and affect are appropriate.  HEENT: EOMI, PERRLA.   Neck: supple, no masses, no cervical adenopathy.  Cardiovascular: no lower extremity edema.  Pulmonary: breathing non labored on room air.  GI: Abdomen soft, no masses. No hepatomegaly or splenomegaly.  Lymph nodes: No supraclavicular or axillary adenopathy bilaterally.  Musculoskeletal: Full range of motion in the upper extremities bilaterally.  Neuro: denies dizziness, tremors    Breasts: The breasts were  examined in both the seated and supine positions.    RIGHT: surgically absent.  Incision well approximated.  Flaps viable. No signs of infection. 1 drain remaining with serosanguinous output.   LEFT: surgically absent.  Incision well approximated.  Flaps viable. No signs of infection. 2 drains with serosanguinous output.     Radiology review: All images and reports were personally reviewed.         Jeniffer Langley, DO

## 2024-06-18 ENCOUNTER — HOME CARE VISIT (OUTPATIENT)
Dept: HOME HEALTH SERVICES | Facility: HOME HEALTH | Age: 74
End: 2024-06-18
Payer: MEDICARE

## 2024-06-19 ENCOUNTER — HOSPITAL ENCOUNTER (OUTPATIENT)
Facility: HOSPITAL | Age: 74
Setting detail: OBSERVATION
Discharge: HOME | End: 2024-06-20
Attending: INTERNAL MEDICINE | Admitting: INTERNAL MEDICINE
Payer: MEDICARE

## 2024-06-19 ENCOUNTER — HOSPITAL ENCOUNTER (INPATIENT)
Age: 74
End: 2024-06-19
Attending: INTERNAL MEDICINE | Admitting: INTERNAL MEDICINE
Payer: MEDICARE

## 2024-06-19 DIAGNOSIS — R07.1 CHEST PAIN ON BREATHING: Primary | ICD-10-CM

## 2024-06-19 DIAGNOSIS — Z90.13 STATUS POST MASTECTOMY, BILATERAL: ICD-10-CM

## 2024-06-19 PROBLEM — R07.9 CHEST PAIN: Status: ACTIVE | Noted: 2024-06-19

## 2024-06-19 LAB
ANION GAP SERPL CALC-SCNC: 15 MMOL/L (ref 10–20)
BUN SERPL-MCNC: 19 MG/DL (ref 6–23)
CALCIUM SERPL-MCNC: 8.3 MG/DL (ref 8.6–10.3)
CARDIAC TROPONIN I PNL SERPL HS: 8 NG/L (ref 0–13)
CHLORIDE SERPL-SCNC: 101 MMOL/L (ref 98–107)
CHOLEST SERPL-MCNC: 138 MG/DL (ref 0–199)
CHOLESTEROL/HDL RATIO: 2.1
CO2 SERPL-SCNC: 22 MMOL/L (ref 21–32)
CREAT SERPL-MCNC: 0.82 MG/DL (ref 0.5–1.05)
EGFRCR SERPLBLD CKD-EPI 2021: 77 ML/MIN/1.73M*2
ERYTHROCYTE [DISTWIDTH] IN BLOOD BY AUTOMATED COUNT: 14.8 % (ref 11.5–14.5)
GLUCOSE SERPL-MCNC: 157 MG/DL (ref 74–99)
HCT VFR BLD AUTO: 30.5 % (ref 36–46)
HDLC SERPL-MCNC: 64.7 MG/DL
HGB BLD-MCNC: 9.3 G/DL (ref 12–16)
LDLC SERPL CALC-MCNC: 59 MG/DL
LEGIONELLA AG UR QL: NEGATIVE
MAGNESIUM SERPL-MCNC: 1.84 MG/DL (ref 1.6–2.4)
MCH RBC QN AUTO: 32.7 PG (ref 26–34)
MCHC RBC AUTO-ENTMCNC: 30.5 G/DL (ref 32–36)
MCV RBC AUTO: 107 FL (ref 80–100)
NON HDL CHOLESTEROL: 73 MG/DL (ref 0–149)
NRBC BLD-RTO: 0 /100 WBCS (ref 0–0)
PLATELET # BLD AUTO: 312 X10*3/UL (ref 150–450)
POTASSIUM SERPL-SCNC: 3.6 MMOL/L (ref 3.5–5.3)
PROCALCITONIN SERPL-MCNC: 0.11 NG/ML
RBC # BLD AUTO: 2.84 X10*6/UL (ref 4–5.2)
S PNEUM AG UR QL: NEGATIVE
SODIUM SERPL-SCNC: 134 MMOL/L (ref 136–145)
TRIGL SERPL-MCNC: 70 MG/DL (ref 0–149)
VLDL: 14 MG/DL (ref 0–40)
WBC # BLD AUTO: 13.1 X10*3/UL (ref 4.4–11.3)

## 2024-06-19 PROCEDURE — 84484 ASSAY OF TROPONIN QUANT: CPT | Performed by: INTERNAL MEDICINE

## 2024-06-19 PROCEDURE — 99222 1ST HOSP IP/OBS MODERATE 55: CPT | Performed by: INTERNAL MEDICINE

## 2024-06-19 PROCEDURE — 85027 COMPLETE CBC AUTOMATED: CPT | Performed by: INTERNAL MEDICINE

## 2024-06-19 PROCEDURE — 80061 LIPID PANEL: CPT | Performed by: INTERNAL MEDICINE

## 2024-06-19 PROCEDURE — 36415 COLL VENOUS BLD VENIPUNCTURE: CPT | Performed by: INTERNAL MEDICINE

## 2024-06-19 PROCEDURE — 96372 THER/PROPH/DIAG INJ SC/IM: CPT | Performed by: INTERNAL MEDICINE

## 2024-06-19 PROCEDURE — G0378 HOSPITAL OBSERVATION PER HR: HCPCS

## 2024-06-19 PROCEDURE — 2500000004 HC RX 250 GENERAL PHARMACY W/ HCPCS (ALT 636 FOR OP/ED): Performed by: INTERNAL MEDICINE

## 2024-06-19 PROCEDURE — 94640 AIRWAY INHALATION TREATMENT: CPT | Mod: MUE

## 2024-06-19 PROCEDURE — 87449 NOS EACH ORGANISM AG IA: CPT | Mod: STJLAB | Performed by: INTERNAL MEDICINE

## 2024-06-19 PROCEDURE — 87899 AGENT NOS ASSAY W/OPTIC: CPT | Mod: STJLAB | Performed by: INTERNAL MEDICINE

## 2024-06-19 PROCEDURE — 2500000001 HC RX 250 WO HCPCS SELF ADMINISTERED DRUGS (ALT 637 FOR MEDICARE OP): Performed by: INTERNAL MEDICINE

## 2024-06-19 PROCEDURE — 87040 BLOOD CULTURE FOR BACTERIA: CPT | Mod: STJLAB | Performed by: INTERNAL MEDICINE

## 2024-06-19 PROCEDURE — 83735 ASSAY OF MAGNESIUM: CPT | Performed by: INTERNAL MEDICINE

## 2024-06-19 PROCEDURE — 84145 PROCALCITONIN (PCT): CPT | Mod: STJLAB | Performed by: INTERNAL MEDICINE

## 2024-06-19 PROCEDURE — 82374 ASSAY BLOOD CARBON DIOXIDE: CPT | Performed by: INTERNAL MEDICINE

## 2024-06-19 PROCEDURE — 2500000002 HC RX 250 W HCPCS SELF ADMINISTERED DRUGS (ALT 637 FOR MEDICARE OP, ALT 636 FOR OP/ED): Performed by: INTERNAL MEDICINE

## 2024-06-19 RX ORDER — KETOROLAC TROMETHAMINE 15 MG/ML
15 INJECTION, SOLUTION INTRAMUSCULAR; INTRAVENOUS EVERY 6 HOURS PRN
Status: DISCONTINUED | OUTPATIENT
Start: 2024-06-19 | End: 2024-06-20 | Stop reason: HOSPADM

## 2024-06-19 RX ORDER — ROSUVASTATIN CALCIUM 5 MG/1
5 TABLET, COATED ORAL NIGHTLY
Status: DISCONTINUED | OUTPATIENT
Start: 2024-06-19 | End: 2024-06-20 | Stop reason: HOSPADM

## 2024-06-19 RX ORDER — POLYETHYLENE GLYCOL 3350 17 G/17G
17 POWDER, FOR SOLUTION ORAL DAILY
Status: DISCONTINUED | OUTPATIENT
Start: 2024-06-19 | End: 2024-06-20 | Stop reason: HOSPADM

## 2024-06-19 RX ORDER — DULOXETIN HYDROCHLORIDE 30 MG/1
30 CAPSULE, DELAYED RELEASE ORAL 2 TIMES DAILY
Status: DISCONTINUED | OUTPATIENT
Start: 2024-06-19 | End: 2024-06-20 | Stop reason: HOSPADM

## 2024-06-19 RX ORDER — TRAMADOL HYDROCHLORIDE 50 MG/1
1 TABLET ORAL 2 TIMES DAILY PRN
COMMUNITY

## 2024-06-19 RX ORDER — ACETAMINOPHEN 650 MG/1
650 SUPPOSITORY RECTAL EVERY 4 HOURS PRN
Status: DISCONTINUED | OUTPATIENT
Start: 2024-06-19 | End: 2024-06-20 | Stop reason: HOSPADM

## 2024-06-19 RX ORDER — MORPHINE SULFATE 2 MG/ML
2 INJECTION, SOLUTION INTRAMUSCULAR; INTRAVENOUS EVERY 5 MIN PRN
Status: DISCONTINUED | OUTPATIENT
Start: 2024-06-19 | End: 2024-06-20 | Stop reason: HOSPADM

## 2024-06-19 RX ORDER — LEVOTHYROXINE SODIUM 125 UG/1
125 TABLET ORAL DAILY
Status: DISCONTINUED | OUTPATIENT
Start: 2024-06-19 | End: 2024-06-20 | Stop reason: HOSPADM

## 2024-06-19 RX ORDER — OXYCODONE HYDROCHLORIDE 5 MG/1
5 TABLET ORAL EVERY 6 HOURS PRN
Status: DISCONTINUED | OUTPATIENT
Start: 2024-06-19 | End: 2024-06-20 | Stop reason: HOSPADM

## 2024-06-19 RX ORDER — TRAMADOL HYDROCHLORIDE 50 MG/1
50 TABLET ORAL 2 TIMES DAILY PRN
Status: DISCONTINUED | OUTPATIENT
Start: 2024-06-19 | End: 2024-06-20 | Stop reason: HOSPADM

## 2024-06-19 RX ORDER — CALCIUM CARBONATE 500(1250)
1250 TABLET ORAL DAILY
Status: DISCONTINUED | OUTPATIENT
Start: 2024-06-19 | End: 2024-06-20 | Stop reason: HOSPADM

## 2024-06-19 RX ORDER — GABAPENTIN 600 MG/1
600 TABLET ORAL 3 TIMES DAILY
Status: DISCONTINUED | OUTPATIENT
Start: 2024-06-19 | End: 2024-06-20 | Stop reason: HOSPADM

## 2024-06-19 RX ORDER — IPRATROPIUM BROMIDE AND ALBUTEROL SULFATE 2.5; .5 MG/3ML; MG/3ML
3 SOLUTION RESPIRATORY (INHALATION)
Status: DISCONTINUED | OUTPATIENT
Start: 2024-06-19 | End: 2024-06-20 | Stop reason: HOSPADM

## 2024-06-19 RX ORDER — GABAPENTIN 100 MG/1
100 CAPSULE ORAL DAILY PRN
Status: DISCONTINUED | OUTPATIENT
Start: 2024-06-19 | End: 2024-06-20 | Stop reason: HOSPADM

## 2024-06-19 RX ORDER — ACETAMINOPHEN 160 MG/5ML
650 SOLUTION ORAL EVERY 4 HOURS PRN
Status: DISCONTINUED | OUTPATIENT
Start: 2024-06-19 | End: 2024-06-20 | Stop reason: HOSPADM

## 2024-06-19 RX ORDER — ATORVASTATIN CALCIUM 80 MG/1
80 TABLET, FILM COATED ORAL NIGHTLY
Status: DISCONTINUED | OUTPATIENT
Start: 2024-06-19 | End: 2024-06-19

## 2024-06-19 RX ORDER — NITROGLYCERIN 0.4 MG/1
0.4 TABLET SUBLINGUAL EVERY 5 MIN PRN
Status: DISCONTINUED | OUTPATIENT
Start: 2024-06-19 | End: 2024-06-20 | Stop reason: HOSPADM

## 2024-06-19 RX ORDER — ENOXAPARIN SODIUM 100 MG/ML
40 INJECTION SUBCUTANEOUS EVERY 24 HOURS
Status: DISCONTINUED | OUTPATIENT
Start: 2024-06-19 | End: 2024-06-20 | Stop reason: HOSPADM

## 2024-06-19 RX ORDER — ACETAMINOPHEN 325 MG/1
650 TABLET ORAL EVERY 4 HOURS PRN
Status: DISCONTINUED | OUTPATIENT
Start: 2024-06-19 | End: 2024-06-20 | Stop reason: HOSPADM

## 2024-06-19 RX ORDER — ASPIRIN 81 MG/1
81 TABLET ORAL EVERY EVENING
Status: DISCONTINUED | OUTPATIENT
Start: 2024-06-19 | End: 2024-06-20 | Stop reason: HOSPADM

## 2024-06-19 SDOH — SOCIAL STABILITY: SOCIAL INSECURITY: ARE THERE ANY APPARENT SIGNS OF INJURIES/BEHAVIORS THAT COULD BE RELATED TO ABUSE/NEGLECT?: NO

## 2024-06-19 SDOH — ECONOMIC STABILITY: INCOME INSECURITY: IN THE LAST 12 MONTHS, WAS THERE A TIME WHEN YOU WERE NOT ABLE TO PAY THE MORTGAGE OR RENT ON TIME?: NO

## 2024-06-19 SDOH — ECONOMIC STABILITY: INCOME INSECURITY: IN THE PAST 12 MONTHS, HAS THE ELECTRIC, GAS, OIL, OR WATER COMPANY THREATENED TO SHUT OFF SERVICE IN YOUR HOME?: NO

## 2024-06-19 SDOH — SOCIAL STABILITY: SOCIAL INSECURITY: ARE YOU OR HAVE YOU BEEN THREATENED OR ABUSED PHYSICALLY, EMOTIONALLY, OR SEXUALLY BY ANYONE?: NO

## 2024-06-19 SDOH — ECONOMIC STABILITY: FOOD INSECURITY: WITHIN THE PAST 12 MONTHS, THE FOOD YOU BOUGHT JUST DIDN'T LAST AND YOU DIDN'T HAVE MONEY TO GET MORE.: NEVER TRUE

## 2024-06-19 SDOH — SOCIAL STABILITY: SOCIAL INSECURITY: WERE YOU ABLE TO COMPLETE ALL THE BEHAVIORAL HEALTH SCREENINGS?: YES

## 2024-06-19 SDOH — SOCIAL STABILITY: SOCIAL INSECURITY: HAVE YOU HAD ANY THOUGHTS OF HARMING ANYONE ELSE?: NO

## 2024-06-19 SDOH — ECONOMIC STABILITY: HOUSING INSECURITY
IN THE LAST 12 MONTHS, WAS THERE A TIME WHEN YOU DID NOT HAVE A STEADY PLACE TO SLEEP OR SLEPT IN A SHELTER (INCLUDING NOW)?: NO

## 2024-06-19 SDOH — ECONOMIC STABILITY: TRANSPORTATION INSECURITY
IN THE PAST 12 MONTHS, HAS LACK OF TRANSPORTATION KEPT YOU FROM MEETINGS, WORK, OR FROM GETTING THINGS NEEDED FOR DAILY LIVING?: NO

## 2024-06-19 SDOH — HEALTH STABILITY: MENTAL HEALTH
HOW OFTEN DO YOU NEED TO HAVE SOMEONE HELP YOU WHEN YOU READ INSTRUCTIONS, PAMPHLETS, OR OTHER WRITTEN MATERIAL FROM YOUR DOCTOR OR PHARMACY?: NEVER

## 2024-06-19 SDOH — ECONOMIC STABILITY: FOOD INSECURITY: WITHIN THE PAST 12 MONTHS, YOU WORRIED THAT YOUR FOOD WOULD RUN OUT BEFORE YOU GOT MONEY TO BUY MORE.: NEVER TRUE

## 2024-06-19 SDOH — SOCIAL STABILITY: SOCIAL INSECURITY: ABUSE: ADULT

## 2024-06-19 SDOH — ECONOMIC STABILITY: TRANSPORTATION INSECURITY
IN THE PAST 12 MONTHS, HAS THE LACK OF TRANSPORTATION KEPT YOU FROM MEDICAL APPOINTMENTS OR FROM GETTING MEDICATIONS?: NO

## 2024-06-19 SDOH — SOCIAL STABILITY: SOCIAL INSECURITY: DO YOU FEEL ANYONE HAS EXPLOITED OR TAKEN ADVANTAGE OF YOU FINANCIALLY OR OF YOUR PERSONAL PROPERTY?: NO

## 2024-06-19 SDOH — SOCIAL STABILITY: SOCIAL INSECURITY: DO YOU FEEL UNSAFE GOING BACK TO THE PLACE WHERE YOU ARE LIVING?: NO

## 2024-06-19 SDOH — ECONOMIC STABILITY: INCOME INSECURITY: HOW HARD IS IT FOR YOU TO PAY FOR THE VERY BASICS LIKE FOOD, HOUSING, MEDICAL CARE, AND HEATING?: NOT HARD AT ALL

## 2024-06-19 SDOH — SOCIAL STABILITY: SOCIAL INSECURITY: HAS ANYONE EVER THREATENED TO HURT YOUR FAMILY OR YOUR PETS?: NO

## 2024-06-19 SDOH — SOCIAL STABILITY: SOCIAL INSECURITY: DOES ANYONE TRY TO KEEP YOU FROM HAVING/CONTACTING OTHER FRIENDS OR DOING THINGS OUTSIDE YOUR HOME?: NO

## 2024-06-19 SDOH — SOCIAL STABILITY: SOCIAL INSECURITY: HAVE YOU HAD THOUGHTS OF HARMING ANYONE ELSE?: NO

## 2024-06-19 ASSESSMENT — COGNITIVE AND FUNCTIONAL STATUS - GENERAL
MOBILITY SCORE: 24
PATIENT BASELINE BEDBOUND: NO
MOBILITY SCORE: 24
DAILY ACTIVITIY SCORE: 24
MOBILITY SCORE: 24
DAILY ACTIVITIY SCORE: 24
DAILY ACTIVITIY SCORE: 24

## 2024-06-19 ASSESSMENT — ACTIVITIES OF DAILY LIVING (ADL)
TOILETING: INDEPENDENT
HEARING - LEFT EAR: FUNCTIONAL
GROOMING: INDEPENDENT
PATIENT'S MEMORY ADEQUATE TO SAFELY COMPLETE DAILY ACTIVITIES?: YES
LACK_OF_TRANSPORTATION: PATIENT DECLINED
BATHING: INDEPENDENT
WALKS IN HOME: INDEPENDENT
HEARING - RIGHT EAR: FUNCTIONAL
DRESSING YOURSELF: INDEPENDENT
FEEDING YOURSELF: INDEPENDENT
ADEQUATE_TO_COMPLETE_ADL: YES
JUDGMENT_ADEQUATE_SAFELY_COMPLETE_DAILY_ACTIVITIES: YES

## 2024-06-19 ASSESSMENT — LIFESTYLE VARIABLES
AUDIT-C TOTAL SCORE: 0
HOW MANY STANDARD DRINKS CONTAINING ALCOHOL DO YOU HAVE ON A TYPICAL DAY: PATIENT DOES NOT DRINK
SKIP TO QUESTIONS 9-10: 1
HOW OFTEN DO YOU HAVE A DRINK CONTAINING ALCOHOL: NEVER
AUDIT-C TOTAL SCORE: 0
HOW OFTEN DO YOU HAVE 6 OR MORE DRINKS ON ONE OCCASION: NEVER

## 2024-06-19 ASSESSMENT — PAIN SCALES - GENERAL
PAINLEVEL_OUTOF10: 0 - NO PAIN
PAINLEVEL_OUTOF10: 0 - NO PAIN
PAINLEVEL_OUTOF10: 5 - MODERATE PAIN
PAINLEVEL_OUTOF10: 5 - MODERATE PAIN
PAINLEVEL_OUTOF10: 4
PAINLEVEL_OUTOF10: 0 - NO PAIN

## 2024-06-19 ASSESSMENT — COLUMBIA-SUICIDE SEVERITY RATING SCALE - C-SSRS
1. IN THE PAST MONTH, HAVE YOU WISHED YOU WERE DEAD OR WISHED YOU COULD GO TO SLEEP AND NOT WAKE UP?: NO
6. HAVE YOU EVER DONE ANYTHING, STARTED TO DO ANYTHING, OR PREPARED TO DO ANYTHING TO END YOUR LIFE?: NO
2. HAVE YOU ACTUALLY HAD ANY THOUGHTS OF KILLING YOURSELF?: NO

## 2024-06-19 ASSESSMENT — PATIENT HEALTH QUESTIONNAIRE - PHQ9
SUM OF ALL RESPONSES TO PHQ9 QUESTIONS 1 & 2: 0
2. FEELING DOWN, DEPRESSED OR HOPELESS: NOT AT ALL
1. LITTLE INTEREST OR PLEASURE IN DOING THINGS: NOT AT ALL

## 2024-06-19 ASSESSMENT — PAIN SCALES - WONG BAKER
WONGBAKER_NUMERICALRESPONSE: NO HURT
WONGBAKER_NUMERICALRESPONSE: NO HURT
WONGBAKER_NUMERICALRESPONSE: HURTS LITTLE BIT

## 2024-06-19 ASSESSMENT — PAIN DESCRIPTION - ORIENTATION: ORIENTATION: LEFT

## 2024-06-19 ASSESSMENT — PAIN DESCRIPTION - LOCATION: LOCATION: BREAST

## 2024-06-19 NOTE — SIGNIFICANT EVENT
73-year-old female with recent mastectomy presented to outside hospital emergency department for rigors for 24 hours along with some chest pain.  Patient has white count of about 11.2.  She was worked up there and CT scan showed no fluid collection but slight groundglass opacity.  She had chest pain which resolved and 2 sets of cardiac enzymes along with EKG have been unremarkable.  Her last stress test was done in 2022 as per our system.  There was also concern for postop infection and Dr. Langley was included in the call med transfer center initially who did not believe that patient has postop infection at this point.  She was accepted to [facility] under me for observation telemetry for pneumonia workup and for chest pain workup.

## 2024-06-19 NOTE — H&P
History Of Present Illness  Raiza Nguyễn is a 73 y.o. female who had bilateral mastectomy on 6/12/2024 at [facility], diabetes, hypertension, hypothyroidism, hyperlipidemia presented to outside hospital for pain along with shivers.  Patient stated that despite taking her pain medication she was not getting comfortable.  She was getting worse after taking last dose of oxycodone and had sugars but no fevers.  She felt like she could not take deep breath and was having pain in her chest area.  Out side hospital performed testing and CTA was done which was negative for PE but showed some groundglass opacity and there was concern for pneumonia by them.  Patient denies any cough, fever, other systemic illness.  She had a bowel movement yesterday.  She was referred to hospitalist service for chest pain and postop pain with possible pneumonia.     Past Medical History  She has a past medical history of Amnesia (02/26/2024), Anxiety, Arthritis, Smith esophagus (2015), Breast cancer (Multi) (2002), Cancer (Multi), Chronic pain disorder, Colon polyp (2016), Coronary artery disease, Depression, Diabetes mellitus (Multi), Diverticulitis of colon (2022), Diverticulosis (2019), GERD (gastroesophageal reflux disease), Heart disease, Heart failure (Multi), Hyperlipidemia, Hypersomnia, unspecified (11/30/2021), Hypertension, Hypothyroid, Lumbar disc herniation (01/18/2023), Melanoma (Multi) (2016), Osteoporosis, Other conditions influencing health status, Other intervertebral disc degeneration, lumbar region (06/27/2014), Other intervertebral disc displacement, lumbar region (06/27/2014), Personal history of malignant melanoma of skin (10/16/2019), Personal history of other diseases of the digestive system, Personal history of other endocrine, nutritional and metabolic disease (02/20/2022), Personal history of other endocrine, nutritional and metabolic disease, PONV (postoperative nausea and vomiting) (2002),  Sleep apnea, Thrombosis (2004), Thyrotoxicosis, unspecified without thyrotoxic crisis or storm, and Type 2 diabetes mellitus (Multi).    Surgical History  She has a past surgical history that includes Breast lumpectomy (07/17/2013); Ovary surgery (07/17/2013); Other surgical history (05/14/2021); Cholecystectomy (10/03/2017); Breast biopsy (2010); Lymph node biopsy (2004); Appendectomy; Colonoscopy; Skin cancer excision; and Mastectomy complete / simple (Bilateral, 6/12/2024).     Social History  She reports that she quit smoking about 41 years ago. Her smoking use included cigarettes. She started smoking about 56 years ago. She has a 80 pack-year smoking history. She has been exposed to tobacco smoke. She has never used smokeless tobacco. She reports current alcohol use of about 1.0 standard drink of alcohol per week. She reports that she does not use drugs.    Family History  Family History   Problem Relation Name Age of Onset    Breast cancer Mother Dorothea     Alcohol abuse Mother Dorothea     Lung cancer Father Yuriy     Cancer Father Yuriy     Ovarian cysts Maternal Grandmother Maddy Ferrara     Diabetes Maternal Grandmother Maddy Ferrara     Colon cancer Other Materal Uncle     Stomach cancer Other Materal Uncle     Stomach cancer Maternal Cousin      Cancer Mother's Brother Kyrie Valadez     Cancer Sister Korin     Colon cancer Mother's Brother Igor Valadez         Allergies  Erythromycin, Tetracycline, Cephalexin, Glucosamine, and Adhesive    Scheduled medications  aspirin, 81 mg, oral, q PM  calcium carbonate, 1,250 mg, oral, Daily  DULoxetine, 30 mg, oral, BID  enoxaparin, 40 mg, subcutaneous, q24h  gabapentin, 600 mg, oral, TID  ipratropium-albuteroL, 3 mL, nebulization, q6h  levothyroxine, 125 mcg, oral, Daily  polyethylene glycol, 17 g, oral, Daily  rosuvastatin, 5 mg, oral, Nightly      Continuous medications     PRN medications  PRN medications: acetaminophen **OR** acetaminophen **OR**  acetaminophen, gabapentin, morphine, nitroglycerin, oxyCODONE      Physical Exam:  General: Alert, in mild distress.  On room air  HEENT: PERRLA, head intact and normocephalic  Neck: Normal to inspection  Lungs: Clear to auscultation, work of breathing within normal limit  Cardiac: Regular rate and rhythm  Abdomen: Soft nontender, positive bowel sounds  : Exam deferred  Skin: Bruising in the posterior and anterior area noted with drain x 3.  2 on left side and 1 on the right  Hematology: No petechia or excessive ecchymosis  Musculoskeletal: Without significant trauma  Neurological: Alert awake oriented, no focal deficit, cranial nerves grossly intact  Psych: No suicidal ideation or homicidal ideation     Last Recorded Vitals  BP 99/60 (BP Location: Right arm, Patient Position: Lying)   Pulse 102   Temp 37.7 °C (99.9 °F) (Temporal)   Resp 16   SpO2 97%     Relevant Results  Electrocardiogram, 12-lead PRN ACS symptoms    Result Date: 6/15/2024  Normal sinus rhythm Low voltage QRS Cannot rule out Inferior infarct (cited on or before 13-JUN-2024) Abnormal ECG When compared with ECG of 13-JUN-2024 05:49, (unconfirmed) No significant change was found    Electrocardiogram, 12-lead PRN ACS symptoms    Result Date: 6/15/2024  Normal sinus rhythm Low voltage QRS Possible Inferior infarct , age undetermined Abnormal ECG When compared with ECG of 12-JUN-2024 18:50, (unconfirmed) No significant change was found    ECG 12 lead    Result Date: 6/15/2024  Normal sinus rhythm Low voltage QRS Prolonged QT Abnormal ECG When compared with ECG of 29-MAY-2024 09:58, QT has lengthened    Onco-Echo Limited (Strain And 3D)    Result Date: 6/5/2024    [facility], [address] [place] [address]                      Tel [phone] and Fax [phone] TRANSTHORACIC ECHOCARDIOGRAM REPORT  Patient Name:      DULCE CHAMORRO    Reading Physician:    [id number]  Stacy Alejandro MD Study Date:        6/5/2024             Ordering Provider:    [id number] VENITA GREENBERG MRN/PID:           [id number]             Fellow: Accession#:        [id number]         Nurse:                Mia Donohue RN Date of Birth/Age: 1950 / 73      Sonographer:          Deborah Castaneda RDCS                    years Gender:            F                    Additional Staff: Height:            149.86 cm            Admit Date: Weight:            83.91 kg             Admission Status:     Outpatient BSA / BMI:         1.78 m2 / 37.37      Encounter#:           [id number]                    kg/m2                                         Department Location:  [facility]                                                               Echo Lab Blood Pressure: 139 /77 mmHg Study Type:    ONCO-ECHO LIMITED (STRAIN AND 3D) Diagnosis/ICD: Malignant neoplasm of unspecified site of left female                breast-C50.912 Indication:    Malignant neoplasm of central portion of left breast in female,                estrogen receptor negative CPT Code:      Echo Limited-44676; Doppler Limited-79519; Color Doppler-67293;                3D Rendering w/o independent workstation-[id number]; Myocardial Strain                Imaging-[id number] Patient History: Pertinent History: Breast cancer, DM, HTN, HLD. Study Detail: The following Echo studies were performed: 2D, M-Mode, Doppler,               color flow, Strain and 3D. Definity used as a contrast agent for               endocardial border definition. Total contrast used for this               procedure was 2 mL via IV push.  PHYSICIAN INTERPRETATION: Left Ventricle: The left ventricular systolic function is normal, with an estimated ejection fraction of 65-70%. There are no regional wall  motion abnormalities. The left ventricular cavity size is normal. Spectral Doppler shows an impaired relaxation pattern of left ventricular diastolic filling. GLS is borderline at -17.6%. Left Atrium: The left atrium is normal in size. Right Ventricle: The right ventricle was not well visualized. There is normal right ventricular global systolic function. Right Atrium: The right atrium was not well visualized. Aortic Valve: The aortic valve was not well visualized. There is no evidence of aortic valve regurgitation. The peak instantaneous gradient of the aortic valve is 3.4 mmHg. Mitral Valve: The mitral valve is normal in structure. There is mild mitral annular calcification. There is no evidence of mitral valve regurgitation. Tricuspid Valve: The tricuspid valve is structurally normal. There is trace tricuspid regurgitation. The right ventricular systolic pressure is unable to be estimated. Pulmonic Valve: The pulmonic valve is not well visualized. The pulmonic valve regurgitation was not well visualized. Pericardium: There is no pericardial effusion noted. Aorta: The aortic root is normal. In comparison to the previous echocardiogram(s): Compared with the prior exam from 10/27/2024, there are no significant changes. Note GLS was not assessed on the prior exam.  ONCO-CARDIOLOGY: Vital Signs: The patients heart rate during the study was 81 beats per minute. The patients blood pressure was 139/77 during the study. Machine: This study was performed on the Unified Color.  Onco-Cardiology Measurements: Current Measurements 2D EF (Biplane)                     70% Global Longitudinal Strain (GLS) -17.6% GLS Tracking Quality: Fair  CONCLUSIONS:  1. Left ventricular systolic function is normal with a 65-70% estimated ejection fraction.  2. Poorly visualized anatomical structures due to suboptimal image quality.  3. Spectral Doppler shows an impaired relaxation pattern of left ventricular diastolic filling.  4. Compared with the  prior exam from 10/27/2024, there are no significant changes. Note GLS was not assessed on the prior exam. QUANTITATIVE DATA SUMMARY: 2D MEASUREMENTS:                          Normal Ranges: IVSd:          1.11 cm   (0.6-1.1cm) LVPWd:         0.86 cm   (0.6-1.1cm) LVIDd:         4.45 cm   (3.9-5.9cm) LVIDs:         3.19 cm LV Mass Index: 82.5 g/m2 LV % FS        28.2 % LA VOLUME:                               Normal Ranges: LA Vol A4C:        43.6 ml    (22+/-6mL/m2) LA Vol A2C:        40.1 ml LA Vol BP:         42.3 ml LA Vol Index A4C:  24.5 ml/m2 LA Vol Index A2C:  22.5 ml/m2 LA Vol Index BP:   23.7 ml/m2 LA Area A4C:       15.7 cm2 LA Area A2C:       15.2 cm2 LA Major Axis A4C: 4.8 cm LA Major Axis A2C: 4.9 cm LA Vol A4C:        41.7 ml LA Vol A2C:        38.3 ml AORTA MEASUREMENTS:                      Normal Ranges: Ao Sinus, d: 3.20 cm (2.1-3.5cm) LV SYSTOLIC FUNCTION BY 2D PLANIMETRY (MOD):                     Normal Ranges: EF-A4C View: 68.8 % (>=55%) EF-A2C View: 72.6 % EF-Biplane:  70.4 % LV DIASTOLIC FUNCTION:                      Normal Ranges: MV Peak E:  0.65 m/s (0.7-1.2 m/s) MV Peak A:  1.07 m/s (0.42-0.7 m/s) E/A Ratio:  0.61     (1.0-2.2) MV e'       0.06 m/s (>8.0) E/e' Ratio: 9.97     (<8.0) MV DT:      137 msec (150-240 msec) MITRAL VALVE:                 Normal Ranges: MV DT: 137 msec (150-240msec) AORTIC VALVE:                         Normal Ranges: AoV Vmax:      0.92 m/s (<=1.7m/s) AoV Peak PG:   3.4 mmHg (<20mmHg) LVOT Max Je:  0.91 m/s (<=1.1m/s) LVOT VTI:      19.20 cm LVOT Diameter: 2.11 cm  (1.8-2.4cm) AoV Area,Vmax: 3.46 cm2 (2.5-4.5cm2)  RIGHT VENTRICLE: TAPSE: 20.0 mm RV s'  0.13 m/s  [id number] Stacy Alejandro MD Electronically signed on 6/5/2024 at 2:16:53 PM  ** Final **     ECG 12 lead (Ancillary Performed)    Result Date: 5/30/2024  Normal sinus rhythm Slow r-wave progression Abnormal ECG When compared with ECG of 21-OCT-2020 13:23, Questionable change in initial forces of Lateral  leads Confirmed by Fernando Galdamez ([id number]) on 5/30/2024 5:36:37 PM     No results found for this or any previous visit (from the past 24 hour(s)).     Assessment/Plan   Raiza Nguyễn is a 73 y.o. female on day 0 of admission presenting with Chest pain.  Principal Problem:    Chest pain      Raiza Nguyễn is a 73 y.o. female who had bilateral mastectomy on 6/12/2024 at [facility], diabetes, hypertension, hypothyroidism, hyperlipidemia presented to outside hospital for pain along with shivers.  Patient appears to have postop pain and some associated chest pain.    Chest pain  Do not believe acute cardiac in origin  Will try Toradol  2 sets of cardiac enzymes have been negative  Repeat labs done here we will follow-up the results on that  Cardiology consultation  Previous stress test results from 2022  Continue with aspirin, Crestor    Postop pain  Will consult the surgeon that performed the surgery  Initially there was concern for infection but I do not believe that patient has infection at the postop site  Pain control  Will add Toradol    Hypertension  Slowly resume blood pressure medication as blood pressure curve and once pharmacy verifies medications    Hyperlipidemia  Crestor    Hypothyroidism  Continue with levothyroxine    DVT prophylaxis  Lovenox       Plan discussed with patient at bedside in room #3017  Full code  Moderate level of MDM based on above issue and discussing plan    This note is created using voice recognition software. All efforts are made to minimize errors, if there are errors there due to transcription.    Jose Parkinson  Hospitalist

## 2024-06-19 NOTE — PROGRESS NOTES
06/19/24 1131   Discharge Planning   Living Arrangements Alone   Support Systems Family members   Assistance Needed none   Type of Residence Private residence   Home or Post Acute Services In home services   Type of Home Care Services Home nursing visits   Patient expects to be discharged to: home, resume [facility]   Financial Resource Strain   How hard is it for you to pay for the very basics like food, housing, medical care, and heating? Not hard   Housing Stability   In the last 12 months, was there a time when you were not able to pay the mortgage or rent on time? N   In the last 12 months, was there a time when you did not have a steady place to sleep or slept in a shelter (including now)? N   Transportation Needs   In the past 12 months, has lack of transportation kept you from medical appointments or from getting medications? no   In the past 12 months, has lack of transportation kept you from meetings, work, or from getting things needed for daily living? No   Patient Choice   Patient / Family choosing to utilize agency / facility established prior to hospitalization Yes     Met with patient at bedside. Patient lives at home alone in [place] and was recently set up with [facility] for nursing, but has not had first visit yet. Patient is independent, no use of DME and drives. PCP is Shoshana Olivarez. Patient confirms understanding of how to manage her health at home and of her home medications. Patient plans to return home at d/c and will resume [facility]- will need internal referral.

## 2024-06-19 NOTE — PROGRESS NOTES
BREAST SURGERY POST OPERATIVE NOTE      Raiza is a 73 y.o. female with left breast cancer POD #6 s/p bilateral total mastectomy.      SUBJECTIVE  Presented to [facility] ED yesterday evening for rigors, substernal and left sided chest pain.  CTA was negative for PE.  No discernable fluid collection in either mastectomy bed.  Admitted to medicine for CP.  Cardiology consulted.       OBJECTIVE  Physical exam:    General: alert and oriented x 3.  No acute distress.  Resp: non labored breathing RA  CV: vital signs reviewed  Breasts:   RIGHT: surgically absent with no reconstruction.  Flaps viable.   Drain x 1 dark red blood. Incisions well approximated.  No signs of infection.   LEFT: surgically absent with no reconstruction.  Flaps viable.  Drains x 2 serosanguinous. Incisions well approximated.  No signs of infection.      PLAN  1. Left breast cancer with BRCA2 mutation s/p bilateral total mastectomies.   -no signs of surgical site infection   -symptomatic treatment for acute post op pain   -monitor drain outputs.    2. Chest pain- work up per primary team   -CTA negative for PE   -appreciate cardiology recs         Jeniffer Langley DO

## 2024-06-19 NOTE — PROGRESS NOTES
Met with the pt and provided information on the breast cancer support group, [facility] that meets the first Wednesday for every month [facility]. She was receptive to the information.     Amol Schmidt

## 2024-06-19 NOTE — CONSULTS
Consults    R07.9 chest pain  C50 0 breast cancer recent bilateral mastectomy  E03.9 hypothyroidism on Synthroid  E78.5 Clinical dyslipidemia      History Of Present Illness:    Raiza Nguễyn is a 73 y.o. female presenting with breast cancer recent bilateral mastectomy June 12 patient presents to observation unit with chest pain consult to Dr. Ware whom I am covering while he is out of the [place] on vacation the patient has diabetes hypertension hypothyroidism hyperlipidemia presented to outside hospital with pain and shivers.  Stated the pain was worse after last dose of oxycodone but denied any fever.  CTA was performed negative for PE but showed groundglass opacity concerning for the potential of pneumonia..  Current chest pain somewhat atypical most likely more musculoskeletal also the possibility of pneumonitis being evaluated a previous stress test from 2022 was negative for ischemic coronary disease blood pressure and Crestor for lipid management noted along with levothyroxine for hypothyroidism    Patient was seen by Dr. Rubio in cardio oncology clinic recently June 5 January 2003 breast cancer 4 cycles Adriamycin and cyclophosphamide followed by left lumpectomy axillary lymph node dissection had a lesion lower back removed that turned out to be melanoma then had recurrent breast cancer 2018 underwent resection excision patient is BRCA2 positive PET scan May 8 showed hypermetabolic soft tissue density left outer breast and a small area of activity in the left hepatic lobe and was recommended simple bilateral mastectomy intent is to pursue Herceptin based chemotherapy in the future      2022 March nuclear stress test    FINDINGS:  This is a technically good study. The left ventricle appear normal in  size. Additionally the right ventricle appear normal in size. There  is no evidence of transient ischemic dilatation. Perfusion imaging  demonstrated a small area of moderate fixed perfusion defect  in the  apex which is felt to be likely secondary to apical thinning  artifact. Overall systolic function was vigorous with estimated  ejection fraction 84%. There is normal wall motion and thickening in  all segments.     IMPRESSION:  1. No nuclear evidence of pharmacologic stress-induced ischemia.  Apical thinning artifact suggested.  2. Vigorous LV function, EF 84% with no significant wall motion  abnormality.  3. Based on these findings low likelihood of flow-limiting coronary  artery disease.      Recent echocardiogram and cardio oncology clinic    NCO-CARDIOLOGY:  Vital Signs: The patients heart rate during the study was 81 beats per minute.  The patients blood pressure was 139/77 during the study.  Machine: This study was performed on the [employer].        Onco-Cardiology Measurements:  Current Measurements  2D EF (Biplane)                     70%  Global Longitudinal Strain (GLS) -17.6%     GLS Tracking Quality: Fair        CONCLUSIONS:   1. Left ventricular systolic function is normal with a 65-70% estimated ejection fraction.   2. Poorly visualized anatomical structures due to suboptimal image quality.   3. Spectral Doppler shows an impaired relaxation pattern of left ventricular diastolic filling.   4. Compared with the prior exam from 10/27/2024, there are no significant changes. Note GLS was not assessed on the prior exam.      Past Medical History:   Diagnosis Date    Amnesia 02/26/2024    Anxiety     Arthritis     Smith esophagus 2015    Breast cancer (Multi) 2002    Cancer (Multi)     Chronic pain disorder     Colon polyp 2016    Coronary artery disease     Depression     Diabetes mellitus (Multi)     Diverticulitis of colon 2022    Diverticulosis 2019    GERD (gastroesophageal reflux disease)     Heart disease     Heart failure (Multi)     Hyperlipidemia     Hypersomnia, unspecified 11/30/2021    Hypersomnolence disorder, persistent    Hypertension     Hypothyroid     Lumbar disc herniation  01/18/2023    History of    Melanoma (Multi) 2016    Osteoporosis     Other conditions influencing health status     Breast Cancer    Other intervertebral disc degeneration, lumbar region 06/27/2014    Disc degeneration, lumbar    Other intervertebral disc displacement, lumbar region 06/27/2014    Lumbar disc herniation    Personal history of malignant melanoma of skin 10/16/2019    History of malignant melanoma of skin    Personal history of other diseases of the digestive system     History of esophageal reflux    Personal history of other endocrine, nutritional and metabolic disease 02/20/2022    History of vitamin D deficiency    Personal history of other endocrine, nutritional and metabolic disease     History of hyperglycemia    PONV (postoperative nausea and vomiting) 2002    They give me a shot to prevent vomiting    Sleep apnea     Thrombosis 2004    from [facility] to [facility]    Thyrotoxicosis, unspecified without thyrotoxic crisis or storm     Hyperthyroidism    Type 2 diabetes mellitus (Multi)        Past Surgical History:   Procedure Laterality Date    APPENDECTOMY      BREAST BIOPSY  2010    BREAST LUMPECTOMY  07/17/2013    Breast Surgery Lumpectomy    CHOLECYSTECTOMY  10/03/2017    Cholecystectomy Laparoscopic    COLONOSCOPY      LYMPH NODE BIOPSY  2004    MASTECTOMY COMPLETE / SIMPLE Bilateral 6/12/2024    Procedure: BILATERAL SIMPLE MASTECTOMY;  Surgeon: Jeniffer Langley DO;  Location: [facility] OR;  Service: General Surgery Oncology;  Laterality: Bilateral;    OTHER SURGICAL HISTORY  05/14/2021    Knee replacement    OVARY SURGERY  07/17/2013    Ovarian Surgery    SKIN CANCER EXCISION       Family History   Problem Relation Name Age of Onset    Breast cancer Mother Dorothea     Alcohol abuse Mother Dorothea     Lung cancer Father Yuriy     Cancer Father Yuriy     Ovarian cysts Maternal Grandmother Maddy Josias     Diabetes Maternal Grandmother Maddy Josias     Colon cancer Other Materal Uncle     Stomach  cancer Other Materal Uncle     Stomach cancer Maternal Cousin      Cancer Mother's Brother Kyrie Valadez     Cancer Sister Korin     Colon cancer Mother's Brother Igor Valadez      No current facility-administered medications on file prior to encounter.     Current Outpatient Medications on File Prior to Encounter   Medication Sig Dispense Refill    aspirin 81 mg EC tablet Take 1 tablet (81 mg) by mouth once daily in the evening.      calcium carbonate 600 mg calcium (1,500 mg) tablet Take 600 mg by mouth once daily.      docusate sodium (Colace) 100 mg capsule Take 1 capsule (100 mg) by mouth 2 times a day for 14 days. 28 capsule 0    DULoxetine (Cymbalta) 30 mg DR capsule Take 1 capsule (30 mg) by mouth 2 times a day.      gabapentin (Neurontin) 100 mg capsule Take 1 capsule (100 mg) by mouth once daily as needed (pain).      gabapentin (Neurontin) 300 mg capsule Take 2 capsules (600 mg) by mouth 3 times a day. 180 capsule 1    hydroCHLOROthiazide (HYDRODiuril) 25 mg tablet Take 1 tablet (25 mg) by mouth 2 times a day. 180 tablet 0    levothyroxine (Synthroid, Levoxyl) 125 mcg tablet Take 1 tablet (125 mcg) by mouth once daily.      lisinopril 10 mg tablet TAKE 1 TABLET TWICE DAILY 180 tablet 3    metFORMIN XR (Glucophage-XR) 500 mg 24 hr tablet Take 1 tablet (500 mg) by mouth once daily with breakfast. Do not crush, chew, or split. 100 tablet 3    oxyCODONE (Roxicodone) 5 mg immediate release tablet Take 1 tablet (5 mg) by mouth every 6 hours if needed for moderate pain (4 - 6). 15 tablet 0    rosuvastatin (Crestor) 5 mg tablet TAKE 1 TABLET ONE TIME DAILY 90 tablet 3    traMADol (Ultram) 50 mg tablet Take 1 tablet (50 mg) by mouth 2 times a day as needed for severe pain (7 - 10).      chlorhexidine (Peridex) 0.12 % solution Swish and spit 15 ml for 2 doses, 15mL the night before surgery and 15 ml morning of surgery - swish for 30 seconds -DO NOT SWALLOW, SPIT OUT (Patient not taking: Reported on  "6/19/2024) 473 mL 0    tirzepatide (Mounjaro) 2.5 mg/0.5 mL pen injector Inject 2.5 mg under the skin 1 (one) time per week. (Patient not taking: Reported on 6/19/2024) 2 mL 1    tirzepatide (Mounjaro) 2.5 mg/0.5 mL pen injector Inject 2.5 mg under the skin 1 (one) time per week. (Patient not taking: Reported on 6/19/2024) 2 mL 3            Last Recorded Vitals:  Vitals:    06/19/24 1200 06/19/24 1518 06/19/24 1600 06/19/24 1755   BP: 117/57  115/62 115/62   BP Location: Right arm  Right arm Right arm   Patient Position: Lying  Lying    Pulse: 66  101 101   Resp: 18  18 18   Temp: 35.8 °C (96.4 °F)  36 °C (96.8 °F) 36.8 °C (98.2 °F)   TempSrc: Temporal  Temporal Temporal   SpO2: 96% 96% 100%    Weight:    81 kg (178 lb 9.2 oz)   Height:    1.511 m (4' 11.49\")       Last Labs:  CBC - 6/19/2024:  6:01 AM  13.1 9.3 312    30.5      CMP - 6/19/2024:  6:01 AM  8.3 7.1 19 --- 0.6   _ 5.1 19 70      PTT - No results in last year.  _   _ _     Troponin I, High Sensitivity   Date/Time Value Ref Range Status   06/19/2024 06:01 AM 8 0 - 13 ng/L Final     BNP   Date/Time Value Ref Range Status   10/17/2019 08:20 AM 8 0 - 99 pg/mL Final     Comment:     .  <100 pg/mL - Heart failure unlikely  100-299 pg/mL - Intermediate probability of acute heart  .               failure exacerbation. Correlate with clinical  .               context and patient history.    >=300 pg/mL - Heart Failure likely. Correlate with clinical  .               context and patient history.  BNP testing is performed using different testing   methodology at [facility] than at other   [facility] hospitals. Direct result comparisons should   only be made within the same method.       POC HEMOGLOBIN A1c   Date/Time Value Ref Range Status   03/06/2023 11:29 AM 6.1 4.2 - 6.5 % Final     Hemoglobin A1C   Date/Time Value Ref Range Status   05/29/2024 10:51 AM 6.9 (H) see below % Final   01/30/2024 10:40 AM 6.5 (H) see below % Final     LDL Calculated "   Date/Time Value Ref Range Status   06/19/2024 06:01 AM 59 <=99 mg/dL Final     Comment:                                 Near   Borderline      AGE      Desirable  Optimal    High     High     Very High     0-19 Y     0 - 109     ---    110-129   >/= 130     ----    20-24 Y     0 - 119     ---    120-159   >/= 160     ----      >24 Y     0 -  99   100-129  130-159   160-189     >/=190       VLDL   Date/Time Value Ref Range Status   06/19/2024 06:01 AM 14 0 - 40 mg/dL Final   07/22/2022 11:35 AM 21 0 - 40 mg/dL Final   05/05/2020 07:45 AM 27 0 - 40 mg/dL Final      Results for orders placed or performed during the hospital encounter of 06/19/24 (from the past 96 hour(s))   Legionella Antigen, Urine    Specimen: Urine   Result Value Ref Range    L. pneumophila Urine Ag Negative Negative   Streptococcus pneumoniae Antigen, Urine    Specimen: Urine   Result Value Ref Range    Streptococcus pneumoniae Ag, Urine Negative Negative   Procalcitonin   Result Value Ref Range    Procalcitonin 0.11 (H) <=0.07 ng/mL   CBC   Result Value Ref Range    WBC 13.1 (H) 4.4 - 11.3 x10*3/uL    nRBC 0.0 0.0 - 0.0 /100 WBCs    RBC 2.84 (L) 4.00 - 5.20 x10*6/uL    Hemoglobin 9.3 (L) 12.0 - 16.0 g/dL    Hematocrit 30.5 (L) 36.0 - 46.0 %     (H) 80 - 100 fL    MCH 32.7 26.0 - 34.0 pg    MCHC 30.5 (L) 32.0 - 36.0 g/dL    RDW 14.8 (H) 11.5 - 14.5 %    Platelets 312 150 - 450 x10*3/uL   Basic Metabolic Panel   Result Value Ref Range    Glucose 157 (H) 74 - 99 mg/dL    Sodium 134 (L) 136 - 145 mmol/L    Potassium 3.6 3.5 - 5.3 mmol/L    Chloride 101 98 - 107 mmol/L    Bicarbonate 22 21 - 32 mmol/L    Anion Gap 15 10 - 20 mmol/L    Urea Nitrogen 19 6 - 23 mg/dL    Creatinine 0.82 0.50 - 1.05 mg/dL    eGFR 77 >60 mL/min/1.73m*2    Calcium 8.3 (L) 8.6 - 10.3 mg/dL   Lipid Panel   Result Value Ref Range    Cholesterol 138 0 - 199 mg/dL    HDL-Cholesterol 64.7 mg/dL    Cholesterol/HDL Ratio 2.1     LDL Calculated 59 <=99 mg/dL    VLDL 14 0 - 40  "mg/dL    Triglycerides 70 0 - 149 mg/dL    Non HDL Cholesterol 73 0 - 149 mg/dL   Magnesium   Result Value Ref Range    Magnesium 1.84 1.60 - 2.40 mg/dL   Troponin I, High Sensitivity   Result Value Ref Range    Troponin I, High Sensitivity 8 0 - 13 ng/L   Blood Culture    Specimen: Peripheral Venipuncture; Blood culture   Result Value Ref Range    Blood Culture Loaded on Instrument - Culture in progress    Blood Culture    Specimen: Peripheral Venipuncture; Blood culture   Result Value Ref Range    Blood Culture Loaded on Instrument - Culture in progress         Last I/O:  I/O last 3 completed shifts:  In: -   Out: 75 [Drains:75]    Past Cardiology Tests (Last 3 Years):  EKG:  ECG 12 lead 06/13/2024 (Preliminary)      Electrocardiogram, 12-lead PRN ACS symptoms 06/13/2024 (Preliminary)      Electrocardiogram, 12-lead PRN ACS symptoms 06/13/2024 (Preliminary)      ECG 12 lead (Ancillary Performed) 05/29/2024    Echo:  Onco-Echo Limited (Strain And 3D) 06/05/2024    Ejection Fractions:  No results found for: \"EF\"  Cath:  No results found for this or any previous visit from the past 1095 days.    Stress Test:  Nuclear Stress Test 03/04/2022    Cardiac Imaging:  No results found for this or any previous visit from the past 1095 days.      Past Medical History:  She has a past medical history of Amnesia (02/26/2024), Anxiety, Arthritis, Smith esophagus (2015), Breast cancer (Multi) (2002), Cancer (Multi), Chronic pain disorder, Colon polyp (2016), Coronary artery disease, Depression, Diabetes mellitus (Multi), Diverticulitis of colon (2022), Diverticulosis (2019), GERD (gastroesophageal reflux disease), Heart disease, Heart failure (Multi), Hyperlipidemia, Hypersomnia, unspecified (11/30/2021), Hypertension, Hypothyroid, Lumbar disc herniation (01/18/2023), Melanoma (Multi) (2016), Osteoporosis, Other conditions influencing health status, Other intervertebral disc degeneration, lumbar region (06/27/2014), Other " intervertebral disc displacement, lumbar region (06/27/2014), Personal history of malignant melanoma of skin (10/16/2019), Personal history of other diseases of the digestive system, Personal history of other endocrine, nutritional and metabolic disease (02/20/2022), Personal history of other endocrine, nutritional and metabolic disease, PONV (postoperative nausea and vomiting) (2002), Sleep apnea, Thrombosis (2004), Thyrotoxicosis, unspecified without thyrotoxic crisis or storm, and Type 2 diabetes mellitus (Multi).    Past Surgical History:  She has a past surgical history that includes Breast lumpectomy (07/17/2013); Ovary surgery (07/17/2013); Other surgical history (05/14/2021); Cholecystectomy (10/03/2017); Breast biopsy (2010); Lymph node biopsy (2004); Appendectomy; Colonoscopy; Skin cancer excision; and Mastectomy complete / simple (Bilateral, 6/12/2024).      Social History:  She reports that she quit smoking about 41 years ago. Her smoking use included cigarettes. She started smoking about 56 years ago. She has a 80 pack-year smoking history. She has been exposed to tobacco smoke. She has never used smokeless tobacco. She reports current alcohol use of about 1.0 standard drink of alcohol per week. She reports that she does not use drugs.    Family History:  Family History   Problem Relation Name Age of Onset    Breast cancer Mother Dorothea     Alcohol abuse Mother Dorothea     Lung cancer Father Yuriy     Cancer Father Yuriy     Ovarian cysts Maternal Grandmother Maddy Ferrara     Diabetes Maternal Grandmother Maddy Ferrara     Colon cancer Other Materal Uncle     Stomach cancer Other Materal Uncle     Stomach cancer Maternal Cousin      Cancer Mother's Brother Kyrie Valadez     Cancer Sister Korin     Colon cancer Mother's Brother Igor Valadez         Allergies:  Erythromycin, Tetracycline, Cephalexin, Glucosamine, and Adhesive    Inpatient Medications:  Scheduled medications   Medication Dose  Route Frequency    aspirin  81 mg oral q PM    calcium carbonate  1,250 mg oral Daily    DULoxetine  30 mg oral BID    enoxaparin  40 mg subcutaneous q24h    gabapentin  600 mg oral TID    ipratropium-albuteroL  3 mL nebulization q6h    levothyroxine  125 mcg oral Daily    polyethylene glycol  17 g oral Daily    rosuvastatin  5 mg oral Nightly     PRN medications   Medication    acetaminophen    Or    acetaminophen    Or    acetaminophen    gabapentin    ketorolac    morphine    nitroglycerin    oxyCODONE    traMADol     Continuous Medications   Medication Dose Last Rate     Outpatient Medications:  Current Outpatient Medications   Medication Instructions    aspirin 81 mg, oral, Every evening    calcium carbonate 600 mg, oral, Daily    chlorhexidine (Peridex) 0.12 % solution Swish and spit 15 ml for 2 doses, 15mL the night before surgery and 15 ml morning of surgery - swish for 30 seconds -DO NOT SWALLOW, SPIT OUT    docusate sodium (COLACE) 100 mg, oral, 2 times daily    DULoxetine (CYMBALTA) 30 mg, oral, 2 times daily    gabapentin (NEURONTIN) 600 mg, oral, 3 times daily    gabapentin (NEURONTIN) 100 mg, oral, Daily PRN    hydroCHLOROthiazide (HYDRODIURIL) 25 mg, oral, 2 times daily    levothyroxine (Synthroid, Levoxyl) 125 mcg tablet 1 tablet, oral, Daily    lisinopril 10 mg tablet TAKE 1 TABLET TWICE DAILY    metFORMIN XR (GLUCOPHAGE-XR) 500 mg, oral, Daily with breakfast, Do not crush, chew, or split.    Mounjaro 2.5 mg, subcutaneous, Once Weekly    Mounjaro 2.5 mg, subcutaneous, Once Weekly    oxyCODONE (ROXICODONE) 5 mg, oral, Every 6 hours PRN    rosuvastatin (CRESTOR) 5 mg, oral, Daily    traMADol (ULTRAM) 50 mg, oral, 2 times daily PRN       Physical Exam:  Subjective:   Examination:   General Examination:   General Appearance: Well developed, well nourished, in no acute distress.   Head: normocephalic, atraumatic recent presented with rigors atypical chest pain  Eyes: Anicteric sclera. Pupils are equally  round and reactive to light.  Extraocular movements are intact.    Ears: normal   Oral: Cavity: mucosa moist.   Throat: clear.   Neck/Thyroid: neck supple, full range of motion, no cervical lymphadenopathy.   Skin: warm and dry, no suspicious lesions.    Heart: regular rate and rhythm, S1, S2 normal, no murmurs.   Lungs: clear to auscultation bilaterally.   Abdomen: soft, non-tender, non-distended, bowl sound present, normal.   Extremities: no clubbing, no cyanosis, no edema.   Neuro: non-focal, motor strength normal upper lower extremities, sensory exam intact.       Assessment/Plan   R07.9 chest pain  C50 0 breast cancer recent bilateral mastectomy  E03.9 hypothyroidism on Synthroid  E78.5 Clinical dyslipidemia       Code Status:  Full Code    I spent 45 minutes in the professional and overall care of this patient.        Walt Mueller MD

## 2024-06-20 ENCOUNTER — DOCUMENTATION (OUTPATIENT)
Dept: HOME HEALTH SERVICES | Facility: HOME HEALTH | Age: 74
End: 2024-06-20
Payer: MEDICARE

## 2024-06-20 VITALS
BODY MASS INDEX: 39.31 KG/M2 | OXYGEN SATURATION: 98 % | RESPIRATION RATE: 16 BRPM | HEIGHT: 59 IN | TEMPERATURE: 96.8 F | HEART RATE: 96 BPM | WEIGHT: 195 LBS | DIASTOLIC BLOOD PRESSURE: 60 MMHG | SYSTOLIC BLOOD PRESSURE: 134 MMHG

## 2024-06-20 LAB
ANION GAP SERPL CALC-SCNC: 11 MMOL/L (ref 10–20)
BUN SERPL-MCNC: 17 MG/DL (ref 6–23)
CALCIUM SERPL-MCNC: 8.4 MG/DL (ref 8.6–10.3)
CHLORIDE SERPL-SCNC: 98 MMOL/L (ref 98–107)
CO2 SERPL-SCNC: 29 MMOL/L (ref 21–32)
CREAT SERPL-MCNC: 0.7 MG/DL (ref 0.5–1.05)
EGFRCR SERPLBLD CKD-EPI 2021: >90 ML/MIN/1.73M*2
ERYTHROCYTE [DISTWIDTH] IN BLOOD BY AUTOMATED COUNT: 14.8 % (ref 11.5–14.5)
GLUCOSE SERPL-MCNC: 131 MG/DL (ref 74–99)
HCT VFR BLD AUTO: 26.4 % (ref 36–46)
HGB BLD-MCNC: 8.5 G/DL (ref 12–16)
MCH RBC QN AUTO: 32.4 PG (ref 26–34)
MCHC RBC AUTO-ENTMCNC: 32.2 G/DL (ref 32–36)
MCV RBC AUTO: 101 FL (ref 80–100)
NRBC BLD-RTO: 0 /100 WBCS (ref 0–0)
PLATELET # BLD AUTO: 247 X10*3/UL (ref 150–450)
POTASSIUM SERPL-SCNC: 3.6 MMOL/L (ref 3.5–5.3)
RBC # BLD AUTO: 2.62 X10*6/UL (ref 4–5.2)
SODIUM SERPL-SCNC: 134 MMOL/L (ref 136–145)
WBC # BLD AUTO: 12.1 X10*3/UL (ref 4.4–11.3)

## 2024-06-20 PROCEDURE — 94640 AIRWAY INHALATION TREATMENT: CPT

## 2024-06-20 PROCEDURE — 80048 BASIC METABOLIC PNL TOTAL CA: CPT | Performed by: INTERNAL MEDICINE

## 2024-06-20 PROCEDURE — G0378 HOSPITAL OBSERVATION PER HR: HCPCS

## 2024-06-20 PROCEDURE — 2500000001 HC RX 250 WO HCPCS SELF ADMINISTERED DRUGS (ALT 637 FOR MEDICARE OP): Performed by: INTERNAL MEDICINE

## 2024-06-20 PROCEDURE — 2500000002 HC RX 250 W HCPCS SELF ADMINISTERED DRUGS (ALT 637 FOR MEDICARE OP, ALT 636 FOR OP/ED): Mod: MUE | Performed by: INTERNAL MEDICINE

## 2024-06-20 PROCEDURE — 99239 HOSP IP/OBS DSCHRG MGMT >30: CPT | Performed by: INTERNAL MEDICINE

## 2024-06-20 PROCEDURE — 2500000004 HC RX 250 GENERAL PHARMACY W/ HCPCS (ALT 636 FOR OP/ED): Performed by: INTERNAL MEDICINE

## 2024-06-20 PROCEDURE — 36415 COLL VENOUS BLD VENIPUNCTURE: CPT | Performed by: INTERNAL MEDICINE

## 2024-06-20 PROCEDURE — 96372 THER/PROPH/DIAG INJ SC/IM: CPT | Performed by: INTERNAL MEDICINE

## 2024-06-20 PROCEDURE — 85027 COMPLETE CBC AUTOMATED: CPT | Performed by: INTERNAL MEDICINE

## 2024-06-20 ASSESSMENT — COGNITIVE AND FUNCTIONAL STATUS - GENERAL
MOBILITY SCORE: 24
DAILY ACTIVITIY SCORE: 24

## 2024-06-20 ASSESSMENT — PAIN SCALES - GENERAL: PAINLEVEL_OUTOF10: 0 - NO PAIN

## 2024-06-20 NOTE — PROGRESS NOTES
06/20/24 1148   Discharge Planning   Patient expects to be discharged to: home with resumption of [facility]     Message sent to [facility] intake nurse that patient will d/c today and has orders to resume [facility].

## 2024-06-20 NOTE — DISCHARGE SUMMARY
Discharge Diagnosis  Chest pain suspect musculoskeletal in nature (no PE, no cardiac etiology)    Issues Requiring Follow-Up  See PCP in 1 week  Dr Langley as scheduled     Discharge Meds     Your medication list        CONTINUE taking these medications        Instructions Last Dose Given Next Dose Due   aspirin 81 mg EC tablet           calcium carbonate 600 mg calcium (1,500 mg) tablet           docusate sodium 100 mg capsule  Commonly known as: Colace      Take 1 capsule (100 mg) by mouth 2 times a day for 14 days.       DULoxetine 30 mg DR capsule  Commonly known as: Cymbalta      Take 1 capsule (30 mg) by mouth 2 times a day.       gabapentin 100 mg capsule  Commonly known as: Neurontin           gabapentin 300 mg capsule  Commonly known as: Neurontin      Take 2 capsules (600 mg) by mouth 3 times a day.       hydroCHLOROthiazide 25 mg tablet  Commonly known as: HYDRODiuril      Take 1 tablet (25 mg) by mouth 2 times a day.       levothyroxine 125 mcg tablet  Commonly known as: Synthroid, Levoxyl           lisinopril 10 mg tablet      TAKE 1 TABLET TWICE DAILY       metFORMIN  mg 24 hr tablet  Commonly known as: Glucophage-XR      Take 1 tablet (500 mg) by mouth once daily with breakfast. Do not crush, chew, or split.       oxyCODONE 5 mg immediate release tablet  Commonly known as: Roxicodone      Take 1 tablet (5 mg) by mouth every 6 hours if needed for moderate pain (4 - 6).       rosuvastatin 5 mg tablet  Commonly known as: Crestor      TAKE 1 TABLET ONE TIME DAILY       traMADol 50 mg tablet  Commonly known as: Ultram                  STOP taking these medications      chlorhexidine 0.12 % solution  Commonly known as: Peridex        Mounjaro 2.5 mg/0.5 mL pen injector  Generic drug: tirzepatide                 Test Results Pending At Discharge  Pending Labs       Order Current Status    Blood Culture Preliminary result    Blood Culture Preliminary result            Hospital Course   Raiza Nguyễn  is a 73 y.o. female who had bilateral mastectomy on 6/12/2024 at [facility], diabetes, hypertension, hypothyroidism, hyperlipidemia presented to outside hospital for pain along with shivers.  Patient stated that despite taking her pain medication she was not getting comfortable.  She was getting worse after taking last dose of oxycodone and had sugars but no fevers.  She felt like she could not take deep breath and was having pain in her chest area.  Out side hospital performed testing and CTA was done which was negative for PE but showed some groundglass opacity and there was concern for pneumonia by them.  Patient denies any cough, fever, other systemic illness.  She had a bowel movement yesterday.  She was referred to hospitalist service for chest pain and postop pain with possible pneumonia.     Hospital course:  Patient is a 73-year-old female with history of bilateral mastectomy on 6- with Dr. Langley who presented from an outside hospital due to chills, heart palpitations, chest pain and difficulty taking a deep breath in.  CT of the chest was done at the outside hospital and was negative for PE, there was initially concern of possible developing pneumonia.  Procalcitonin came back low so low suspicion for pneumonia, she was afebrile, no oxygen requirement, no significant leukocytosis and no cough.  She was seen by cardiology and breast surgery Dr. Langley.  She was initially given some fluids and Toradol.  Her symptoms did improve.  She denies any significant chest pain or shortness of breath at this time.  Her labs and vitals are stable.  She has been afebrile.  Troponin was negative, EKG showed no ischemic changes.  She is stable for DC home, told to see PCP in a week and Dr. Langley as planned. No signs of surgical site infection.    Spent>35 minutes DC patient    Pertinent Physical Exam At Time of Discharge    General: Alert, in mild distress.  On room air  HEENT: PERRLA, head  intact and normocephalic  Neck: Normal to inspection  Lungs: Clear to auscultation, work of breathing within normal limit  Cardiac: Regular rate and rhythm  Abdomen: Soft nontender, positive bowel sounds  : Exam deferred  Skin: Bruising in the posterior and anterior area noted with drain x 3.  2 on left side and 1 on the right  Hematology: No petechia or excessive ecchymosis  Musculoskeletal: Without significant trauma  Neurological: Alert awake oriented, no focal deficit, cranial nerves grossly intact  Psych: No suicidal ideation or homicidal ideation    Outpatient Follow-Up  Future Appointments   Date Time Provider Department Center   6/25/2024  1:00 PM Jeniffer Langley DO [id number] [place]   6/26/2024 To Be Determined Eugene Stern RN [facility]   7/2/2024 11:10 AM Joanna Bourne MD [id number] [place]   7/3/2024 To Be Determined Eugene Stern RN [facility]   7/8/2024  9:00 AM Ricardo Moody MD [id number] [place]   7/23/2024  9:30 AM Shoshana Olivarez DO [id number] [facility]   7/29/2024  9:00 AM Mona Dodson MD [id number] [place]   8/28/2024 10:40 AM Verna Tabor DO [id number] [place]   9/26/2024 11:00 [facility]   9/26/2024 12:30 PM Anaid Beverly [facility] [id number] [place]   10/2/2024  9:50 AM MIN ECHO [id number] [facility]    10/2/2024 11:15 AM Dev Rubio MD [id number] [facility]   10/17/2024  9:00 AM Shoshana Olivarez DO [id number] [facility]         Lillie Pham DO

## 2024-06-20 NOTE — HH CARE COORDINATION
Home Care received a Referral for Nursing, Physical Therapy, and Occupational Therapy. We have processed the referral for a Start of Care on 6.21-22.24.     If you have any questions or concerns, please feel free to contact us at [phone]. Follow the prompts, enter your five digit zip code, and you will be directed to your care team on WEST 3.

## 2024-06-20 NOTE — CARE PLAN
The patient's goals for the shift include      The clinical goals for the shift include not to fall

## 2024-06-20 NOTE — CARE PLAN
The patient's goals for the shift include      The clinical goals for the shift include PT WILL USE THE CALL LIGHT WHEN ASSISTANCE IS NEEDED TO PREVENT FALLS

## 2024-06-21 ENCOUNTER — PATIENT OUTREACH (OUTPATIENT)
Dept: PRIMARY CARE | Facility: CLINIC | Age: 74
End: 2024-06-21
Payer: MEDICARE

## 2024-06-21 ENCOUNTER — DOCUMENTATION (OUTPATIENT)
Dept: PRIMARY CARE | Facility: CLINIC | Age: 74
End: 2024-06-21
Payer: MEDICARE

## 2024-06-21 NOTE — PROGRESS NOTES
Discharge Facility:  [facility]    Discharge Diagnosis:  Chest pain suspect musculoskeletal in nature (no PE, no cardiac etiology)  post op pain     Admission Date:6/19/24  Discharge Date: 6/20/24    PCP Appointment Date:   6/27/2024 9:30 AM  Francesca   Arrive by:  9:15 AM   PRIMARY CARE [facility] Follow up   [id number] ([place])   Shoshana Olivarez, DO      Visit Type: Primary Care Established          Date: 7/23/2024                  Dept:  [facility]                  Provider: Shoshana Olivarez                  Time: 9:30 AM    Specialist Appointment Date:    Visit Type: Surgical Oncology Established Patient          Date: 6/25/2024                  Dept: [facility]                  Provider: Jeniffer Langley                  Time: 1:00 PM     Visit Type: Hematology and Oncology Established Patient          Date: 7/2/2024                  Dept: [facility]                  Provider: Joanna Bourne                  Time: 11:10 AM    Hospital Encounter and Summary: Linked   See discharge assessment below for further details  Medications  Medications reviewed with patient/caregiver?: Yes (6/21/2024 12:47 PM)  Is the patient having any side effects they believe may be caused by any medication additions or changes?: No (6/21/2024 12:47 PM)  Does the patient have all medications ordered at discharge?: Not applicable (6/21/2024 12:47 PM)  Prescription Comments: No New or Changed meds. Per patient's discharge STOP taking:  chlorhexidine 0.12 % solution (Peridex)   Mounjaro 2.5 mg/0.5 mL pen injector (tirzepatide) (6/21/2024 12:47 PM)  Is the patient taking all medications as directed (includes completed medication regime)?: Yes (6/21/2024 12:47 PM)  Medication Comments: CM discussed referencing discharge paperwork to follow detailed daily medication schedule. Peridex was only for prior to surgery and Mourjaro was never  started due to filling issues (6/21/2024 12:47 PM)    Appointments  Does the patient have a primary care provider?: Yes (6/21/2024 12:47 PM)  Care Management Interventions: Verified appointment date/time/provider (Virtual TCM Hosptial follow up made - pt aware) (6/21/2024 12:47 PM)  Has the patient kept scheduled appointments due by today?: Yes (6/21/2024 12:47 PM)  Care Management Interventions: Educated on importance of keeping appointment (reviewed up coming Oncology post op appt) (6/21/2024 12:47 PM)    Self Management  What is the home health agency?: (S) Per patient- nurse came once and then on vacation. Homecare had called her and ask if she was doing okay with out homecare due to staffing shortage they did not have anyone to send. Patient reported she did not need them at this time. I encouraged her to reach out to Dr Cuadra for a new order if she would change her mind and feel she needs homecare. (6/21/2024 12:47 PM)  Has home health visited the patient within 72 hours of discharge?: Yes (6/21/2024 12:47 PM)  What Durable Medical Equipment (DME) was ordered?: n/a (6/21/2024 12:47 PM)    Patient Teaching  Does the patient have access to their discharge instructions?: Yes (6/21/2024 12:47 PM)  Care Management Interventions: Reviewed instructions with patient; Educated on MyChart (Patient is active on MyChart) (6/21/2024 12:47 PM)  What is the patient's perception of their health status since discharge?: Same (Doing okay but very fatigued - napping often) (6/21/2024 12:47 PM)  Is the patient/caregiver able to teach back the hierarchy of who to call/visit for symptoms/problems? PCP, Specialist, Home Health nurse, Urgent Care, ED, 911: Yes (6/21/2024 12:47 PM)  Patient/Caregiver Education Comments: I introduced myself and the TCM program to Raiza Nguyễn. Reviewed hospital stay and answered any questions. I gave my contact information and encouraged to call me if needing assistance or has any further  non-emergent questions prior to my next outreach. (6/21/2024 12:47 PM)    Wrap Up  Call End Time: 1253 (6/21/2024 12:47 PM)

## 2024-06-23 LAB
BACTERIA BLD CULT: NORMAL
BACTERIA BLD CULT: NORMAL

## 2024-06-25 ENCOUNTER — PATIENT MESSAGE (OUTPATIENT)
Dept: SURGICAL ONCOLOGY | Facility: CLINIC | Age: 74
End: 2024-06-25
Payer: MEDICARE

## 2024-06-25 ENCOUNTER — APPOINTMENT (OUTPATIENT)
Dept: SURGICAL ONCOLOGY | Facility: CLINIC | Age: 74
End: 2024-06-25
Payer: MEDICARE

## 2024-06-25 DIAGNOSIS — E11.9 TYPE 2 DIABETES MELLITUS WITHOUT COMPLICATION, WITHOUT LONG-TERM CURRENT USE OF INSULIN (MULTI): Primary | ICD-10-CM

## 2024-06-25 NOTE — TELEPHONE ENCOUNTER
From: Raiza Nguyễn  To: Jeniffer Langley  Sent: 6/25/2024 11:32 AM EDT  Subject: HI Raine    Hi Raine. I'm confused how this cancer is graded as we know ir is it HERnegativePR I don't understand. Thx

## 2024-06-25 NOTE — PROGRESS NOTES
BREAST SURGERY POST OPERATIVE VISIT    Assessment/Plan     Left breast IDC g3 ER0% NE 0% HER2 positive at 3:00 10cmFN measuring 0.9cm on MRI  -concerning level 3 lymph node not amenable to biopsy.  PET CT recommended for further assessment by radiology.  PET negative.  -s/p bilateral completion mastectomy, pathology pending     2.  History of bilateral breast cancer and known BRCA2 mutation     Stage IB (Prognostic Stage IA) Right breast cancer 7/2018, [id number], grade 2 IDC, ER+95% NE+95% HER2 equivocal, FISH-.   - s/p R. magseed pm / SLNB (2mi/3) on 8/27/2018 with a 1.5cm IDC and negative margins. She had 2 out of 3 LNs with micrometastases measuring 0.4mm and 1.1mm, no NASH.   - Oncotype 17   - s/p WBRT 10/11/2018 - 11/1/2018   - taking Exemestane      Stage II Left breast cancer in 2003, 1.5 cm grade 3 IDC with positive LNs and NASH, ER+ NE+ HER2 not defined.   - L.pm/ALND   - ACx4 + Tx4   - WBRT   - Anastrozole x5yr    Follow up with path results.  Discussed importance of seeking evaluation if dizziness, chest pain, or shortness of breath occur.    Subjective   Raiza Nguyễn is a 73 y.o. female here for post op visit s/p bilateral mastectomy.    Issues over the weekend with drains.  1 drain on the right became dislodged.  Also endorses increased heart rate with dizziness over the weekend which has since subsided.  She did not seek medical care for this.    The following day, she presented to the ED with SOB, dizzines and rigors.  CTA negative for PE.  Admitted for further work up. Cardiac work up negative.  Discharged home on Thursday.    Date of surgery: 6/12/2024  Pathology: pending     Objective   Physical Exam  General: Alert and oriented x 3.  Mood and affect are appropriate.  HEENT: EOMI, PERRLA.   Neck: supple, no masses, no cervical adenopathy.  Cardiovascular: no lower extremity edema.  Pulmonary: breathing non labored on room air.  GI: Abdomen soft, no masses. No hepatomegaly or  splenomegaly.  Lymph nodes: No supraclavicular or axillary adenopathy bilaterally.  Musculoskeletal: Full range of motion in the upper extremities bilaterally.  Neuro: denies dizziness, tremors    Breasts: The breasts were examined in both the seated and supine positions.    RIGHT: surgically absent.  Incision well approximated.  Flaps viable. No signs of infection. 1 drain remaining with serosanguinous output.   LEFT: surgically absent.  Incision well approximated.  Flaps viable. No signs of infection. 2 drains with serosanguinous output.     Radiology review: All images and reports were personally reviewed.         Jeniffer Langley, DO

## 2024-06-26 ENCOUNTER — HOME CARE VISIT (OUTPATIENT)
Dept: HOME HEALTH SERVICES | Facility: HOME HEALTH | Age: 74
End: 2024-06-26
Payer: MEDICARE

## 2024-06-26 ENCOUNTER — PATIENT MESSAGE (OUTPATIENT)
Dept: SURGICAL ONCOLOGY | Facility: CLINIC | Age: 74
End: 2024-06-26
Payer: MEDICARE

## 2024-06-26 VITALS — DIASTOLIC BLOOD PRESSURE: 50 MMHG | HEART RATE: 60 BPM | SYSTOLIC BLOOD PRESSURE: 90 MMHG

## 2024-06-26 VITALS
OXYGEN SATURATION: 97 % | SYSTOLIC BLOOD PRESSURE: 102 MMHG | HEART RATE: 85 BPM | TEMPERATURE: 97.3 F | DIASTOLIC BLOOD PRESSURE: 52 MMHG

## 2024-06-26 PROCEDURE — G0152 HHCP-SERV OF OT,EA 15 MIN: HCPCS | Mod: HHH

## 2024-06-26 PROCEDURE — G0299 HHS/HOSPICE OF RN EA 15 MIN: HCPCS | Mod: HHH

## 2024-06-26 SDOH — ECONOMIC STABILITY: FOOD INSECURITY: MEALS PER DAY: 2

## 2024-06-26 ASSESSMENT — ENCOUNTER SYMPTOMS
PAIN LOCATION - PAIN FREQUENCY: FREQUENT
PERSON REPORTING PAIN: PATIENT
PAIN: 1
LOWEST PAIN SEVERITY IN PAST 24 HOURS: 3/10
PERSON REPORTING PAIN: PATIENT
APPETITE LEVEL: FAIR
PAIN LOCATION - PAIN SEVERITY: 6/10
PAIN: 1
PAIN LOCATION: CHEST
HIGHEST PAIN SEVERITY IN PAST 24 HOURS: 6/10

## 2024-06-26 ASSESSMENT — ACTIVITIES OF DAILY LIVING (ADL)
TOILETING: 1
BATHING ASSESSED: 1
BATHING_CURRENT_FUNCTION: SUPERVISION
DRESSING_UB_CURRENT_FUNCTION: SUPERVISION
DRESSING_LB_CURRENT_FUNCTION: SUPERVISION
PREPARING MEALS: NEEDS ASSISTANCE
TOILETING: INDEPENDENT

## 2024-06-26 NOTE — TELEPHONE ENCOUNTER
From: Raiza Nguyễn  To: Jeniffer Langley  Sent: 6/26/2024 9:39 AM EDT  Subject: Esomeprazole    Is it OK to begin taking my rx for esomeprazole for this indigestion? Thanks

## 2024-06-26 NOTE — CASE COMMUNICATION
PT HAD A FALL YESTERDAY. FELL WHILE TRYING TO  A DRYER SHEET. SHE WAS ABLE TO REACH THE PHONE AND CALL BOTH HER SON AND EMS. EMS PICKED HER UP. NO INJURY.

## 2024-06-26 NOTE — HOME HEALTH
PT SEEN FOR ROUTINE VISIT. DID HAVE AN UNWITNESSED FALL YESTERDAY. NO INJURIES. WAS ABLE TO REACH PHONE TO CALL SON AND EMS FOR HELP. DOES HAVE LIFE ALERT. PAIN IS ABOUT AN 8 TODAY AT INCISION TODAT BUT IS USUSUALLY COMFORTABLE. NO S/S OF INFECTION AT INCISION SITE. PICTURES AND MEASUREMENTS IN CHART. DRAIN DRESSINGS DRY AND INTACT. MD CHANGES AT VISITS. TRACKS OUTPUT AS DIRECTED. VSS. NO OTHER CONCERNS.    FALL PREVENTION CARE PLAN ADDED

## 2024-06-27 ENCOUNTER — APPOINTMENT (OUTPATIENT)
Dept: PRIMARY CARE | Facility: CLINIC | Age: 74
End: 2024-06-27
Payer: MEDICARE

## 2024-06-27 ENCOUNTER — HOME CARE VISIT (OUTPATIENT)
Dept: HOME HEALTH SERVICES | Facility: HOME HEALTH | Age: 74
End: 2024-06-27
Payer: MEDICARE

## 2024-06-27 DIAGNOSIS — I95.9 HYPOTENSION, UNSPECIFIED HYPOTENSION TYPE: ICD-10-CM

## 2024-06-27 DIAGNOSIS — Z17.0 MALIGNANT NEOPLASM OF UPPER-OUTER QUADRANT OF LEFT BREAST IN FEMALE, ESTROGEN RECEPTOR POSITIVE (MULTI): ICD-10-CM

## 2024-06-27 DIAGNOSIS — Z09 HOSPITAL DISCHARGE FOLLOW-UP: Primary | ICD-10-CM

## 2024-06-27 DIAGNOSIS — C50.412 MALIGNANT NEOPLASM OF UPPER-OUTER QUADRANT OF LEFT BREAST IN FEMALE, ESTROGEN RECEPTOR POSITIVE (MULTI): ICD-10-CM

## 2024-06-27 DIAGNOSIS — R53.83 OTHER FATIGUE: ICD-10-CM

## 2024-06-27 DIAGNOSIS — R11.0 NAUSEA: ICD-10-CM

## 2024-06-27 DIAGNOSIS — D64.9 ANEMIA, UNSPECIFIED TYPE: ICD-10-CM

## 2024-06-27 DIAGNOSIS — Z90.13 S/P MASTECTOMY, BILATERAL: ICD-10-CM

## 2024-06-27 DIAGNOSIS — R07.1 CHEST PAIN ON BREATHING: ICD-10-CM

## 2024-06-27 LAB
LAB AP BLOCK FOR ADDITIONAL STUDIES: NORMAL
LABORATORY COMMENT REPORT: NORMAL
PATH REPORT.FINAL DX SPEC: NORMAL
PATH REPORT.GROSS SPEC: NORMAL
PATH REPORT.RELEVANT HX SPEC: NORMAL
PATH REPORT.TOTAL CANCER: NORMAL
PATHOLOGY SYNOPTIC REPORT: NORMAL

## 2024-06-27 PROCEDURE — 1036F TOBACCO NON-USER: CPT | Performed by: FAMILY MEDICINE

## 2024-06-27 PROCEDURE — 1157F ADVNC CARE PLAN IN RCRD: CPT | Performed by: FAMILY MEDICINE

## 2024-06-27 PROCEDURE — 99495 TRANSJ CARE MGMT MOD F2F 14D: CPT | Performed by: FAMILY MEDICINE

## 2024-06-27 PROCEDURE — 1159F MED LIST DOCD IN RCRD: CPT | Performed by: FAMILY MEDICINE

## 2024-06-27 PROCEDURE — 3048F LDL-C <100 MG/DL: CPT | Performed by: FAMILY MEDICINE

## 2024-06-27 PROCEDURE — 3008F BODY MASS INDEX DOCD: CPT | Performed by: FAMILY MEDICINE

## 2024-06-27 PROCEDURE — 1160F RVW MEDS BY RX/DR IN RCRD: CPT | Performed by: FAMILY MEDICINE

## 2024-06-27 PROCEDURE — 3044F HG A1C LEVEL LT 7.0%: CPT | Performed by: FAMILY MEDICINE

## 2024-06-27 RX ORDER — ONDANSETRON HYDROCHLORIDE 8 MG/1
TABLET, FILM COATED ORAL
Qty: 20 TABLET | Refills: 2 | Status: SHIPPED | OUTPATIENT
Start: 2024-06-27

## 2024-06-27 RX ORDER — HYDROGEN PEROXIDE 3 %
20 SOLUTION, NON-ORAL MISCELLANEOUS 2 TIMES DAILY
COMMUNITY

## 2024-06-27 ASSESSMENT — ENCOUNTER SYMPTOMS: SUBJECTIVE PAIN PROGRESSION: GRADUALLY IMPROVING

## 2024-06-27 NOTE — PROGRESS NOTES
Subjective   Patient ID: Raiza Nguyễn is a 73 y.o. female who presents for Hospital Follow-up (Pt presents for hospital follow up [facility]  chest pain/post op pain- pt states that she is still very exhausted, and she has had a lot of nausea since her operation).    HPI     Patient is seen using a virtual visit telehealth interface through epic.  Patient is aware that this would be a telehealth office visit, and gives consent.    Patient and provider are both located in the [facility], provider is licensed in the state in which care is  being provided.    Patient presents for hospital discharge follow-up after an admission June 19 through June 20 [facility].     Patient was seen for chest pain.    Patient had undergone bilateral mastectomy 6-12-24, 6 days prior to that hospitalization, but was experiencing increasing chest pain and was feeling unwell.    Evaluation revealed possible groundglass type changes but no pulmonary embolism and a CAT scan.    Patient was admitted overnight, pain better controlled, and was discharged with a negative pulmonary and cardiac workup.  Please see discharge summary scanned into chart.    At the time of admission, patient's hemoglobin was 8.5.    Since patient has been discharged, she has had some increasing fatigue, does notice that she is getting dizzy with standing, and is nauseated and just feels weak.  She has had no emesis, she does not have an appetite, she has noticed no melena or hematochezia.    From her bilateral mastectomy patient still has a Fredis-Benavidez drain which is producing about 70 mL of deep sanguinous type drainage every 12 hours.    Patient reports that home nursing came to see her yesterday and her systolic blood pressure was in the 90s and the nurse could not get a pulse ox on her.  Patient does remain on lisinopril 10 mg and hydrochlorothiazide 12.5 mg daily.  The patient reports that she is extremely cold feeling.    She denies shortness  of breath at rest or cough.  She does get winded and fatigued easily with movement.    Patient is unable to drive at this time, secondary to postoperative state from bilateral mastectomy.    Patient states that she does feel fatigued and weak enough that more urgent evaluation and treatment would be appropriate.      Review of Systems    Objective   There were no vitals taken for this visit.      Physical Exam  Visit conducted via telehealth in light of COVID-19 pandemic.    Video used     Gen: patient is alert and oriented x 3  pleasant, and in no apparent distress, though does appear tired.  Patient appears quite pale.      , no cough, no dyspnea/tachypnea observed on video.   Psychiatric: Patient has good eye contact. Mood and affect are appropriate.        Assessment/Plan   Diagnoses and all orders for this visit:  Hospital discharge follow-up  Chest pain on breathing  -     Referral to Primary Care - Family Practice  Anemia, unspecified type  Other fatigue  Hypotension, unspecified hypotension type  S/P mastectomy, bilateral  Malignant neoplasm of upper-outer quadrant of left breast in female, estrogen receptor positive (Multi)     On hospital discharge follow-up virtual visit today, patient appeared pale, reported hypotension with systolics in the 90s, is dizzy upon standing, and has significant fatigue.    Patient is unable to drive secondary to postoperative state from recent bilateral mastectomy.    Patient's most recent blood count from overnight hospitalization for chst pain post/op at [facility] on  6/19/2024 to 6/20/2024 was 8.5.    Patient's baseline blood count prior to her recent mastectomy(outot, June 12th )   was in the 14's.    Because of patient's symptoms of low volume/anemia, I will have her proceed to the emergency room for expedited evaluation and treatment.    The patient reports an emergency room that is fairly close to her is [facility].    I will call [facility] and  alert them of her incoming arrival via car.  Patient has to wait for her son to come back into town from [place] this morning to take her to the emergency room.    Patient should reach out to us after ER treatment and/or admission, and we will set up an outpatient follow-up for her.    Time spent reviewing admissions from the 19th and 20th, reviewing patient's labs, reviewing course of illness/breast cancer/events leading up to admission, 10 minutes.    Time spent virtually on visit 10 minutes.    Time spent with documentation and reaching out to [facility] emergency department, 12 minutes.    Called [facility] - spoke w charge nurse Chrissy who will let the provider seeing Angie know,     copy of this note was faxed to [facility] [phone]

## 2024-07-01 ENCOUNTER — LAB (OUTPATIENT)
Dept: LAB | Facility: LAB | Age: 74
End: 2024-07-01
Payer: MEDICARE

## 2024-07-01 ENCOUNTER — OFFICE VISIT (OUTPATIENT)
Dept: PRIMARY CARE | Facility: CLINIC | Age: 74
End: 2024-07-01
Payer: MEDICARE

## 2024-07-01 VITALS
HEART RATE: 86 BPM | DIASTOLIC BLOOD PRESSURE: 122 MMHG | OXYGEN SATURATION: 96 % | TEMPERATURE: 97 F | RESPIRATION RATE: 14 BRPM | SYSTOLIC BLOOD PRESSURE: 185 MMHG | BODY MASS INDEX: 36.54 KG/M2 | WEIGHT: 183.9 LBS

## 2024-07-01 DIAGNOSIS — D75.839 THROMBOCYTOSIS: ICD-10-CM

## 2024-07-01 DIAGNOSIS — R94.4 DECREASED GFR: ICD-10-CM

## 2024-07-01 DIAGNOSIS — Z15.09 BRCA2 GENE MUTATION POSITIVE IN FEMALE: ICD-10-CM

## 2024-07-01 DIAGNOSIS — I10 ESSENTIAL HYPERTENSION: ICD-10-CM

## 2024-07-01 DIAGNOSIS — R34 DECREASED URINE OUTPUT: ICD-10-CM

## 2024-07-01 DIAGNOSIS — E11.9 TYPE 2 DIABETES MELLITUS WITHOUT COMPLICATION, WITHOUT LONG-TERM CURRENT USE OF INSULIN (MULTI): ICD-10-CM

## 2024-07-01 DIAGNOSIS — Z90.13 S/P MASTECTOMY, BILATERAL: ICD-10-CM

## 2024-07-01 DIAGNOSIS — Z15.01 BRCA2 GENE MUTATION POSITIVE IN FEMALE: ICD-10-CM

## 2024-07-01 DIAGNOSIS — C50.412 MALIGNANT NEOPLASM OF UPPER-OUTER QUADRANT OF LEFT BREAST IN FEMALE, ESTROGEN RECEPTOR POSITIVE (MULTI): ICD-10-CM

## 2024-07-01 DIAGNOSIS — Z17.0 MALIGNANT NEOPLASM OF UPPER-OUTER QUADRANT OF LEFT BREAST IN FEMALE, ESTROGEN RECEPTOR POSITIVE (MULTI): ICD-10-CM

## 2024-07-01 DIAGNOSIS — D64.9 ANEMIA, UNSPECIFIED TYPE: Primary | ICD-10-CM

## 2024-07-01 DIAGNOSIS — D64.9 ANEMIA, UNSPECIFIED TYPE: ICD-10-CM

## 2024-07-01 DIAGNOSIS — Z15.02 BRCA2 GENE MUTATION POSITIVE IN FEMALE: ICD-10-CM

## 2024-07-01 PROCEDURE — 1159F MED LIST DOCD IN RCRD: CPT | Performed by: FAMILY MEDICINE

## 2024-07-01 PROCEDURE — 36415 COLL VENOUS BLD VENIPUNCTURE: CPT

## 2024-07-01 PROCEDURE — 1157F ADVNC CARE PLAN IN RCRD: CPT | Performed by: FAMILY MEDICINE

## 2024-07-01 PROCEDURE — 80053 COMPREHEN METABOLIC PANEL: CPT

## 2024-07-01 PROCEDURE — 1160F RVW MEDS BY RX/DR IN RCRD: CPT | Performed by: FAMILY MEDICINE

## 2024-07-01 PROCEDURE — 3077F SYST BP >= 140 MM HG: CPT | Performed by: FAMILY MEDICINE

## 2024-07-01 PROCEDURE — 3048F LDL-C <100 MG/DL: CPT | Performed by: FAMILY MEDICINE

## 2024-07-01 PROCEDURE — 3008F BODY MASS INDEX DOCD: CPT | Performed by: FAMILY MEDICINE

## 2024-07-01 PROCEDURE — 85025 COMPLETE CBC W/AUTO DIFF WBC: CPT

## 2024-07-01 PROCEDURE — 1036F TOBACCO NON-USER: CPT | Performed by: FAMILY MEDICINE

## 2024-07-01 PROCEDURE — 4010F ACE/ARB THERAPY RXD/TAKEN: CPT | Performed by: FAMILY MEDICINE

## 2024-07-01 PROCEDURE — 3079F DIAST BP 80-89 MM HG: CPT | Performed by: FAMILY MEDICINE

## 2024-07-01 PROCEDURE — 99215 OFFICE O/P EST HI 40 MIN: CPT | Performed by: FAMILY MEDICINE

## 2024-07-01 PROCEDURE — 81001 URINALYSIS AUTO W/SCOPE: CPT

## 2024-07-01 PROCEDURE — 3044F HG A1C LEVEL LT 7.0%: CPT | Performed by: FAMILY MEDICINE

## 2024-07-01 RX ORDER — LISINOPRIL 10 MG/1
10 TABLET ORAL 2 TIMES DAILY
Start: 2024-07-01

## 2024-07-01 RX ORDER — TIRZEPATIDE 2.5 MG/.5ML
2.5 INJECTION, SOLUTION SUBCUTANEOUS
Qty: 2 ML | Refills: 3 | Status: SHIPPED | OUTPATIENT
Start: 2024-07-07

## 2024-07-01 RX ORDER — OXYCODONE HYDROCHLORIDE 5 MG/1
5 TABLET ORAL EVERY 6 HOURS PRN
Start: 2024-07-01

## 2024-07-01 NOTE — PROGRESS NOTES
Subjective   Patient ID: Raiza Nguyễn is a 73 y.o. female who presents for blood counts (Pt presents to review blood counts- pt would like you to take a look at her drains).    HPI     Patient presents today for follow-up anemia, hypotension, malaise.    Patient seen by me as virtual visit for hospital follow-up on 6/27/2024 for overnight hospital admission from 6/19/2024 to 6/20/2024 due to chest pain s/p recent mastectomy. Her hgb was 8.5 at time of hospital discharge and prior to mastectomy on 6/12/2024 was in the 14s.    Because patient appeared anemic, pale at virtual follow-up  on 6-27 she was instructed to go to the ER for expedited eval and treatment. Patient went to [facility] for geographic reasons.    Patient reports her cardiac eval at [facility] was negative, and was told her counts improved. Unable to see notes from [facility] in entirety in Epic system at time of today's follow-up visit.    I reviewed labs from [facility] on 6/27/2024:  Serial troponin negative  PT/INR normal  CBC showed Hgb 10.1, Hct 29, Wbc 9.8, Plts 592k  CMP showed GFR 49, Cr 1.1, glucose 169,     Decrease in kidney function is new for patient, prior lab trends show GFR in 60-90s. Patient reports urinating about 4x per day. Drinking about 80 oz fluid per day.    Reviewed CTA from hospital admission 6/19/2024, per copy of note this was negative for PE but showed some groundglass opacity.    She has upcoming appointments scheduled with:    -Hem-Onc, Dr. Joanna Bourne on 7/2/2024   -Breast surgeon, Dr. Jeniffer Langley on 7/2/2024  -GI new patient visit, Dr. Dodson on 7/29/2024  -Derm, Dr. Verna Tabor on 8/28/2024  -Genetics new patient, Anaid M Siria [facility] on 9/26/2024    She states she has been feeling better the last few days but did feel alittle off this AM. BP was also high this AM so she took Lisinopril but felt off prior to taking this. Would like her drain looked at, still having drain output about 50-60 mL.    Review of Systems   All other  systems reviewed and are negative.      Objective   BP (!) 185/122 (BP Location: Left leg, Patient Position: Standing, BP Cuff Size: Thigh)   Pulse 86   Temp 36.1 °C (97 °F) (Temporal)   Resp 14   Wt 83.4 kg (183 lb 14.4 oz)   SpO2 96%   BMI 36.54 kg/m²     Physical Exam  Vitals and nursing note reviewed.   Constitutional:       General: She is not in acute distress.     Appearance: Normal appearance. She is not ill-appearing, toxic-appearing or diaphoretic.   HENT:      Head: Normocephalic and atraumatic.   Neck:      Thyroid: No thyromegaly.      Vascular: No hepatojugular reflux or JVD.   Cardiovascular:      Rate and Rhythm: Normal rate and regular rhythm.      Heart sounds: No murmur heard.     No friction rub. No gallop.   Pulmonary:      Effort: Pulmonary effort is normal.      Breath sounds: Normal breath sounds. No wheezing, rhonchi or rales.   Chest:          Comments: Circled area with fullness but no tenderness, fluctuance, warmth, or drainage noted  Erythema seth-incisional skin  chest wall right side, though incision clean dry and healing well - no oozing or dehiscence  Non-tender  no fluctuance, no seroma or fluid noted around incision other than area near right axilla - again, not fluctuant, not warm, not tender      Small amount of peeling/flaking of skin seth-incisionally right side   Looks more inflammatory or reactionary rather than infectious.    1 right JOHN drain, a little less than 25 mL deep sanguinous fluid present.    2 left JOHN drains both with  more serous  (slightly pinkish /straw colored)  output, 1 approx 30 mL, the other trace output.  Skin:     General: Skin is warm and dry.   Neurological:      General: No focal deficit present.      Mental Status: She is alert and oriented to person, place, and time.   Psychiatric:         Mood and Affect: Mood normal.         Behavior: Behavior normal.         Assessment/Plan   Diagnoses and all orders for this visit:  Anemia, unspecified  type  -     CBC and Auto Differential; Future  -     Comprehensive Metabolic Panel; Future  Essential hypertension  -     CBC and Auto Differential; Future  -     Comprehensive Metabolic Panel; Future  -     lisinopril 10 mg tablet; Take 1 tablet (10 mg) by mouth 2 times a day.  S/P mastectomy, bilateral  Decreased GFR  -     Urine Culture  -     Urinalysis with Reflex Microscopic  Thrombocytosis  -     CBC and Auto Differential; Future  Decreased urine output  -     Urine Culture  -     Urinalysis with Reflex Microscopic  Type 2 diabetes mellitus without complication, without long-term current use of insulin (Multi)  -     tirzepatide (Mounjaro) 2.5 mg/0.5 mL pen injector; Inject 2.5 mg under the skin 1 (one) time per week.  BRCA2 gene mutation positive in female  -     oxyCODONE (Roxicodone) 5 mg immediate release tablet; Take 1 tablet (5 mg) by mouth every 6 hours if needed for moderate pain (4 - 6).  Malignant neoplasm of upper-outer quadrant of left breast in female, estrogen receptor positive (Multi)  -     oxyCODONE (Roxicodone) 5 mg immediate release tablet; Take 1 tablet (5 mg) by mouth every 6 hours if needed for moderate pain (4 - 6).    Post-op, s/p mastectomy  -s/p vinay mastectomy 6/12/2024 in setting of breast cancer  -JOHN drain noted to have deep sanguineous fluid present, patient in no-acute distress on exam, afebrile, much less pale than visit 1 week prior.   -Labs reviewed as noted below, await repeat CBC and CMP ordered today.  -WBC normal on CBC from 6/27/2024, await repeat CBC, abx deferred at this time.  -Patient has follow-up tomorrow scheduled with her surgeon, deferred drain removal to them.  -Patient will continue to follow-up with oncology as they have recommended.    Anemia  -Labs reviewed, hgb levels trending in right direction, wbc previously elevated has now normalized.  -Hgb from 6/27/24 was 10.1, up from 8.5 at time of hospital discharge on 6/20/2024.   -Repeat CBC w/ diff ordered  today    Thrombocytosis  -Platelets 592k on CBC done 6/27/29 @ [facility]  -Repeat CBC w/diff ordered today    Decreased GFR/Decreased urine output  -Patient reports drinking at least 80oz fluids daily with decreased output  -Repeat CMP ordered today  -Urinalysis + Urine culture ordered today, to be collected in clinic.  -Hold HCTZ as noted below.    Hypertension  -BP is 185/22 in office today, previously hypotensive  -Resume Lisinopril BID as prescribed  -Hold HCTZ ---> await repeat CMP for reassessment of previously decreased GFR as noted above.    DM2  -Refilled Mounjaro 2.5 mg weekly today at patient request  -Started on lowest dose to minimize side effects  -A1c deferred in setting of anemia.      Follow-up with as previously scheduled for routine care.  Call for sooner follow-up if needed.     Email sent to dr marcos dixon , breast surgeon reviewing information above      Scribe Attestation  By signing my name below, IClaudia, Scribe   attest that this documentation has been prepared under the direction and in the presence of Shoshana Olivarez DO.

## 2024-07-01 NOTE — PROGRESS NOTES
BREAST SURGERY POST OPERATIVE VISIT    Assessment/Plan     Left breast IDC g3 ER0% VT 0% HER2 positive at 3:00 10cmFN measuring 0.9cm on MRI  -concerning level 3 lymph node not amenable to biopsy.  PET CT recommended for further assessment by radiology.  PET negative.  -s/p bilateral completion mastectomy  -pT1cNx     2.  History of bilateral breast cancer and known BRCA2 mutation     Stage IB (Prognostic Stage IA) Right breast cancer 7/2018, [id number], grade 2 IDC, ER+95% VT+95% HER2 equivocal, FISH-.   - s/p R. magseed pm / SLNB (2mi/3) on 8/27/2018 with a 1.5cm IDC and negative margins. She had 2 out of 3 LNs with micrometastases measuring 0.4mm and 1.1mm, no NASH.   - Oncotype 17   - s/p WBRT 10/11/2018 - 11/1/2018   - taking Exemestane      Stage II Left breast cancer in 2003, 1.5 cm grade 3 IDC with positive LNs and NASH, ER+ VT+ HER2 not defined.   - L.pm/ALND   - ACx4 + Tx4   - WBRT   - Anastrozole x5yr    Saw Dr. Bourne today- planning for weekly taxol/herceptin.  Some faint erythema right chest wall- not entirely convinced it is infectious though will treat with bactrim as to not delay adjuvant treatment. Will follow up with me in January for new baseline exam.    Subjective   Raiza Nguyễn is a 73 y.o. female here for post op visit s/p bilateral mastectomy.    One drain on the right with darker serosanguinous fluid.  2 drains on the left more serous fluid.  She is emptying the drains twice per day.    3 weeks out from surgery.  Had follow up with her PCP yesterday after hospital discharge.    Date of surgery: 6/12/2024  Pathology:    Tumor size: 1.1cm   Nodes: NA   Margins: negative   Stage: [id number]     Objective   Physical Exam  General: Alert and oriented x 3.  Mood and affect are appropriate.  HEENT: EOMI, PERRLA.   Neck: supple, no masses, no cervical adenopathy.  Cardiovascular: no lower extremity edema.  Pulmonary: breathing non labored on room air.  GI: Abdomen soft, no masses. No  hepatomegaly or splenomegaly.  Lymph nodes: No supraclavicular or axillary adenopathy bilaterally.  Musculoskeletal: Full range of motion in the upper extremities bilaterally.  Neuro: denies dizziness, tremors    Breasts: The breasts were examined in both the seated and supine positions.    RIGHT: surgically absent.  Incision well approximated.  Flaps viable. No signs of infection. Faint erythema may be irritant related to dressings / adhesive.  No fluctuance.  Remaining drain removed.  LEFT: surgically absent.  Incision well approximated.  Flaps viable. No signs of infection. 2 drains with serosanguinous output. Drains removed.    Radiology review: All images and reports were personally reviewed.         Jeniffer Langley, DO

## 2024-07-02 ENCOUNTER — HOME CARE VISIT (OUTPATIENT)
Dept: HOME HEALTH SERVICES | Facility: HOME HEALTH | Age: 74
End: 2024-07-02
Payer: MEDICARE

## 2024-07-02 ENCOUNTER — OFFICE VISIT (OUTPATIENT)
Dept: SURGICAL ONCOLOGY | Facility: CLINIC | Age: 74
End: 2024-07-02
Payer: MEDICARE

## 2024-07-02 ENCOUNTER — ONCOLOGY MEDICATION OUTREACH (OUTPATIENT)
Dept: HEMATOLOGY/ONCOLOGY | Facility: CLINIC | Age: 74
End: 2024-07-02
Payer: MEDICARE

## 2024-07-02 ENCOUNTER — OFFICE VISIT (OUTPATIENT)
Dept: HEMATOLOGY/ONCOLOGY | Facility: CLINIC | Age: 74
End: 2024-07-02
Payer: MEDICARE

## 2024-07-02 VITALS
RESPIRATION RATE: 18 BRPM | TEMPERATURE: 98.7 F | DIASTOLIC BLOOD PRESSURE: 62 MMHG | SYSTOLIC BLOOD PRESSURE: 114 MMHG | OXYGEN SATURATION: 99 % | HEART RATE: 96 BPM

## 2024-07-02 DIAGNOSIS — Z17.1 MALIGNANT NEOPLASM OF CENTRAL PORTION OF LEFT BREAST IN FEMALE, ESTROGEN RECEPTOR NEGATIVE (MULTI): Primary | ICD-10-CM

## 2024-07-02 DIAGNOSIS — C50.112 MALIGNANT NEOPLASM OF CENTRAL PORTION OF LEFT BREAST IN FEMALE, ESTROGEN RECEPTOR NEGATIVE (MULTI): Primary | ICD-10-CM

## 2024-07-02 LAB
ALBUMIN SERPL BCP-MCNC: 3.7 G/DL (ref 3.4–5)
ALP SERPL-CCNC: 77 U/L (ref 33–136)
ALT SERPL W P-5'-P-CCNC: 15 U/L (ref 7–45)
ANION GAP SERPL CALC-SCNC: 14 MMOL/L (ref 10–20)
APPEARANCE UR: CLEAR
AST SERPL W P-5'-P-CCNC: 19 U/L (ref 9–39)
BASOPHILS # BLD AUTO: 0.04 X10*3/UL (ref 0–0.1)
BASOPHILS NFR BLD AUTO: 0.5 %
BILIRUB SERPL-MCNC: 0.2 MG/DL (ref 0–1.2)
BILIRUB UR STRIP.AUTO-MCNC: NEGATIVE MG/DL
BUN SERPL-MCNC: 16 MG/DL (ref 6–23)
CALCIUM SERPL-MCNC: 9.1 MG/DL (ref 8.6–10.6)
CHLORIDE SERPL-SCNC: 100 MMOL/L (ref 98–107)
CO2 SERPL-SCNC: 29 MMOL/L (ref 21–32)
COLOR UR: ABNORMAL
CREAT SERPL-MCNC: 0.69 MG/DL (ref 0.5–1.05)
EGFRCR SERPLBLD CKD-EPI 2021: >90 ML/MIN/1.73M*2
EOSINOPHIL # BLD AUTO: 0.33 X10*3/UL (ref 0–0.4)
EOSINOPHIL NFR BLD AUTO: 3.8 %
ERYTHROCYTE [DISTWIDTH] IN BLOOD BY AUTOMATED COUNT: 13.9 % (ref 11.5–14.5)
GLUCOSE SERPL-MCNC: 108 MG/DL (ref 74–99)
GLUCOSE UR STRIP.AUTO-MCNC: NORMAL MG/DL
HCT VFR BLD AUTO: 30.3 % (ref 36–46)
HGB BLD-MCNC: 9.5 G/DL (ref 12–16)
IMM GRANULOCYTES # BLD AUTO: 0.03 X10*3/UL (ref 0–0.5)
IMM GRANULOCYTES NFR BLD AUTO: 0.3 % (ref 0–0.9)
KETONES UR STRIP.AUTO-MCNC: NEGATIVE MG/DL
LEUKOCYTE ESTERASE UR QL STRIP.AUTO: ABNORMAL
LYMPHOCYTES # BLD AUTO: 1.74 X10*3/UL (ref 0.8–3)
LYMPHOCYTES NFR BLD AUTO: 19.9 %
MCH RBC QN AUTO: 30.5 PG (ref 26–34)
MCHC RBC AUTO-ENTMCNC: 31.4 G/DL (ref 32–36)
MCV RBC AUTO: 97 FL (ref 80–100)
MONOCYTES # BLD AUTO: 0.78 X10*3/UL (ref 0.05–0.8)
MONOCYTES NFR BLD AUTO: 8.9 %
NEUTROPHILS # BLD AUTO: 5.81 X10*3/UL (ref 1.6–5.5)
NEUTROPHILS NFR BLD AUTO: 66.6 %
NITRITE UR QL STRIP.AUTO: NEGATIVE
NRBC BLD-RTO: 0 /100 WBCS (ref 0–0)
PH UR STRIP.AUTO: 6 [PH]
PLATELET # BLD AUTO: 599 X10*3/UL (ref 150–450)
POTASSIUM SERPL-SCNC: 4.5 MMOL/L (ref 3.5–5.3)
PROT SERPL-MCNC: 6.3 G/DL (ref 6.4–8.2)
PROT UR STRIP.AUTO-MCNC: NEGATIVE MG/DL
RBC # BLD AUTO: 3.11 X10*6/UL (ref 4–5.2)
RBC # UR STRIP.AUTO: NEGATIVE /UL
RBC #/AREA URNS AUTO: NORMAL /HPF
SODIUM SERPL-SCNC: 138 MMOL/L (ref 136–145)
SP GR UR STRIP.AUTO: 1.01
UROBILINOGEN UR STRIP.AUTO-MCNC: NORMAL MG/DL
WBC # BLD AUTO: 8.7 X10*3/UL (ref 4.4–11.3)
WBC #/AREA URNS AUTO: NORMAL /HPF

## 2024-07-02 PROCEDURE — 99215 OFFICE O/P EST HI 40 MIN: CPT | Performed by: STUDENT IN AN ORGANIZED HEALTH CARE EDUCATION/TRAINING PROGRAM

## 2024-07-02 PROCEDURE — 3008F BODY MASS INDEX DOCD: CPT | Performed by: STUDENT IN AN ORGANIZED HEALTH CARE EDUCATION/TRAINING PROGRAM

## 2024-07-02 PROCEDURE — 3048F LDL-C <100 MG/DL: CPT | Performed by: STUDENT IN AN ORGANIZED HEALTH CARE EDUCATION/TRAINING PROGRAM

## 2024-07-02 PROCEDURE — G0299 HHS/HOSPICE OF RN EA 15 MIN: HCPCS | Mod: HHH

## 2024-07-02 PROCEDURE — 1159F MED LIST DOCD IN RCRD: CPT | Performed by: STUDENT IN AN ORGANIZED HEALTH CARE EDUCATION/TRAINING PROGRAM

## 2024-07-02 PROCEDURE — 3048F LDL-C <100 MG/DL: CPT | Performed by: SURGERY

## 2024-07-02 PROCEDURE — 1157F ADVNC CARE PLAN IN RCRD: CPT | Performed by: STUDENT IN AN ORGANIZED HEALTH CARE EDUCATION/TRAINING PROGRAM

## 2024-07-02 PROCEDURE — 99211 OFF/OP EST MAY X REQ PHY/QHP: CPT | Performed by: SURGERY

## 2024-07-02 PROCEDURE — 3044F HG A1C LEVEL LT 7.0%: CPT | Performed by: STUDENT IN AN ORGANIZED HEALTH CARE EDUCATION/TRAINING PROGRAM

## 2024-07-02 PROCEDURE — 1157F ADVNC CARE PLAN IN RCRD: CPT | Performed by: SURGERY

## 2024-07-02 PROCEDURE — 4010F ACE/ARB THERAPY RXD/TAKEN: CPT | Performed by: SURGERY

## 2024-07-02 PROCEDURE — 3044F HG A1C LEVEL LT 7.0%: CPT | Performed by: SURGERY

## 2024-07-02 PROCEDURE — 3008F BODY MASS INDEX DOCD: CPT | Performed by: SURGERY

## 2024-07-02 PROCEDURE — 4010F ACE/ARB THERAPY RXD/TAKEN: CPT | Performed by: STUDENT IN AN ORGANIZED HEALTH CARE EDUCATION/TRAINING PROGRAM

## 2024-07-02 RX ORDER — SULFAMETHOXAZOLE AND TRIMETHOPRIM 800; 160 MG/1; MG/1
1 TABLET ORAL 2 TIMES DAILY
Qty: 14 TABLET | Refills: 0 | Status: SHIPPED | OUTPATIENT
Start: 2024-07-02 | End: 2024-07-09

## 2024-07-02 ASSESSMENT — PATIENT HEALTH QUESTIONNAIRE - PHQ9
2. FEELING DOWN, DEPRESSED OR HOPELESS: SEVERAL DAYS
SUM OF ALL RESPONSES TO PHQ9 QUESTIONS 1 & 2: 2
10. IF YOU CHECKED OFF ANY PROBLEMS, HOW DIFFICULT HAVE THESE PROBLEMS MADE IT FOR YOU TO DO YOUR WORK, TAKE CARE OF THINGS AT HOME, OR GET ALONG WITH OTHER PEOPLE: SOMEWHAT DIFFICULT
1. LITTLE INTEREST OR PLEASURE IN DOING THINGS: SEVERAL DAYS

## 2024-07-02 ASSESSMENT — ACTIVITIES OF DAILY LIVING (ADL)
HOME_HEALTH_OASIS: 00
OASIS_M1830: 00

## 2024-07-02 ASSESSMENT — ENCOUNTER SYMPTOMS
LOWEST PAIN SEVERITY IN PAST 24 HOURS: 0/10
PAIN SEVERITY GOAL: 0/10
HIGHEST PAIN SEVERITY IN PAST 24 HOURS: 3/10

## 2024-07-02 ASSESSMENT — PAIN SCALES - PAIN ASSESSMENT IN ADVANCED DEMENTIA (PAINAD): BREATHING: 0

## 2024-07-02 NOTE — PROGRESS NOTES
Reviewed taxol/herceptin, dose, frequency, administration, treatment cycle, duration of therapy, and missed doses. Counseled on potential side effects including but not limited to chemotherapy side effects: neutropenia, infection risk, anemia, fatigue, weakness, low energy, thrombocytopenia, bleeding/bruising, n/v, diarrhea, constipation, edema, hair loss, headache, muscle or joint pain, mucositis, and skin rash. Discussed techniques to mitigate severity of side effects such as blood count checks, temperature checks, electrolyte monitoring, antiemetic use, loperamide use with max dose of 8 tabs per 24 hours, and staying hydrated if having diarrhea.  Provided medication/regimen handout. All questions answered and contact information was given to patient.

## 2024-07-02 NOTE — PATIENT INSTRUCTIONS
Follow up with Dr. Langley as scheduled.  One of our schedulers will call you to schedule infusions and follow up with Dr. Bourne.  Schedule port placement.   Please call [phone] with questions or concerns.

## 2024-07-02 NOTE — PROGRESS NOTES
Breast Medical Oncology Clinic  Location: [facility]    Visit Type: Follow-up Visit    Oncologic History:    She was diagnosed in January 2003 with a left-sided 1.5 cm infiltrating ductal carcinoma with positive axillary lymph nodes and extranodal extension. It was a grade 3 tumor with hormone receptors positive and HER-2/anabell not defined.   Four cycles of Adriamycin and cyclophosphamide were followed by 4 cycles of paclitaxel in March 2004.   Left lumpectomy and axillary node dissection followed by left breast radiation is also completed in 2004.   She had bilateral oophorectomies in May 2008.   Anastrozole through March 2009.   She had a lesion in her left lower back removed, and confirmed to be a 0.35 mm melanoma. It was not ulcerative and she did not require a sentinel node evaluation. She has since had multiple excisions of atypical myelomonocytic lesions without disease  discovered.   She did well until screening mammogram 8/2018 confirmed finding on PET (done related melanoma dx) .  She had excision of 1.5cm IDC with 1/2 SN with microscopic involvement. She did not want to receive chemotherapy and the lesion was strongly ER and ND  positive, HER 2 negative.   Started on exemestane November 2018.   She is BRCA2 positive   4/18/24: MRI breast screening: An irregular rim enhancing mass with irregular margins measuring 0.8 x 0.8 x 0.9 cm is seen in the superolateral breast at middle depth A prominent level III axillary lymph node measures 1.3 x 0.9 x 0.8 cm, and may have slightly increased in size when compared to breast MRI 10/03/2016 (previously measuring 1.0 x 0.8 x 0.8 cm). An internal mammary lymph node measuring 0.4 cm is noted and demonstrates a fatty hilum (series 401, image 136 of 232). This was not definitively seen on the prior MRI.  4/23/24: L breast mass 3:00 bx IDC G3, ER/ND negative, HER2 positive. L breast mass 2:00 bx: fibrous scar with associated calcifications.   5/8/24: PET scan: mildly  hypermetabolic soft tissue density measuring 1.6 x 0.8 cm at left outer breast.  Mild heterogeneous activity is noted in the region of the liver with a suggestion of a small focus along the medial tip of segment 2 of the left hepatic lobe.   5/9/24: TB discussion: Plan for surgery first approach.   5/17/2024: MRI of liver: No definite discrete hepatic lesion to correlate with mild FDG avid fit lesion seen on PET scan.  Few subcentimeter pancreatic cystic lesions represent IPMN most likely.  Partially visualized left uterine lesion likely representing a fibroid.  6/12/2024: Left breast simple mastectomy showed invasive ductal carcinoma, grade 3, single focus measuring 1.1 cm.  There is high-grade DCIS present.  All margins are negative.  No lymph nodes submitted.  Right breast simple completion mastectomy also performed negative for carcinoma.    Subjective: Interval History    Patient presents today for follow-up visit.  She is accompanied by her son.  She is status post bilateral completion mastectomies.  Postop she was admitted for chills, heart palpitations and chest pain.  Workup was negative for PE.  She was seen by cardiology and breast surgery while admitted.  Workup was largely  unremarkable.    Patient was seen yesterday by her PCP in follow-up.  Started on antibiotics for possible postop infection.  Patient also had adjustment in her blood pressure medication.    Patient reports redness and tenderness of left chest wall.  Otherwise she is doing well.    Pertinent Family history:    Mother having bilateral breast cancer and a first cousin having breast cancer later in life. Her sister had a GYN malignancy, now metastatic and a maternal aunt with stomach cancer. A maternal uncle had colon  cancer.     Social History  Raizamerly Nguyễn  reports that she quit smoking about 41 years ago. Her smoking use included cigarettes. She started smoking about 56 years ago. She has a 80 pack-year smoking history. She has  been exposed to tobacco smoke. She has never used smokeless tobacco.  She  reports that she does not currently use alcohol after a past usage of about 1.0 standard drink of alcohol per week.  She  reports no history of drug use.    ROS:     Review of Systems   All other systems reviewed and are negative.       Physical Examination:    There were no vitals taken for this visit.    Physical Exam  Vitals and nursing note reviewed.   Constitutional:       General: She is not in acute distress.     Appearance: Normal appearance. She is not toxic-appearing.   HENT:      Head: Normocephalic and atraumatic.   Eyes:      Conjunctiva/sclera: Conjunctivae normal.   Cardiovascular:      Rate and Rhythm: Normal rate.   Pulmonary:      Effort: Pulmonary effort is normal. No respiratory distress.   Abdominal:      General: Abdomen is flat.      Palpations: Abdomen is soft.   Musculoskeletal:         General: No swelling. Normal range of motion.      Cervical back: Normal range of motion.   Skin:     General: Skin is warm and dry.   Neurological:      General: No focal deficit present.      Mental Status: She is alert.   Psychiatric:         Mood and Affect: Mood normal.         Breast Examination:    Bilateral mastectomies with well-healing incisions.  Drains remained in place.  Left chest wall there is faint erythema along the incision.    ECOG Performance Status:     [x] 0 Fully active, able to carry on all pre-disease performance without restriction  [] 1 Restricted in physically strenuous activity but ambulatory and able to carry out work of a light or sedentary nature, e.g., light house work, office work  [] 2 Ambulatory and capable of all selfcare but unable to carry out any work activities; up and about more than 50% of waking hours  [] 3 Capable of only limited selfcare; confined to bed or chair more than 50% of waking hours  [] 4 Completely disabled; cannot carry on any selfcare; totally confined to bed or chair  [] 5  Dead     Results:    Labs:      Lab Results   Component Value Date    WBC 8.7 07/01/2024    HGB 9.5 (L) 07/01/2024    HCT 30.3 (L) 07/01/2024    MCV 97 07/01/2024     (H) 07/01/2024       Chemistry    Lab Results   Component Value Date/Time     07/01/2024 1349    K 4.5 07/01/2024 1349     07/01/2024 1349    CO2 29 07/01/2024 1349    BUN 16 07/01/2024 1349    CREATININE 0.69 07/01/2024 1349    Lab Results   Component Value Date/Time    CALCIUM 9.1 07/01/2024 1349    ALKPHOS 77 07/01/2024 1349    AST 19 07/01/2024 1349    ALT 15 07/01/2024 1349    BILITOT 0.2 07/01/2024 1349             Imaging:  Reviewed above in Onc History    Pathology:  Reviewed above in Onc History    Assessment:     2003: Stage II Left IDC grade 3 with positive LNs and NASH, ER+ CT+; S/p L PM and SLNBx; S/p AC+T; S/p radiation; anastrozole x5 years  2018: Stage IB (Prognostic Stage IA), [id number], grade 2 IDC, ER+95% CT+95% HER2 equivocal, FISH-. S/p R PM and SLNBx; Oncotype 17; S/p radiation; Exemestane since 11/2028, currently on hold  2024: cT1 cN0 L IDC, G3, ER/CT negative and HER2 positive.  Status post bilateral completion mastectomy, pT1b pNx  BRCA2 germline mutation  Plan:    Surgical Plan: Status post left partial mastectomy and sentinel lymph node biopsy in 2003; status post right partial mastectomy and sentinel lymph node biopsy 2018; status post bilateral completion mastectomy in 2024  Additional biopsy: No further biopsy indicated  Radiation Plan: Status post left breast radiation in 2003; status post right breast radiation 2018  Additional staging scans/DEXA/echo: Baseline staging scans reviewed.  Baseline echocardiogram reviewed; she has established with on-call cardiology.  Additional Path info (i.e Ki67, PDL1): Not indicated  Gene assays: Not indicated    Systemic treatment plan: Today, we reviewed the natural history of small HER2-positive breast cancers and the role of systemic therapy in decreasing risk of  recurrence. We explained that some, but not all retrospective reviews demonstrated a higher risk of recurrence with small HER2-positive breast cancers compared to small HER2-negative breast cancers.  We reviewed the results of the APT trial that showed a very low recurrence rate for patients with HER2-positive breast cancers measuring 3cm or less who received weekly paclitaxel and trastuzumab (TH) for 12 weeks followed by every 3 week trastuzumab for one year ([facility]  2019). We discussed the regimen, schedule, common toxicities, strategies for managing toxicities and monitoring parameters. The patient had time to ask questions. Consent was signed today.    Intent: Curative   Clinical trial: Not eligible for our current trials   Endocrine therapy: We will decide whether to resume exemestane after completion of chemotherapy for her ER/MO positive breast cancer diagnosed in 2018   HER2 treatment: As discussed above   Targeted agents: not indicated   Chemotherapy: As discussed above   BMA: Will discuss at future visit.    Access: Referral for mediport in place  Supportive meds: Anti-emetics and EMLA cream sent to pharmacy  Genetic testing: Referral to genetics for gBRCA2 mutation- pt reports is she s/p BSO appointment in place for September 2024  Fertility preservation: Not indicated  Other active problems/orders:     Mild FDG avid liver lesion without MRI correlate: Short-term follow-up has been recommended per radiologist, will plan to repeat 3 months from prior.  Pancreatic cystic lesions likely IPMN: Referral to hepatobiliary surgery down the line for follow-up, will need closer monitoring as patient has germline BRCA mutation.  Partially visualized left uterine lesion likely representing fibroid: Discussed ultrasound with patient down the line.  Chest wall incision erythema: Suspicious for early cellulitis, patient will be seeing her breast surgeon later this afternoon. On abx. Will await clearance  from breast surgery standpoint to initiate patient on chemotherapy.    Surveillance plan: No routine breast imaging indicated    Follow-up: Tentatively first treatment at [place] in 2-3 weeks. MD visit prior to C3 of taxol/herceptin.     Patient expressed understanding of the plan outlined above. She had ample time to ask questions. She understands she can contact us should she have additional questions or issues arise in the interim.    Joanna Bourne MD  Breast Medical Oncology  [facility]    Portions of this note were dictated utilizing voice recognition software.

## 2024-07-03 ENCOUNTER — HOME CARE VISIT (OUTPATIENT)
Dept: HOME HEALTH SERVICES | Facility: HOME HEALTH | Age: 74
End: 2024-07-03
Payer: MEDICARE

## 2024-07-03 VITALS — SYSTOLIC BLOOD PRESSURE: 100 MMHG | HEART RATE: 93 BPM | OXYGEN SATURATION: 97 % | DIASTOLIC BLOOD PRESSURE: 60 MMHG

## 2024-07-03 DIAGNOSIS — Z17.1 MALIGNANT NEOPLASM OF CENTRAL PORTION OF LEFT BREAST IN FEMALE, ESTROGEN RECEPTOR NEGATIVE (MULTI): ICD-10-CM

## 2024-07-03 DIAGNOSIS — C50.112 MALIGNANT NEOPLASM OF CENTRAL PORTION OF LEFT BREAST IN FEMALE, ESTROGEN RECEPTOR NEGATIVE (MULTI): ICD-10-CM

## 2024-07-03 PROCEDURE — G0152 HHCP-SERV OF OT,EA 15 MIN: HCPCS | Mod: HHH

## 2024-07-03 RX ORDER — DIPHENHYDRAMINE HCL 50 MG
50 CAPSULE ORAL ONCE
OUTPATIENT
Start: 2024-07-22

## 2024-07-03 RX ORDER — DIPHENHYDRAMINE HCL 50 MG
50 CAPSULE ORAL ONCE
OUTPATIENT
Start: 2024-07-15

## 2024-07-03 RX ORDER — FAMOTIDINE 10 MG/ML
20 INJECTION INTRAVENOUS ONCE
OUTPATIENT
Start: 2024-07-29

## 2024-07-03 RX ORDER — EPINEPHRINE 0.3 MG/.3ML
0.3 INJECTION SUBCUTANEOUS EVERY 5 MIN PRN
OUTPATIENT
Start: 2024-07-22

## 2024-07-03 RX ORDER — FAMOTIDINE 10 MG/ML
20 INJECTION INTRAVENOUS ONCE AS NEEDED
OUTPATIENT
Start: 2024-07-29

## 2024-07-03 RX ORDER — PROCHLORPERAZINE EDISYLATE 5 MG/ML
10 INJECTION INTRAMUSCULAR; INTRAVENOUS EVERY 6 HOURS PRN
OUTPATIENT
Start: 2024-08-05

## 2024-07-03 RX ORDER — DEXAMETHASONE SODIUM PHOSPHATE 100 MG/10ML
10 INJECTION INTRAMUSCULAR; INTRAVENOUS ONCE
OUTPATIENT
Start: 2024-07-29

## 2024-07-03 RX ORDER — PROCHLORPERAZINE EDISYLATE 5 MG/ML
10 INJECTION INTRAMUSCULAR; INTRAVENOUS EVERY 6 HOURS PRN
OUTPATIENT
Start: 2024-07-29

## 2024-07-03 RX ORDER — PROCHLORPERAZINE MALEATE 10 MG
10 TABLET ORAL EVERY 6 HOURS PRN
OUTPATIENT
Start: 2024-07-22

## 2024-07-03 RX ORDER — FAMOTIDINE 10 MG/ML
20 INJECTION INTRAVENOUS ONCE
OUTPATIENT
Start: 2024-07-15

## 2024-07-03 RX ORDER — ALBUTEROL SULFATE 0.83 MG/ML
3 SOLUTION RESPIRATORY (INHALATION) AS NEEDED
OUTPATIENT
Start: 2024-07-29

## 2024-07-03 RX ORDER — PROCHLORPERAZINE MALEATE 10 MG
10 TABLET ORAL EVERY 6 HOURS PRN
OUTPATIENT
Start: 2024-07-15

## 2024-07-03 RX ORDER — EPINEPHRINE 0.3 MG/.3ML
0.3 INJECTION SUBCUTANEOUS EVERY 5 MIN PRN
OUTPATIENT
Start: 2024-08-05

## 2024-07-03 RX ORDER — FAMOTIDINE 10 MG/ML
20 INJECTION INTRAVENOUS ONCE
OUTPATIENT
Start: 2024-08-05

## 2024-07-03 RX ORDER — FAMOTIDINE 10 MG/ML
20 INJECTION INTRAVENOUS ONCE AS NEEDED
OUTPATIENT
Start: 2024-07-22

## 2024-07-03 RX ORDER — DEXAMETHASONE SODIUM PHOSPHATE 100 MG/10ML
10 INJECTION INTRAMUSCULAR; INTRAVENOUS ONCE
OUTPATIENT
Start: 2024-07-15

## 2024-07-03 RX ORDER — PROCHLORPERAZINE MALEATE 10 MG
10 TABLET ORAL EVERY 6 HOURS PRN
Qty: 30 TABLET | Refills: 3 | Status: SHIPPED | OUTPATIENT
Start: 2024-07-03 | End: 2024-09-01

## 2024-07-03 RX ORDER — DEXAMETHASONE SODIUM PHOSPHATE 100 MG/10ML
10 INJECTION INTRAMUSCULAR; INTRAVENOUS ONCE
OUTPATIENT
Start: 2024-08-05

## 2024-07-03 RX ORDER — DIPHENHYDRAMINE HYDROCHLORIDE 50 MG/ML
50 INJECTION INTRAMUSCULAR; INTRAVENOUS AS NEEDED
OUTPATIENT
Start: 2024-07-15

## 2024-07-03 RX ORDER — ALBUTEROL SULFATE 0.83 MG/ML
3 SOLUTION RESPIRATORY (INHALATION) AS NEEDED
OUTPATIENT
Start: 2024-08-05

## 2024-07-03 RX ORDER — FAMOTIDINE 10 MG/ML
20 INJECTION INTRAVENOUS ONCE AS NEEDED
OUTPATIENT
Start: 2024-07-15

## 2024-07-03 RX ORDER — DIPHENHYDRAMINE HYDROCHLORIDE 50 MG/ML
50 INJECTION INTRAMUSCULAR; INTRAVENOUS AS NEEDED
OUTPATIENT
Start: 2024-07-29

## 2024-07-03 RX ORDER — FAMOTIDINE 10 MG/ML
20 INJECTION INTRAVENOUS ONCE AS NEEDED
OUTPATIENT
Start: 2024-08-05

## 2024-07-03 RX ORDER — DIPHENHYDRAMINE HYDROCHLORIDE 50 MG/ML
50 INJECTION INTRAMUSCULAR; INTRAVENOUS AS NEEDED
OUTPATIENT
Start: 2024-07-22

## 2024-07-03 RX ORDER — ALBUTEROL SULFATE 0.83 MG/ML
3 SOLUTION RESPIRATORY (INHALATION) AS NEEDED
OUTPATIENT
Start: 2024-07-22

## 2024-07-03 RX ORDER — DIPHENHYDRAMINE HCL 50 MG
50 CAPSULE ORAL ONCE
OUTPATIENT
Start: 2024-08-05

## 2024-07-03 RX ORDER — ONDANSETRON HYDROCHLORIDE 8 MG/1
8 TABLET, FILM COATED ORAL EVERY 8 HOURS PRN
Qty: 30 TABLET | Refills: 3 | Status: SHIPPED | OUTPATIENT
Start: 2024-07-03

## 2024-07-03 RX ORDER — DIPHENHYDRAMINE HCL 50 MG
50 CAPSULE ORAL ONCE
OUTPATIENT
Start: 2024-07-29

## 2024-07-03 RX ORDER — PROCHLORPERAZINE MALEATE 10 MG
10 TABLET ORAL EVERY 6 HOURS PRN
OUTPATIENT
Start: 2024-07-29

## 2024-07-03 RX ORDER — DEXAMETHASONE SODIUM PHOSPHATE 100 MG/10ML
10 INJECTION INTRAMUSCULAR; INTRAVENOUS ONCE
OUTPATIENT
Start: 2024-07-22

## 2024-07-03 RX ORDER — PROCHLORPERAZINE EDISYLATE 5 MG/ML
10 INJECTION INTRAMUSCULAR; INTRAVENOUS EVERY 6 HOURS PRN
OUTPATIENT
Start: 2024-07-15

## 2024-07-03 RX ORDER — EPINEPHRINE 0.3 MG/.3ML
0.3 INJECTION SUBCUTANEOUS EVERY 5 MIN PRN
OUTPATIENT
Start: 2024-07-29

## 2024-07-03 RX ORDER — PROCHLORPERAZINE EDISYLATE 5 MG/ML
10 INJECTION INTRAMUSCULAR; INTRAVENOUS EVERY 6 HOURS PRN
OUTPATIENT
Start: 2024-07-22

## 2024-07-03 RX ORDER — DIPHENHYDRAMINE HYDROCHLORIDE 50 MG/ML
50 INJECTION INTRAMUSCULAR; INTRAVENOUS AS NEEDED
OUTPATIENT
Start: 2024-08-05

## 2024-07-03 RX ORDER — ALBUTEROL SULFATE 0.83 MG/ML
3 SOLUTION RESPIRATORY (INHALATION) AS NEEDED
OUTPATIENT
Start: 2024-07-15

## 2024-07-03 RX ORDER — EPINEPHRINE 0.3 MG/.3ML
0.3 INJECTION SUBCUTANEOUS EVERY 5 MIN PRN
OUTPATIENT
Start: 2024-07-15

## 2024-07-03 RX ORDER — PROCHLORPERAZINE MALEATE 10 MG
10 TABLET ORAL EVERY 6 HOURS PRN
OUTPATIENT
Start: 2024-08-05

## 2024-07-03 RX ORDER — LIDOCAINE AND PRILOCAINE 25; 25 MG/G; MG/G
CREAM TOPICAL
Qty: 30 UNSPECIFIED | Refills: 1 | Status: SHIPPED | OUTPATIENT
Start: 2024-07-03 | End: 2024-07-03

## 2024-07-03 RX ORDER — FAMOTIDINE 10 MG/ML
20 INJECTION INTRAVENOUS ONCE
OUTPATIENT
Start: 2024-07-22

## 2024-07-03 ASSESSMENT — ACTIVITIES OF DAILY LIVING (ADL)
HOME_HEALTH_OASIS: 00
OASIS_M1830: 01

## 2024-07-03 ASSESSMENT — ENCOUNTER SYMPTOMS
PAIN LOCATION - RELIEVING FACTORS: MEDS/POSITIONING
PAIN LOCATION - EXACERBATING FACTORS: MOVEMENT/POSITIONING
PAIN LOCATION - PAIN FREQUENCY: FREQUENT
PERSON REPORTING PAIN: PATIENT
LOWEST PAIN SEVERITY IN PAST 24 HOURS: 2/10
PAIN: 1
PAIN LOCATION - PAIN SEVERITY: 4/10
PAIN LOCATION: ABDOMEN
SUBJECTIVE PAIN PROGRESSION: GRADUALLY IMPROVING
HIGHEST PAIN SEVERITY IN PAST 24 HOURS: 4/10

## 2024-07-08 ENCOUNTER — APPOINTMENT (OUTPATIENT)
Dept: INTEGRATIVE MEDICINE | Facility: CLINIC | Age: 74
End: 2024-07-08
Payer: MEDICARE

## 2024-07-09 ENCOUNTER — PATIENT OUTREACH (OUTPATIENT)
Dept: PRIMARY CARE | Facility: CLINIC | Age: 74
End: 2024-07-09

## 2024-07-09 NOTE — PROGRESS NOTES
Call regarding appt. with PCP on 6/27/24 & 7/1/24 after hospitalization.  At time of outreach call the patient feels as if their condition has stayed about the same since last visit.  Reviewed the PCP appointment with the pt and addressed any questions or concerns.   She has keep in close contact with Dr Cuadra and the Oncology dept.  Raiza will be having her Mediport placed on the 12th of this week.

## 2024-07-12 ENCOUNTER — HOSPITAL ENCOUNTER (OUTPATIENT)
Dept: RADIOLOGY | Facility: HOSPITAL | Age: 74
Discharge: HOME | End: 2024-07-12
Payer: MEDICARE

## 2024-07-12 VITALS
DIASTOLIC BLOOD PRESSURE: 50 MMHG | TEMPERATURE: 97.7 F | HEART RATE: 78 BPM | SYSTOLIC BLOOD PRESSURE: 103 MMHG | RESPIRATION RATE: 18 BRPM | OXYGEN SATURATION: 99 %

## 2024-07-12 DIAGNOSIS — C50.112 MALIGNANT NEOPLASM OF CENTRAL PORTION OF LEFT BREAST IN FEMALE, ESTROGEN RECEPTOR NEGATIVE (MULTI): ICD-10-CM

## 2024-07-12 DIAGNOSIS — Z17.1 MALIGNANT NEOPLASM OF CENTRAL PORTION OF LEFT BREAST IN FEMALE, ESTROGEN RECEPTOR NEGATIVE (MULTI): ICD-10-CM

## 2024-07-12 PROCEDURE — 99152 MOD SED SAME PHYS/QHP 5/>YRS: CPT

## 2024-07-12 PROCEDURE — 99152 MOD SED SAME PHYS/QHP 5/>YRS: CPT | Performed by: STUDENT IN AN ORGANIZED HEALTH CARE EDUCATION/TRAINING PROGRAM

## 2024-07-12 PROCEDURE — 36010 PLACE CATHETER IN VEIN: CPT | Performed by: STUDENT IN AN ORGANIZED HEALTH CARE EDUCATION/TRAINING PROGRAM

## 2024-07-12 PROCEDURE — 36561 INSERT TUNNELED CV CATH: CPT | Performed by: STUDENT IN AN ORGANIZED HEALTH CARE EDUCATION/TRAINING PROGRAM

## 2024-07-12 PROCEDURE — 2500000004 HC RX 250 GENERAL PHARMACY W/ HCPCS (ALT 636 FOR OP/ED): Performed by: STUDENT IN AN ORGANIZED HEALTH CARE EDUCATION/TRAINING PROGRAM

## 2024-07-12 PROCEDURE — 7100000010 HC PHASE TWO TIME - EACH INCREMENTAL 1 MINUTE

## 2024-07-12 PROCEDURE — C1788 PORT, INDWELLING, IMP: HCPCS

## 2024-07-12 PROCEDURE — 76937 US GUIDE VASCULAR ACCESS: CPT | Performed by: STUDENT IN AN ORGANIZED HEALTH CARE EDUCATION/TRAINING PROGRAM

## 2024-07-12 PROCEDURE — 2780000003 HC OR 278 NO HCPCS

## 2024-07-12 PROCEDURE — 7100000009 HC PHASE TWO TIME - INITIAL BASE CHARGE

## 2024-07-12 PROCEDURE — 77001 FLUOROGUIDE FOR VEIN DEVICE: CPT | Performed by: STUDENT IN AN ORGANIZED HEALTH CARE EDUCATION/TRAINING PROGRAM

## 2024-07-12 RX ORDER — FENTANYL CITRATE 50 UG/ML
INJECTION, SOLUTION INTRAMUSCULAR; INTRAVENOUS
Status: COMPLETED | OUTPATIENT
Start: 2024-07-12 | End: 2024-07-12

## 2024-07-12 RX ORDER — MIDAZOLAM HYDROCHLORIDE 1 MG/ML
INJECTION INTRAMUSCULAR; INTRAVENOUS
Status: COMPLETED | OUTPATIENT
Start: 2024-07-12 | End: 2024-07-12

## 2024-07-12 ASSESSMENT — PAIN SCALES - GENERAL
PAINLEVEL_OUTOF10: 0 - NO PAIN
PAINLEVEL_OUTOF10: 3

## 2024-07-12 ASSESSMENT — PAIN - FUNCTIONAL ASSESSMENT
PAIN_FUNCTIONAL_ASSESSMENT: 0-10

## 2024-07-12 NOTE — POST-PROCEDURE NOTE
Interventional Radiology Brief Postprocedure Note    Attending: Mario Alberto Tello    Diagnosis: Breast cancer    Description of procedure: Right internal jugular single lumen port placement     Anesthesia:  MAC    Complications: None    Estimated Blood Loss: minimal    Medications (Filter: Administrations occurring from 1306 to 1414 on 07/12/24) As of 07/12/24 1414      fentaNYL PF (Sublimaze) injection (mcg) Total dose:  50 mcg      Date/Time Rate/Dose/Volume Action       07/12/24  1341 50 mcg Given               midazolam (Versed) injection (mg) Total dose:  1 mg      Date/Time Rate/Dose/Volume Action       07/12/24  1341 1 mg Given                   No specimens collected      See detailed result report with images in PACS.    The patient tolerated the procedure well without incident or complication and is in stable condition.

## 2024-07-12 NOTE — Clinical Note
R chest port placed. Pt received 1mg versed and 50mcg fentanyl for sedation; tolerated well. VSS throughout procedure. Dressing is clean, dry, and intact. Recovery time 1hr. Pt to RPCU; report to RPCU RN.

## 2024-07-12 NOTE — DISCHARGE INSTRUCTIONS
You received moderate sedation:  - Do not drive a car, or operate any machinery or power tools of any kind for 24 hours.  - Do not drink any alcoholic drinks for 24 hours.  - Do not take any over the counter medications that may cause drowsiness for 24 hours.  - Do not make any important decisions or sign any legal documents for 24 hours.  - You need to have a responsible adult accompany you home.  - You may resume your normal diet.  - We strongly suggest that a responsible adult be with you for the rest of the day and also during the night. This is for your protection and safety.     For questions related to your procedure:  Please call [phone] between the hours of 7:00am-5:00pm Monday through Friday.  Please call [phone] after 5:00pm and on weekends and holidays.     In the event of an emergency call [phone] or go to your nearest emergency room.

## 2024-07-12 NOTE — PRE-PROCEDURE NOTE
Interventional Radiology Preprocedure Note    Indication for procedure: The encounter diagnosis was Malignant neoplasm of central portion of left breast in female, estrogen receptor negative (Multi). Mediport Placement.     Relevant review of systems: NA    Relevant Labs:   Lab Results   Component Value Date    CREATININE 0.69 07/01/2024    EGFR >90 07/01/2024    INR 1.1 10/21/2020    PROTIME 12.5 10/21/2020       Planned Sedation/Anesthesia: Minimal    Airway assessment: normal    Directed physical examination:    Physical Exam  HENT:      Head: Normocephalic.   Pulmonary:      Effort: Pulmonary effort is normal.   Neurological:      Mental Status: She is alert.          Mallampati: II (hard and soft palate, upper portion of tonsils and uvula visible)    ASA Score: ASA 3 - Patient with moderate systemic disease with functional limitations    Benefits, risks and alternatives of procedure and planned sedation have been discussed with the patient and/or their representative. All questions answered and they agree to proceed.

## 2024-07-15 ENCOUNTER — DOCUMENTATION (OUTPATIENT)
Dept: HEMATOLOGY/ONCOLOGY | Facility: CLINIC | Age: 74
End: 2024-07-15

## 2024-07-15 ENCOUNTER — NUTRITION (OUTPATIENT)
Dept: HEMATOLOGY/ONCOLOGY | Facility: CLINIC | Age: 74
End: 2024-07-15
Payer: MEDICARE

## 2024-07-15 ENCOUNTER — SOCIAL WORK (OUTPATIENT)
Dept: HEMATOLOGY/ONCOLOGY | Facility: CLINIC | Age: 74
End: 2024-07-15
Payer: MEDICARE

## 2024-07-15 ENCOUNTER — LAB (OUTPATIENT)
Dept: LAB | Facility: CLINIC | Age: 74
End: 2024-07-15
Payer: MEDICARE

## 2024-07-15 ENCOUNTER — INFUSION (OUTPATIENT)
Dept: HEMATOLOGY/ONCOLOGY | Facility: CLINIC | Age: 74
End: 2024-07-15
Payer: MEDICARE

## 2024-07-15 VITALS
HEART RATE: 93 BPM | RESPIRATION RATE: 16 BRPM | TEMPERATURE: 97.5 F | DIASTOLIC BLOOD PRESSURE: 75 MMHG | WEIGHT: 181 LBS | SYSTOLIC BLOOD PRESSURE: 129 MMHG | BODY MASS INDEX: 35.96 KG/M2 | OXYGEN SATURATION: 95 %

## 2024-07-15 DIAGNOSIS — Z17.1 MALIGNANT NEOPLASM OF CENTRAL PORTION OF LEFT BREAST IN FEMALE, ESTROGEN RECEPTOR NEGATIVE (MULTI): ICD-10-CM

## 2024-07-15 DIAGNOSIS — C50.911 MALIGNANT NEOPLASM OF BOTH BREASTS IN FEMALE, ESTROGEN RECEPTOR NEGATIVE, UNSPECIFIED SITE OF BREAST (MULTI): ICD-10-CM

## 2024-07-15 DIAGNOSIS — C50.912 MALIGNANT NEOPLASM OF BOTH BREASTS IN FEMALE, ESTROGEN RECEPTOR NEGATIVE, UNSPECIFIED SITE OF BREAST (MULTI): ICD-10-CM

## 2024-07-15 DIAGNOSIS — C50.112 MALIGNANT NEOPLASM OF CENTRAL PORTION OF LEFT BREAST IN FEMALE, ESTROGEN RECEPTOR NEGATIVE (MULTI): ICD-10-CM

## 2024-07-15 DIAGNOSIS — Z17.1 MALIGNANT NEOPLASM OF BOTH BREASTS IN FEMALE, ESTROGEN RECEPTOR NEGATIVE, UNSPECIFIED SITE OF BREAST (MULTI): ICD-10-CM

## 2024-07-15 LAB
ALBUMIN SERPL BCP-MCNC: 3.7 G/DL (ref 3.4–5)
ALP SERPL-CCNC: 84 U/L (ref 33–136)
ALT SERPL W P-5'-P-CCNC: 12 U/L (ref 7–45)
ANION GAP SERPL CALC-SCNC: 14 MMOL/L (ref 10–20)
AST SERPL W P-5'-P-CCNC: 16 U/L (ref 9–39)
BASOPHILS # BLD AUTO: 0.03 X10*3/UL (ref 0–0.1)
BASOPHILS NFR BLD AUTO: 0.4 %
BILIRUB SERPL-MCNC: 0.2 MG/DL (ref 0–1.2)
BUN SERPL-MCNC: 27 MG/DL (ref 6–23)
CALCIUM SERPL-MCNC: 8.8 MG/DL (ref 8.6–10.6)
CHLORIDE SERPL-SCNC: 98 MMOL/L (ref 98–107)
CO2 SERPL-SCNC: 26 MMOL/L (ref 21–32)
CREAT SERPL-MCNC: 1.13 MG/DL (ref 0.5–1.05)
EGFRCR SERPLBLD CKD-EPI 2021: 52 ML/MIN/1.73M*2
EOSINOPHIL # BLD AUTO: 0.33 X10*3/UL (ref 0–0.4)
EOSINOPHIL NFR BLD AUTO: 4.5 %
ERYTHROCYTE [DISTWIDTH] IN BLOOD BY AUTOMATED COUNT: 14.2 % (ref 11.5–14.5)
GLUCOSE SERPL-MCNC: 102 MG/DL (ref 74–99)
HBV CORE AB SER QL: NONREACTIVE
HBV SURFACE AB SER-ACNC: <3.1 MIU/ML
HBV SURFACE AG SERPL QL IA: NONREACTIVE
HCT VFR BLD AUTO: 30 % (ref 36–46)
HGB BLD-MCNC: 9.5 G/DL (ref 12–16)
IMM GRANULOCYTES # BLD AUTO: 0.01 X10*3/UL (ref 0–0.5)
IMM GRANULOCYTES NFR BLD AUTO: 0.1 % (ref 0–0.9)
LYMPHOCYTES # BLD AUTO: 1.7 X10*3/UL (ref 0.8–3)
LYMPHOCYTES NFR BLD AUTO: 23.4 %
MCH RBC QN AUTO: 30.5 PG (ref 26–34)
MCHC RBC AUTO-ENTMCNC: 31.7 G/DL (ref 32–36)
MCV RBC AUTO: 97 FL (ref 80–100)
MONOCYTES # BLD AUTO: 0.63 X10*3/UL (ref 0.05–0.8)
MONOCYTES NFR BLD AUTO: 8.7 %
NEUTROPHILS # BLD AUTO: 4.56 X10*3/UL (ref 1.6–5.5)
NEUTROPHILS NFR BLD AUTO: 62.9 %
NRBC BLD-RTO: ABNORMAL /100{WBCS}
PLATELET # BLD AUTO: 369 X10*3/UL (ref 150–450)
POTASSIUM SERPL-SCNC: 4.3 MMOL/L (ref 3.5–5.3)
PROT SERPL-MCNC: 6.4 G/DL (ref 6.4–8.2)
RBC # BLD AUTO: 3.11 X10*6/UL (ref 4–5.2)
SODIUM SERPL-SCNC: 134 MMOL/L (ref 136–145)
WBC # BLD AUTO: 7.3 X10*3/UL (ref 4.4–11.3)

## 2024-07-15 PROCEDURE — 85025 COMPLETE CBC W/AUTO DIFF WBC: CPT

## 2024-07-15 PROCEDURE — 96375 TX/PRO/DX INJ NEW DRUG ADDON: CPT | Mod: INF

## 2024-07-15 PROCEDURE — 2500000004 HC RX 250 GENERAL PHARMACY W/ HCPCS (ALT 636 FOR OP/ED): Performed by: STUDENT IN AN ORGANIZED HEALTH CARE EDUCATION/TRAINING PROGRAM

## 2024-07-15 PROCEDURE — 96417 CHEMO IV INFUS EACH ADDL SEQ: CPT

## 2024-07-15 PROCEDURE — 86706 HEP B SURFACE ANTIBODY: CPT | Performed by: STUDENT IN AN ORGANIZED HEALTH CARE EDUCATION/TRAINING PROGRAM

## 2024-07-15 PROCEDURE — 96413 CHEMO IV INFUSION 1 HR: CPT

## 2024-07-15 PROCEDURE — 87340 HEPATITIS B SURFACE AG IA: CPT | Performed by: STUDENT IN AN ORGANIZED HEALTH CARE EDUCATION/TRAINING PROGRAM

## 2024-07-15 PROCEDURE — 86704 HEP B CORE ANTIBODY TOTAL: CPT | Performed by: STUDENT IN AN ORGANIZED HEALTH CARE EDUCATION/TRAINING PROGRAM

## 2024-07-15 PROCEDURE — 84075 ASSAY ALKALINE PHOSPHATASE: CPT | Performed by: STUDENT IN AN ORGANIZED HEALTH CARE EDUCATION/TRAINING PROGRAM

## 2024-07-15 PROCEDURE — 2500000001 HC RX 250 WO HCPCS SELF ADMINISTERED DRUGS (ALT 637 FOR MEDICARE OP): Performed by: STUDENT IN AN ORGANIZED HEALTH CARE EDUCATION/TRAINING PROGRAM

## 2024-07-15 RX ORDER — FAMOTIDINE 10 MG/ML
20 INJECTION INTRAVENOUS ONCE
Status: COMPLETED | OUTPATIENT
Start: 2024-07-15 | End: 2024-07-15

## 2024-07-15 RX ORDER — HEPARIN SODIUM,PORCINE/PF 10 UNIT/ML
50 SYRINGE (ML) INTRAVENOUS AS NEEDED
Status: CANCELLED | OUTPATIENT
Start: 2024-07-15

## 2024-07-15 RX ORDER — HEPARIN 100 UNIT/ML
500 SYRINGE INTRAVENOUS AS NEEDED
Status: CANCELLED | OUTPATIENT
Start: 2024-07-15

## 2024-07-15 RX ORDER — DIPHENHYDRAMINE HYDROCHLORIDE 50 MG/ML
50 INJECTION INTRAMUSCULAR; INTRAVENOUS AS NEEDED
Status: DISCONTINUED | OUTPATIENT
Start: 2024-07-15 | End: 2024-07-15 | Stop reason: HOSPADM

## 2024-07-15 RX ORDER — DIPHENHYDRAMINE HCL 50 MG
50 CAPSULE ORAL ONCE
Status: COMPLETED | OUTPATIENT
Start: 2024-07-15 | End: 2024-07-15

## 2024-07-15 RX ORDER — EPINEPHRINE 0.3 MG/.3ML
0.3 INJECTION SUBCUTANEOUS EVERY 5 MIN PRN
Status: DISCONTINUED | OUTPATIENT
Start: 2024-07-15 | End: 2024-07-15 | Stop reason: HOSPADM

## 2024-07-15 RX ORDER — ALBUTEROL SULFATE 0.83 MG/ML
3 SOLUTION RESPIRATORY (INHALATION) AS NEEDED
Status: DISCONTINUED | OUTPATIENT
Start: 2024-07-15 | End: 2024-07-15 | Stop reason: HOSPADM

## 2024-07-15 RX ORDER — HEPARIN SODIUM 1000 [USP'U]/ML
2000 INJECTION, SOLUTION INTRAVENOUS; SUBCUTANEOUS AS NEEDED
Status: CANCELLED | OUTPATIENT
Start: 2024-07-15

## 2024-07-15 RX ORDER — HEPARIN 100 UNIT/ML
500 SYRINGE INTRAVENOUS AS NEEDED
Status: DISCONTINUED | OUTPATIENT
Start: 2024-07-15 | End: 2024-07-15 | Stop reason: HOSPADM

## 2024-07-15 RX ORDER — FAMOTIDINE 10 MG/ML
20 INJECTION INTRAVENOUS ONCE AS NEEDED
Status: DISCONTINUED | OUTPATIENT
Start: 2024-07-15 | End: 2024-07-15 | Stop reason: HOSPADM

## 2024-07-15 RX ORDER — PROCHLORPERAZINE MALEATE 10 MG
10 TABLET ORAL EVERY 6 HOURS PRN
Status: DISCONTINUED | OUTPATIENT
Start: 2024-07-15 | End: 2024-07-15 | Stop reason: HOSPADM

## 2024-07-15 RX ORDER — PROCHLORPERAZINE EDISYLATE 5 MG/ML
10 INJECTION INTRAMUSCULAR; INTRAVENOUS EVERY 6 HOURS PRN
Status: DISCONTINUED | OUTPATIENT
Start: 2024-07-15 | End: 2024-07-15 | Stop reason: HOSPADM

## 2024-07-15 ASSESSMENT — PAIN SCALES - GENERAL: PAINLEVEL: 0-NO PAIN

## 2024-07-15 NOTE — PROGRESS NOTES
Patient is here today for first dose Paclitaxel/Trastuzumab infusions -   Independent double check done prior to infusions today- PI sheets given on medications, possible side effects and safe handling of body fluids for 7 days post chemo treatment. Discussed information with patient and her son thru out the day.  b/h/ lung sounds not auscultated  Patient tolerated infusions well.  No complaints. Call back instructions reviewed.    Patient verbalizes understanding of plan of care.  Ambulated off unit without difficulty using cane, slow/steady gait.  Accompanied by son.

## 2024-07-15 NOTE — PROGRESS NOTES
PSYCHOSOCIAL ASSESSMENT     Demographic Information  Raiza Nguyễn  1950  [id number]  Assessment Type:  Initial Psychosocial Assessment Social Work  Date of assessment: 07/15/24  Provider(s): Joanna Bourne MD, Shoshana Olivarez DO  Diagnosis: Breast Cancer  Person(s) present during assessment: The pt and her son Sathya  Primary language: English  Interpretive services used: N/A                   Living Environment/Support Systems  Partner Status:   Children: 2 grown children in their 40s  Support systems: Her children and friends  Primary caregiver: Self  Current Living Situation: House Own  Resides with: Lives alone  Concerns with Housing Environment: Accessibility, The pt would like to get assistance, if available, to help turn her bathtub into a standing shower only.   Comments: The SW will look and ask about assistance to change her tub into a shower.      Safety  Safety at Home: No concerns reported  History of Domestic Violence: None reported      Functional Status   Functional status: Minimum Assistance  Patient currently ambulates: With Aides: Cane  Patient has following equipment: None  Other physical health issues that the patient is experiencing: None reported  What supports are in place to assist the patient: None  Transportation:  Self  Comments:         Finances/Insurance  Insurance: Medicare/[place] Medicare Suppliment  Does the patient have any pending insurance applications: No   Hospital Financial Assistance: None  Patient's income source: SSI/SSDI  Work History: The pt reports she previously was a  and retired in 2022.   History: No  Background  Food Insecurity: Yes, The pt reports she plans to call Meals on Wheels to get this set up soon.  Does the patient have any financial concerns: Yes, The pt reports she could use financial assistance with home repairs.   Any difficulties affording medications? No   Applicable [place]: No   Comments:       Advance Directives  Has Advance Directives on file  Health Care Agent Status:Not Activated  Health Care Agent, When applicable: N/A  Comments:    Legal Involvement  Relevant current or previous legal concerns: None Reported     Roman Catholic or Spiritual Identity  Comments: None reported    Mental Health  Active SI/HI: No  Mental Health Diagnosis, if applicable: N/A  Mood: The pt appears to be in good spirits at this time. She reports she went to counseling in the past.   Concerns relating to substance use (including alcohol/tobacco): None  Patient identified coping skills: Spending time with family and friends  Learning Preferences: Visual  Cognitive Comments: N/A      Assessment  Potential Barriers to Care:  Financial Concerns  Patient Strengths:  Strong Support System    Plan   Referrals: DME  Applications:  SW seeking assistance for either shower chair or assistance in changing tub into shower.  Other: N/A    Narrative: This pt is a 73 year old female who is coming to her first treatment today. The SW alerted the financial navigator as well for any financial support. The pt's youngest son (Sathya) was with her today for support.   *The pt reports she has friends and family for support. She reports she lives alone and could use a shower chair or assistance in getting her tub turned into a stand alone shower. The SW will look into this.   **The pt reports she retired in 2022 from being a , is , and has a living will/POA on file.

## 2024-07-15 NOTE — PROGRESS NOTES
NUTRITION Assessment NOTE    Nutrition Assessment       Reason for Visit:  Raiza Nguyễn is a 73 y.o. female with invasive ductal carcinoma, grade 3. There is high-grade DCIS present  (6/2024)   -s/p bilateral completion mastectomies.  -BRCA2 positive   Note:  H/o of breast cancer (initial dx 2003, recurrence 2018)   -Starting treatment with Taxol/herceptin 7/15/24      Met with patient today in infusion.  She is here for 1st chemo cycle.     Lab Results   Component Value Date/Time    GLUCOSE 108 (H) 07/01/2024 1349     07/01/2024 1349    K 4.5 07/01/2024 1349     07/01/2024 1349    CO2 29 07/01/2024 1349    ANIONGAP 14 07/01/2024 1349    BUN 16 07/01/2024 1349    CREATININE 0.69 07/01/2024 1349    EGFR >90 07/01/2024 1349    CALCIUM 9.1 07/01/2024 1349    ALBUMIN 3.7 07/01/2024 1349    ALKPHOS 77 07/01/2024 1349    PROT 6.3 (L) 07/01/2024 1349    AST 19 07/01/2024 1349    BILITOT 0.2 07/01/2024 1349    ALT 15 07/01/2024 1349     Lab Results   Component Value Date/Time    VITD25 89 02/22/2022 0927       Anthropometrics:  Anthropometrics  Height:  (151.1 cm)  Weight:  (82.1 kg)  IBW/kg (Dietitian Calculated):  (44.5 kg)  Percent of IBW:  (184%)  Adjusted Body Weight (kg):  (52.8 kg)  Weight Change  Significant Weight Loss: No        Wt Readings from Last 10 Encounters:   07/15/24 82.1 kg (181 lb)   07/01/24 83.4 kg (183 lb 14.4 oz)   06/20/24 88.5 kg (195 lb)   06/19/24 81 kg (178 lb 9.2 oz)   06/17/24 83.9 kg (185 lb)   06/12/24 83.9 kg (185 lb)   06/05/24 83.6 kg (184 lb 4.9 oz)   05/29/24 84 kg (185 lb 3 oz)   05/22/24 84 kg (185 lb 1.6 oz)   05/16/24 83.5 kg (184 lb)        Food And Nutrient Intake:  Food and Nutrient History  Food and Nutrient History: Patient reports good appetite and oral intake.  States she does best with small frequent meals/snacks and grazing throughout the day . Difficulty in consuming large portion sizes at one sitting.  States she has received chemotherapy in the past and  "remembers that smells were bothersome to her. She currently takes a one-a-day MVI and Calcium.  She also supplements with Premier protein shakes prn. Fluid intake is good.  Weight has remailed relatively stable.  No other specific nutrition questions/concerns at this time.  Energy Intake: Good > 75 %  Fluid Intake: Good  GI Symptoms: none           Micronutrient Intake  Vitamin Intake: Multivitamin  Mineral/Element Intake: Calcium       Nutrition Focused Physical Exam Findings:      Subcutaneous Fat Loss  Orbital Fat Pads: Well nourished (slightly bulging fat pads)  Buccal Fat Pads: Well nourished (full, rounded cheeks)    Muscle Wasting  Temporalis: Well nourished (well-defined muscle)      Energy Needs  Calculated Energy Needs Using Equations  Height:  (151.1 cm)  Estimated Energy Needs  Total Energy Estimated Needs (kCal):  (3145-9840)  Estimated Protein Needs  Total Protein Estimated Needs (g):  (65-80 g)        Nutrition Diagnosis   Malnutrition Diagnosis  Patient has Malnutrition Diagnosis: No    Nutrition Diagnosis  Patient has Nutrition Diagnosis: Yes  Diagnosis Status (1): New  Nutrition Diagnosis 1: Increased nutrient needs ((protein needs))  Related to (1): chemothe  As Evidenced by (1): increased metabolic demands    Nutrition Interventions/Recommendations   Nutrition Prescription  Individualized Nutrition Prescription Provided for : 64 oz non-caffeinated fluids; High protein; small meals/snacks as tolerated ; General healthy diet (if no GI symptoms present)    Food and Nutrition Delivery  Food and Nutrition Delivery  Meals & Snacks: Protein-modified diet    Nutrition Education     Provided \"Eating Well During Cancer Treatment\" ([facility] PI Sheet) and reviewed fluid intake, healthy eating if not experiencing GI symptoms, and adequate protein intake   Discouraged high dose anti-oxidant supplements (I.e vit C, E, A) with chemo.     Coordination of Care  Coordination of Nutrition Care by a Nutrition " Professional  Collaboration and referral of nutrition care: Collaboration by nutrition professional with other nutrition professionals    There are no Patient Instructions on file for this visit.    Nutrition Monitoring and Evaluation   Food/Nutrient Related History Monitoring  Monitoring and Evaluation Plan: Vitamin intake, Protein intake  Estimated protein intake: Estimated protein intake  Criteria: Meet >75% pro needs  Vitamin Intake: Measured vitamin intake  Criteria: avoid high dose antioxidants          Time Spent  Prep time on day of patient encounter: 10 minutes  Time spent directly with patient, family or caregiver: 5 minutes  Additional Time Spent on Patient Care Activities: 0 minutes  Documentation Time: 30 minutes  Other Time Spent: 0 minutes  Total: 45 minutes          Level of Understanding : Good

## 2024-07-15 NOTE — PROGRESS NOTES
Introductory Visit: [id number]  Identified patient via name and date of birth. Introduced Integrative Oncology Services including palliative massage/reiki/relaxation and stress management tools, self-care tools, and creating community and storytelling to patient and son Bill. Provider supported patient's decision and autonomy in choosing what services she will receive in relation to her oncology journey. Patient has provider's business card with phone number and email address to contact provider with questions or concerns about the plan of care.     Would like to experience Reiki next week  Will follow up during her infusion session next Monday

## 2024-07-16 ENCOUNTER — SOCIAL WORK (OUTPATIENT)
Dept: HEMATOLOGY/ONCOLOGY | Facility: CLINIC | Age: 74
End: 2024-07-16
Payer: MEDICARE

## 2024-07-16 NOTE — PROGRESS NOTES
MINI called this pt to make her aware of services provided by [facility] for Older Adults. The pt is wanting a shower chair for her bathtub. The pt also reported she would like to set up meals on wheels. The Older Adults office can set that up for her. The shower chair can be done through a doctors prescription or through: [employer] [phone], [employer], or [employer] (senior wishes) through Older adults office which gives each person $150 a year for needs.   **The SW will meet with this pt on 7/22 to give her paperwork for Older Adult services.

## 2024-07-17 ENCOUNTER — OFFICE VISIT (OUTPATIENT)
Dept: HEMATOLOGY/ONCOLOGY | Facility: HOSPITAL | Age: 74
End: 2024-07-17
Payer: MEDICARE

## 2024-07-17 ENCOUNTER — NURSE TRIAGE (OUTPATIENT)
Dept: ADMISSION | Facility: HOSPITAL | Age: 74
End: 2024-07-17
Payer: MEDICARE

## 2024-07-17 VITALS
SYSTOLIC BLOOD PRESSURE: 133 MMHG | DIASTOLIC BLOOD PRESSURE: 61 MMHG | OXYGEN SATURATION: 100 % | WEIGHT: 180.34 LBS | RESPIRATION RATE: 18 BRPM | BODY MASS INDEX: 35.83 KG/M2 | TEMPERATURE: 96.8 F | HEART RATE: 86 BPM

## 2024-07-17 DIAGNOSIS — L03.011 PARONYCHIA OF FINGER OF RIGHT HAND: Primary | ICD-10-CM

## 2024-07-17 PROCEDURE — 3044F HG A1C LEVEL LT 7.0%: CPT

## 2024-07-17 PROCEDURE — 99212 OFFICE O/P EST SF 10 MIN: CPT

## 2024-07-17 PROCEDURE — 4010F ACE/ARB THERAPY RXD/TAKEN: CPT

## 2024-07-17 PROCEDURE — 3078F DIAST BP <80 MM HG: CPT

## 2024-07-17 PROCEDURE — 3048F LDL-C <100 MG/DL: CPT

## 2024-07-17 PROCEDURE — 1157F ADVNC CARE PLAN IN RCRD: CPT

## 2024-07-17 PROCEDURE — 3075F SYST BP GE 130 - 139MM HG: CPT

## 2024-07-17 ASSESSMENT — ENCOUNTER SYMPTOMS
CHILLS: 0
FEVER: 0
VOMITING: 0
NAUSEA: 0

## 2024-07-17 NOTE — TELEPHONE ENCOUNTER
Patient called and states she has a possible infected hangnail. Right hand, ring finger is red and inflamed around edge of nail with mild swelling.  No fever.  Pain 5/10.  Redness/swelling started 3 days ago. Attempted neosporin, soaking it in salt water, boiled warm water.  Took 1-500mg amoxicillin tablet that patient had at home for dental work. Called PCP unable to see patient and was advised to go to ED. Patient calling to see if Dr. Fuller has any other recommendations or should patient go to ED.  Plan for C3 treatment on 7/22/24.     Additional Information   Commented on: When was the last time you took your temperature? What was it?     Afebrile    Protocols used: Fever/Chills/Infection

## 2024-07-17 NOTE — PROGRESS NOTES
[facility]  Acute Care Clinic    Patient: Raiza Nguyễn  MRN: [id number]  Date of visit: 24  Visit type: In-person clinic visit  Clinician: Susi Horan PA-C    Oncological & Treatment History:  Oncology History   Malignant neoplasm of central portion of left breast in female, estrogen receptor negative (Multi)   2024 Initial Diagnosis    Malignant neoplasm of central portion of left breast in female, estrogen receptor negative (Multi)     2024 Cancer Staged    Staging form: Breast, AJCC 8th Edition, Clinical: Stage IA (cT1, cN0, cM0, G3, ER-, OK-, HER2+) - Signed by Jeniffer Langley DO on 2024     7/15/2024 -  Chemotherapy    PACLitaxel + Trastuzumab, Weekly Followed by Trastuzumab - Breast           History of Present Illness:  24: Raiza Nguyễn presents to acute care clinic today for a possible infection of her right ring finger. She states she picked at dry skin in the lateral nail fold 3 days ago, then developed swelling and erythema to the area. Patient applied Neosporin and warm soaks yesterday, after which the area improved. States it is much less swollen and red today. Only hurts if she pushes the area. Denies drainage, fever, chills, numbness, and decreased ROM. Notes she took one 500 mg amoxicillin tablet yesterday that she had leftover from previous Rx. States she tried getting in with her PCP, but they couldn't see her today and advised her to go to the ED.    Review of Systems:  Review of Systems   Constitutional:  Negative for chills and fever.   Gastrointestinal:  Negative for nausea and vomiting.       Social History:  Social History     Tobacco Use    Smoking status: Former     Current packs/day: 0.00     Average packs/day: 1.7 packs/day for 47.5 years (80.0 ttl pk-yrs)     Types: Cigarettes     Start date: 1968     Quit date: 1983     Years since quittin.5     Passive exposure: Past    Smokeless tobacco: Never    Vaping Use    Vaping status: Never Used   Substance Use Topics    Alcohol use: Not Currently     Alcohol/week: 1.0 standard drink of alcohol     Types: 1 Glasses of wine per week    Drug use: Never       Past Medical History:  Past Medical History:  02/26/2024: Amnesia  No date: Anxiety  No date: Arthritis  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  2002: Breast cancer (Multi)  No date: Cancer (Multi)  No date: Chronic pain disorder  2016: Colon polyp  No date: Coronary artery disease  No date: Depression  No date: Diabetes mellitus (Multi)  No date: Disease of thyroid gland  2022: Diverticulitis of colon  2019: Diverticulosis  No date: GERD (gastroesophageal reflux disease)  No date: Heart disease  No date: Heart failure (Multi)  No date: Hyperlipidemia  11/30/2021: Hypersomnia, unspecified      Comment:  Hypersomnolence disorder, persistent  No date: Hypertension  No date: Hypothyroid  01/18/2023: Lumbar disc herniation      Comment:  History of  2016: Melanoma (Multi)  No date: Osteoporosis  No date: Other conditions influencing health status      Comment:  Breast Cancer  06/27/2014: Other intervertebral disc degeneration, lumbar region      Comment:  Disc degeneration, lumbar  06/27/2014: Other intervertebral disc displacement, lumbar region      Comment:  Lumbar disc herniation  10/16/2019: Personal history of malignant melanoma of skin      Comment:  History of malignant melanoma of skin  No date: Personal history of other diseases of the digestive system      Comment:  History of esophageal reflux  02/20/2022: Personal history of other endocrine, nutritional and   metabolic disease      Comment:  History of vitamin D deficiency  No date: Personal history of other endocrine, nutritional and   metabolic disease      Comment:  History of hyperglycemia  2002: PONV (postoperative nausea and vomiting)      Comment:  They give me a shot to prevent vomiting  No date: Sleep apnea  2004: Thrombosis      Comment:  from mediport to  SVC  No date: Thyrotoxicosis, unspecified without thyrotoxic crisis or   storm      Comment:  Hyperthyroidism  No date: Type 2 diabetes mellitus (Multi)    Past Surgical History:  Past Surgical History:  No date: APPENDECTOMY  2010: BREAST BIOPSY  07/17/2013: BREAST LUMPECTOMY      Comment:  Breast Surgery Lumpectomy  10/03/2017: CHOLECYSTECTOMY      Comment:  Cholecystectomy Laparoscopic  No date: COLONOSCOPY  2021: JOINT REPLACEMENT  2004: LYMPH NODE BIOPSY  06/12/2024: MASTECTOMY COMPLETE / SIMPLE; Bilateral      Comment:  Procedure: BILATERAL SIMPLE MASTECTOMY;  Surgeon: Jeniffer Langley DO;  Location: [facility] OR;  Service: General                Surgery Oncology;  Laterality: Bilateral;  05/14/2021: OTHER SURGICAL HISTORY      Comment:  Knee replacement  07/17/2013: OVARY SURGERY      Comment:  Ovarian Surgery  No date: SKIN CANCER EXCISION  No date: SMALL INTESTINE SURGERY    Family History:  family history includes Alcohol abuse in her mother; Breast cancer in her mother; Cancer in her father, mother, mother's brother, and sister; Colon cancer in her mother's brother and another family member; Diabetes in her maternal grandmother; Lung cancer in her father; Ovarian cysts in her maternal grandmother; Stomach cancer in her maternal cousin and another family member.    Medications:  Current Outpatient Medications   Medication Instructions    aspirin 81 mg EC tablet 1 tablet, oral, Every evening    blood sugar diagnostic strip 90 strips, miscellaneous, Daily    calcium carbonate 600 mg, oral, Daily    DULoxetine (CYMBALTA) 30 mg, oral, 2 times daily    esomeprazole (NEXIUM) 20 mg, oral, 2 times daily, Do not open capsule.    gabapentin (NEURONTIN) 600 mg, oral, 3 times daily    levothyroxine (Synthroid, Levoxyl) 125 mcg tablet 1 tablet, oral, Daily    lisinopril 10 mg, oral, 2 times daily    metFORMIN XR (GLUCOPHAGE-XR) 500 mg, oral, Daily with breakfast, Do not crush, chew, or split.    Mounjaro 2.5  mg, subcutaneous, Once Weekly    ondansetron (Zofran) 8 mg tablet 1/2 to 1 tab 3 x daily as needed for nausea    ondansetron (ZOFRAN) 8 mg, oral, Every 8 hours PRN    oxyCODONE (ROXICODONE) 5 mg, oral, Every 6 hours PRN    prochlorperazine (COMPAZINE) 10 mg, oral, Every 6 hours PRN    rosuvastatin (CRESTOR) 5 mg, oral, Daily    traMADol (Ultram) 50 mg tablet 1 tablet, oral, 2 times daily PRN          Vitals: /61 (BP Location: Right arm, Patient Position: Sitting, BP Cuff Size: Adult)   Pulse 86   Temp 36 °C (96.8 °F) (Temporal)   Resp 18   Wt 81.8 kg (180 lb 5.4 oz)   SpO2 100%   BMI 35.83 kg/m²     Physical Exam  Vitals reviewed.   Constitutional:       General: She is not in acute distress.     Appearance: She is not toxic-appearing.   Pulmonary:      Effort: Pulmonary effort is normal. No respiratory distress.   Musculoskeletal:         General: No deformity. Normal range of motion.      Cervical back: Normal range of motion and neck supple.   Skin:     General: Skin is warm and dry.      Comments: Small healing wound to lateral fold of right ring finger with small amount of surrounding erythema, no swelling, no drainage   Neurological:      Mental Status: She is alert and oriented to person, place, and time.       Assessment & Plan:  Raiza Nguyễn is a 73 y.o. female with breast cancer who was seen today for acute mild paronychia of the right ring finger. Symptoms reportedly worse yesterday, definitely appears to be improving/healing today. No evidence of felon, onychomycosis, or other finger or nail abnormality. Advised patient to continue current regimen, but to notify PCP/primary oncologist if symptoms worsen again / if she develops severe pain with erythema, swelling, and/or drainage. Patient amenable to plan.    1. Acute paronychia, improving  Continue Neosporin and warm soaks  Advised to not pick the skin around nails to prevent similar issues

## 2024-07-17 NOTE — TELEPHONE ENCOUNTER
Secure chat sent to Dr. Fuller - recommended [facility] if available.  Per Susi Horan CNP - patient can be seen in [facility] at 12pm.  RN called patient - agreeable to go to [facility] today - team notified.

## 2024-07-18 ENCOUNTER — NURSE TRIAGE (OUTPATIENT)
Dept: HEMATOLOGY/ONCOLOGY | Facility: CLINIC | Age: 74
End: 2024-07-18
Payer: MEDICARE

## 2024-07-18 NOTE — PATIENT COMMUNICATION
Pt called- reporting starting yesterday AM she developed full body aches. She said it feels like when your body aches from the flu. Occasionally she will get sharp/shooting pains in her knees/ankles but these are intermittent. She said when she is up moving around she doesn't notice it as much. Reports always having some leg weakness (uses a caldera & has a walker at home also) but nothing more severe currently. She is taking a stool softener for some mild constipation but reports no issues with bowels/urine. Tylenol gives her restless leg issues so she avoids that. She takes Gabapentin but otherwise hasn't taken anything else. Denies chest pain, fever, SOB. She would like Dr. Bourne's recommendations.

## 2024-07-18 NOTE — TELEPHONE ENCOUNTER
Raiza called nurse line to speak to Saadia regarding her call.  I told her Saadia is still waiting to hear back from the physician and when she does she will call her back. Raiza agreed to plan.

## 2024-07-18 NOTE — TELEPHONE ENCOUNTER
Pt scheduled in [facility] tomorrow at 10am. She said she spoke to her pain medicine doctor also who increased her Gabapentin dose and added Tramadol so she is going to try that as well (pt said she has chronic neck/back issues so they manage that). However, she is agreeable to be seen tomorrow in [facility]. Message sent to [facility] letting them know to cancel her port draw tomorrow at 11am.

## 2024-07-19 ENCOUNTER — APPOINTMENT (OUTPATIENT)
Dept: HEMATOLOGY/ONCOLOGY | Facility: CLINIC | Age: 74
End: 2024-07-19
Payer: MEDICARE

## 2024-07-19 ENCOUNTER — OFFICE VISIT (OUTPATIENT)
Dept: HEMATOLOGY/ONCOLOGY | Facility: HOSPITAL | Age: 74
End: 2024-07-19
Payer: MEDICARE

## 2024-07-19 VITALS
BODY MASS INDEX: 35.74 KG/M2 | HEART RATE: 84 BPM | WEIGHT: 179.9 LBS | RESPIRATION RATE: 18 BRPM | DIASTOLIC BLOOD PRESSURE: 70 MMHG | OXYGEN SATURATION: 98 % | SYSTOLIC BLOOD PRESSURE: 117 MMHG | TEMPERATURE: 97 F

## 2024-07-19 DIAGNOSIS — C50.912 MALIGNANT NEOPLASM OF BOTH BREASTS IN FEMALE, ESTROGEN RECEPTOR NEGATIVE, UNSPECIFIED SITE OF BREAST (MULTI): ICD-10-CM

## 2024-07-19 DIAGNOSIS — Z17.1 MALIGNANT NEOPLASM OF BOTH BREASTS IN FEMALE, ESTROGEN RECEPTOR NEGATIVE, UNSPECIFIED SITE OF BREAST (MULTI): ICD-10-CM

## 2024-07-19 DIAGNOSIS — C50.112 MALIGNANT NEOPLASM OF CENTRAL PORTION OF LEFT BREAST IN FEMALE, ESTROGEN RECEPTOR NEGATIVE (MULTI): Primary | ICD-10-CM

## 2024-07-19 DIAGNOSIS — C50.911 MALIGNANT NEOPLASM OF BOTH BREASTS IN FEMALE, ESTROGEN RECEPTOR NEGATIVE, UNSPECIFIED SITE OF BREAST (MULTI): ICD-10-CM

## 2024-07-19 DIAGNOSIS — R52 ACUTE PAIN: ICD-10-CM

## 2024-07-19 DIAGNOSIS — Z17.1 MALIGNANT NEOPLASM OF CENTRAL PORTION OF LEFT BREAST IN FEMALE, ESTROGEN RECEPTOR NEGATIVE (MULTI): Primary | ICD-10-CM

## 2024-07-19 LAB
ALBUMIN SERPL BCP-MCNC: 3.9 G/DL (ref 3.4–5)
ALP SERPL-CCNC: 74 U/L (ref 33–136)
ALT SERPL W P-5'-P-CCNC: 26 U/L (ref 7–45)
ANION GAP SERPL CALC-SCNC: 15 MMOL/L (ref 10–20)
AST SERPL W P-5'-P-CCNC: 25 U/L (ref 9–39)
BASOPHILS # BLD AUTO: 0.02 X10*3/UL (ref 0–0.1)
BASOPHILS NFR BLD AUTO: 0.4 %
BILIRUB SERPL-MCNC: 0.3 MG/DL (ref 0–1.2)
BUN SERPL-MCNC: 25 MG/DL (ref 6–23)
CALCIUM SERPL-MCNC: 9.1 MG/DL (ref 8.6–10.3)
CHLORIDE SERPL-SCNC: 97 MMOL/L (ref 98–107)
CO2 SERPL-SCNC: 29 MMOL/L (ref 21–32)
CREAT SERPL-MCNC: 0.76 MG/DL (ref 0.5–1.05)
EGFRCR SERPLBLD CKD-EPI 2021: 84 ML/MIN/1.73M*2
EOSINOPHIL # BLD AUTO: 0.13 X10*3/UL (ref 0–0.4)
EOSINOPHIL NFR BLD AUTO: 2.8 %
ERYTHROCYTE [DISTWIDTH] IN BLOOD BY AUTOMATED COUNT: 14.6 % (ref 11.5–14.5)
GLUCOSE SERPL-MCNC: 106 MG/DL (ref 74–99)
HCT VFR BLD AUTO: 30.7 % (ref 36–46)
HGB BLD-MCNC: 10 G/DL (ref 12–16)
IMM GRANULOCYTES # BLD AUTO: 0.02 X10*3/UL (ref 0–0.5)
IMM GRANULOCYTES NFR BLD AUTO: 0.4 % (ref 0–0.9)
LYMPHOCYTES # BLD AUTO: 1.14 X10*3/UL (ref 0.8–3)
LYMPHOCYTES NFR BLD AUTO: 24.4 %
MAGNESIUM SERPL-MCNC: 1.79 MG/DL (ref 1.6–2.4)
MCH RBC QN AUTO: 30.2 PG (ref 26–34)
MCHC RBC AUTO-ENTMCNC: 32.6 G/DL (ref 32–36)
MCV RBC AUTO: 93 FL (ref 80–100)
MONOCYTES # BLD AUTO: 0.23 X10*3/UL (ref 0.05–0.8)
MONOCYTES NFR BLD AUTO: 4.9 %
NEUTROPHILS # BLD AUTO: 3.13 X10*3/UL (ref 1.6–5.5)
NEUTROPHILS NFR BLD AUTO: 67.1 %
NRBC BLD-RTO: 0 /100 WBCS (ref 0–0)
PLATELET # BLD AUTO: 301 X10*3/UL (ref 150–450)
POTASSIUM SERPL-SCNC: 3.7 MMOL/L (ref 3.5–5.3)
PROT SERPL-MCNC: 6.9 G/DL (ref 6.4–8.2)
RBC # BLD AUTO: 3.31 X10*6/UL (ref 4–5.2)
SODIUM SERPL-SCNC: 137 MMOL/L (ref 136–145)
WBC # BLD AUTO: 4.7 X10*3/UL (ref 4.4–11.3)

## 2024-07-19 PROCEDURE — 99215 OFFICE O/P EST HI 40 MIN: CPT | Performed by: NURSE PRACTITIONER

## 2024-07-19 PROCEDURE — 2500000004 HC RX 250 GENERAL PHARMACY W/ HCPCS (ALT 636 FOR OP/ED): Performed by: NURSE PRACTITIONER

## 2024-07-19 PROCEDURE — 96360 HYDRATION IV INFUSION INIT: CPT | Mod: INF

## 2024-07-19 PROCEDURE — 85025 COMPLETE CBC W/AUTO DIFF WBC: CPT | Performed by: NURSE PRACTITIONER

## 2024-07-19 PROCEDURE — 3044F HG A1C LEVEL LT 7.0%: CPT | Performed by: NURSE PRACTITIONER

## 2024-07-19 PROCEDURE — 1157F ADVNC CARE PLAN IN RCRD: CPT | Performed by: NURSE PRACTITIONER

## 2024-07-19 PROCEDURE — 2500000004 HC RX 250 GENERAL PHARMACY W/ HCPCS (ALT 636 FOR OP/ED): Performed by: STUDENT IN AN ORGANIZED HEALTH CARE EDUCATION/TRAINING PROGRAM

## 2024-07-19 PROCEDURE — 3048F LDL-C <100 MG/DL: CPT | Performed by: NURSE PRACTITIONER

## 2024-07-19 PROCEDURE — 2500000001 HC RX 250 WO HCPCS SELF ADMINISTERED DRUGS (ALT 637 FOR MEDICARE OP): Performed by: NURSE PRACTITIONER

## 2024-07-19 PROCEDURE — 3074F SYST BP LT 130 MM HG: CPT | Performed by: NURSE PRACTITIONER

## 2024-07-19 PROCEDURE — 83735 ASSAY OF MAGNESIUM: CPT | Performed by: NURSE PRACTITIONER

## 2024-07-19 PROCEDURE — 3078F DIAST BP <80 MM HG: CPT | Performed by: NURSE PRACTITIONER

## 2024-07-19 PROCEDURE — 84155 ASSAY OF PROTEIN SERUM: CPT | Performed by: NURSE PRACTITIONER

## 2024-07-19 PROCEDURE — 4010F ACE/ARB THERAPY RXD/TAKEN: CPT | Performed by: NURSE PRACTITIONER

## 2024-07-19 RX ORDER — HEPARIN SODIUM,PORCINE/PF 10 UNIT/ML
50 SYRINGE (ML) INTRAVENOUS AS NEEDED
Status: DISCONTINUED | OUTPATIENT
Start: 2024-07-19 | End: 2024-07-19 | Stop reason: HOSPADM

## 2024-07-19 RX ORDER — HEPARIN SODIUM 1000 [USP'U]/ML
2000 INJECTION, SOLUTION INTRAVENOUS; SUBCUTANEOUS AS NEEDED
OUTPATIENT
Start: 2024-07-19

## 2024-07-19 RX ORDER — TRAMADOL HYDROCHLORIDE 50 MG/1
50 TABLET ORAL ONCE
Status: COMPLETED | OUTPATIENT
Start: 2024-07-19 | End: 2024-07-19

## 2024-07-19 RX ORDER — HEPARIN SODIUM,PORCINE/PF 10 UNIT/ML
50 SYRINGE (ML) INTRAVENOUS AS NEEDED
OUTPATIENT
Start: 2024-07-19

## 2024-07-19 RX ORDER — HEPARIN 100 UNIT/ML
500 SYRINGE INTRAVENOUS AS NEEDED
OUTPATIENT
Start: 2024-07-19

## 2024-07-19 RX ORDER — HEPARIN 100 UNIT/ML
500 SYRINGE INTRAVENOUS AS NEEDED
Status: DISCONTINUED | OUTPATIENT
Start: 2024-07-19 | End: 2024-07-19 | Stop reason: HOSPADM

## 2024-07-19 RX ORDER — HEPARIN SODIUM 1000 [USP'U]/ML
2000 INJECTION, SOLUTION INTRAVENOUS; SUBCUTANEOUS AS NEEDED
Status: DISCONTINUED | OUTPATIENT
Start: 2024-07-19 | End: 2024-07-19 | Stop reason: HOSPADM

## 2024-07-19 ASSESSMENT — PAIN DESCRIPTION - LOCATION: LOCATION: GENERALIZED

## 2024-07-19 ASSESSMENT — PAIN SCALES - GENERAL
PAINLEVEL_OUTOF10: 5 - MODERATE PAIN
PAINLEVEL: 4

## 2024-07-19 NOTE — PROGRESS NOTES
Patient ID: Raiza Nguyễn is a 73 y.o. female.    The patient presents to [facility] for evaluation of general malaise/flu like symptoms. She reports the symptoms started yesterday. She was instructed to take her tramadol from her pain doctor and increase her gabapentin. She thought the tramadol helped significantly. She said she got this chemo 20 years ago and remembers having the same aches and pains after. She denies chest pain, cough, SOB, fever or chills. She is having some constipation but taking a stool softener and had BM yesterday otherwise denies n/v/abd pain.   She also still has some tenderness on her ingrown nail. She has been soaking and using neosporin. She noticed some improvement but not complete resolution.     Objective    BSA: There is no height or weight on file to calculate BSA.          7/12/2024     2:30 PM 7/12/2024     2:45 PM 7/15/2024     8:54 AM 7/15/2024    10:00 AM 7/17/2024    12:23 PM   Vitals   Systolic 115 103 129  133   Diastolic 57 50 75  61   Heart Rate 87 78 93  86   Temp   36.4 °C (97.5 °F)  36 °C (96.8 °F)   Resp 18 18 16  18   Height (in)    --    Weight (lb)   181 -- 180.34   BMI   35.96 kg/m2  35.83 kg/m2   BSA (m2)   1.86 m2  1.85 m2   Visit Report     Report         Results from last 7 days   Lab Units 07/15/24  0745   WBC AUTO x10*3/uL 7.3   HEMOGLOBIN g/dL 9.5*   HEMATOCRIT % 30.0*   PLATELETS AUTO x10*3/uL 369   NEUTROS ABS x10*3/uL 4.56   LYMPHS ABS AUTO x10*3/uL 1.70   MONOS ABS AUTO x10*3/uL 0.63   EOS ABS AUTO x10*3/uL 0.33   NEUTROS PCT AUTO % 62.9   LYMPHS PCT AUTO % 23.4   MONOS PCT AUTO % 8.7   EOS PCT AUTO % 4.5        Results from last 7 days   Lab Units 07/15/24  0745   GLUCOSE mg/dL 102*   SODIUM mmol/L 134*   POTASSIUM mmol/L 4.3   CHLORIDE mmol/L 98   CO2 mmol/L 26   BUN mg/dL 27*   CREATININE mg/dL 1.13*   EGFR mL/min/1.73m*2 52*   CALCIUM mg/dL 8.8   ALBUMIN g/dL 3.7   PROTEIN TOTAL g/dL 6.4                      === 11/30/20 ===    - Impression -  1.  No  evidence of acute fracture or dislocation.  2.  Degenerative changes, as described above.  === 07/06/23 ===    CT ABDOMEN PELVIS W IV CONTRAST    - Impression -  1.  No acute abnormality is identified in the abdomen and pelvis. No  evidence of abnormal bowel dilatation or acute inflammatory changes.  No enlarged lymphadenopathy is identified.  2. Numerous diverticula are present in the descending and sigmoid  colon without evidence of acute diverticulitis.  3. Multilevel degenerative changes of the lumbar spine, incompletely  characterized on current exam, but with likely at least moderate  stenosis at several spinal canal and neural foraminal levels.  4. Some prolapse of the bladder, vagina and rectum into the perineum.  5. Mild hepatomegaly, and additional findings as detailed above.  === 05/17/24 ===    MR LIVER WO AND W CONTRAST    - Impression -  1. Hepatomegaly with no definite discrete hepatic lesion or signal  alteration to correlate with the recently described mild FDG avid  focus in the lateral aspect of segment 2. However given the  previously seen heterogenous FDG uptake in segment 2, short-term  follow-up to be considered.  2. 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 Please see below  guidelines for follow-up.  3. Partially visualized hypoenhancing left uterine lesion measuring 2  cm could represent a fibroid. Finding could be further assessed by  dedicated ultrasound if clinically desired.      MACRO:  Incidental Finding:  There is a small cystic lesion noted within the  pancreas measuring less than 15 mm.  (**-YCF-**)    Instructions:  Follow-up contrast enhanced MRI or pancreas protocol  CT every two years up to 10 years or more frequent imaging versus  EUS/FNA in case of interval growth. (Jorge YAO, Devaughn ME, Miles DE,  et al. Management of Incidental Pancreatic Cysts: A White Paper of  the ACR Incidental Findings  Committee. J Am Alyse Radiol.  2017;14(7):911-923.) [email or web address]    Signed by: Mickey Cornelius 5/17/2024 10:08 AM  Dictation workstation:   [id number]   No nuclear medicine results found for the past 12 months   No echocardiogram results found for the past 12 months         Physical Exam    Cancer Staging   Malignant neoplasm of central portion of left breast in female, estrogen receptor negative (Multi)  Staging form: Breast, AJCC 8th Edition  - Clinical: Stage IA (cT1, cN0, cM0, G3, ER-, SC-, HER2+) - Signed by Jeniffer Langley DO on 5/13/2024      Oncology History   Malignant neoplasm of central portion of left breast in female, estrogen receptor negative (Multi)   5/7/2024 Initial Diagnosis    Malignant neoplasm of central portion of left breast in female, estrogen receptor negative (Multi)     5/13/2024 Cancer Staged    Staging form: Breast, AJCC 8th Edition, Clinical: Stage IA (cT1, cN0, cM0, G3, ER-, SC-, HER2+) - Signed by Jeniffer Langley DO on 5/13/2024     7/15/2024 -  Chemotherapy    PACLitaxel + Trastuzumab, Weekly Followed by Trastuzumab - Breast            72yo F with breast cancer who presents to [facility] for evaluation of general malaise.     Plan:  - orthos positive  - labs unremarkable  - 1L NS for dehydration  - tramadol 50mg PO once for neoplasm and chemo related pain  - given further instructions for Ingrown nail including soaking in warm, soapy water, avoiding picking or biting. Finding a method for deterrence like painting nails or crocheting. Also, given warning s/s of infection    Dispo:  - pt discharged home with no needs after ACC course complete  - return to clinic/ED instructions given to patient  - follow up oncology as scheduled           Haley Aguilera, AARON-CNP

## 2024-07-20 LAB
ATRIAL RATE: 93 BPM
P AXIS: 64 DEGREES
P OFFSET: 186 MS
P ONSET: 126 MS
PR INTERVAL: 184 MS
Q ONSET: 218 MS
QRS COUNT: 15 BEATS
QRS DURATION: 92 MS
QT INTERVAL: 392 MS
QTC CALCULATION(BAZETT): 487 MS
QTC FREDERICIA: 454 MS
R AXIS: -3 DEGREES
T AXIS: 56 DEGREES
T OFFSET: 414 MS
VENTRICULAR RATE: 93 BPM

## 2024-07-22 ENCOUNTER — DOCUMENTATION (OUTPATIENT)
Dept: HEMATOLOGY/ONCOLOGY | Facility: CLINIC | Age: 74
End: 2024-07-22

## 2024-07-22 ENCOUNTER — INFUSION (OUTPATIENT)
Dept: HEMATOLOGY/ONCOLOGY | Facility: CLINIC | Age: 74
End: 2024-07-22
Payer: MEDICARE

## 2024-07-22 ENCOUNTER — SOCIAL WORK (OUTPATIENT)
Dept: HEMATOLOGY/ONCOLOGY | Facility: CLINIC | Age: 74
End: 2024-07-22
Payer: MEDICARE

## 2024-07-22 ENCOUNTER — TELEPHONE (OUTPATIENT)
Dept: PRIMARY CARE | Facility: CLINIC | Age: 74
End: 2024-07-22

## 2024-07-22 ENCOUNTER — NUTRITION (OUTPATIENT)
Dept: HEMATOLOGY/ONCOLOGY | Facility: CLINIC | Age: 74
End: 2024-07-22
Payer: MEDICARE

## 2024-07-22 VITALS
SYSTOLIC BLOOD PRESSURE: 120 MMHG | HEART RATE: 85 BPM | TEMPERATURE: 97.2 F | RESPIRATION RATE: 16 BRPM | DIASTOLIC BLOOD PRESSURE: 70 MMHG | OXYGEN SATURATION: 98 % | HEIGHT: 59 IN | BODY MASS INDEX: 36.76 KG/M2 | WEIGHT: 182.32 LBS

## 2024-07-22 DIAGNOSIS — C50.912 MALIGNANT NEOPLASM OF BOTH BREASTS IN FEMALE, ESTROGEN RECEPTOR NEGATIVE, UNSPECIFIED SITE OF BREAST (MULTI): Primary | ICD-10-CM

## 2024-07-22 DIAGNOSIS — Z90.13 S/P MASTECTOMY, BILATERAL: ICD-10-CM

## 2024-07-22 DIAGNOSIS — Z17.1 MALIGNANT NEOPLASM OF CENTRAL PORTION OF LEFT BREAST IN FEMALE, ESTROGEN RECEPTOR NEGATIVE (MULTI): ICD-10-CM

## 2024-07-22 DIAGNOSIS — M54.50 LOW BACK PAIN, UNSPECIFIED BACK PAIN LATERALITY, UNSPECIFIED CHRONICITY, UNSPECIFIED WHETHER SCIATICA PRESENT: ICD-10-CM

## 2024-07-22 DIAGNOSIS — C50.911 MALIGNANT NEOPLASM OF BOTH BREASTS IN FEMALE, ESTROGEN RECEPTOR NEGATIVE, UNSPECIFIED SITE OF BREAST (MULTI): ICD-10-CM

## 2024-07-22 DIAGNOSIS — Z17.1 MALIGNANT NEOPLASM OF BOTH BREASTS IN FEMALE, ESTROGEN RECEPTOR NEGATIVE, UNSPECIFIED SITE OF BREAST (MULTI): Primary | ICD-10-CM

## 2024-07-22 DIAGNOSIS — C50.912 MALIGNANT NEOPLASM OF BOTH BREASTS IN FEMALE, ESTROGEN RECEPTOR NEGATIVE, UNSPECIFIED SITE OF BREAST (MULTI): ICD-10-CM

## 2024-07-22 DIAGNOSIS — R53.83 OTHER FATIGUE: ICD-10-CM

## 2024-07-22 DIAGNOSIS — C50.112 MALIGNANT NEOPLASM OF CENTRAL PORTION OF LEFT BREAST IN FEMALE, ESTROGEN RECEPTOR NEGATIVE (MULTI): ICD-10-CM

## 2024-07-22 DIAGNOSIS — C50.911 MALIGNANT NEOPLASM OF BOTH BREASTS IN FEMALE, ESTROGEN RECEPTOR NEGATIVE, UNSPECIFIED SITE OF BREAST (MULTI): Primary | ICD-10-CM

## 2024-07-22 DIAGNOSIS — G89.29 OTHER CHRONIC PAIN: ICD-10-CM

## 2024-07-22 DIAGNOSIS — Z17.1 MALIGNANT NEOPLASM OF BOTH BREASTS IN FEMALE, ESTROGEN RECEPTOR NEGATIVE, UNSPECIFIED SITE OF BREAST (MULTI): ICD-10-CM

## 2024-07-22 PROCEDURE — 96413 CHEMO IV INFUSION 1 HR: CPT

## 2024-07-22 PROCEDURE — 2500000004 HC RX 250 GENERAL PHARMACY W/ HCPCS (ALT 636 FOR OP/ED): Performed by: STUDENT IN AN ORGANIZED HEALTH CARE EDUCATION/TRAINING PROGRAM

## 2024-07-22 PROCEDURE — 96417 CHEMO IV INFUS EACH ADDL SEQ: CPT

## 2024-07-22 PROCEDURE — 96375 TX/PRO/DX INJ NEW DRUG ADDON: CPT | Mod: INF

## 2024-07-22 PROCEDURE — 2500000001 HC RX 250 WO HCPCS SELF ADMINISTERED DRUGS (ALT 637 FOR MEDICARE OP): Performed by: STUDENT IN AN ORGANIZED HEALTH CARE EDUCATION/TRAINING PROGRAM

## 2024-07-22 RX ORDER — DIPHENHYDRAMINE HYDROCHLORIDE 50 MG/ML
50 INJECTION INTRAMUSCULAR; INTRAVENOUS AS NEEDED
Status: DISCONTINUED | OUTPATIENT
Start: 2024-07-22 | End: 2024-07-22 | Stop reason: HOSPADM

## 2024-07-22 RX ORDER — HEPARIN 100 UNIT/ML
500 SYRINGE INTRAVENOUS AS NEEDED
Status: CANCELLED | OUTPATIENT
Start: 2024-07-22

## 2024-07-22 RX ORDER — PROCHLORPERAZINE MALEATE 10 MG
10 TABLET ORAL EVERY 6 HOURS PRN
Status: DISCONTINUED | OUTPATIENT
Start: 2024-07-22 | End: 2024-07-22 | Stop reason: HOSPADM

## 2024-07-22 RX ORDER — PROCHLORPERAZINE EDISYLATE 5 MG/ML
10 INJECTION INTRAMUSCULAR; INTRAVENOUS EVERY 6 HOURS PRN
Status: DISCONTINUED | OUTPATIENT
Start: 2024-07-22 | End: 2024-07-22 | Stop reason: HOSPADM

## 2024-07-22 RX ORDER — HEPARIN SODIUM 1000 [USP'U]/ML
2000 INJECTION, SOLUTION INTRAVENOUS; SUBCUTANEOUS AS NEEDED
Status: CANCELLED | OUTPATIENT
Start: 2024-07-22

## 2024-07-22 RX ORDER — EPINEPHRINE 0.3 MG/.3ML
0.3 INJECTION SUBCUTANEOUS EVERY 5 MIN PRN
Status: DISCONTINUED | OUTPATIENT
Start: 2024-07-22 | End: 2024-07-22 | Stop reason: HOSPADM

## 2024-07-22 RX ORDER — HEPARIN 100 UNIT/ML
500 SYRINGE INTRAVENOUS AS NEEDED
Status: DISCONTINUED | OUTPATIENT
Start: 2024-07-22 | End: 2024-07-22 | Stop reason: HOSPADM

## 2024-07-22 RX ORDER — HEPARIN SODIUM,PORCINE/PF 10 UNIT/ML
50 SYRINGE (ML) INTRAVENOUS AS NEEDED
Status: CANCELLED | OUTPATIENT
Start: 2024-07-22

## 2024-07-22 RX ORDER — DIPHENHYDRAMINE HCL 50 MG
50 CAPSULE ORAL ONCE
Status: COMPLETED | OUTPATIENT
Start: 2024-07-22 | End: 2024-07-22

## 2024-07-22 RX ORDER — FAMOTIDINE 10 MG/ML
20 INJECTION INTRAVENOUS ONCE
Status: COMPLETED | OUTPATIENT
Start: 2024-07-22 | End: 2024-07-22

## 2024-07-22 RX ORDER — FAMOTIDINE 10 MG/ML
20 INJECTION INTRAVENOUS ONCE AS NEEDED
Status: DISCONTINUED | OUTPATIENT
Start: 2024-07-22 | End: 2024-07-22 | Stop reason: HOSPADM

## 2024-07-22 RX ORDER — ALBUTEROL SULFATE 0.83 MG/ML
3 SOLUTION RESPIRATORY (INHALATION) AS NEEDED
Status: DISCONTINUED | OUTPATIENT
Start: 2024-07-22 | End: 2024-07-22 | Stop reason: HOSPADM

## 2024-07-22 ASSESSMENT — PAIN SCALES - GENERAL
PAINLEVEL: 3
PAINLEVEL_OUTOF10: 3

## 2024-07-22 NOTE — PROGRESS NOTES
MINI met to follow up with this pt for resources available to her. The SW asked the covering physician to write a prescription for a shower chair per the pt's request. The SW then found out that Medicare will not pay for a shower chair. The SW gave the pt a printout with possible services/assistance offered by [facility]. The pt can use [employer] to receive a izzy for a shower chair if she chooses or she can order one online/use local pharmacies.   **The SW gave the pt a printout of the prescription written for the chair.

## 2024-07-22 NOTE — PROGRESS NOTES
The SW stopped in to follow up with this pt today. She had spoke about getting a shower chair for her bathroom to help her stay steady when taking a shower. The SW sent a message to the team covering for Dr. Bourne to see if a prescription can be written for this.   **The SW also gave the pt a list of resources available to her from the local Aging agency. The pt was appreciative of assistance.   **The SW will send the request for the DME after the prescription is written along with supportive documentation.   The pt was appreciative of assistance.

## 2024-07-22 NOTE — PROGRESS NOTES
NUTRITION Assessment NOTE    Nutrition Assessment       Reason for Visit:  Raiza Nguyễn is a 73 y.o. female with invasive ductal carcinoma, grade 3. There is high-grade DCIS present  (6/2024)   -s/p bilateral completion mastectomies.  -BRCA2 positive   Note:  H/o of breast cancer (initial dx 2003, recurrence 2018)   -Starting treatment with Taxol/herceptin 7/15/24      Met with patient today in infusion.  She is here for 2nd chemo cycle.     Lab Results   Component Value Date/Time    GLUCOSE 106 (H) 07/19/2024 1015     07/19/2024 1015    K 3.7 07/19/2024 1015    CL 97 (L) 07/19/2024 1015    CO2 29 07/19/2024 1015    ANIONGAP 15 07/19/2024 1015    BUN 25 (H) 07/19/2024 1015    CREATININE 0.76 07/19/2024 1015    EGFR 84 07/19/2024 1015    CALCIUM 9.1 07/19/2024 1015    ALBUMIN 3.9 07/19/2024 1015    ALKPHOS 74 07/19/2024 1015    PROT 6.9 07/19/2024 1015    AST 25 07/19/2024 1015    BILITOT 0.3 07/19/2024 1015    ALT 26 07/19/2024 1015     Lab Results   Component Value Date/Time    VITD25 89 02/22/2022 0927       Anthropometrics:  Anthropometrics  IBW/kg (Dietitian Calculated):  (44.5 kg)  Weight Change  Significant Weight Loss: No        Wt Readings from Last 10 Encounters:   07/22/24 82.7 kg (182 lb 5.1 oz)   07/19/24 81.6 kg (179 lb 14.3 oz)   07/17/24 81.8 kg (180 lb 5.4 oz)   07/15/24 82.1 kg (181 lb)   07/01/24 83.4 kg (183 lb 14.4 oz)   06/20/24 88.5 kg (195 lb)   06/19/24 81 kg (178 lb 9.2 oz)   06/17/24 83.9 kg (185 lb)   06/12/24 83.9 kg (185 lb)   06/05/24 83.6 kg (184 lb 4.9 oz)        Food And Nutrient Intake:  Food and Nutrient History  Food and Nutrient History: Patient reporting good appetite and oral intake.  Denies n/v/d.   Did develop some aches and flu like symptoms,  resolved with adding Tramadol and increasing Gabapentin.  No taste changes reported. Overall, patient feels like she tolerated treatment well.  No specific nutrition questions/concerns at this time.  Energy Intake: Good > 75  %           Micronutrient Intake  Vitamin Intake: Multivitamin  Mineral/Element Intake: Calcium       Nutrition Focused Physical Exam Findings:  Deferred (see note 7/15/24)       Energy Needs  Estimated Energy Needs  Total Energy Estimated Needs (kCal):  (8926-1692 cals)  Estimated Protein Needs  Total Protein Estimated Needs (g):  (65-80 g)        Nutrition Diagnosis   Malnutrition Diagnosis  Patient has Malnutrition Diagnosis: No    Nutrition Diagnosis  Patient has Nutrition Diagnosis: Yes  Diagnosis Status (1): Ongoing  Nutrition Diagnosis 1: Increased nutrient needs  Related to (1): chemotherapy treatment  As Evidenced by (1): increased metabolic demands    Nutrition Interventions/Recommendations   Nutrition Prescription  Individualized Nutrition Prescription Provided for : 64 oz non-caffeinated fluids; High protein ; small freq meals/snacks    Food and Nutrition Delivery  Food and Nutrition Delivery  Meals & Snacks: Protein-modified diet    Nutrition Education  Nutrition Education  Nutrition Education Content: Content related nutrition education  Continue to push fluids, small frequent meals and snacks   Coordination of Care  Coordination of Nutrition Care by a Nutrition Professional  Collaboration and referral of nutrition care: Collaboration by nutrition professional with other providers    There are no Patient Instructions on file for this visit.    Nutrition Monitoring and Evaluation   Food/Nutrient Related History Monitoring  Estimated protein intake: Estimated protein intake  Criteria: Meet >75% estimated protein requirements          Time Spent  Prep time on day of patient encounter: 5 minutes  Time spent directly with patient, family or caregiver: 5 minutes  Additional Time Spent on Patient Care Activities: 0 minutes  Documentation Time: 5 minutes  Other Time Spent: 0 minutes  Total: 15 minutes            Level of Understanding : Good

## 2024-07-22 NOTE — TELEPHONE ENCOUNTER
Order for shower chair needed     Rationale:     Pt s/p bilat mastectomy for second breast cancer occurrence    Has severe arthritis back, shoulders, knees, SI joints     Problems with mobility balance weakness dizziness    Needs shower chair - trouble standing while showering       Order placed     Has been printed      Melissa dhaliwal ask Macie Cárdenas how to determine where pt gets durable med equip?    Thx

## 2024-07-22 NOTE — PROGRESS NOTES
[facility] Infusion Note     Raiza Nguyễn is a 73 y.o. year old female here today,07/22/24,  in the [facility] infusion center for C2D1 PACLitaxel + Trastuzumab.       Medications given:  Administrations This Visit       dexAMETHasone (Decadron) injection 10 mg       Admin Date  07/22/2024 Action  Given Dose  10 mg Route  intravenous Documented By  Anna Velasco RN              diphenhydrAMINE (BENADryl) capsule 50 mg       Admin Date  07/22/2024 Action  Given Dose  50 mg Route  oral Documented By  Anna Velasco RN              famotidine PF (Pepcid) injection 20 mg       Admin Date  07/22/2024 Action  Given Dose  20 mg Route  intravenous Documented By  Anna Velasco RN              heparin flush 100 unit/mL syringe 500 Units       Admin Date  07/22/2024 Action  Given Dose  500 Units Route  intra-catheter Documented By  Anna Velasco RN              PACLitaxeL (Taxol) 150 mg in dextrose 5% 135 mL IV       Admin Date  07/22/2024 Action  New Bag Dose  150 mg Route  intravenous Documented By  Anna Velasco RN              trastuzumab-anns (Kanjinti) 165.9 mg in sodium chloride 0.9% 274.9 mL IV       Admin Date  07/22/2024 Action  New Bag Dose  165.9 mg Rate  549.8 mL/hr Route  intravenous Documented By  Anna Velasco RN                    Pt tolerated infusions well and was discharged to home with son transporting in stable condition. Pt ambulated to exit with use of cane with a slow and steady gait. Pt had no further questions or concerns at this time.

## 2024-07-23 ENCOUNTER — APPOINTMENT (OUTPATIENT)
Dept: PRIMARY CARE | Facility: CLINIC | Age: 74
End: 2024-07-23
Payer: MEDICARE

## 2024-07-23 VITALS
HEART RATE: 86 BPM | DIASTOLIC BLOOD PRESSURE: 82 MMHG | OXYGEN SATURATION: 97 % | SYSTOLIC BLOOD PRESSURE: 133 MMHG | WEIGHT: 181.8 LBS | TEMPERATURE: 97.6 F | RESPIRATION RATE: 14 BRPM | BODY MASS INDEX: 36.46 KG/M2

## 2024-07-23 DIAGNOSIS — C50.912 MALIGNANT NEOPLASM OF BOTH BREASTS IN FEMALE, ESTROGEN RECEPTOR NEGATIVE, UNSPECIFIED SITE OF BREAST (MULTI): ICD-10-CM

## 2024-07-23 DIAGNOSIS — I10 ESSENTIAL HYPERTENSION: ICD-10-CM

## 2024-07-23 DIAGNOSIS — Z17.1 MALIGNANT NEOPLASM OF BOTH BREASTS IN FEMALE, ESTROGEN RECEPTOR NEGATIVE, UNSPECIFIED SITE OF BREAST (MULTI): ICD-10-CM

## 2024-07-23 DIAGNOSIS — R26.89 POOR BALANCE: ICD-10-CM

## 2024-07-23 DIAGNOSIS — C50.911 MALIGNANT NEOPLASM OF BOTH BREASTS IN FEMALE, ESTROGEN RECEPTOR NEGATIVE, UNSPECIFIED SITE OF BREAST (MULTI): ICD-10-CM

## 2024-07-23 DIAGNOSIS — E03.9 HYPOTHYROIDISM, UNSPECIFIED TYPE: ICD-10-CM

## 2024-07-23 DIAGNOSIS — I70.0 ATHEROSCLEROSIS OF AORTA (CMS-HCC): ICD-10-CM

## 2024-07-23 DIAGNOSIS — E11.9 TYPE 2 DIABETES MELLITUS WITHOUT COMPLICATION, WITHOUT LONG-TERM CURRENT USE OF INSULIN (MULTI): Primary | ICD-10-CM

## 2024-07-23 DIAGNOSIS — G89.29 OTHER CHRONIC PAIN: ICD-10-CM

## 2024-07-23 PROBLEM — R07.9 CHEST PAIN: Status: RESOLVED | Noted: 2024-06-19 | Resolved: 2024-07-23

## 2024-07-23 PROBLEM — Z09 HOSPITAL DISCHARGE FOLLOW-UP: Status: RESOLVED | Noted: 2024-06-27 | Resolved: 2024-07-23

## 2024-07-23 PROBLEM — R00.2 PALPITATIONS: Status: RESOLVED | Noted: 2024-01-30 | Resolved: 2024-07-23

## 2024-07-23 PROCEDURE — 3048F LDL-C <100 MG/DL: CPT | Performed by: FAMILY MEDICINE

## 2024-07-23 PROCEDURE — 1159F MED LIST DOCD IN RCRD: CPT | Performed by: FAMILY MEDICINE

## 2024-07-23 PROCEDURE — 4010F ACE/ARB THERAPY RXD/TAKEN: CPT | Performed by: FAMILY MEDICINE

## 2024-07-23 PROCEDURE — 1160F RVW MEDS BY RX/DR IN RCRD: CPT | Performed by: FAMILY MEDICINE

## 2024-07-23 PROCEDURE — 1036F TOBACCO NON-USER: CPT | Performed by: FAMILY MEDICINE

## 2024-07-23 PROCEDURE — 1157F ADVNC CARE PLAN IN RCRD: CPT | Performed by: FAMILY MEDICINE

## 2024-07-23 PROCEDURE — 3075F SYST BP GE 130 - 139MM HG: CPT | Performed by: FAMILY MEDICINE

## 2024-07-23 PROCEDURE — 3079F DIAST BP 80-89 MM HG: CPT | Performed by: FAMILY MEDICINE

## 2024-07-23 PROCEDURE — 99214 OFFICE O/P EST MOD 30 MIN: CPT | Performed by: FAMILY MEDICINE

## 2024-07-23 PROCEDURE — 3044F HG A1C LEVEL LT 7.0%: CPT | Performed by: FAMILY MEDICINE

## 2024-07-23 RX ORDER — LISINOPRIL 10 MG/1
10 TABLET ORAL DAILY
Start: 2024-07-23

## 2024-07-23 RX ORDER — GABAPENTIN 300 MG/1
900 CAPSULE ORAL 3 TIMES DAILY
Start: 2024-07-23

## 2024-07-23 RX ORDER — HYDROCHLOROTHIAZIDE 12.5 MG/1
12.5 TABLET ORAL DAILY
COMMUNITY

## 2024-07-23 RX ORDER — LIDOCAINE AND PRILOCAINE 25; 25 MG/G; MG/G
CREAM TOPICAL
COMMUNITY
Start: 2024-07-03

## 2024-07-23 NOTE — ASSESSMENT & PLAN NOTE
BP is 133/82 in office today, goal BP is 130/80 or less, ideally 120/80 or less.   BP adequately controlled in office, ccm.    Continue Lisinopril 10 mg daily  Continue HCTZ from 25 mg daily.    64 oz of water is recommended daily minimum.    Patient to check BP regularly at home and keep a diary.  This should be taken after sitting with feet flat on floor and resting for 5 minutes.   Arm should be level with your heart.   New guidelines recommend goal for blood pressure less than 130/80.   Ideally for stroke and heart attack risk reduction the systolic, or top, blood pressure number should be in the 110's or 120's.    PLEASE BRING BP CUFF IN TO NEXT VISIT FOR VALIDATION - TAKE YOUR BP @ HOME BEFORE YOU COME!

## 2024-07-23 NOTE — ASSESSMENT & PLAN NOTE
10/2020 CT A/P showed mild aortic atherosclerosis, no CACS.  06/2024 TC/HDL ratio 2.1, LDL 59, Trig 70, good control, continue Rosuvastatin 5 mg daily.  Lipid panel to be monitored routinely.

## 2024-07-23 NOTE — PROGRESS NOTES
Subjective   Patient ID: Raiza Nguyễn is a 73 y.o. female who presents for Follow-up (Pt presents for general/routine follow up- pt states that she is feeling better- had her second round of chemo yesterday- is having some exhaustion and upset stomach but other than that she is feeling well. ).    HPI     Patient presents today for 3 month follow-up.    Patient concerns:    # Fatigue/had her second round of chemo yesterday- is having some exhaustion and upset stomach but other than that she is feeling well.    # Blurry vision  X 2 months, unsure of cause  Has not seen eye doctor; states not due to follow-up for regular visit until 8/2024  Does not feel it coordinates with the Gabapentin but has not tried stopping this.    # Pain  Using Gabapentin often  Only needing Tramadol for breakthrough pain    # Shower chair  Increased fall risk and decreased balance  Needs shower chair to perform proper hygiene.    ROUTINE VISIT  CHRONIC CONDITIONS:    DM, type 2  Lifestyle managed, A1c most recently in prediabetic range in past but occasionally in diabetic range but below 7.0     Hgb A1c: 6.9 in 5/2024, 6.5 in 1/2024, 6.2 in 7/2023, 6.1 in 3/2023, 6.9 in 7/2022  Albumin: 2/2022 negative  Foot exam: 9/2023 via PCP    Current regimen:  Metformin 500 mg daily, started 4/2024  Mounjaro 2.5 mg weekly, started 4/2024    Prior medications:   None    States having some constipation, plans to switch over to the MiraLAX    HTN  Current regimen:  Lisinopril 10 mg BID  HCTZ 25 mg daily, decreased to 1 tab from 2 tabs 4/2024    HLD  Taking Rosuvastatin 5 mg daily.  10/2020 CT A/P showed mild aortic atherosclerosis, no CACS.  06/2024 TC/HDL ratio 2.1, LDL 59, Trig 70    Hypothyroidism  CURRENT DOSE: Synthroid 125 mcg daily.  1/2024 TSH normal, to be checked annually unless symptomatic.    BOLIVAR, severe  12/2021 sleep study (severe with SpO2 88% and AHI 36.1).  Intolerant to CPAP x several tries.     She had inspire consult with ENT   Angelo in September 2023 however she joined a group with people who had the inspire and based off this experience she chose not to proceed with this but has opted to pursue conservative measure of weight reduction prior to having this done.     Given progression into diabetes and comorbidities of obesity, hypertension, hyperlipidemia, coronary artery disease, chronic pain due to numerous ortho issues, and obstructive sleep apnea intolerant to CPAP I do feel patient would benefit from GPL-1 for both better control of sugars and weight reduction.      Prescription for mounjaro sent to pharmacy, see DM section    Chronic Pain  Taking Duloxetine 60 mg + Gabapentin daily.  S/p evaluation with [facility] rheumatology.  Plan per rheum pre 10/2022 OV NOTE:  --For Pseudogout flares of the wrist - Prednisone 12 day taper  --Continue Gapabentin 600 mg three times a day  --Tylenol 1000 mg up to 3x/day  --Voltaren gel up to 4x/day  --Continue home occupational therapy exercises   --Follow up as needed      She follows with spinal specialist @ [facility], seeing him in 2 weeks     7/2023 CT A/P showed multilevel degenerative changes of the lower cervical and thoracic spine are present, with likely at least mild-to-moderate spinal canal narrowing present in the thoracic spine,: The least moderate to severe spinal canal stenosis present at L3-L4 and L4-L5 due to bulging disc, hypertrophic facet changes and ligamentum flavum thickening, incompletely characterized on current exam.     PCP Plan per 4/11/2024 OV:  Patient with limited mobility secondary to low back pain, multiple ortho issues.     Reviewed CT A/P pelvis from July 2023 as noted below. Patient advised to follow-up with her ortho @ [facility] regarding latest imaging and current state of mobility as it is worsening and very limiting for ADLs. Copy of CT A/P provided for patient to take to her ortho to review as well. She has appointment with ortho in 2 weeks per patient. Continue  current medication regimen as previously prescribed other than the diabetic meds that were added today.    Breast Cancer  Followed by heme/onc, previously Dr. Wilson, now Dr. Bourne  Follows with breast surgeon (Dr. Jeniffer Langley)     Left breast IDC g3 ER0% SD 0% HER2 positive at 3:00 10cmFN measuring 0.9cm on MRI  -concerning level 3 lymph node not amenable to biopsy.  PET CT recommended for further assessment by radiology.  PET negative.  -s/p bilateral completion mastectomy  -pT1cNx     2.  History of bilateral breast cancer and known BRCA2 mutation     Stage IB (Prognostic Stage IA) Right breast cancer 7/2018, [id number], grade 2 IDC, ER+95% SD+95% HER2 equivocal, FISH-.   - s/p R. magseed pm / SLNB (2mi/3) on 8/27/2018 with a 1.5cm IDC and negative margins. She had 2 out of 3 LNs with micrometastases measuring 0.4mm and 1.1mm, no NASH.   - Oncotype 17   - s/p WBRT 10/11/2018 - 11/1/2018   - taking Exemestane      Stage II Left breast cancer in 2003, 1.5 cm grade 3 IDC with positive LNs and NASH, ER+ SD+ HER2 not defined.   - L.pm/ALND   - ACx4 + Tx4   - WBRT   - Anastrozole x5yr    Currently receiving taxol/herceptin under direction of hematology.    Review of Systems   All other systems reviewed and are negative.      Objective   /82 (BP Location: Right arm, Patient Position: Sitting, BP Cuff Size: Small adult)   Pulse 86   Temp 36.4 °C (97.6 °F) (Temporal)   Resp 14   Wt 82.5 kg (181 lb 12.8 oz)   SpO2 97%   BMI 36.46 kg/m²     Physical Exam  Vitals and nursing note reviewed.   Constitutional:       General: She is not in acute distress.     Appearance: Normal appearance. She is not toxic-appearing.   HENT:      Head: Normocephalic and atraumatic.   Eyes:      Extraocular Movements: Extraocular movements intact.      Pupils: Pupils are equal, round, and reactive to light.   Neck:      Thyroid: No thyromegaly.      Vascular: No hepatojugular reflux or JVD.   Cardiovascular:      Rate and Rhythm:  Normal rate and regular rhythm.      Heart sounds: No murmur heard.     No friction rub. No gallop.   Pulmonary:      Effort: Pulmonary effort is normal.      Breath sounds: Normal breath sounds. No wheezing, rhonchi or rales.   Abdominal:      General: Bowel sounds are normal. There is no distension.      Palpations: Abdomen is soft. There is no mass.      Tenderness: There is no abdominal tenderness. There is no guarding.   Musculoskeletal:      Right lower leg: No edema.      Left lower leg: No edema.   Lymphadenopathy:      Cervical: No cervical adenopathy.   Skin:     General: Skin is warm and dry.   Neurological:      General: No focal deficit present.      Mental Status: She is alert and oriented to person, place, and time.   Psychiatric:         Mood and Affect: Mood normal.         Behavior: Behavior normal.         Assessment/Plan   Problem List Items Addressed This Visit             ICD-10-CM    Atherosclerosis of aorta (CMS-HCC) I70.0     10/2020 CT A/P showed mild aortic atherosclerosis, no CACS.  06/2024 TC/HDL ratio 2.1, LDL 59, Trig 70, good control, continue Rosuvastatin 5 mg daily.  Lipid panel to be monitored routinely.         Bilateral breast cancer (Multi) C50.911, C50.912     Currently receiving taxol/herceptin under direction of oncology.  [facility] as per oncology/breast specialist.         Chronic pain G89.29     Patient with limited mobility secondary to low back pain, multiple ortho issues.  Continue to follow-up with ortho.    Currently taking Gabapentin for pain, some concern for side effects (blurry vision) thus patient to taper on her own to see if any changes. She will follow-up with eye doctor if not improved with med changes.    Only needing Tramadol for breakthrough pain, continue as prescribed.    Because of increased fall risk and decreased balanced patient would benefit from a shower chair to maintain proper hygiene.          Relevant Medications    gabapentin (Neurontin) 300 mg  capsule    Diabetes mellitus (Multi) - Primary E11.9     Hgb A1c: 6.9 in 5/2024, 6.5 in 1/2024, 6.2 in 7/2023, 6.1 in 3/2023, 6.9 in 7/2022  Albumin: 2/2022 negative  Foot exam: 9/2023 via PCP    A1c up but patient was not on Mounjaro at that time, will ccm since below goal A1c of 7.0.  Recheck A1c in office at 3 month follow-up.  Urine albumin to be done at lab w/ next draw for oncology  Foot check to be completed at 3 month follow-up.    Continue Metformin 500 mg daily, take WITH your biggest meal.    Continue Mounjaro 2.5 mg weekly.   --> continue at present dose for another month, if tolerating will increase to 5 mg next month  --> to start on Miralax for the constipation         Essential hypertension I10     BP is 133/82 in office today, goal BP is 130/80 or less, ideally 120/80 or less.   BP adequately controlled in office, ccm.    Continue Lisinopril 10 mg daily  Continue HCTZ from 25 mg daily.    64 oz of water is recommended daily minimum.    Patient to check BP regularly at home and keep a diary.  This should be taken after sitting with feet flat on floor and resting for 5 minutes.   Arm should be level with your heart.   New guidelines recommend goal for blood pressure less than 130/80.   Ideally for stroke and heart attack risk reduction the systolic, or top, blood pressure number should be in the 110's or 120's.    PLEASE BRING BP CUFF IN TO NEXT VISIT FOR VALIDATION - TAKE YOUR BP @ HOME BEFORE YOU COME!          Relevant Medications    lisinopril 10 mg tablet    Hypothyroidism E03.9     CURRENT DOSE: Synthroid 125 mcg daily.  1/2024 TSH normal, to be checked annually unless symptomatic.         Poor balance R26.89     Patient with limited mobility secondary to low back pain, multiple ortho issues.     Because of increased fall risk and decreased balanced patient would benefit from a shower chair to maintain proper hygiene.             Follow-up 3 months for routine care + MWV.  Urine albumin ordered  to be done when she gets next labs with oncology  IO A1c upon rooming.  Due for DM foot exam upon rooming.  Call for sooner follow-up if needed.         Scribe Attestation  By signing my name below, I, Claudia Hansen, John   attest that this documentation has been prepared under the direction and in the presence of Shoshana Olivarez DO.

## 2024-07-23 NOTE — PROGRESS NOTES
Integrative Oncology Massage and Support Initial Visit [id number]    Condition of Client (C)   Initial Visit  Patient arrived ambulatory, steady gait, no assistive device  Identified Patient via two identifiers: name and date of birth  No signs or symptoms of  COVID-19 noted per patient today  Fall Risk: she knows to call the staff if she needs to rise from the chair/recliner  No precautions/contraindications to Reiki (non-touch and nurturing palliative touch)  Chief Complaint(S): Pain in the left breast rated 3/10/anxiety and stress rated 0/10, fatigue rated 8/10, nausea rated 0/10  Functional Limitations: not addressed  Exacerbating Factors: not addressed  Alleviating Factors: Not addressed  Patient Goals: relaxation, comfort, promote pain and fatigue release  Patient Preferences: lighter nurturing pressure  Patients session intention: did not note  Patient Noted:   She has experienced breast cancer three times:   20 years ago she experienced chemo and radiation  Breast Cancer recurred 5 years ago  Recently recurred: a different type of breast cancer - experienced a double mastectomy  She has the Anaid II gene  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  She though at this time in her life, she would be enjoying life  What brings her dayanna and lifts her spirits: her swimming pool and the exercises she can do, but noted she cannot go in water right now, he is learning how scents can affect her, her 2 cats are healing to her, her sons, her ex daughter in law  She worries about losing her hair  Pain in the left breast rated 3/10/anxiety and stress rated 0/10, fatigue rated 8/10, nausea rated 0/10  Requested Reiki    Actions (A)  Provider reviewed plan for the session, no precautions and contraindications to Reiki and patient has given consent to treat with full understanding of what to expect  during the session.  Before the Reiki Session began, the provider explained to the patient to communicate at any time if the pressure was causing discomfort past their tolerance level. Patient agreed to advise therapist.  Jensen's Pressure Scale: 0-1/5   20 minute session  Seated Position:   Hands On: head: frontal, temporal, occiput, posterior neck, anterior and posterior shoulders, elbows, hands, mid-back, lower back, lateral hips, knees, ankles, dorsal and planter surfaces of feet  Hands Off/Hovering: anterior chest abdomen, pelvic region   Room quiet    Response (R)  Patient noted pain in the left breast rated 3/10, rating did not change; anxiety and stress rated 0/10, rating did not change; fatigue rated 5/10; rating decreased 3 levels, nausea rated 0/10; rating did not cchange  Patient's Subjective Pressure Rating: relaxing  Patient noted a sense of well-being, felt uplifting and relaxing    Evaluation (E):  Patient independent in transferring from recliner to chair and back, while provider was present and by her side  Recommendation: Reiki Sessions as she requests  She would like to experience future Reiki Session and provider can stop in and assess her needs or she will contact provider via  email or phone  Patient prefers Reiki  Encouraged her to be mindful and allow herself to receive messages about her life's purpose  Suggested she can soak her feet in water, until she can return to the swimming pool  Supported patient's decision and autonomy in choosing what services she will receive in relaxation to her oncology journey  Provided a business card so patient can contact therapist with any concerns or questions about the treatment protocol and to advise provider when she would like to receive again

## 2024-07-23 NOTE — ASSESSMENT & PLAN NOTE
Currently receiving taxol/herceptin under direction of oncology.  CCM as per oncology/breast specialist.

## 2024-07-23 NOTE — ASSESSMENT & PLAN NOTE
Patient with limited mobility secondary to low back pain, multiple ortho issues.     Because of increased fall risk and decreased balanced patient would benefit from a shower chair to maintain proper hygiene.

## 2024-07-23 NOTE — ASSESSMENT & PLAN NOTE
Hgb A1c: 6.9 in 5/2024, 6.5 in 1/2024, 6.2 in 7/2023, 6.1 in 3/2023, 6.9 in 7/2022  Albumin: 2/2022 negative  Foot exam: 9/2023 via PCP    A1c up but patient was not on Mounjaro at that time, will ccm since below goal A1c of 7.0.  Recheck A1c in office at 3 month follow-up.  Urine albumin to be done at lab w/ next draw for oncology  Foot check to be completed at 3 month follow-up.    Continue Metformin 500 mg daily, take WITH your biggest meal.    Continue Mounjaro 2.5 mg weekly.   --> continue at present dose for another month, if tolerating will increase to 5 mg next month  --> to start on Miralax for the constipation

## 2024-07-23 NOTE — ASSESSMENT & PLAN NOTE
Patient with limited mobility secondary to low back pain, multiple ortho issues.  Continue to follow-up with ortho.    Currently taking Gabapentin for pain, some concern for side effects (blurry vision) thus patient to taper on her own to see if any changes. She will follow-up with eye doctor if not improved with med changes.    Only needing Tramadol for breakthrough pain, continue as prescribed.    Because of increased fall risk and decreased balanced patient would benefit from a shower chair to maintain proper hygiene.

## 2024-07-24 ENCOUNTER — TELEPHONE (OUTPATIENT)
Dept: HEMATOLOGY/ONCOLOGY | Facility: HOSPITAL | Age: 74
End: 2024-07-24
Payer: MEDICARE

## 2024-07-26 ENCOUNTER — INFUSION (OUTPATIENT)
Dept: HEMATOLOGY/ONCOLOGY | Facility: CLINIC | Age: 74
End: 2024-07-26
Payer: MEDICARE

## 2024-07-26 VITALS
TEMPERATURE: 97.3 F | HEIGHT: 59 IN | SYSTOLIC BLOOD PRESSURE: 117 MMHG | WEIGHT: 178.13 LBS | OXYGEN SATURATION: 96 % | HEART RATE: 87 BPM | BODY MASS INDEX: 35.91 KG/M2 | RESPIRATION RATE: 18 BRPM | DIASTOLIC BLOOD PRESSURE: 66 MMHG

## 2024-07-26 DIAGNOSIS — C50.112 MALIGNANT NEOPLASM OF CENTRAL PORTION OF LEFT BREAST IN FEMALE, ESTROGEN RECEPTOR NEGATIVE (MULTI): ICD-10-CM

## 2024-07-26 DIAGNOSIS — Z17.1 MALIGNANT NEOPLASM OF BOTH BREASTS IN FEMALE, ESTROGEN RECEPTOR NEGATIVE, UNSPECIFIED SITE OF BREAST (MULTI): ICD-10-CM

## 2024-07-26 DIAGNOSIS — C50.912 MALIGNANT NEOPLASM OF BOTH BREASTS IN FEMALE, ESTROGEN RECEPTOR NEGATIVE, UNSPECIFIED SITE OF BREAST (MULTI): ICD-10-CM

## 2024-07-26 DIAGNOSIS — C50.911 MALIGNANT NEOPLASM OF BOTH BREASTS IN FEMALE, ESTROGEN RECEPTOR NEGATIVE, UNSPECIFIED SITE OF BREAST (MULTI): ICD-10-CM

## 2024-07-26 DIAGNOSIS — Z17.1 MALIGNANT NEOPLASM OF CENTRAL PORTION OF LEFT BREAST IN FEMALE, ESTROGEN RECEPTOR NEGATIVE (MULTI): ICD-10-CM

## 2024-07-26 LAB
BASOPHILS # BLD AUTO: 0.01 X10*3/UL (ref 0–0.1)
BASOPHILS NFR BLD AUTO: 0.1 %
EOSINOPHIL # BLD AUTO: 0.11 X10*3/UL (ref 0–0.4)
EOSINOPHIL NFR BLD AUTO: 1.3 %
ERYTHROCYTE [DISTWIDTH] IN BLOOD BY AUTOMATED COUNT: 14.9 % (ref 11.5–14.5)
HCT VFR BLD AUTO: 29.1 % (ref 36–46)
HGB BLD-MCNC: 9.4 G/DL (ref 12–16)
IMM GRANULOCYTES # BLD AUTO: 0.02 X10*3/UL (ref 0–0.5)
IMM GRANULOCYTES NFR BLD AUTO: 0.2 % (ref 0–0.9)
LYMPHOCYTES # BLD AUTO: 1.22 X10*3/UL (ref 0.8–3)
LYMPHOCYTES NFR BLD AUTO: 14.2 %
MCH RBC QN AUTO: 30.1 PG (ref 26–34)
MCHC RBC AUTO-ENTMCNC: 32.3 G/DL (ref 32–36)
MCV RBC AUTO: 93 FL (ref 80–100)
MONOCYTES # BLD AUTO: 0.32 X10*3/UL (ref 0.05–0.8)
MONOCYTES NFR BLD AUTO: 3.7 %
NEUTROPHILS # BLD AUTO: 6.9 X10*3/UL (ref 1.6–5.5)
NEUTROPHILS NFR BLD AUTO: 80.5 %
NRBC BLD-RTO: ABNORMAL /100{WBCS}
PLATELET # BLD AUTO: 269 X10*3/UL (ref 150–450)
RBC # BLD AUTO: 3.12 X10*6/UL (ref 4–5.2)
WBC # BLD AUTO: 8.6 X10*3/UL (ref 4.4–11.3)

## 2024-07-26 PROCEDURE — 80053 COMPREHEN METABOLIC PANEL: CPT | Performed by: STUDENT IN AN ORGANIZED HEALTH CARE EDUCATION/TRAINING PROGRAM

## 2024-07-26 PROCEDURE — 36591 DRAW BLOOD OFF VENOUS DEVICE: CPT

## 2024-07-26 PROCEDURE — 85025 COMPLETE CBC W/AUTO DIFF WBC: CPT | Performed by: STUDENT IN AN ORGANIZED HEALTH CARE EDUCATION/TRAINING PROGRAM

## 2024-07-26 RX ORDER — HEPARIN 100 UNIT/ML
500 SYRINGE INTRAVENOUS AS NEEDED
OUTPATIENT
Start: 2024-07-26

## 2024-07-26 RX ORDER — HEPARIN SODIUM,PORCINE/PF 10 UNIT/ML
50 SYRINGE (ML) INTRAVENOUS AS NEEDED
OUTPATIENT
Start: 2024-07-26

## 2024-07-26 RX ORDER — HEPARIN SODIUM 1000 [USP'U]/ML
2000 INJECTION, SOLUTION INTRAVENOUS; SUBCUTANEOUS AS NEEDED
OUTPATIENT
Start: 2024-07-26

## 2024-07-26 RX ORDER — HEPARIN 100 UNIT/ML
500 SYRINGE INTRAVENOUS AS NEEDED
Status: DISCONTINUED | OUTPATIENT
Start: 2024-07-26 | End: 2024-07-26 | Stop reason: HOSPADM

## 2024-07-26 ASSESSMENT — PAIN SCALES - GENERAL: PAINLEVEL: 5

## 2024-07-26 NOTE — PROGRESS NOTES
Patient here for port lab draw for treatment on Monday. C/o pain burning left chest in area of surgery. Approximately 7 inches of redness. Warm to touch and hard with palpation. No itching butstates that is burns and has shooting pains occasionally  Began Monday and got worse on Thursday. Message sent to dr. Bourne who included dr dixon. Haiku picture sent. Physicians agreed needed antibiotics but possible drainage of a potential abscess. Directed to [facility] ED as that is closest to her house. Labs and last med onc and surgeon note sent with patient. Dr. Bourne called report. Patient agreeable and will call her son to meet her there if needed.   She is aware if she is treated and discharged to come to visit with doc on Monday but will probably have chemo held a week until all improves. She was understanding of above.

## 2024-07-27 LAB
ALBUMIN SERPL BCP-MCNC: 3.5 G/DL (ref 3.4–5)
ALP SERPL-CCNC: 80 U/L (ref 33–136)
ALT SERPL W P-5'-P-CCNC: 12 U/L (ref 7–45)
ANION GAP SERPL CALC-SCNC: 11 MMOL/L (ref 10–20)
AST SERPL W P-5'-P-CCNC: 13 U/L (ref 9–39)
BILIRUB SERPL-MCNC: 0.6 MG/DL (ref 0–1.2)
BUN SERPL-MCNC: 16 MG/DL (ref 6–23)
CALCIUM SERPL-MCNC: 8.9 MG/DL (ref 8.6–10.6)
CHLORIDE SERPL-SCNC: 92 MMOL/L (ref 98–107)
CO2 SERPL-SCNC: 31 MMOL/L (ref 21–32)
CREAT SERPL-MCNC: 0.78 MG/DL (ref 0.5–1.05)
EGFRCR SERPLBLD CKD-EPI 2021: 81 ML/MIN/1.73M*2
GLUCOSE SERPL-MCNC: 141 MG/DL (ref 74–99)
POTASSIUM SERPL-SCNC: 3.3 MMOL/L (ref 3.5–5.3)
PROT SERPL-MCNC: 6 G/DL (ref 6.4–8.2)
SODIUM SERPL-SCNC: 131 MMOL/L (ref 136–145)

## 2024-07-29 ENCOUNTER — APPOINTMENT (OUTPATIENT)
Dept: HEMATOLOGY/ONCOLOGY | Facility: CLINIC | Age: 74
End: 2024-07-29
Payer: MEDICARE

## 2024-07-29 ENCOUNTER — TELEPHONE (OUTPATIENT)
Dept: HEMATOLOGY/ONCOLOGY | Facility: CLINIC | Age: 74
End: 2024-07-29

## 2024-07-29 ENCOUNTER — APPOINTMENT (OUTPATIENT)
Dept: GASTROENTEROLOGY | Facility: CLINIC | Age: 74
End: 2024-07-29
Payer: MEDICARE

## 2024-07-29 NOTE — TELEPHONE ENCOUNTER
Spoke with Angie. Sent to ER Friday for concern for chest wall infection. She is currently admitted at [facility]. Started on Abx. Working with team there for further management. Overall feels well. She states they will reach out to me or Dr. Langley for further management. Chemotherapy on hold until she has fully recovered from this infection. Angie was appreciative of the call. No further questions or needs at this time. We will reschedule visits once we have a better sense of her disposition.

## 2024-08-02 LAB
ATRIAL RATE: 92 BPM
ATRIAL RATE: 96 BPM
P AXIS: 64 DEGREES
P AXIS: 71 DEGREES
P OFFSET: 183 MS
P OFFSET: 188 MS
P ONSET: 128 MS
P ONSET: 133 MS
PR INTERVAL: 166 MS
PR INTERVAL: 174 MS
Q ONSET: 215 MS
Q ONSET: 216 MS
QRS COUNT: 15 BEATS
QRS COUNT: 16 BEATS
QRS DURATION: 92 MS
QRS DURATION: 94 MS
QT INTERVAL: 376 MS
QT INTERVAL: 410 MS
QTC CALCULATION(BAZETT): 475 MS
QTC CALCULATION(BAZETT): 507 MS
QTC FREDERICIA: 439 MS
QTC FREDERICIA: 472 MS
R AXIS: -3 DEGREES
R AXIS: 38 DEGREES
T AXIS: 50 DEGREES
T AXIS: 63 DEGREES
T OFFSET: 403 MS
T OFFSET: 421 MS
VENTRICULAR RATE: 92 BPM
VENTRICULAR RATE: 96 BPM

## 2024-08-04 ENCOUNTER — NURSE TRIAGE (OUTPATIENT)
Dept: ADMISSION | Facility: HOSPITAL | Age: 74
End: 2024-08-04
Payer: MEDICARE

## 2024-08-04 NOTE — TELEPHONE ENCOUNTER
Pt called in, chief complaint: Port in neck is causing pain, when cough there is pain, if touched or bumped, it is painful, concerned about a blood clot in the poty  Pt of Dr. Fuller    The patient was released from the hospital earlier this week, states that this morning her port (up into her neck) has felt slightly tender and irritated. She thought maybe she slept wrong on It, however the discomfort has continued all day and it unrelieved by Tylenol. Denies any SOB or chest pain, reports that the pain/discomfort when touched is 7/10. No redness, skin discoloration or warmth. She is concerned that there is a clot, denies any swelling or any issues of that sort however has a history of a clot in a port over 20 years ago. Does not feel as though she needs to go to ER tonight, would like an order placed for IR to evaluate (possible dye study this week. Will call if symptoms worsen or go to ER but will monitor tonight and follow up tomorrow.       On-call fellow aware and team made aware, this RN will follow up tomorrow to get set up through IR for a possible dye study

## 2024-08-05 ENCOUNTER — INFUSION (OUTPATIENT)
Dept: HEMATOLOGY/ONCOLOGY | Facility: CLINIC | Age: 74
End: 2024-08-05
Payer: MEDICARE

## 2024-08-05 ENCOUNTER — PATIENT OUTREACH (OUTPATIENT)
Dept: CARE COORDINATION | Facility: CLINIC | Age: 74
End: 2024-08-05

## 2024-08-05 VITALS
HEART RATE: 112 BPM | SYSTOLIC BLOOD PRESSURE: 143 MMHG | DIASTOLIC BLOOD PRESSURE: 82 MMHG | TEMPERATURE: 98.4 F | BODY MASS INDEX: 35.72 KG/M2 | OXYGEN SATURATION: 92 % | RESPIRATION RATE: 18 BRPM | HEIGHT: 59 IN

## 2024-08-05 DIAGNOSIS — C50.112 MALIGNANT NEOPLASM OF CENTRAL PORTION OF LEFT BREAST IN FEMALE, ESTROGEN RECEPTOR NEGATIVE (MULTI): ICD-10-CM

## 2024-08-05 DIAGNOSIS — C50.112 MALIGNANT NEOPLASM OF CENTRAL PORTION OF LEFT BREAST IN FEMALE, ESTROGEN RECEPTOR NEGATIVE (MULTI): Primary | ICD-10-CM

## 2024-08-05 DIAGNOSIS — Z17.1 MALIGNANT NEOPLASM OF CENTRAL PORTION OF LEFT BREAST IN FEMALE, ESTROGEN RECEPTOR NEGATIVE (MULTI): Primary | ICD-10-CM

## 2024-08-05 DIAGNOSIS — Z17.1 MALIGNANT NEOPLASM OF CENTRAL PORTION OF LEFT BREAST IN FEMALE, ESTROGEN RECEPTOR NEGATIVE (MULTI): ICD-10-CM

## 2024-08-05 DIAGNOSIS — I10 ESSENTIAL HYPERTENSION: ICD-10-CM

## 2024-08-05 RX ORDER — AMOXICILLIN AND CLAVULANATE POTASSIUM 562.5; 437.5; 62.5 MG/1; MG/1; MG/1
2000 TABLET, FILM COATED, EXTENDED RELEASE ORAL 2 TIMES DAILY
COMMUNITY

## 2024-08-05 RX ORDER — LISINOPRIL 10 MG/1
10 TABLET ORAL 2 TIMES DAILY
Qty: 180 TABLET | Refills: 3 | Status: SHIPPED | OUTPATIENT
Start: 2024-08-05

## 2024-08-05 RX ORDER — AMOXICILLIN/POTASSIUM CLAV 875-125 MG
875 TABLET ORAL 2 TIMES DAILY
COMMUNITY
Start: 2024-08-01 | End: 2024-08-11

## 2024-08-05 ASSESSMENT — PAIN SCALES - GENERAL: PAINLEVEL: 8

## 2024-08-05 NOTE — PROGRESS NOTES
Ms. Nguyễn was here today in the infusion center and is complaining of 8/10 pain starting around her port site and then up her neck and into her jaw. She reports that any movement brings excruciating pain. She also mentioned a new cough that is producing phlegm that is consistent but worse at night.She mentions having a pressure sensation behind her eyes and has some nasal drainage that started yesterday. She says her throat hurts when she swallows. She denies any headache, chest pain, shortness of breath, nausea, vomiting, diarrhea. She feels moderately fatigued and was up frequently overnight from the pain and the new cough. Dr. Bourne assessed pt. Pt did not need anything additional completed. Pt ambulated with use of cane to the exit safely. Pt had no further questions or concerns at this time.

## 2024-08-05 NOTE — PROGRESS NOTES
Discharge Facility: [facility]   Discharge Diagnosis: chest wall infection  Admission Date: 7/26/24  Discharge Date: 8/3/24    PCP Appointment Date: 8/12/24  Specialist Appointment Date: 8/5/24 heme/onc  8/6/24 Dr Shivam ROJAS will have wound care at home  Hospital Encounter and Summary Linked: Limited info attached from care everywhere  See discharge assessment below for further details    Engagement  Call Start Time: 1425 (8/5/2024  2:35 PM)    Medications  Medications reviewed with patient/caregiver?: Yes (8/5/2024  2:35 PM)  Is the patient having any side effects they believe may be caused by any medication additions or changes?: No (8/5/2024  2:35 PM)  Does the patient have all medications ordered at discharge?: Yes (8/5/2024  2:35 PM)  Care Management Interventions: No intervention needed (8/5/2024  2:35 PM)  Prescription Comments: On oral course of augmentin (8/5/2024  2:35 PM)  Is the patient taking all medications as directed (includes completed medication regime)?: Yes (8/5/2024  2:35 PM)  Care Management Interventions: Provided patient education (8/5/2024  2:35 PM)  Medication Comments: No issues with medications so far (8/5/2024  2:35 PM)    Appointments  Does the patient have a primary care provider?: Yes (8/5/2024  2:35 PM)  Care Management Interventions: Verified appointment date/time/provider (8/5/2024  2:35 PM)  Has the patient kept scheduled appointments due by today?: Yes (8/5/2024  2:35 PM)  Care Management Interventions: Advised patient to keep appointment (Saw heme/onc today, will have ultrasound of port area, will see Dr Langley tomorrow, saw wound clinic at  today who arranged home care visits for port dressing changes MWF) (8/5/2024  2:35 PM)    Self Management  What is the home health agency?: [facility] (8/5/2024  2:35 PM)  Has home health visited the patient within 72 hours of discharge?: Call prior to 72 hours (8/5/2024  2:35 PM)  What Durable Medical Equipment (DME) was  ordered?: dressing supplies (8/5/2024  2:35 PM)  Has all Durable Medical Equipment (DME) been delivered?: Yes (8/5/2024  2:35 PM)    Patient Teaching  Does the patient have access to their discharge instructions?: Yes (8/5/2024  2:35 PM)  Care Management Interventions: Reviewed instructions with patient (8/5/2024  2:35 PM)  What is the patient's perception of their health status since discharge?: New symptoms unrelated to diagnosis (Having some pain around area where her port is located, she states she saw her heme/onc today who ordered ultrasound of area.) (8/5/2024  2:35 PM)  Is the patient/caregiver able to teach back the hierarchy of who to call/visit for symptoms/problems? PCP, Specialist, Home Health nurse, Urgent Care, ED, 911: Yes (8/5/2024  2:35 PM)  Patient/Caregiver Education Comments: Aware to seek care with any worsening pain or signs of worsening infection to wound near chest wall port site, increased redness drainage or fevers. (8/5/2024  2:35 PM)    Wrap Up  Wrap Up Additional Comments: Kath is home after hospital stay at [facility], minimal information in chart, will send message to office about requesting records for scanning. She is doing ok, will have home care three times a week for packing and dressing changes to her chest wall wound. Currently on oral antibiotics. She will see Dr Langley tomorrow for follow up. (8/5/2024  2:35 PM)  Call End Time: 1435 (8/5/2024  2:35 PM)

## 2024-08-05 NOTE — TELEPHONE ENCOUNTER
This RN called patient, she was scheduled at [facility] today at 9am Infusions are on hold 2/2 to possible chest wall infection. Per Dr. Bourne , we would like patient to come in and can assess the port area for swelling, redness and possible flush with INF nurse. Per patient, she has to go to [facility] for wound clinic appointment at 1030am, and will head here after [place] per charge RN to keep patient on in INF for appointment. Patient will check in when she arrives. Dr. Bourne will assess port area when patient arrives.    Thanks,  SARAH BETH STOKES, RN

## 2024-08-06 ENCOUNTER — OFFICE VISIT (OUTPATIENT)
Dept: SURGICAL ONCOLOGY | Facility: CLINIC | Age: 74
End: 2024-08-06
Payer: MEDICARE

## 2024-08-06 DIAGNOSIS — C50.112 MALIGNANT NEOPLASM OF CENTRAL PORTION OF LEFT BREAST IN FEMALE, ESTROGEN RECEPTOR NEGATIVE (MULTI): Primary | ICD-10-CM

## 2024-08-06 DIAGNOSIS — Z17.1 MALIGNANT NEOPLASM OF CENTRAL PORTION OF LEFT BREAST IN FEMALE, ESTROGEN RECEPTOR NEGATIVE (MULTI): Primary | ICD-10-CM

## 2024-08-06 PROCEDURE — 3044F HG A1C LEVEL LT 7.0%: CPT | Performed by: SURGERY

## 2024-08-06 PROCEDURE — 1157F ADVNC CARE PLAN IN RCRD: CPT | Performed by: SURGERY

## 2024-08-06 PROCEDURE — 4010F ACE/ARB THERAPY RXD/TAKEN: CPT | Performed by: SURGERY

## 2024-08-06 PROCEDURE — 99211 OFF/OP EST MAY X REQ PHY/QHP: CPT | Performed by: SURGERY

## 2024-08-06 PROCEDURE — 3048F LDL-C <100 MG/DL: CPT | Performed by: SURGERY

## 2024-08-06 NOTE — PROGRESS NOTES
BREAST SURGERY POST OPERATIVE VISIT    Assessment/Plan     Left breast IDC g3 ER0% MT 0% HER2 positive at 3:00 10cmFN measuring 0.9cm on MRI  -concerning level 3 lymph node not amenable to biopsy.  PET CT recommended for further assessment by radiology.  PET negative.  -s/p bilateral completion mastectomy  -pT1cNx     2.  History of bilateral breast cancer and known BRCA2 mutation     Stage IB (Prognostic Stage IA) Right breast cancer 7/2018, fP1vH1fr, grade 2 IDC, ER+95% MT+95% HER2 equivocal, FISH-.   - s/p R. magseed pm / SLNB (2mi/3) on 8/27/2018 with a 1.5cm IDC and negative margins. She had 2 out of 3 LNs with micrometastases measuring 0.4mm and 1.1mm, no NASH.   - Oncotype 17   - s/p WBRT 10/11/2018 - 11/1/2018   - taking Exemestane      Stage II Left breast cancer in 2003, 1.5 cm grade 3 IDC with positive LNs and NASH, ER+ MT+ HER2 not defined.   - L.pm/ALND   - ACx4 + Tx4   - WBRT   - Anastrozole x5yr    3.  Chest wall seroma  / abscess   -s/p beside I&D during recent hospitalization at [facility]   -cultures growing pasteurella.  On antibiotics   -has home wound care MWF.    Plan on follow up as scheduled in January or sooner if she has any additional concerns with healing.      Subjective   Raiza Nguyễn is a 73 y.o. female here for post op visit s/p bilateral mastectomy.    Admitted to [facility] for left chest wall seroma / abscess on Friday 7/25.  She was discharged on 8/3/2024.  Underwent bedside I&D.  Home health wound care MWF.  Incision being packed with alginate dressing.    Date of surgery: 6/12/2024  Pathology:    Tumor size: 1.1cm   Nodes: NA   Margins: negative   Stage: [id number]     Objective   Physical Exam  General: Alert and oriented x 3.  Mood and affect are appropriate.  HEENT: EOMI, PERRLA.   Neck: supple, no masses, no cervical adenopathy.  Cardiovascular: no lower extremity edema.  Pulmonary: breathing non labored on room air.  GI: Abdomen soft, no masses. No hepatomegaly or  splenomegaly.  Lymph nodes: No supraclavicular or axillary adenopathy bilaterally.  Musculoskeletal: Full range of motion in the upper extremities bilaterally.  Neuro: denies dizziness, tremors    Breasts: The breasts were examined in both the seated and supine positions.    RIGHT: surgically absent.  Incision well approximated.  Flaps viable. No signs of infection. Incision well healed.  LEFT: surgically absent.  No erythema.  2cm defect along lateral aspect of incision with tunneling medially along incision.  Wound packed with alginate dressing.          Jeniffer Langley, DO

## 2024-08-07 ENCOUNTER — HOSPITAL ENCOUNTER (OUTPATIENT)
Dept: RADIOLOGY | Facility: CLINIC | Age: 74
Discharge: HOME | End: 2024-08-07
Payer: MEDICARE

## 2024-08-07 DIAGNOSIS — C50.112 MALIGNANT NEOPLASM OF CENTRAL PORTION OF LEFT BREAST IN FEMALE, ESTROGEN RECEPTOR NEGATIVE (MULTI): ICD-10-CM

## 2024-08-07 DIAGNOSIS — Z17.1 MALIGNANT NEOPLASM OF CENTRAL PORTION OF LEFT BREAST IN FEMALE, ESTROGEN RECEPTOR NEGATIVE (MULTI): ICD-10-CM

## 2024-08-07 PROCEDURE — 76536 US EXAM OF HEAD AND NECK: CPT | Performed by: RADIOLOGY

## 2024-08-07 PROCEDURE — 76536 US EXAM OF HEAD AND NECK: CPT

## 2024-08-08 RX ORDER — APIXABAN 5 MG (74)
KIT ORAL
Qty: 74 TABLET | Refills: 0 | Status: SHIPPED | OUTPATIENT
Start: 2024-08-08

## 2024-08-09 NOTE — RESULT ENCOUNTER NOTE
Reviewed results with patient on 8/8. Apixiban discussed with patient- instructions given and sent to pharmacy. Discussed if any symptoms related to this finding it is important she will call and/or present to ER. Expressed understanding as patient reports similar event >20 years ago. She will call if any issues.

## 2024-08-12 ENCOUNTER — APPOINTMENT (OUTPATIENT)
Dept: PRIMARY CARE | Facility: CLINIC | Age: 74
End: 2024-08-12
Payer: MEDICARE

## 2024-08-12 VITALS
DIASTOLIC BLOOD PRESSURE: 71 MMHG | WEIGHT: 179 LBS | TEMPERATURE: 97.1 F | HEART RATE: 96 BPM | SYSTOLIC BLOOD PRESSURE: 117 MMHG | BODY MASS INDEX: 35.89 KG/M2 | OXYGEN SATURATION: 96 %

## 2024-08-12 DIAGNOSIS — L02.213 CHEST WALL ABSCESS: ICD-10-CM

## 2024-08-12 DIAGNOSIS — I82.90 ACUTE DEEP VEIN THROMBOSIS (DVT) OF NON-EXTREMITY VEIN: ICD-10-CM

## 2024-08-12 DIAGNOSIS — Z09 HOSPITAL DISCHARGE FOLLOW-UP: Primary | ICD-10-CM

## 2024-08-12 DIAGNOSIS — D64.9 ANEMIA, UNSPECIFIED TYPE: ICD-10-CM

## 2024-08-12 PROCEDURE — 1159F MED LIST DOCD IN RCRD: CPT | Performed by: FAMILY MEDICINE

## 2024-08-12 PROCEDURE — 4010F ACE/ARB THERAPY RXD/TAKEN: CPT | Performed by: FAMILY MEDICINE

## 2024-08-12 PROCEDURE — 1157F ADVNC CARE PLAN IN RCRD: CPT | Performed by: FAMILY MEDICINE

## 2024-08-12 PROCEDURE — 3048F LDL-C <100 MG/DL: CPT | Performed by: FAMILY MEDICINE

## 2024-08-12 PROCEDURE — 3044F HG A1C LEVEL LT 7.0%: CPT | Performed by: FAMILY MEDICINE

## 2024-08-12 PROCEDURE — 1160F RVW MEDS BY RX/DR IN RCRD: CPT | Performed by: FAMILY MEDICINE

## 2024-08-12 PROCEDURE — 1036F TOBACCO NON-USER: CPT | Performed by: FAMILY MEDICINE

## 2024-08-12 PROCEDURE — 99495 TRANSJ CARE MGMT MOD F2F 14D: CPT | Performed by: FAMILY MEDICINE

## 2024-08-12 PROCEDURE — 3078F DIAST BP <80 MM HG: CPT | Performed by: FAMILY MEDICINE

## 2024-08-12 PROCEDURE — 3074F SYST BP LT 130 MM HG: CPT | Performed by: FAMILY MEDICINE

## 2024-08-12 NOTE — PROGRESS NOTES
Subjective   Patient ID: Raiza Nguyễn is a 73 y.o. female who presents for Hospital Follow-up (Pt presents for hospital follow up- pt states that she is feeling good, blood clot was found in her jugular).    HPI     Patient presents today for hospital discharge follow-up.    All records from available from hospitalization reviewed.  Seen at [facility] - limited records available via [employer] everywhere  Will request complete copy of records from [facility] for patient's chart.    Patient was admitted to [facility] 7/26/2024 - 8/3/2024 with chest wall seroma/abscess.  -s/p beside I&D during recent hospitalization at [facility]  -cultures growing pasteurella.  On antibiotics  -has home wound care MWF.  -chemo on hold until fully recovers from infection per oncology 7/29    Saw surgeon, Dr Langley, on 8/6/2024 for outpatient follow-up. Was to [facility] and plan to follow-up as scheduled in Jan or sooner if any additional concerns with healing.    Her heme-onc provider ordered outpatient US neck, this was completed 8/7/2024 and showed acute occluding deep venous thrombosis within right jugular vein. She was subsequently started on Eliquis,    Reports has similar experience of infection and clotting with port placement 20 years ago.    Wound care scheduled for today at 11:30 to change bandage.    Awaiting repeat imaging per oncology    Review of Systems   All other systems reviewed and are negative.      Objective   /71 (BP Location: Right arm, Patient Position: Sitting, BP Cuff Size: Adult)   Pulse 96   Temp 36.2 °C (97.1 °F) (Temporal)   Wt 81.2 kg (179 lb)   SpO2 96%   BMI 35.89 kg/m²     Physical Exam  Vitals and nursing note reviewed.   Constitutional:       General: She is not in acute distress.     Appearance: Normal appearance. She is not toxic-appearing.   HENT:      Head: Normocephalic and atraumatic.   Eyes:      Extraocular Movements: Extraocular movements intact.      Pupils: Pupils are equal, round, and reactive to light.    Neck:      Thyroid: No thyromegaly.      Vascular: No hepatojugular reflux or JVD.      Comments: Slight firmness along jugular right side but non-tender, not red/hot or noticeably edematous       Cardiovascular:      Rate and Rhythm: Normal rate and regular rhythm.      Heart sounds: No murmur heard.     No friction rub. No gallop.   Pulmonary:      Effort: Pulmonary effort is normal.      Breath sounds: Normal breath sounds. No wheezing, rhonchi or rales.   Abdominal:      General: Bowel sounds are normal. There is no distension.      Palpations: Abdomen is soft. There is no mass.      Tenderness: There is no abdominal tenderness. There is no guarding.   Musculoskeletal:      Right lower leg: No edema.      Left lower leg: No edema.   Lymphadenopathy:      Cervical: No cervical adenopathy.   Skin:     General: Skin is warm and dry.      Comments: Wound dressed over left side of chest wall,  small amount drainage visible through outside of bandage - non-sanguinous, non-purulent   Surrounding area no erythema    Neurological:      General: No focal deficit present.      Mental Status: She is alert and oriented to person, place, and time.   Psychiatric:         Mood and Affect: Mood normal.         Behavior: Behavior normal.         Assessment/Plan   Diagnoses and all orders for this visit:  Hospital discharge follow-up  Acute deep vein thrombosis (DVT) of non-extremity vein  Chest wall abscess  Anemia, unspecified type  -     CBC and Auto Differential; Future  -     Basic Metabolic Panel; Future    All available hospital records and outpatient follow-up records have been reviewed  Will request complete records from [facility] [place] [place]    Chest wall seroma/abscess  -s/p beside I&D during recent hospitalization at [facility] [place] [place]  -cultures growing pasteurella.  On antibiotics  -has home wound care MWF.  -chemo on hold until fully recovers from infection per oncology 7/29    Acute DVT, right jugular vein  -started on Eliquis via heme-onc,  continue as they have prescribed  -repeat labs ordered today    Follow-up with me as previously discussed for routine care.  Call for sooner follow-up if needed.         Scribe Attestation  By signing my name below, I, John Ely   attest that this documentation has been prepared under the direction and in the presence of Shoshana Olivarez DO.

## 2024-08-13 ENCOUNTER — TELEPHONE (OUTPATIENT)
Dept: HEMATOLOGY/ONCOLOGY | Facility: HOSPITAL | Age: 74
End: 2024-08-13
Payer: MEDICARE

## 2024-08-13 ENCOUNTER — HOSPITAL ENCOUNTER (OUTPATIENT)
Dept: RADIOLOGY | Facility: HOSPITAL | Age: 74
Discharge: HOME | End: 2024-08-13
Payer: MEDICARE

## 2024-08-13 DIAGNOSIS — Z17.1 MALIGNANT NEOPLASM OF CENTRAL PORTION OF LEFT BREAST IN FEMALE, ESTROGEN RECEPTOR NEGATIVE (MULTI): ICD-10-CM

## 2024-08-13 DIAGNOSIS — C50.112 MALIGNANT NEOPLASM OF CENTRAL PORTION OF LEFT BREAST IN FEMALE, ESTROGEN RECEPTOR NEGATIVE (MULTI): ICD-10-CM

## 2024-08-13 PROCEDURE — 2550000001 HC RX 255 CONTRASTS: Performed by: RADIOLOGY

## 2024-08-13 PROCEDURE — 2500000004 HC RX 250 GENERAL PHARMACY W/ HCPCS (ALT 636 FOR OP/ED): Performed by: RADIOLOGY

## 2024-08-13 PROCEDURE — 36598 INJ W/FLUOR EVAL CV DEVICE: CPT | Mod: RT | Performed by: RADIOLOGY

## 2024-08-13 RX ORDER — HEPARIN 100 UNIT/ML
SYRINGE INTRAVENOUS
Status: COMPLETED | OUTPATIENT
Start: 2024-08-13 | End: 2024-08-13

## 2024-08-13 NOTE — TELEPHONE ENCOUNTER
Pt just had her port checked and reports there was no evidence of thrombus in the port or in the jugular vein.  She asks if she needs to continue on eliquis?

## 2024-08-14 ENCOUNTER — APPOINTMENT (OUTPATIENT)
Dept: GASTROENTEROLOGY | Facility: CLINIC | Age: 74
End: 2024-08-14
Payer: MEDICARE

## 2024-08-14 VITALS
SYSTOLIC BLOOD PRESSURE: 126 MMHG | BODY MASS INDEX: 34.96 KG/M2 | DIASTOLIC BLOOD PRESSURE: 76 MMHG | OXYGEN SATURATION: 99 % | HEIGHT: 59 IN | HEART RATE: 98 BPM | RESPIRATION RATE: 18 BRPM | WEIGHT: 173.4 LBS

## 2024-08-14 DIAGNOSIS — Z15.09 BRCA2 POSITIVE: ICD-10-CM

## 2024-08-14 DIAGNOSIS — K86.2 PANCREAS CYST (HHS-HCC): Primary | ICD-10-CM

## 2024-08-14 DIAGNOSIS — Z15.01 BRCA2 POSITIVE: ICD-10-CM

## 2024-08-14 PROCEDURE — 1159F MED LIST DOCD IN RCRD: CPT | Performed by: STUDENT IN AN ORGANIZED HEALTH CARE EDUCATION/TRAINING PROGRAM

## 2024-08-14 PROCEDURE — 1157F ADVNC CARE PLAN IN RCRD: CPT | Performed by: STUDENT IN AN ORGANIZED HEALTH CARE EDUCATION/TRAINING PROGRAM

## 2024-08-14 PROCEDURE — 3008F BODY MASS INDEX DOCD: CPT | Performed by: STUDENT IN AN ORGANIZED HEALTH CARE EDUCATION/TRAINING PROGRAM

## 2024-08-14 PROCEDURE — 3044F HG A1C LEVEL LT 7.0%: CPT | Performed by: STUDENT IN AN ORGANIZED HEALTH CARE EDUCATION/TRAINING PROGRAM

## 2024-08-14 PROCEDURE — 4010F ACE/ARB THERAPY RXD/TAKEN: CPT | Performed by: STUDENT IN AN ORGANIZED HEALTH CARE EDUCATION/TRAINING PROGRAM

## 2024-08-14 PROCEDURE — 99215 OFFICE O/P EST HI 40 MIN: CPT | Performed by: STUDENT IN AN ORGANIZED HEALTH CARE EDUCATION/TRAINING PROGRAM

## 2024-08-14 PROCEDURE — 3074F SYST BP LT 130 MM HG: CPT | Performed by: STUDENT IN AN ORGANIZED HEALTH CARE EDUCATION/TRAINING PROGRAM

## 2024-08-14 PROCEDURE — 3048F LDL-C <100 MG/DL: CPT | Performed by: STUDENT IN AN ORGANIZED HEALTH CARE EDUCATION/TRAINING PROGRAM

## 2024-08-14 PROCEDURE — 3078F DIAST BP <80 MM HG: CPT | Performed by: STUDENT IN AN ORGANIZED HEALTH CARE EDUCATION/TRAINING PROGRAM

## 2024-08-14 PROCEDURE — 1036F TOBACCO NON-USER: CPT | Performed by: STUDENT IN AN ORGANIZED HEALTH CARE EDUCATION/TRAINING PROGRAM

## 2024-08-14 NOTE — PROGRESS NOTES
Subjective     History of Present Illness:   Raiza Nguyễn is a 73 y.o. female with hx of BRCA 2 mutation and bilateral mastectomy (06/2024) who presents to clinic for abnormal PET scan and pancreas cysts.    Patient completed a PET scan in May 2024 that demonstrated mild enhancement in segment 2 of the liver.  She underwent MRI of the liver at that time that was largely unremarkable.  Her liver enzymes are normal.  Also noted on MRI was small subcentimeter pancreatic cysts.     She denies any symptoms of abdominal pain, diarrhea.  She has no blood in her stool.  She completed a colonoscopy in January 2024 at an outside facility with no polyps at that time and recommendation to repeat colonoscopy in 3 years.    Past Medical History   has a past medical history of Amnesia (02/26/2024), Anxiety, Arthritis, Smith esophagus (2015), Breast cancer (Multi) (2002), Cancer (Multi), Chronic pain disorder, Colon polyp (2016), Coronary artery disease, Depression, Diabetes mellitus (Multi), Disease of thyroid gland, Diverticulitis of colon (2022), Diverticulosis (2019), GERD (gastroesophageal reflux disease), Heart disease, Heart failure (Multi), Hyperlipidemia, Hypersomnia, unspecified (11/30/2021), Hypertension, Hypothyroid, Lumbar disc herniation (01/18/2023), Melanoma (Multi) (2016), Obesity (1959), Osteoporosis, Other conditions influencing health status, Other intervertebral disc degeneration, lumbar region (06/27/2014), Other intervertebral disc displacement, lumbar region (06/27/2014), Personal history of irradiation (2004), Personal history of malignant melanoma of skin (10/16/2019), Personal history of other diseases of the digestive system, Personal history of other endocrine, nutritional and metabolic disease (02/20/2022), Personal history of other endocrine, nutritional and metabolic disease, PONV (postoperative nausea and vomiting) (2002), Sleep apnea, Thrombosis (2004), Thyrotoxicosis, unspecified without  thyrotoxic crisis or storm, and Type 2 diabetes mellitus (Multi).     Social History   reports that she quit smoking about 41 years ago. Her smoking use included cigarettes. She started smoking about 56 years ago. She has a 80 pack-year smoking history. She has been exposed to tobacco smoke. She has never used smokeless tobacco. She reports that she does not currently use alcohol after a past usage of about 1.0 standard drink of alcohol per week. She reports that she does not use drugs.     Family History  family history includes Alcohol abuse in her mother; Breast cancer in her mother; Cancer in her father, mother, mother's brother, and sister; Colon cancer in her mother's brother and another family member; Diabetes in her maternal grandmother; Lung cancer in her father; Ovarian cysts in her maternal grandmother; Stomach cancer in her maternal cousin and another family member.     Allergies  Allergies   Allergen Reactions    Erythromycin GI bleeding    Tetracycline GI bleeding    Cephalexin Unknown    Glucosamine Unknown    Adhesive Itching and Rash       Medications  Current Outpatient Medications   Medication Instructions    apixaban (Eliquis DVT-PE Treat 30D Start) 5 mg (74 tabs) tablet Take 2 tablets (10 mg) by mouth 2 times a day for 7 days, then take 1 tablet (5 mg) by mouth 2 times a day.    aspirin 81 mg EC tablet 1 tablet, oral, Every evening    blood sugar diagnostic strip 90 strips, miscellaneous, Daily    calcium carbonate 600 mg, oral, Daily    DULoxetine (CYMBALTA) 30 mg, oral, 2 times daily    esomeprazole (NEXIUM) 20 mg, oral, 2 times daily, Do not open capsule.    gabapentin (NEURONTIN) 900 mg, oral, 3 times daily    hydroCHLOROthiazide (MICROZIDE) 12.5 mg, oral, Daily    levothyroxine (Synthroid, Levoxyl) 125 mcg tablet 1 tablet, oral, Daily    lidocaine-prilocaine (Emla) 2.5-2.5 % cream PLEASE SEE ATTACHED FOR DETAILED DIRECTIONS    lisinopril 10 mg, oral, 2 times daily    metFORMIN XR  (GLUCOPHAGE-XR) 500 mg, oral, Daily with breakfast, Do not crush, chew, or split.    Mounjaro 2.5 mg, subcutaneous, Once Weekly    rosuvastatin (CRESTOR) 5 mg, oral, Daily    traMADol (Ultram) 50 mg tablet 1 tablet, oral, 2 times daily PRN        Objective   Visit Vitals  /76 (BP Location: Right arm, Patient Position: Sitting, BP Cuff Size: Adult)   Pulse 98   Resp 18      Physical Exam  Constitutional:       General: She is not in acute distress.     Appearance: Normal appearance.   HENT:      Head: Normocephalic and atraumatic.      Mouth/Throat:      Mouth: Mucous membranes are moist.   Eyes:      Extraocular Movements: Extraocular movements intact.   Pulmonary:      Effort: Pulmonary effort is normal.      Breath sounds: No stridor. No wheezing.   Abdominal:      General: Abdomen is flat. There is no distension.   Musculoskeletal:         General: Normal range of motion.   Skin:     General: Skin is warm and dry.   Neurological:      General: No focal deficit present.      Mental Status: She is alert.   Psychiatric:         Mood and Affect: Mood normal.         Judgment: Judgment normal.         Assessment/Plan   Raiza Nguyễn is a 73 y.o. female with hx of BRCA 2 mutation and bilateral mastectomy (06/2024) who presents to clinic for abnormal PET scan and pancreas cysts noted incidentally on MRI liver.        Discussed with patient in accordance with guidelines above plan to repeat imaging in 6 months to ensure no rapid growth of pancreatic cysts.  Note that there were no findings within the pancreas on CT imaging from July 2023.    With regards to possible uptake within segment 2 of the liver, this is of unclear etiology.  MRI liver reviewed with no parenchymal pathology.  Will discuss with primary oncologist but would be reasonable to repeat PET scan at this time to evaluate for persistence or interval change.     Problem List Items Addressed This Visit       BRCA2 positive    Relevant Orders      abdomen w and wo IV contrast     Other Visit Diagnoses       Pancreas cyst ([facility])    -  Primary    Relevant Orders    MR abdomen w and wo IV contrast                   Mona Dodson MD         My final recommendations will be communicated back to the requesting physician by way of shared Medical record or letter to requesting physician via fax.

## 2024-08-15 ENCOUNTER — PATIENT OUTREACH (OUTPATIENT)
Dept: CARE COORDINATION | Facility: CLINIC | Age: 74
End: 2024-08-15
Payer: MEDICARE

## 2024-08-15 ENCOUNTER — TELEPHONE (OUTPATIENT)
Dept: HEMATOLOGY/ONCOLOGY | Facility: CLINIC | Age: 74
End: 2024-08-15
Payer: MEDICARE

## 2024-08-15 DIAGNOSIS — Z17.1 MALIGNANT NEOPLASM OF CENTRAL PORTION OF LEFT BREAST IN FEMALE, ESTROGEN RECEPTOR NEGATIVE (MULTI): Primary | ICD-10-CM

## 2024-08-15 DIAGNOSIS — C50.112 MALIGNANT NEOPLASM OF CENTRAL PORTION OF LEFT BREAST IN FEMALE, ESTROGEN RECEPTOR NEGATIVE (MULTI): Primary | ICD-10-CM

## 2024-08-15 DIAGNOSIS — R60.0 LOCALIZED EDEMA: ICD-10-CM

## 2024-08-15 RX ORDER — PROCHLORPERAZINE MALEATE 10 MG
10 TABLET ORAL EVERY 6 HOURS PRN
OUTPATIENT
Start: 2024-09-05

## 2024-08-15 RX ORDER — DEXAMETHASONE SODIUM PHOSPHATE 10 MG/ML
10 INJECTION INTRAMUSCULAR; INTRAVENOUS ONCE
OUTPATIENT
Start: 2024-09-12

## 2024-08-15 RX ORDER — PROCHLORPERAZINE MALEATE 10 MG
10 TABLET ORAL EVERY 6 HOURS PRN
OUTPATIENT
Start: 2024-09-12

## 2024-08-15 RX ORDER — EPINEPHRINE 0.3 MG/.3ML
0.3 INJECTION SUBCUTANEOUS EVERY 5 MIN PRN
OUTPATIENT
Start: 2024-09-12

## 2024-08-15 RX ORDER — EPINEPHRINE 0.3 MG/.3ML
0.3 INJECTION SUBCUTANEOUS EVERY 5 MIN PRN
OUTPATIENT
Start: 2024-09-05

## 2024-08-15 RX ORDER — PROCHLORPERAZINE EDISYLATE 5 MG/ML
10 INJECTION INTRAMUSCULAR; INTRAVENOUS EVERY 6 HOURS PRN
OUTPATIENT
Start: 2024-09-12

## 2024-08-15 RX ORDER — DIPHENHYDRAMINE HCL 50 MG
50 CAPSULE ORAL ONCE
OUTPATIENT
Start: 2024-09-05

## 2024-08-15 RX ORDER — FAMOTIDINE 10 MG/ML
20 INJECTION INTRAVENOUS ONCE AS NEEDED
OUTPATIENT
Start: 2024-09-05

## 2024-08-15 RX ORDER — DIPHENHYDRAMINE HCL 50 MG
50 CAPSULE ORAL ONCE
OUTPATIENT
Start: 2024-09-12

## 2024-08-15 RX ORDER — FAMOTIDINE 10 MG/ML
20 INJECTION INTRAVENOUS ONCE AS NEEDED
OUTPATIENT
Start: 2024-09-12

## 2024-08-15 RX ORDER — ALBUTEROL SULFATE 0.83 MG/ML
3 SOLUTION RESPIRATORY (INHALATION) AS NEEDED
OUTPATIENT
Start: 2024-09-05

## 2024-08-15 RX ORDER — DIPHENHYDRAMINE HYDROCHLORIDE 50 MG/ML
50 INJECTION INTRAMUSCULAR; INTRAVENOUS AS NEEDED
OUTPATIENT
Start: 2024-09-05

## 2024-08-15 RX ORDER — PROCHLORPERAZINE EDISYLATE 5 MG/ML
10 INJECTION INTRAMUSCULAR; INTRAVENOUS EVERY 6 HOURS PRN
OUTPATIENT
Start: 2024-09-05

## 2024-08-15 RX ORDER — DEXAMETHASONE SODIUM PHOSPHATE 10 MG/ML
10 INJECTION INTRAMUSCULAR; INTRAVENOUS ONCE
OUTPATIENT
Start: 2024-09-05

## 2024-08-15 RX ORDER — DIPHENHYDRAMINE HYDROCHLORIDE 50 MG/ML
50 INJECTION INTRAMUSCULAR; INTRAVENOUS AS NEEDED
OUTPATIENT
Start: 2024-09-12

## 2024-08-15 RX ORDER — ALBUTEROL SULFATE 0.83 MG/ML
3 SOLUTION RESPIRATORY (INHALATION) AS NEEDED
OUTPATIENT
Start: 2024-09-12

## 2024-08-15 RX ORDER — FAMOTIDINE 10 MG/ML
20 INJECTION INTRAVENOUS ONCE
OUTPATIENT
Start: 2024-09-05

## 2024-08-15 RX ORDER — FAMOTIDINE 10 MG/ML
20 INJECTION INTRAVENOUS ONCE
OUTPATIENT
Start: 2024-09-12

## 2024-08-15 NOTE — PROGRESS NOTES
Call regarding appt. with PCP on 8/12/24 after hospitalization.  At time of outreach call the patient feels as if their condition has improved since last visit.  Reviewed the PCP appointment with the pt and addressed any questions or concerns. Has been in contact with her oncologist and will plan on resuming immunotherapy next week possibly, having more energy and feeling well at this time.

## 2024-08-15 NOTE — TELEPHONE ENCOUNTER
Called patient to review updated events.  She was able to  Eliquis and is tolerating it well.  We will obtain right upper extremity ultrasound to assess extent of clot.  She went for port assessment on 8/13 and port appears to be functioning.  She continues to heal from her breast surgery.  She is seeing wound care on Mondays Wednesdays and Thursdays.  To not further delay her treatment I have recommended resuming Herceptin alone without paclitaxel and we can reassess adding paclitaxel at a future visit.  We will arrange for weekly Herceptin to resume next week.  Will see patient prior to cycle 6 Herceptin to reassess addition of paclitaxel.  We also discussed patient's message regarding B12 injections.  I asked that we defer at this time but can revisit after her treatment in collaboration with her PCP.

## 2024-08-15 NOTE — TELEPHONE ENCOUNTER
Dr. Tayla Luke just spoke with patient. She is ordering an upper extremity duplex, and patient will resume herceptin infusions. I am going to reach out to [facility] and have scheduling contact patient.

## 2024-08-21 ENCOUNTER — HOSPITAL ENCOUNTER (OUTPATIENT)
Dept: RADIOLOGY | Facility: CLINIC | Age: 74
Discharge: HOME | End: 2024-08-21
Payer: MEDICARE

## 2024-08-21 DIAGNOSIS — Z17.1 MALIGNANT NEOPLASM OF CENTRAL PORTION OF LEFT BREAST IN FEMALE, ESTROGEN RECEPTOR NEGATIVE (MULTI): ICD-10-CM

## 2024-08-21 DIAGNOSIS — R60.0 LOCALIZED EDEMA: ICD-10-CM

## 2024-08-21 DIAGNOSIS — C50.112 MALIGNANT NEOPLASM OF CENTRAL PORTION OF LEFT BREAST IN FEMALE, ESTROGEN RECEPTOR NEGATIVE (MULTI): ICD-10-CM

## 2024-08-21 PROCEDURE — 93971 EXTREMITY STUDY: CPT

## 2024-08-21 PROCEDURE — 93971 EXTREMITY STUDY: CPT | Performed by: RADIOLOGY

## 2024-08-22 ENCOUNTER — INFUSION (OUTPATIENT)
Dept: HEMATOLOGY/ONCOLOGY | Facility: CLINIC | Age: 74
End: 2024-08-22
Payer: MEDICARE

## 2024-08-22 VITALS
OXYGEN SATURATION: 95 % | SYSTOLIC BLOOD PRESSURE: 135 MMHG | RESPIRATION RATE: 18 BRPM | TEMPERATURE: 97 F | WEIGHT: 176.92 LBS | DIASTOLIC BLOOD PRESSURE: 79 MMHG | HEART RATE: 87 BPM | BODY MASS INDEX: 35.73 KG/M2

## 2024-08-22 DIAGNOSIS — C50.911 MALIGNANT NEOPLASM OF BOTH BREASTS IN FEMALE, ESTROGEN RECEPTOR NEGATIVE, UNSPECIFIED SITE OF BREAST (MULTI): ICD-10-CM

## 2024-08-22 DIAGNOSIS — Z17.1 MALIGNANT NEOPLASM OF CENTRAL PORTION OF LEFT BREAST IN FEMALE, ESTROGEN RECEPTOR NEGATIVE (MULTI): ICD-10-CM

## 2024-08-22 DIAGNOSIS — Z17.1 MALIGNANT NEOPLASM OF BOTH BREASTS IN FEMALE, ESTROGEN RECEPTOR NEGATIVE, UNSPECIFIED SITE OF BREAST (MULTI): ICD-10-CM

## 2024-08-22 DIAGNOSIS — C50.912 MALIGNANT NEOPLASM OF BOTH BREASTS IN FEMALE, ESTROGEN RECEPTOR NEGATIVE, UNSPECIFIED SITE OF BREAST (MULTI): ICD-10-CM

## 2024-08-22 DIAGNOSIS — C50.112 MALIGNANT NEOPLASM OF CENTRAL PORTION OF LEFT BREAST IN FEMALE, ESTROGEN RECEPTOR NEGATIVE (MULTI): ICD-10-CM

## 2024-08-22 LAB
BASOPHILS # BLD AUTO: 0.03 X10*3/UL (ref 0–0.1)
BASOPHILS NFR BLD AUTO: 0.5 %
EOSINOPHIL # BLD AUTO: 0.14 X10*3/UL (ref 0–0.4)
EOSINOPHIL NFR BLD AUTO: 2.5 %
ERYTHROCYTE [DISTWIDTH] IN BLOOD BY AUTOMATED COUNT: 15.8 % (ref 11.5–14.5)
HCT VFR BLD AUTO: 32.8 % (ref 36–46)
HGB BLD-MCNC: 10.5 G/DL (ref 12–16)
IMM GRANULOCYTES # BLD AUTO: 0 X10*3/UL (ref 0–0.5)
IMM GRANULOCYTES NFR BLD AUTO: 0 % (ref 0–0.9)
LYMPHOCYTES # BLD AUTO: 1.45 X10*3/UL (ref 0.8–3)
LYMPHOCYTES NFR BLD AUTO: 26 %
MCH RBC QN AUTO: 28.4 PG (ref 26–34)
MCHC RBC AUTO-ENTMCNC: 32 G/DL (ref 32–36)
MCV RBC AUTO: 89 FL (ref 80–100)
MONOCYTES # BLD AUTO: 0.55 X10*3/UL (ref 0.05–0.8)
MONOCYTES NFR BLD AUTO: 9.9 %
NEUTROPHILS # BLD AUTO: 3.4 X10*3/UL (ref 1.6–5.5)
NEUTROPHILS NFR BLD AUTO: 61.1 %
NRBC BLD-RTO: ABNORMAL /100{WBCS}
PLATELET # BLD AUTO: 386 X10*3/UL (ref 150–450)
RBC # BLD AUTO: 3.7 X10*6/UL (ref 4–5.2)
WBC # BLD AUTO: 5.6 X10*3/UL (ref 4.4–11.3)

## 2024-08-22 PROCEDURE — 96413 CHEMO IV INFUSION 1 HR: CPT

## 2024-08-22 PROCEDURE — 80053 COMPREHEN METABOLIC PANEL: CPT | Performed by: STUDENT IN AN ORGANIZED HEALTH CARE EDUCATION/TRAINING PROGRAM

## 2024-08-22 PROCEDURE — 2500000004 HC RX 250 GENERAL PHARMACY W/ HCPCS (ALT 636 FOR OP/ED): Performed by: STUDENT IN AN ORGANIZED HEALTH CARE EDUCATION/TRAINING PROGRAM

## 2024-08-22 PROCEDURE — 85025 COMPLETE CBC W/AUTO DIFF WBC: CPT | Performed by: STUDENT IN AN ORGANIZED HEALTH CARE EDUCATION/TRAINING PROGRAM

## 2024-08-22 RX ORDER — EPINEPHRINE 0.3 MG/.3ML
0.3 INJECTION SUBCUTANEOUS EVERY 5 MIN PRN
Status: DISCONTINUED | OUTPATIENT
Start: 2024-08-22 | End: 2024-08-22 | Stop reason: HOSPADM

## 2024-08-22 RX ORDER — FAMOTIDINE 10 MG/ML
20 INJECTION INTRAVENOUS ONCE AS NEEDED
Status: DISCONTINUED | OUTPATIENT
Start: 2024-08-22 | End: 2024-08-22 | Stop reason: HOSPADM

## 2024-08-22 RX ORDER — PROCHLORPERAZINE EDISYLATE 5 MG/ML
10 INJECTION INTRAMUSCULAR; INTRAVENOUS EVERY 6 HOURS PRN
Status: DISCONTINUED | OUTPATIENT
Start: 2024-08-22 | End: 2024-08-22 | Stop reason: HOSPADM

## 2024-08-22 RX ORDER — HEPARIN SODIUM,PORCINE/PF 10 UNIT/ML
50 SYRINGE (ML) INTRAVENOUS AS NEEDED
OUTPATIENT
Start: 2024-08-22

## 2024-08-22 RX ORDER — DIPHENHYDRAMINE HCL 50 MG
50 CAPSULE ORAL ONCE
Status: DISCONTINUED | OUTPATIENT
Start: 2024-08-22 | End: 2024-08-22

## 2024-08-22 RX ORDER — ALBUTEROL SULFATE 0.83 MG/ML
3 SOLUTION RESPIRATORY (INHALATION) AS NEEDED
Status: DISCONTINUED | OUTPATIENT
Start: 2024-08-22 | End: 2024-08-22 | Stop reason: HOSPADM

## 2024-08-22 RX ORDER — FAMOTIDINE 10 MG/ML
20 INJECTION INTRAVENOUS ONCE
Status: DISCONTINUED | OUTPATIENT
Start: 2024-08-22 | End: 2024-08-22

## 2024-08-22 RX ORDER — DIPHENHYDRAMINE HYDROCHLORIDE 50 MG/ML
50 INJECTION INTRAMUSCULAR; INTRAVENOUS AS NEEDED
Status: DISCONTINUED | OUTPATIENT
Start: 2024-08-22 | End: 2024-08-22 | Stop reason: HOSPADM

## 2024-08-22 RX ORDER — PROCHLORPERAZINE MALEATE 10 MG
10 TABLET ORAL EVERY 6 HOURS PRN
Status: DISCONTINUED | OUTPATIENT
Start: 2024-08-22 | End: 2024-08-22 | Stop reason: HOSPADM

## 2024-08-22 RX ORDER — HEPARIN SODIUM 1000 [USP'U]/ML
2000 INJECTION, SOLUTION INTRAVENOUS; SUBCUTANEOUS AS NEEDED
OUTPATIENT
Start: 2024-08-22

## 2024-08-22 RX ORDER — HEPARIN 100 UNIT/ML
500 SYRINGE INTRAVENOUS AS NEEDED
Status: DISCONTINUED | OUTPATIENT
Start: 2024-08-22 | End: 2024-08-22 | Stop reason: HOSPADM

## 2024-08-22 RX ORDER — HEPARIN 100 UNIT/ML
500 SYRINGE INTRAVENOUS AS NEEDED
OUTPATIENT
Start: 2024-08-22

## 2024-08-22 ASSESSMENT — PAIN SCALES - GENERAL: PAINLEVEL: 0-NO PAIN

## 2024-08-22 NOTE — PROGRESS NOTES
Patient is here today for infusion - no complication since last being seen-  independant double check done prior to chemotherapy today-   b/h/ lung sounds not auscultated  patient verbalizes understanding of plan of care     Ok to use port today- venous Duplex Right arm - existing thrombus- discussed with Dr. Bourne- went over thrombus precautions

## 2024-08-23 ENCOUNTER — SOCIAL WORK (OUTPATIENT)
Dept: HEMATOLOGY/ONCOLOGY | Facility: CLINIC | Age: 74
End: 2024-08-23
Payer: MEDICARE

## 2024-08-23 DIAGNOSIS — Z17.1 MALIGNANT NEOPLASM OF CENTRAL PORTION OF LEFT BREAST IN FEMALE, ESTROGEN RECEPTOR NEGATIVE (MULTI): Primary | ICD-10-CM

## 2024-08-23 DIAGNOSIS — C50.112 MALIGNANT NEOPLASM OF CENTRAL PORTION OF LEFT BREAST IN FEMALE, ESTROGEN RECEPTOR NEGATIVE (MULTI): Primary | ICD-10-CM

## 2024-08-23 DIAGNOSIS — E11.9 TYPE 2 DIABETES MELLITUS WITHOUT COMPLICATION, WITHOUT LONG-TERM CURRENT USE OF INSULIN (MULTI): ICD-10-CM

## 2024-08-23 LAB
ALBUMIN SERPL BCP-MCNC: 4 G/DL (ref 3.4–5)
ALP SERPL-CCNC: 81 U/L (ref 33–136)
ALT SERPL W P-5'-P-CCNC: 16 U/L (ref 7–45)
ANION GAP SERPL CALC-SCNC: 15 MMOL/L (ref 10–20)
AST SERPL W P-5'-P-CCNC: 21 U/L (ref 9–39)
BILIRUB SERPL-MCNC: 0.2 MG/DL (ref 0–1.2)
BUN SERPL-MCNC: 23 MG/DL (ref 6–23)
CALCIUM SERPL-MCNC: 9.4 MG/DL (ref 8.6–10.6)
CHLORIDE SERPL-SCNC: 97 MMOL/L (ref 98–107)
CO2 SERPL-SCNC: 27 MMOL/L (ref 21–32)
CREAT SERPL-MCNC: 0.76 MG/DL (ref 0.5–1.05)
EGFRCR SERPLBLD CKD-EPI 2021: 84 ML/MIN/1.73M*2
GLUCOSE SERPL-MCNC: 107 MG/DL (ref 74–99)
POTASSIUM SERPL-SCNC: 4 MMOL/L (ref 3.5–5.3)
PROT SERPL-MCNC: 6.6 G/DL (ref 6.4–8.2)
SODIUM SERPL-SCNC: 135 MMOL/L (ref 136–145)

## 2024-08-23 RX ORDER — TIRZEPATIDE 2.5 MG/.5ML
2.5 INJECTION, SOLUTION SUBCUTANEOUS
Qty: 2 ML | Refills: 3 | Status: SHIPPED | OUTPATIENT
Start: 2024-08-25

## 2024-08-23 NOTE — PROGRESS NOTES
The  met with this patient to follow up on a shower chair. The pt reports that she was able to borrow a chair from a friend and currently using the chair.   *The pt also asked about possible assistance for her medication copays. The pt reports she pays around $1000 a month for 2 medications (Elequis & Mounjaro). The pt reports she uses [employer] but it doesn't give much assistance.   *The SW looked to see if there are any copay assistance plans for these 2 medications and there are, but only if the pt is using a traditional insurance plan. *This pt has Medicare with supplement so she doesn't qualify.   *The SW is checking with  [facility] to see if the medications would be cheaper going through  compared to an outside pharmacy. The SW emailed [facility] worker and waiting for a response for possible [employer] Assistance through .   **The SW called the pt back 8/23/24 to follow up. The SW will contact the pt back next week after she hears back from the pharmacy about any assistance available.

## 2024-08-26 DIAGNOSIS — E11.9 TYPE 2 DIABETES MELLITUS WITHOUT COMPLICATION, WITHOUT LONG-TERM CURRENT USE OF INSULIN (MULTI): ICD-10-CM

## 2024-08-26 DIAGNOSIS — E11.9 TYPE 2 DIABETES MELLITUS WITHOUT COMPLICATION, WITHOUT LONG-TERM CURRENT USE OF INSULIN (MULTI): Primary | ICD-10-CM

## 2024-08-26 RX ORDER — TIRZEPATIDE 2.5 MG/.5ML
2.5 INJECTION, SOLUTION SUBCUTANEOUS
Qty: 2 ML | Refills: 3 | Status: CANCELLED | OUTPATIENT
Start: 2024-08-26

## 2024-08-26 RX ORDER — TIRZEPATIDE 5 MG/.5ML
5 INJECTION, SOLUTION SUBCUTANEOUS
Qty: 2 ML | Refills: 2 | Status: SHIPPED | OUTPATIENT
Start: 2024-09-01

## 2024-08-26 NOTE — TELEPHONE ENCOUNTER
Per MyChart msg:  August 25, 2024  Raiza Nguyễn   to [facility] [place] [address] Clinical Support Staff (supporting Shoshana Olivarez DO)         8/25/24  9:00 AM  Storm Anna I'm looking forward to our next appointment just for the laughs and feel good feeling.  Anyway I need a refill please on Mounjaro but a higher dose and will you send it to  PHARMACY  on [place]   Raiza Nguyễn   to [facility] [place] [address] Clinical Support Staff (supporting Shoshana Olivarez DO)         8/25/24  2:51 PM  Shoshana please ignore my request to send Mounjaro rx [place]acy [place]. Please send it to [facility]  Pt requested RX sent to [facility] pharmacy

## 2024-08-28 ENCOUNTER — LAB (OUTPATIENT)
Dept: LAB | Facility: CLINIC | Age: 74
End: 2024-08-28
Payer: MEDICARE

## 2024-08-28 ENCOUNTER — APPOINTMENT (OUTPATIENT)
Dept: DERMATOLOGY | Facility: CLINIC | Age: 74
End: 2024-08-28
Payer: MEDICARE

## 2024-08-28 DIAGNOSIS — C50.112 MALIGNANT NEOPLASM OF CENTRAL PORTION OF LEFT BREAST IN FEMALE, ESTROGEN RECEPTOR NEGATIVE (MULTI): ICD-10-CM

## 2024-08-28 DIAGNOSIS — L81.4 LENTIGO: ICD-10-CM

## 2024-08-28 DIAGNOSIS — L82.1 SEBORRHEIC KERATOSIS: ICD-10-CM

## 2024-08-28 DIAGNOSIS — E11.9 TYPE 2 DIABETES MELLITUS WITHOUT COMPLICATION, WITHOUT LONG-TERM CURRENT USE OF INSULIN (MULTI): ICD-10-CM

## 2024-08-28 DIAGNOSIS — Z12.83 ENCOUNTER FOR SCREENING FOR MALIGNANT NEOPLASM OF SKIN: ICD-10-CM

## 2024-08-28 DIAGNOSIS — Z17.1 MALIGNANT NEOPLASM OF CENTRAL PORTION OF LEFT BREAST IN FEMALE, ESTROGEN RECEPTOR NEGATIVE (MULTI): ICD-10-CM

## 2024-08-28 DIAGNOSIS — D22.5 MELANOCYTIC NEVI OF TRUNK: ICD-10-CM

## 2024-08-28 DIAGNOSIS — D18.01 HEMANGIOMA OF SKIN: ICD-10-CM

## 2024-08-28 DIAGNOSIS — L57.0 ACTINIC KERATOSIS: ICD-10-CM

## 2024-08-28 DIAGNOSIS — D22.70 MELANOCYTIC NEVUS OF LOWER EXTREMITY INCLUDING HIP, UNSPECIFIED LATERALITY: ICD-10-CM

## 2024-08-28 DIAGNOSIS — L30.4 INTERTRIGO: ICD-10-CM

## 2024-08-28 DIAGNOSIS — D22.60 MELANOCYTIC NEVI OF UNSPECIFIED UPPER LIMB, INCLUDING SHOULDER: ICD-10-CM

## 2024-08-28 DIAGNOSIS — Z85.820 PERSONAL HISTORY OF MALIGNANT MELANOMA OF SKIN: Primary | ICD-10-CM

## 2024-08-28 DIAGNOSIS — L57.8 PHOTOAGING OF SKIN: ICD-10-CM

## 2024-08-28 DIAGNOSIS — D64.9 ANEMIA, UNSPECIFIED TYPE: ICD-10-CM

## 2024-08-28 LAB
BASOPHILS # BLD AUTO: 0.04 X10*3/UL (ref 0–0.1)
BASOPHILS NFR BLD AUTO: 0.6 %
EOSINOPHIL # BLD AUTO: 0.21 X10*3/UL (ref 0–0.4)
EOSINOPHIL NFR BLD AUTO: 3.4 %
ERYTHROCYTE [DISTWIDTH] IN BLOOD BY AUTOMATED COUNT: 16 % (ref 11.5–14.5)
HCT VFR BLD AUTO: 34.4 % (ref 36–46)
HGB BLD-MCNC: 11 G/DL (ref 12–16)
IMM GRANULOCYTES # BLD AUTO: 0 X10*3/UL (ref 0–0.5)
IMM GRANULOCYTES NFR BLD AUTO: 0 % (ref 0–0.9)
LYMPHOCYTES # BLD AUTO: 1.73 X10*3/UL (ref 0.8–3)
LYMPHOCYTES NFR BLD AUTO: 28 %
MCH RBC QN AUTO: 27.8 PG (ref 26–34)
MCHC RBC AUTO-ENTMCNC: 32 G/DL (ref 32–36)
MCV RBC AUTO: 87 FL (ref 80–100)
MONOCYTES # BLD AUTO: 0.46 X10*3/UL (ref 0.05–0.8)
MONOCYTES NFR BLD AUTO: 7.4 %
NEUTROPHILS # BLD AUTO: 3.74 X10*3/UL (ref 1.6–5.5)
NEUTROPHILS NFR BLD AUTO: 60.6 %
NRBC BLD-RTO: ABNORMAL /100{WBCS}
PLATELET # BLD AUTO: 353 X10*3/UL (ref 150–450)
RBC # BLD AUTO: 3.95 X10*6/UL (ref 4–5.2)
WBC # BLD AUTO: 6.2 X10*3/UL (ref 4.4–11.3)

## 2024-08-28 PROCEDURE — 85025 COMPLETE CBC W/AUTO DIFF WBC: CPT

## 2024-08-28 PROCEDURE — 99213 OFFICE O/P EST LOW 20 MIN: CPT | Performed by: DERMATOLOGY

## 2024-08-28 PROCEDURE — 17000 DESTRUCT PREMALG LESION: CPT | Performed by: STUDENT IN AN ORGANIZED HEALTH CARE EDUCATION/TRAINING PROGRAM

## 2024-08-28 PROCEDURE — 3044F HG A1C LEVEL LT 7.0%: CPT | Performed by: DERMATOLOGY

## 2024-08-28 PROCEDURE — 1036F TOBACCO NON-USER: CPT | Performed by: DERMATOLOGY

## 2024-08-28 PROCEDURE — 1157F ADVNC CARE PLAN IN RCRD: CPT | Performed by: DERMATOLOGY

## 2024-08-28 PROCEDURE — 3048F LDL-C <100 MG/DL: CPT | Performed by: DERMATOLOGY

## 2024-08-28 PROCEDURE — 80053 COMPREHEN METABOLIC PANEL: CPT | Performed by: STUDENT IN AN ORGANIZED HEALTH CARE EDUCATION/TRAINING PROGRAM

## 2024-08-28 PROCEDURE — 82570 ASSAY OF URINE CREATININE: CPT | Performed by: FAMILY MEDICINE

## 2024-08-28 PROCEDURE — 1159F MED LIST DOCD IN RCRD: CPT | Performed by: DERMATOLOGY

## 2024-08-28 PROCEDURE — 4010F ACE/ARB THERAPY RXD/TAKEN: CPT | Performed by: DERMATOLOGY

## 2024-08-28 ASSESSMENT — DERMATOLOGY PATIENT ASSESSMENT
ARE YOU AN ORGAN TRANSPLANT RECIPIENT: NO
FOR PATIENTS COMING IN FOR A FOLLOW-UP VISIT - HAVE THERE BEEN ANY CHANGES IN YOUR HEALTH SINCE YOUR LAST VISIT: YES
DO YOU USE A TANNING BED: NO
ARE YOU ON BIRTH CONTROL: NO
DO YOU HAVE ANY NEW OR CHANGING LESIONS: YES
ARE YOU TRYING TO GET PREGNANT: NO
HAVE YOU HAD OR DO YOU HAVE A STAPH INFECTION: NO
DO YOU HAVE IRREGULAR MENSTRUAL CYCLES: NO
DO YOU USE SUNSCREEN: OCCASIONALLY
HAVE YOU HAD OR DO YOU HAVE VASCULAR DISEASE: NO

## 2024-08-28 ASSESSMENT — DERMATOLOGY QUALITY OF LIFE (QOL) ASSESSMENT
RATE HOW BOTHERED YOU ARE BY SYMPTOMS OF YOUR SKIN PROBLEM (EG, ITCHING, STINGING BURNING, HURTING OR SKIN IRRITATION): 6 - ALWAYS BOTHERED
RATE HOW BOTHERED YOU ARE BY EFFECTS OF YOUR SKIN PROBLEMS ON YOUR ACTIVITIES (EG, GOING OUT, ACCOMPLISHING WHAT YOU WANT, WORK ACTIVITIES OR YOUR RELATIONSHIPS WITH OTHERS): 0 - NEVER BOTHERED
WHAT SINGLE SKIN CONDITION LISTED BELOW IS THE PATIENT ANSWERING THE QUALITY-OF-LIFE ASSESSMENT QUESTIONS ABOUT: NONE OF THE ABOVE
RATE HOW EMOTIONALLY BOTHERED YOU ARE BY YOUR SKIN PROBLEM (FOR EXAMPLE, WORRY, EMBARRASSMENT, FRUSTRATION): 0 - NEVER BOTHERED
ARE THERE EXCLUSIONS OR EXCEPTIONS FOR THE QUALITY OF LIFE ASSESSMENT: NO
DATE THE QUALITY-OF-LIFE ASSESSMENT WAS COMPLETED: 67080

## 2024-08-28 ASSESSMENT — ITCH NUMERIC RATING SCALE: HOW SEVERE IS YOUR ITCHING?: 0

## 2024-08-28 ASSESSMENT — PATIENT GLOBAL ASSESSMENT (PGA): PATIENT GLOBAL ASSESSMENT: PATIENT GLOBAL ASSESSMENT:  2 - MILD

## 2024-08-28 NOTE — PROGRESS NOTES
Subjective     Raiza Nguyễn is a 73 y.o. female who presents for the following: Skin Check (Pt here for FBSE with hx of MM (2017) and Ak's. Pt reports area of concern located on Left Cheek. ).     Review of Systems:  No other skin or systemic complaints other than what is documented elsewhere in the note.    The following portions of the chart were reviewed this encounter and updated as appropriate:         Skin Cancer History  - History of melanoma  - History of actinic keratoses      Specialty Problems          Dermatology Problems    Skin changes due to chronic exposure to nonionizing radiation    Personal history of malignant melanoma of skin        Objective   Well appearing patient in no apparent distress; mood and affect are within normal limits.    A full examination was performed including scalp, head, eyes, ears, nose, lips, neck, chest, axillae, abdomen, back, buttocks, bilateral upper extremities, bilateral lower extremities, hands, feet, fingers, toes, fingernails, and toenails. All findings within normal limits unless otherwise noted below.    Patient's port clean and intact on right upper chest. Unable to examine under the wound dressing from patient's mastectomy procedure.    Assessment/Plan   1. Personal history of malignant melanoma of skin  Left Upper Buttock  Well healed scar at site of prior treatment without evidence of recurrence. No inguinal lymphadenopathy    There is no evidence of recurrence or repigmentation on clinical examination today, reassure was provided to the patient. The importance of sun protection was reviewed with the patient including the use of a broad spectrum sunscreen that protects against both UVA/UVB rays, with ingredients such as Zinc oxide or titanium dioxide, wearing sun protective clothing and sun avoidance. ABCDEs of melanoma reviewed. Patient to f/u should they notice any new or changing pre-existing skin lesion. The patient was advised to follow up 1 year  for FBSE due to history of melanoma.    2. Actinic keratosis  Left Breast  Erythematous macules with gritty scale.    Lesions are due to cumulative sun damage over time and are pre-cancerous. They have a risk (although small in majority of patients) of developing into squamous cell carcinoma and therefore treatment recommendations were offered and discussed.   Treatments Discussed include LN2, photodynamic (blue light) therapy & topical chemotherapy creams, risks and benefits of each.     The risks and benefits of LN2 were reviewed including incomplete removal, crusting, blister hypo and/or hyperpigmentation, scarring and recurrence. Pt elected for LN2 today    Destr of lesion - Left Breast  Complexity: simple    Destruction method: cryotherapy    Informed consent: discussed and consent obtained    Lesion destroyed using liquid nitrogen: Yes    Region frozen until ice ball extended beyond lesion: Yes    Cryotherapy cycles:  1  Outcome: patient tolerated procedure well with no complications    Post-procedure details: wound care instructions given      3. Encounter for screening for malignant neoplasm of skin  No suspicious lesions noted on examination today     The risk of chronic, cumulative sun damage and risk of development of skin cancer was reviewed today.   The importance of sun protection was reviewed: including the use of a broad spectrum sunscreen that protects against both UVA/UVB rays, with ingredients such as Zinc oxide or titanium dioxide, wearing sun protective clothing and sun avoidance. We reviewed the warning signs of non-melanoma skin cancer and ABCDEs of melanoma  Please follow up should you notice any new or changing pre-existing skin lesion.    Related Procedures  Follow Up In Dermatology - Established Patient    4. Photoaging of skin  Mottled pigmentation with telangiectasias and brown reticular macules in sun exposed areas of the body.     The risk of chronic, cumulative sun damage and risk of  development of skin cancer was reviewed today.   The importance of sun protection was reviewed: including the use of a broad spectrum sunscreen that protects against both UVA/UVB rays, with ingredients such as Zinc oxide or titanium dioxide, wearing sun protective clothing and sun avoidance. We reviewed the warning signs of non-melanoma skin cancer and ABCDEs of melanoma  Please follow up should you notice any new or changing pre-existing skin lesion.    5. Lentigo  Scattered tan macules in sun-exposed areas.    These are benign skin lesions due to sun exposure. They will darken in response to sun exposure. They should be monitored for change in size, shape or color.  These lesions can be treated cosmetically with topical creams, liquid nitrogen and a variety of lasers.    6. Melanocytic nevi of trunk  Tan-brown symmetric macules and papules    Clinically benign appearing nevi, no treatment is necessary.  The importance of sun protection was reviewed: including the use of a broad spectrum sunscreen that protects against both UVA/UVB rays, with ingredients such as Zinc oxide or titanium dioxide, wearing sun protective clothing and sun avoidance.   ABCDEs of melanoma reviewed.  Please follow up should you notice any new or changing pre-existing skin lesion.    7. Melanocytic nevi of unspecified upper limb, including shoulder  Scattered, uniform and benign-appearing, regular brown melanocytic papules and macules.    Clinically benign appearing nevi, no treatment is necessary.  The importance of sun protection was reviewed: including the use of a broad spectrum sunscreen that protects against both UVA/UVB rays, with ingredients such as Zinc oxide or titanium dioxide, wearing sun protective clothing and sun avoidance.   ABCDEs of melanoma reviewed.  Please follow up should you notice any new or changing pre-existing skin lesion.    8. Melanocytic nevus of lower extremity including hip, unspecified laterality  Scattered,  uniform and benign-appearing, regular brown melanocytic papules and macules.    Clinically benign appearing nevi, no treatment is necessary.  The importance of sun protection was reviewed: including the use of a broad spectrum sunscreen that protects against both UVA/UVB rays, with ingredients such as Zinc oxide or titanium dioxide, wearing sun protective clothing and sun avoidance.   ABCDEs of melanoma reviewed.  Please follow up should you notice any new or changing pre-existing skin lesion.    9. Hemangioma of skin  Cherry red papules    The benign nature of these skin lesions were reviewed, no treatment is necessary.   Please follow up for any new or pre-existing lesion that is changing in size, shape, color, becomes painful, tender, itches or bleed.    10. Seborrheic keratosis  Brown, tan waxy macules and stuck on appearing papules and plaques    The benign nature of these skin lesions reviewed, reassure provided and no further treatment needed at this time.   These lesions can be removed, if symptomatic (itching, bleeding, rubbing on clothing, painful), otherwise removal is considered cosmetic.     11. Intertrigo  Left Inguinal Area, Right Inguinal Area  Erythematous moist patches    The potentially chronic and intermittently flaring nature of this condition, which likely involves colonization with Candidal yeast, and treatment options were discussed extensively with the patient today.      Recommend OTC topical anti-fungal therapy with Zeasorb AF powder once daily. The patient expressed understanding and is in agreement with this plan.       Follow up in 1 year or sooner as needed    Philip Clayton MD   PGY4, Department of Dermatology    I saw and evaluated the patient. I personally obtained the key and critical portions of the history and physical exam or was physically present for key and critical portions performed by the resident/fellow. I reviewed the resident/fellow's documentation and discussed the patient  with the resident/fellow. I agree with the resident/fellow's medical decision making as documented in the note.    Verna Tabor, DO

## 2024-08-29 ENCOUNTER — APPOINTMENT (OUTPATIENT)
Dept: HEMATOLOGY/ONCOLOGY | Facility: CLINIC | Age: 74
End: 2024-08-29
Payer: MEDICARE

## 2024-08-29 ENCOUNTER — INFUSION (OUTPATIENT)
Dept: HEMATOLOGY/ONCOLOGY | Facility: CLINIC | Age: 74
End: 2024-08-29
Payer: MEDICARE

## 2024-08-29 VITALS
HEIGHT: 59 IN | WEIGHT: 177.58 LBS | SYSTOLIC BLOOD PRESSURE: 127 MMHG | RESPIRATION RATE: 18 BRPM | DIASTOLIC BLOOD PRESSURE: 79 MMHG | OXYGEN SATURATION: 95 % | TEMPERATURE: 97.5 F | HEART RATE: 89 BPM | BODY MASS INDEX: 35.8 KG/M2

## 2024-08-29 DIAGNOSIS — C50.911 MALIGNANT NEOPLASM OF BOTH BREASTS IN FEMALE, ESTROGEN RECEPTOR NEGATIVE, UNSPECIFIED SITE OF BREAST (MULTI): ICD-10-CM

## 2024-08-29 DIAGNOSIS — C50.112 MALIGNANT NEOPLASM OF CENTRAL PORTION OF LEFT BREAST IN FEMALE, ESTROGEN RECEPTOR NEGATIVE (MULTI): ICD-10-CM

## 2024-08-29 DIAGNOSIS — C50.912 MALIGNANT NEOPLASM OF BOTH BREASTS IN FEMALE, ESTROGEN RECEPTOR NEGATIVE, UNSPECIFIED SITE OF BREAST (MULTI): ICD-10-CM

## 2024-08-29 DIAGNOSIS — Z17.1 MALIGNANT NEOPLASM OF CENTRAL PORTION OF LEFT BREAST IN FEMALE, ESTROGEN RECEPTOR NEGATIVE (MULTI): ICD-10-CM

## 2024-08-29 DIAGNOSIS — Z17.1 MALIGNANT NEOPLASM OF BOTH BREASTS IN FEMALE, ESTROGEN RECEPTOR NEGATIVE, UNSPECIFIED SITE OF BREAST (MULTI): ICD-10-CM

## 2024-08-29 LAB
ALBUMIN SERPL BCP-MCNC: 4.2 G/DL (ref 3.4–5)
ALP SERPL-CCNC: 76 U/L (ref 33–136)
ALT SERPL W P-5'-P-CCNC: 20 U/L (ref 7–45)
ANION GAP SERPL CALC-SCNC: 15 MMOL/L (ref 10–20)
AST SERPL W P-5'-P-CCNC: 25 U/L (ref 9–39)
BILIRUB SERPL-MCNC: 0.3 MG/DL (ref 0–1.2)
BUN SERPL-MCNC: 19 MG/DL (ref 6–23)
CALCIUM SERPL-MCNC: 9.7 MG/DL (ref 8.6–10.6)
CHLORIDE SERPL-SCNC: 96 MMOL/L (ref 98–107)
CO2 SERPL-SCNC: 28 MMOL/L (ref 21–32)
CREAT SERPL-MCNC: 0.78 MG/DL (ref 0.5–1.05)
CREAT UR-MCNC: 66.8 MG/DL (ref 20–320)
EGFRCR SERPLBLD CKD-EPI 2021: 81 ML/MIN/1.73M*2
GLUCOSE SERPL-MCNC: 91 MG/DL (ref 74–99)
MICROALBUMIN UR-MCNC: <7 MG/L
MICROALBUMIN/CREAT UR: NORMAL MG/G{CREAT}
POTASSIUM SERPL-SCNC: 4.4 MMOL/L (ref 3.5–5.3)
PROT SERPL-MCNC: 6.7 G/DL (ref 6.4–8.2)
SODIUM SERPL-SCNC: 135 MMOL/L (ref 136–145)

## 2024-08-29 PROCEDURE — 96413 CHEMO IV INFUSION 1 HR: CPT

## 2024-08-29 PROCEDURE — 96375 TX/PRO/DX INJ NEW DRUG ADDON: CPT | Mod: INF

## 2024-08-29 PROCEDURE — 2500000005 HC RX 250 GENERAL PHARMACY W/O HCPCS: Performed by: STUDENT IN AN ORGANIZED HEALTH CARE EDUCATION/TRAINING PROGRAM

## 2024-08-29 PROCEDURE — 2500000004 HC RX 250 GENERAL PHARMACY W/ HCPCS (ALT 636 FOR OP/ED): Performed by: STUDENT IN AN ORGANIZED HEALTH CARE EDUCATION/TRAINING PROGRAM

## 2024-08-29 PROCEDURE — 2500000001 HC RX 250 WO HCPCS SELF ADMINISTERED DRUGS (ALT 637 FOR MEDICARE OP): Performed by: STUDENT IN AN ORGANIZED HEALTH CARE EDUCATION/TRAINING PROGRAM

## 2024-08-29 RX ORDER — EPINEPHRINE 0.3 MG/.3ML
0.3 INJECTION SUBCUTANEOUS EVERY 5 MIN PRN
Status: DISCONTINUED | OUTPATIENT
Start: 2024-08-29 | End: 2024-08-29 | Stop reason: HOSPADM

## 2024-08-29 RX ORDER — HEPARIN 100 UNIT/ML
500 SYRINGE INTRAVENOUS AS NEEDED
Status: DISCONTINUED | OUTPATIENT
Start: 2024-08-29 | End: 2024-08-29 | Stop reason: HOSPADM

## 2024-08-29 RX ORDER — FAMOTIDINE 10 MG/ML
20 INJECTION INTRAVENOUS ONCE
Status: COMPLETED | OUTPATIENT
Start: 2024-08-29 | End: 2024-08-29

## 2024-08-29 RX ORDER — PROCHLORPERAZINE MALEATE 10 MG
10 TABLET ORAL EVERY 6 HOURS PRN
Status: DISCONTINUED | OUTPATIENT
Start: 2024-08-29 | End: 2024-08-29 | Stop reason: HOSPADM

## 2024-08-29 RX ORDER — HEPARIN SODIUM,PORCINE/PF 10 UNIT/ML
50 SYRINGE (ML) INTRAVENOUS AS NEEDED
OUTPATIENT
Start: 2024-08-29

## 2024-08-29 RX ORDER — ALBUTEROL SULFATE 0.83 MG/ML
3 SOLUTION RESPIRATORY (INHALATION) AS NEEDED
Status: DISCONTINUED | OUTPATIENT
Start: 2024-08-29 | End: 2024-08-29 | Stop reason: HOSPADM

## 2024-08-29 RX ORDER — DIPHENHYDRAMINE HYDROCHLORIDE 50 MG/ML
50 INJECTION INTRAMUSCULAR; INTRAVENOUS AS NEEDED
Status: DISCONTINUED | OUTPATIENT
Start: 2024-08-29 | End: 2024-08-29 | Stop reason: HOSPADM

## 2024-08-29 RX ORDER — FAMOTIDINE 10 MG/ML
20 INJECTION INTRAVENOUS ONCE AS NEEDED
Status: DISCONTINUED | OUTPATIENT
Start: 2024-08-29 | End: 2024-08-29 | Stop reason: HOSPADM

## 2024-08-29 RX ORDER — HEPARIN SODIUM 1000 [USP'U]/ML
2000 INJECTION, SOLUTION INTRAVENOUS; SUBCUTANEOUS AS NEEDED
OUTPATIENT
Start: 2024-08-29

## 2024-08-29 RX ORDER — PROCHLORPERAZINE EDISYLATE 5 MG/ML
10 INJECTION INTRAMUSCULAR; INTRAVENOUS EVERY 6 HOURS PRN
Status: DISCONTINUED | OUTPATIENT
Start: 2024-08-29 | End: 2024-08-29 | Stop reason: HOSPADM

## 2024-08-29 RX ORDER — DIPHENHYDRAMINE HCL 50 MG
50 CAPSULE ORAL ONCE
Status: COMPLETED | OUTPATIENT
Start: 2024-08-29 | End: 2024-08-29

## 2024-08-29 RX ORDER — HEPARIN 100 UNIT/ML
500 SYRINGE INTRAVENOUS AS NEEDED
OUTPATIENT
Start: 2024-08-29

## 2024-08-29 ASSESSMENT — PAIN SCALES - GENERAL: PAINLEVEL: 0-NO PAIN

## 2024-08-29 NOTE — PROGRESS NOTES
Pt here for infusion today. Tolerated without complaint. Denied any needs at this time. Aware of next appointment. Independently ambulatory off unit with cane and slow steady gait.

## 2024-09-04 DIAGNOSIS — I82.90 ACUTE DEEP VEIN THROMBOSIS (DVT) OF NON-EXTREMITY VEIN: ICD-10-CM

## 2024-09-04 DIAGNOSIS — E11.9 TYPE 2 DIABETES MELLITUS WITHOUT COMPLICATION, WITHOUT LONG-TERM CURRENT USE OF INSULIN (MULTI): Primary | ICD-10-CM

## 2024-09-05 ENCOUNTER — OFFICE VISIT (OUTPATIENT)
Dept: HEMATOLOGY/ONCOLOGY | Facility: CLINIC | Age: 74
End: 2024-09-05
Payer: MEDICARE

## 2024-09-05 ENCOUNTER — LAB (OUTPATIENT)
Dept: LAB | Facility: LAB | Age: 74
End: 2024-09-05
Payer: MEDICARE

## 2024-09-05 ENCOUNTER — INFUSION (OUTPATIENT)
Dept: HEMATOLOGY/ONCOLOGY | Facility: CLINIC | Age: 74
End: 2024-09-05
Payer: MEDICARE

## 2024-09-05 VITALS
DIASTOLIC BLOOD PRESSURE: 78 MMHG | BODY MASS INDEX: 35.39 KG/M2 | HEART RATE: 97 BPM | TEMPERATURE: 98 F | WEIGHT: 176.48 LBS | SYSTOLIC BLOOD PRESSURE: 118 MMHG

## 2024-09-05 DIAGNOSIS — C50.112 MALIGNANT NEOPLASM OF CENTRAL PORTION OF LEFT BREAST IN FEMALE, ESTROGEN RECEPTOR NEGATIVE (MULTI): Primary | ICD-10-CM

## 2024-09-05 DIAGNOSIS — C50.912 MALIGNANT NEOPLASM OF BOTH BREASTS IN FEMALE, ESTROGEN RECEPTOR NEGATIVE, UNSPECIFIED SITE OF BREAST (MULTI): ICD-10-CM

## 2024-09-05 DIAGNOSIS — Z17.1 MALIGNANT NEOPLASM OF BOTH BREASTS IN FEMALE, ESTROGEN RECEPTOR NEGATIVE, UNSPECIFIED SITE OF BREAST (MULTI): ICD-10-CM

## 2024-09-05 DIAGNOSIS — C50.112 MALIGNANT NEOPLASM OF CENTRAL PORTION OF LEFT BREAST IN FEMALE, ESTROGEN RECEPTOR NEGATIVE (MULTI): ICD-10-CM

## 2024-09-05 DIAGNOSIS — Z17.1 MALIGNANT NEOPLASM OF CENTRAL PORTION OF LEFT BREAST IN FEMALE, ESTROGEN RECEPTOR NEGATIVE (MULTI): ICD-10-CM

## 2024-09-05 DIAGNOSIS — Z17.1 MALIGNANT NEOPLASM OF CENTRAL PORTION OF LEFT BREAST IN FEMALE, ESTROGEN RECEPTOR NEGATIVE (MULTI): Primary | ICD-10-CM

## 2024-09-05 DIAGNOSIS — C50.911 MALIGNANT NEOPLASM OF BOTH BREASTS IN FEMALE, ESTROGEN RECEPTOR NEGATIVE, UNSPECIFIED SITE OF BREAST (MULTI): ICD-10-CM

## 2024-09-05 LAB
ALBUMIN SERPL BCP-MCNC: 4.1 G/DL (ref 3.4–5)
ALP SERPL-CCNC: 76 U/L (ref 33–136)
ALT SERPL W P-5'-P-CCNC: 18 U/L (ref 7–45)
ANION GAP SERPL CALC-SCNC: 14 MMOL/L (ref 10–20)
AST SERPL W P-5'-P-CCNC: 22 U/L (ref 9–39)
BASOPHILS # BLD AUTO: 0.04 X10*3/UL (ref 0–0.1)
BASOPHILS NFR BLD AUTO: 0.5 %
BILIRUB SERPL-MCNC: 0.3 MG/DL (ref 0–1.2)
BUN SERPL-MCNC: 28 MG/DL (ref 6–23)
CALCIUM SERPL-MCNC: 8.9 MG/DL (ref 8.6–10.3)
CHLORIDE SERPL-SCNC: 96 MMOL/L (ref 98–107)
CO2 SERPL-SCNC: 26 MMOL/L (ref 21–32)
CREAT SERPL-MCNC: 1.18 MG/DL (ref 0.5–1.05)
EGFRCR SERPLBLD CKD-EPI 2021: 49 ML/MIN/1.73M*2
EOSINOPHIL # BLD AUTO: 0.22 X10*3/UL (ref 0–0.4)
EOSINOPHIL NFR BLD AUTO: 2.9 %
ERYTHROCYTE [DISTWIDTH] IN BLOOD BY AUTOMATED COUNT: 16.9 % (ref 11.5–14.5)
GLUCOSE SERPL-MCNC: 90 MG/DL (ref 74–99)
HCT VFR BLD AUTO: 36.8 % (ref 36–46)
HGB BLD-MCNC: 11.5 G/DL (ref 12–16)
IMM GRANULOCYTES # BLD AUTO: 0.02 X10*3/UL (ref 0–0.5)
IMM GRANULOCYTES NFR BLD AUTO: 0.3 % (ref 0–0.9)
LYMPHOCYTES # BLD AUTO: 1.79 X10*3/UL (ref 0.8–3)
LYMPHOCYTES NFR BLD AUTO: 23.9 %
MCH RBC QN AUTO: 27.6 PG (ref 26–34)
MCHC RBC AUTO-ENTMCNC: 31.3 G/DL (ref 32–36)
MCV RBC AUTO: 88 FL (ref 80–100)
MONOCYTES # BLD AUTO: 0.68 X10*3/UL (ref 0.05–0.8)
MONOCYTES NFR BLD AUTO: 9.1 %
NEUTROPHILS # BLD AUTO: 4.74 X10*3/UL (ref 1.6–5.5)
NEUTROPHILS NFR BLD AUTO: 63.3 %
NRBC BLD-RTO: 0 /100 WBCS (ref 0–0)
PLATELET # BLD AUTO: 342 X10*3/UL (ref 150–450)
POTASSIUM SERPL-SCNC: 4.2 MMOL/L (ref 3.5–5.3)
PROT SERPL-MCNC: 6.5 G/DL (ref 6.4–8.2)
RBC # BLD AUTO: 4.17 X10*6/UL (ref 4–5.2)
SODIUM SERPL-SCNC: 132 MMOL/L (ref 136–145)
WBC # BLD AUTO: 7.5 X10*3/UL (ref 4.4–11.3)

## 2024-09-05 PROCEDURE — 3048F LDL-C <100 MG/DL: CPT | Performed by: STUDENT IN AN ORGANIZED HEALTH CARE EDUCATION/TRAINING PROGRAM

## 2024-09-05 PROCEDURE — 3062F POS MACROALBUMINURIA REV: CPT | Performed by: STUDENT IN AN ORGANIZED HEALTH CARE EDUCATION/TRAINING PROGRAM

## 2024-09-05 PROCEDURE — 1036F TOBACCO NON-USER: CPT | Performed by: STUDENT IN AN ORGANIZED HEALTH CARE EDUCATION/TRAINING PROGRAM

## 2024-09-05 PROCEDURE — 99215 OFFICE O/P EST HI 40 MIN: CPT | Performed by: STUDENT IN AN ORGANIZED HEALTH CARE EDUCATION/TRAINING PROGRAM

## 2024-09-05 PROCEDURE — 1126F AMNT PAIN NOTED NONE PRSNT: CPT | Performed by: STUDENT IN AN ORGANIZED HEALTH CARE EDUCATION/TRAINING PROGRAM

## 2024-09-05 PROCEDURE — 96375 TX/PRO/DX INJ NEW DRUG ADDON: CPT | Mod: INF

## 2024-09-05 PROCEDURE — 96413 CHEMO IV INFUSION 1 HR: CPT

## 2024-09-05 PROCEDURE — 3044F HG A1C LEVEL LT 7.0%: CPT | Performed by: STUDENT IN AN ORGANIZED HEALTH CARE EDUCATION/TRAINING PROGRAM

## 2024-09-05 PROCEDURE — 2500000001 HC RX 250 WO HCPCS SELF ADMINISTERED DRUGS (ALT 637 FOR MEDICARE OP): Performed by: STUDENT IN AN ORGANIZED HEALTH CARE EDUCATION/TRAINING PROGRAM

## 2024-09-05 PROCEDURE — 2500000004 HC RX 250 GENERAL PHARMACY W/ HCPCS (ALT 636 FOR OP/ED): Performed by: STUDENT IN AN ORGANIZED HEALTH CARE EDUCATION/TRAINING PROGRAM

## 2024-09-05 PROCEDURE — 1157F ADVNC CARE PLAN IN RCRD: CPT | Performed by: STUDENT IN AN ORGANIZED HEALTH CARE EDUCATION/TRAINING PROGRAM

## 2024-09-05 PROCEDURE — 3074F SYST BP LT 130 MM HG: CPT | Performed by: STUDENT IN AN ORGANIZED HEALTH CARE EDUCATION/TRAINING PROGRAM

## 2024-09-05 PROCEDURE — 4010F ACE/ARB THERAPY RXD/TAKEN: CPT | Performed by: STUDENT IN AN ORGANIZED HEALTH CARE EDUCATION/TRAINING PROGRAM

## 2024-09-05 PROCEDURE — 80053 COMPREHEN METABOLIC PANEL: CPT

## 2024-09-05 PROCEDURE — 85025 COMPLETE CBC W/AUTO DIFF WBC: CPT

## 2024-09-05 PROCEDURE — 2500000004 HC RX 250 GENERAL PHARMACY W/ HCPCS (ALT 636 FOR OP/ED): Mod: JG | Performed by: STUDENT IN AN ORGANIZED HEALTH CARE EDUCATION/TRAINING PROGRAM

## 2024-09-05 PROCEDURE — 3078F DIAST BP <80 MM HG: CPT | Performed by: STUDENT IN AN ORGANIZED HEALTH CARE EDUCATION/TRAINING PROGRAM

## 2024-09-05 PROCEDURE — 1159F MED LIST DOCD IN RCRD: CPT | Performed by: STUDENT IN AN ORGANIZED HEALTH CARE EDUCATION/TRAINING PROGRAM

## 2024-09-05 RX ORDER — HEPARIN 100 UNIT/ML
500 SYRINGE INTRAVENOUS AS NEEDED
Status: DISCONTINUED | OUTPATIENT
Start: 2024-09-05 | End: 2024-09-05 | Stop reason: HOSPADM

## 2024-09-05 RX ORDER — EPINEPHRINE 0.3 MG/.3ML
0.3 INJECTION SUBCUTANEOUS EVERY 5 MIN PRN
OUTPATIENT
Start: 2024-09-12

## 2024-09-05 RX ORDER — ALBUTEROL SULFATE 0.83 MG/ML
3 SOLUTION RESPIRATORY (INHALATION) AS NEEDED
OUTPATIENT
Start: 2024-09-12

## 2024-09-05 RX ORDER — EPINEPHRINE 0.3 MG/.3ML
0.3 INJECTION SUBCUTANEOUS EVERY 5 MIN PRN
OUTPATIENT
Start: 2024-10-03

## 2024-09-05 RX ORDER — ALBUTEROL SULFATE 0.83 MG/ML
3 SOLUTION RESPIRATORY (INHALATION) AS NEEDED
Status: DISCONTINUED | OUTPATIENT
Start: 2024-09-05 | End: 2024-09-05 | Stop reason: HOSPADM

## 2024-09-05 RX ORDER — HEPARIN 100 UNIT/ML
500 SYRINGE INTRAVENOUS AS NEEDED
OUTPATIENT
Start: 2024-09-05

## 2024-09-05 RX ORDER — HEPARIN SODIUM 1000 [USP'U]/ML
2000 INJECTION, SOLUTION INTRAVENOUS; SUBCUTANEOUS AS NEEDED
OUTPATIENT
Start: 2024-09-05

## 2024-09-05 RX ORDER — HEPARIN SODIUM,PORCINE/PF 10 UNIT/ML
50 SYRINGE (ML) INTRAVENOUS AS NEEDED
OUTPATIENT
Start: 2024-09-05

## 2024-09-05 RX ORDER — FAMOTIDINE 10 MG/ML
20 INJECTION INTRAVENOUS ONCE AS NEEDED
OUTPATIENT
Start: 2024-10-03

## 2024-09-05 RX ORDER — PROCHLORPERAZINE MALEATE 10 MG
10 TABLET ORAL EVERY 6 HOURS PRN
OUTPATIENT
Start: 2024-09-12

## 2024-09-05 RX ORDER — ALBUTEROL SULFATE 0.83 MG/ML
3 SOLUTION RESPIRATORY (INHALATION) AS NEEDED
OUTPATIENT
Start: 2024-10-03

## 2024-09-05 RX ORDER — DIPHENHYDRAMINE HYDROCHLORIDE 50 MG/ML
50 INJECTION INTRAMUSCULAR; INTRAVENOUS AS NEEDED
Status: DISCONTINUED | OUTPATIENT
Start: 2024-09-05 | End: 2024-09-05 | Stop reason: HOSPADM

## 2024-09-05 RX ORDER — PROCHLORPERAZINE EDISYLATE 5 MG/ML
10 INJECTION INTRAMUSCULAR; INTRAVENOUS EVERY 6 HOURS PRN
OUTPATIENT
Start: 2024-10-03

## 2024-09-05 RX ORDER — DIPHENHYDRAMINE HCL 50 MG
50 CAPSULE ORAL ONCE
Status: COMPLETED | OUTPATIENT
Start: 2024-09-05 | End: 2024-09-05

## 2024-09-05 RX ORDER — DIPHENHYDRAMINE HYDROCHLORIDE 50 MG/ML
50 INJECTION INTRAMUSCULAR; INTRAVENOUS AS NEEDED
OUTPATIENT
Start: 2024-10-03

## 2024-09-05 RX ORDER — EPINEPHRINE 0.3 MG/.3ML
0.3 INJECTION SUBCUTANEOUS EVERY 5 MIN PRN
Status: DISCONTINUED | OUTPATIENT
Start: 2024-09-05 | End: 2024-09-05 | Stop reason: HOSPADM

## 2024-09-05 RX ORDER — PROCHLORPERAZINE MALEATE 10 MG
10 TABLET ORAL EVERY 6 HOURS PRN
OUTPATIENT
Start: 2024-10-03

## 2024-09-05 RX ORDER — DIPHENHYDRAMINE HYDROCHLORIDE 50 MG/ML
50 INJECTION INTRAMUSCULAR; INTRAVENOUS AS NEEDED
OUTPATIENT
Start: 2024-09-12

## 2024-09-05 RX ORDER — FAMOTIDINE 10 MG/ML
20 INJECTION INTRAVENOUS ONCE AS NEEDED
OUTPATIENT
Start: 2024-09-12

## 2024-09-05 RX ORDER — PROCHLORPERAZINE MALEATE 10 MG
10 TABLET ORAL EVERY 6 HOURS PRN
Status: DISCONTINUED | OUTPATIENT
Start: 2024-09-05 | End: 2024-09-05 | Stop reason: HOSPADM

## 2024-09-05 RX ORDER — FAMOTIDINE 10 MG/ML
20 INJECTION INTRAVENOUS ONCE AS NEEDED
Status: DISCONTINUED | OUTPATIENT
Start: 2024-09-05 | End: 2024-09-05 | Stop reason: HOSPADM

## 2024-09-05 RX ORDER — PROCHLORPERAZINE EDISYLATE 5 MG/ML
10 INJECTION INTRAMUSCULAR; INTRAVENOUS EVERY 6 HOURS PRN
OUTPATIENT
Start: 2024-09-12

## 2024-09-05 RX ORDER — FAMOTIDINE 10 MG/ML
20 INJECTION INTRAVENOUS ONCE
Status: COMPLETED | OUTPATIENT
Start: 2024-09-05 | End: 2024-09-05

## 2024-09-05 RX ORDER — PROCHLORPERAZINE EDISYLATE 5 MG/ML
10 INJECTION INTRAMUSCULAR; INTRAVENOUS EVERY 6 HOURS PRN
Status: DISCONTINUED | OUTPATIENT
Start: 2024-09-05 | End: 2024-09-05 | Stop reason: HOSPADM

## 2024-09-05 ASSESSMENT — PAIN SCALES - GENERAL: PAINLEVEL: 0-NO PAIN

## 2024-09-05 ASSESSMENT — PATIENT HEALTH QUESTIONNAIRE - PHQ9
2. FEELING DOWN, DEPRESSED OR HOPELESS: NOT AT ALL
1. LITTLE INTEREST OR PLEASURE IN DOING THINGS: NOT AT ALL
SUM OF ALL RESPONSES TO PHQ9 QUESTIONS 1 & 2: 0

## 2024-09-05 NOTE — PROGRESS NOTES
She is  [facility]  Location: [facility]    Visit Type: Follow-up Visit    Oncologic History:    She was diagnosed in January 2003 with a left-sided 1.5 cm infiltrating ductal carcinoma with positive axillary lymph nodes and extranodal extension. It was a grade 3 tumor with hormone receptors positive and HER-2/anabell not defined.   Four cycles of Adriamycin and cyclophosphamide were followed by 4 cycles of paclitaxel in March 2004.   Left lumpectomy and axillary node dissection followed by left breast radiation is also completed in 2004.   She had bilateral oophorectomies in May 2008.   Anastrozole through March 2009.   She had a lesion in her left lower back removed, and confirmed to be a 0.35 mm melanoma. It was not ulcerative and she did not require a sentinel node evaluation. She has since had multiple excisions of atypical myelomonocytic lesions without disease  discovered.   She did well until screening mammogram 8/2018 confirmed finding on PET (done related melanoma dx) .  She had excision of 1.5cm IDC with 1/2 SN with microscopic involvement. She did not want to receive chemotherapy and the lesion was strongly ER and AR  positive, HER 2 negative.   Started on exemestane November 2018.   She is BRCA2 positive   4/18/24: MRI breast screening: An irregular rim enhancing mass with irregular margins measuring 0.8 x 0.8 x 0.9 cm is seen in the superolateral breast at middle depth A prominent level III axillary lymph node measures 1.3 x 0.9 x 0.8 cm, and may have slightly increased in size when compared to breast MRI 10/03/2016 (previously measuring 1.0 x 0.8 x 0.8 cm). An internal mammary lymph node measuring 0.4 cm is noted and demonstrates a fatty hilum (series 401, image 136 of 232). This was not definitively seen on the prior MRI.  4/23/24: L breast mass 3:00 bx IDC G3, ER/AR negative, HER2 positive. L breast mass 2:00 bx: fibrous scar with associated calcifications.   5/8/24: PET scan: mildly  hypermetabolic soft tissue density measuring 1.6 x 0.8 cm at left outer breast.  Mild heterogeneous activity is noted in the region of the liver with a suggestion of a small focus along the medial tip of segment 2 of the left hepatic lobe.   5/9/24: TB discussion: Plan for surgery first approach.   5/17/2024: MRI of liver: No definite discrete hepatic lesion to correlate with mild FDG avid fit lesion seen on PET scan.  Few subcentimeter pancreatic cystic lesions represent IPMN most likely.  Partially visualized left uterine lesion likely representing a fibroid.  6/12/2024: Left breast simple mastectomy showed invasive ductal carcinoma, grade 3, single focus measuring 1.1 cm.  There is high-grade DCIS present.  All margins are negative.  No lymph nodes submitted.  Right breast simple completion mastectomy also performed negative for carcinoma.  7/15/2024: Patient started adjuvant weekly Taxol/Herceptin  Patient developed breast wound infection requiring antibiotics.  Open wound with delayed healing managed by wound care.  Chemotherapy held.    Subjective: Interval History    Patient presents today for follow-up visit.  She has been continued on Herceptin alone as she continues to heal from recent breast infection.  She states she went to the emergency room at [facility] A few days ago as she noticed leaking from her breast wound with foul smell.  She was started on Bactrim.  She continues to have an open wound and tunneling from her recent breast surgery.  She sees wound care 3 times a week.    Pertinent Family history:    Mother having bilateral breast cancer and a first cousin having breast cancer later in life. Her sister had a GYN malignancy, now metastatic and a maternal aunt with stomach cancer. A maternal uncle had colon  cancer.     Social History  Raiza ROBBIE Nguyễn  reports that she quit smoking about 41 years ago. Her smoking use included cigarettes. She started smoking about 56 years ago. She has a  80 pack-year smoking history. She has been exposed to tobacco smoke. She has never used smokeless tobacco.  She  reports that she does not currently use alcohol after a past usage of about 1.0 standard drink of alcohol per week.  She  reports no history of drug use.    ROS:     Review of Systems   All other systems reviewed and are negative.       Physical Examination:    /78 (BP Location: Left arm, Patient Position: Sitting, BP Cuff Size: Small adult)   Pulse 97   Temp 36.7 °C (98 °F) (Temporal)   Wt 80 kg (176 lb 7.7 oz)   BMI 35.39 kg/m²     Physical Exam  Vitals and nursing note reviewed.   Constitutional:       General: She is not in acute distress.     Appearance: Normal appearance. She is not toxic-appearing.   HENT:      Head: Normocephalic and atraumatic.   Eyes:      Conjunctiva/sclera: Conjunctivae normal.   Cardiovascular:      Rate and Rhythm: Normal rate.   Pulmonary:      Effort: Pulmonary effort is normal. No respiratory distress.   Abdominal:      General: Abdomen is flat.      Palpations: Abdomen is soft.   Musculoskeletal:         General: No swelling. Normal range of motion.      Cervical back: Normal range of motion.   Skin:     General: Skin is warm and dry.   Neurological:      General: No focal deficit present.      Mental Status: She is alert.   Psychiatric:         Mood and Affect: Mood normal.       Breast Examination:    Bilateral mastectomies with well-healing incisions.  Drains remained in place.  Left chest wall there is faint erythema along the incision.    ECOG Performance Status:     [x] 0 Fully active, able to carry on all pre-disease performance without restriction  [] 1 Restricted in physically strenuous activity but ambulatory and able to carry out work of a light or sedentary nature, e.g., light house work, office work  [] 2 Ambulatory and capable of all selfcare but unable to carry out any work activities; up and about more than 50% of waking hours  [] 3 Capable of  only limited selfcare; confined to bed or chair more than 50% of waking hours  [] 4 Completely disabled; cannot carry on any selfcare; totally confined to bed or chair  [] 5 Dead     Results:    Labs:      Lab Results   Component Value Date    WBC 7.5 09/05/2024    HGB 11.5 (L) 09/05/2024    HCT 36.8 09/05/2024    MCV 88 09/05/2024     09/05/2024       Chemistry    Lab Results   Component Value Date/Time     (L) 08/28/2024 1155    K 4.4 08/28/2024 1155    CL 96 (L) 08/28/2024 1155    CO2 28 08/28/2024 1155    BUN 19 08/28/2024 1155    CREATININE 0.78 08/28/2024 1155    Lab Results   Component Value Date/Time    CALCIUM 9.7 08/28/2024 1155    ALKPHOS 76 08/28/2024 1155    AST 25 08/28/2024 1155    ALT 20 08/28/2024 1155    BILITOT 0.3 08/28/2024 1155             Imaging:  Reviewed above in Onc History    Pathology:  Reviewed above in Onc History    Assessment:     2003: Stage II Left IDC grade 3 with positive LNs and NASH, ER+ ME+; S/p L PM and SLNBx; S/p AC+T; S/p radiation; anastrozole x5 years  2018: Stage IB (Prognostic Stage IA), [id number], grade 2 IDC, ER+95% ME+95% HER2 equivocal, FISH-. S/p R PM and SLNBx; Oncotype 17; S/p radiation; Exemestane since 11/2028, currently on hold  2024: cT1 cN0 L IDC, G3, ER/ME negative and HER2 positive.  Status post bilateral completion mastectomy, pT1b pNx  BRCA2 germline mutation    Plan:    Surgical Plan: S/p L PMSLNBx in 2003; S/p R PMSLNBx 2018; S/p BL completion mastectomy in 2024  Additional biopsy: No further biopsy indicated  Radiation Plan: S/p L radiation in 2003; sS/p R breast radiation 2018  Additional staging scans/DEXA/echo: Baseline staging scans reviewed.  Baseline echocardiogram reviewed; she has established with on-co cardiology.  Additional Path info (i.e Ki67, PDL1): Not indicated  Gene assays: Not indicated    Systemic treatment plan: Treatment course complicated by chest wall infection for which chemotherapy has been held.  We continued on  Herceptin alone.  Patient and I discussed whether or not to resume paclitaxel.  We reviewed the risks and the benefits in doing so and I worry about continued delayed wound healing if this resumes.  We reviewed data from a randomized control trial of Herceptin with or without chemotherapy for HER2 positive early breast cancer in older patients (Aram et al JCO 2020).  While the primary objective of this noninferiority trial was not met the observed loss of survival without chemotherapy was less than 1 month at 3 years. At the conclusion of today's discussion patient agreed to omit chemotherapy from her treatment plan.  We will transition to her to every 3-week dosing Herceptin.   Intent: Curative   Clinical trial: Not eligible for our current trials   Endocrine therapy: We will decide whether to resume exemestane after completion of chemotherapy for her ER/LA positive breast cancer diagnosed in 2018   HER2 treatment: As discussed above   Targeted agents: not indicated   Chemotherapy: Received 2 doses of weekly paclitaxel, discontinued given development of breast infection and delayed wound healing.   BMA: Will discuss at future visit.    Access: Mediport in place  Supportive meds: Anti-emetics and EMLA cream sent to pharmacy  Genetic testing: Referral to genetics for gBRCA2 mutation- pt reports is she s/p BSO appointment in place for September 2024  Fertility preservation: Not indicated  Other active problems/orders:     Mild FDG avid liver lesion without MRI correlate: Short-term follow-up has been recommended per radiologist.  Repeat PET scan ordered today.  Pancreatic cystic lesions likely IPMN: She has established with Dr. Dodson with gastroenterology.  Will need closer monitoring as patient has germline BRCA mutation.  Partially visualized left uterine lesion likely representing fibroid: Discussed ultrasound with patient down the line.  Chest wall infection: Continues on antibiotics.  Sees wound care 3 times  a week  Right IJ DVT: Port related. Currently on apixaban and tolerating well.  This is patient's second related port related clot.  Will refer to hematology for guidance on duration of anticoagulation.    Surveillance plan: No routine breast imaging indicated    Follow-up: 6 weeks    Patient expressed understanding of the plan outlined above. She had ample time to ask questions. She understands she can contact us should she have additional questions or issues arise in the interim.    Joanna Bourne MD  Breast Medical Oncology  [facility]    Portions of this note were dictated utilizing voice recognition software.

## 2024-09-05 NOTE — PATIENT INSTRUCTIONS
Continue taking Eliquis.  Referral to Benign Hematology.  Schedule PET scan.  Herceptin Treatments today, 9/12/24, 10/3/24, and 10/24/24.   Follow up with Dr. Bourne on 10/21.   Please call [phone] with questions or concerns.

## 2024-09-09 ENCOUNTER — PATIENT MESSAGE (OUTPATIENT)
Dept: HEMATOLOGY/ONCOLOGY | Facility: CLINIC | Age: 74
End: 2024-09-09
Payer: MEDICARE

## 2024-09-09 DIAGNOSIS — E03.9 HYPOTHYROIDISM, UNSPECIFIED TYPE: ICD-10-CM

## 2024-09-09 RX ORDER — LEVOTHYROXINE SODIUM 125 UG/1
125 TABLET ORAL DAILY
Qty: 90 TABLET | Refills: 3 | Status: SHIPPED | OUTPATIENT
Start: 2024-09-09

## 2024-09-10 ENCOUNTER — OFFICE VISIT (OUTPATIENT)
Dept: PRIMARY CARE | Facility: CLINIC | Age: 74
End: 2024-09-10
Payer: MEDICARE

## 2024-09-10 ENCOUNTER — LAB (OUTPATIENT)
Dept: LAB | Facility: LAB | Age: 74
End: 2024-09-10
Payer: MEDICARE

## 2024-09-10 VITALS
HEART RATE: 91 BPM | SYSTOLIC BLOOD PRESSURE: 101 MMHG | WEIGHT: 177 LBS | BODY MASS INDEX: 35.49 KG/M2 | OXYGEN SATURATION: 96 % | DIASTOLIC BLOOD PRESSURE: 67 MMHG | TEMPERATURE: 97.2 F

## 2024-09-10 DIAGNOSIS — I10 ESSENTIAL HYPERTENSION: ICD-10-CM

## 2024-09-10 DIAGNOSIS — C50.112 MALIGNANT NEOPLASM OF CENTRAL PORTION OF LEFT BREAST IN FEMALE, ESTROGEN RECEPTOR NEGATIVE (MULTI): ICD-10-CM

## 2024-09-10 DIAGNOSIS — Z23 IMMUNIZATION DUE: ICD-10-CM

## 2024-09-10 DIAGNOSIS — Z17.1 MALIGNANT NEOPLASM OF CENTRAL PORTION OF LEFT BREAST IN FEMALE, ESTROGEN RECEPTOR NEGATIVE (MULTI): ICD-10-CM

## 2024-09-10 DIAGNOSIS — Z92.21 STATUS POST ADMINISTRATION OF CARDIOTOXIC CHEMOTHERAPY: ICD-10-CM

## 2024-09-10 DIAGNOSIS — R94.4 DECREASED GFR: ICD-10-CM

## 2024-09-10 DIAGNOSIS — I42.8 OTHER CARDIOMYOPATHIES (MULTI): ICD-10-CM

## 2024-09-10 DIAGNOSIS — E11.9 TYPE 2 DIABETES MELLITUS WITHOUT COMPLICATION, WITHOUT LONG-TERM CURRENT USE OF INSULIN (MULTI): ICD-10-CM

## 2024-09-10 DIAGNOSIS — R25.1 TREMOR: Primary | ICD-10-CM

## 2024-09-10 DIAGNOSIS — I10 ESSENTIAL HYPERTENSION: Primary | ICD-10-CM

## 2024-09-10 DIAGNOSIS — C50.412 MALIGNANT NEOPLASM OF UPPER-OUTER QUADRANT OF LEFT BREAST IN FEMALE, ESTROGEN RECEPTOR NEGATIVE (MULTI): ICD-10-CM

## 2024-09-10 DIAGNOSIS — Z17.1 MALIGNANT NEOPLASM OF UPPER-OUTER QUADRANT OF LEFT BREAST IN FEMALE, ESTROGEN RECEPTOR NEGATIVE (MULTI): ICD-10-CM

## 2024-09-10 DIAGNOSIS — R25.1 TREMOR: ICD-10-CM

## 2024-09-10 PROBLEM — Z09 HOSPITAL DISCHARGE FOLLOW-UP: Status: RESOLVED | Noted: 2024-06-27 | Resolved: 2024-09-10

## 2024-09-10 PROBLEM — Z96.652 HISTORY OF TOTAL LEFT KNEE REPLACEMENT: Status: RESOLVED | Noted: 2023-01-18 | Resolved: 2024-09-10

## 2024-09-10 PROBLEM — R53.83 OTHER FATIGUE: Status: RESOLVED | Noted: 2023-07-06 | Resolved: 2024-09-10

## 2024-09-10 PROBLEM — R16.0 HEPATOMEGALY: Status: RESOLVED | Noted: 2023-07-06 | Resolved: 2024-09-10

## 2024-09-10 PROBLEM — Z96.659 HISTORY OF TOTAL KNEE REPLACEMENT: Status: RESOLVED | Noted: 2024-02-26 | Resolved: 2024-09-10

## 2024-09-10 PROCEDURE — G0008 ADMIN INFLUENZA VIRUS VAC: HCPCS | Performed by: FAMILY MEDICINE

## 2024-09-10 PROCEDURE — 1160F RVW MEDS BY RX/DR IN RCRD: CPT | Performed by: FAMILY MEDICINE

## 2024-09-10 PROCEDURE — 3062F POS MACROALBUMINURIA REV: CPT | Performed by: FAMILY MEDICINE

## 2024-09-10 PROCEDURE — 4010F ACE/ARB THERAPY RXD/TAKEN: CPT | Performed by: FAMILY MEDICINE

## 2024-09-10 PROCEDURE — 83735 ASSAY OF MAGNESIUM: CPT

## 2024-09-10 PROCEDURE — 3048F LDL-C <100 MG/DL: CPT | Performed by: FAMILY MEDICINE

## 2024-09-10 PROCEDURE — 1123F ACP DISCUSS/DSCN MKR DOCD: CPT | Performed by: FAMILY MEDICINE

## 2024-09-10 PROCEDURE — 99214 OFFICE O/P EST MOD 30 MIN: CPT | Performed by: FAMILY MEDICINE

## 2024-09-10 PROCEDURE — 90662 IIV NO PRSV INCREASED AG IM: CPT | Performed by: FAMILY MEDICINE

## 2024-09-10 PROCEDURE — 85027 COMPLETE CBC AUTOMATED: CPT

## 2024-09-10 PROCEDURE — 3078F DIAST BP <80 MM HG: CPT | Performed by: FAMILY MEDICINE

## 2024-09-10 PROCEDURE — 83880 ASSAY OF NATRIURETIC PEPTIDE: CPT

## 2024-09-10 PROCEDURE — 1036F TOBACCO NON-USER: CPT | Performed by: FAMILY MEDICINE

## 2024-09-10 PROCEDURE — 80053 COMPREHEN METABOLIC PANEL: CPT

## 2024-09-10 PROCEDURE — 84484 ASSAY OF TROPONIN QUANT: CPT

## 2024-09-10 PROCEDURE — 1157F ADVNC CARE PLAN IN RCRD: CPT | Performed by: FAMILY MEDICINE

## 2024-09-10 PROCEDURE — 3044F HG A1C LEVEL LT 7.0%: CPT | Performed by: FAMILY MEDICINE

## 2024-09-10 PROCEDURE — 84100 ASSAY OF PHOSPHORUS: CPT

## 2024-09-10 PROCEDURE — 3074F SYST BP LT 130 MM HG: CPT | Performed by: FAMILY MEDICINE

## 2024-09-10 PROCEDURE — 1159F MED LIST DOCD IN RCRD: CPT | Performed by: FAMILY MEDICINE

## 2024-09-10 PROCEDURE — 82607 VITAMIN B-12: CPT

## 2024-09-10 RX ORDER — LISINOPRIL 10 MG/1
5 TABLET ORAL DAILY
Qty: 180 TABLET | Refills: 3 | Status: SHIPPED | OUTPATIENT
Start: 2024-09-10 | End: 2024-09-11 | Stop reason: DRUGHIGH

## 2024-09-10 RX ORDER — LISINOPRIL 10 MG/1
5 TABLET ORAL 2 TIMES DAILY
Qty: 180 TABLET | Refills: 3 | Status: SHIPPED | OUTPATIENT
Start: 2024-09-10 | End: 2024-09-10 | Stop reason: DRUGHIGH

## 2024-09-10 RX ORDER — GABAPENTIN 100 MG/1
CAPSULE ORAL
COMMUNITY
Start: 2024-09-06

## 2024-09-10 NOTE — ASSESSMENT & PLAN NOTE
/67 in office today. Given report of good control of BP (less than 110s systolic), tremors, and mildly decreased sodium on recent labs will decrease the Lisinopril.    Plan:  -Decrease Lisinopril from 10 mg to 5 mg daily  -Continue HCTZ 12.5 mg daily  -Labs ordered today including CMP, B-12  -If BP continues to be low (less than 110/80s) after decreasing the lisinopril would then stop the HCTZ altogether

## 2024-09-10 NOTE — PATIENT INSTRUCTIONS
Essential hypertension  /67 in office today. Given report of good control of BP (less than 110s systolic), tremors, and mildly decreased sodium on recent labs will decrease the Lisinopril.    Plan:  -Decrease Lisinopril from 10 mg to 5 mg daily  -Continue HCTZ 12.5 mg daily  -Labs ordered today including CMP, B-12  -If BP continues to be low (less than 110/80s) after decreasing the lisinopril would then stop the HCTZ altogether    Tremor  Reviewed prior lab evaluations includin2024 CMP with Na 132 (mildly low but baseline per prior trends for patient), some decreased kidney fxn appears to be dehydration    2024 CBC with HGB 11.5 (mildly decreased but at baseline for patient)    2024 mag normal  2024 TSH normal  No recent B-12 level    She reports having imaging of head/brain yesterday (9/10/2024) at [facility] s/p fall that showed no concerning abnormalities. This report is not available at time of visit today.    Plan:  -Labs ordered today - recheck CMP and B-12  -Decrease Lisinopril from 10 mg to 5 mg daily as per HTN a/p section.  -Referral to neurology placed today  -Patient reassured today that her exam cannot rule in or out parkinsonism, though progression and symmetry of tremor not typical       However, if labs inconslusive may consider trial of Sinemet to see if symptoms improve with this although there is low suspicion for parkinson's. Would defer to neurology.

## 2024-09-10 NOTE — PROGRESS NOTES
Subjective   Patient ID: Raiza Nguyễn is a 73 y.o. female who presents for Tremors (Pt presents for tremors today, tremors are mild for 3 mths and is constant.) and Flu Vaccine (Pt would like to receive flu vaccine today.).    HPI     Patient here for evaluation of tremors.  Scheduled for medicare wellness 10/17/2024    Reviewed prior lab evaluations includin2024 CMP with Na 132 (mildly low but baseline per prior trends for patient), some decreased kidney fxn appears to be dehydration    2024 CBC with HGB 11.5 (mildly decreased but at baseline for patient)    2024 mag normal  2024 TSH normal  No recent B-12 level    States having full body tremors - legs and arms - x 3 months  If not shaking externally can feel herself shaking internally  Wakes up and can feel tremors starting while in bed    Tremors initially mild at onset, now constant.  Tremors both at rest and with intention - unable to write legibly.   Tremors equal across both arms and legs.    Currently getting infusions, was told did not feel related but does feel the tremors have worsened since starting this.    No recent med changes.  Has not recently changed duloxetine doses.    No dizziness   BP no higher than 110s at home    FMHx of aunt with parkinson's  Dad with hx of tremors, unsure of cause  Sister with hx of tremors, felt to be related to steroid use.    [place] of [place] [facility] [place].      Review of Systems   All other systems reviewed and are negative.      Objective   /67 (BP Location: Right arm, Patient Position: Sitting, BP Cuff Size: Adult)   Pulse 91   Temp 36.2 °C (97.2 °F) (Temporal)   Wt 80.3 kg (177 lb)   SpO2 96%   BMI 35.49 kg/m²     Physical Exam  Vitals and nursing note reviewed.   Constitutional:       General: She is not in acute distress.     Appearance: Normal appearance. She is not toxic-appearing.   HENT:      Head: Normocephalic and atraumatic.      Comments: Multiple  abrasions to face, primarily right side  Eyes:      Extraocular Movements: Extraocular movements intact.      Pupils: Pupils are equal, round, and reactive to light.      Comments: Back R eye   Neck:      Comments: Port in place on R side  Cardiovascular:      Rate and Rhythm: Normal rate and regular rhythm.      Heart sounds: No murmur heard.     No friction rub. No gallop.   Pulmonary:      Effort: Pulmonary effort is normal.      Breath sounds: Normal breath sounds. No wheezing, rhonchi or rales.   Skin:     General: Skin is warm and dry.   Neurological:      General: No focal deficit present.      Mental Status: She is alert and oriented to person, place, and time.      Comments:   Cranial nerves II through XII grossly intact  Diffuse tremor  Gait weak to stand, leaning heavily on cane  Positive dysdiadochokinesis  No hyperreflexia, no clonus.   Psychiatric:         Mood and Affect: Mood normal.         Behavior: Behavior normal.         Assessment/Plan   Problem List Items Addressed This Visit             ICD-10-CM    Essential hypertension I10     /67 in office today. Given report of good control of BP (less than 110s systolic), tremors, and mildly decreased sodium on recent labs will decrease the Lisinopril.    Plan:  -Decrease Lisinopril from 10 mg to 5 mg daily  -Continue HCTZ 12.5 mg daily  -Labs ordered today including CMP, B-12  -If BP continues to be low (less than 110/80s) after decreasing the lisinopril would then stop the HCTZ altogether         Relevant Medications    lisinopril 10 mg tablet    Tremor - Primary R25.1     Reviewed prior lab evaluations includin2024 CMP with Na 132 (mildly low but baseline per prior trends for patient), some decreased kidney fxn appears to be dehydration    2024 CBC with HGB 11.5 (mildly decreased but at baseline for patient)    2024 mag normal  2024 TSH normal  No recent B-12 level    She reports having imaging of head/brain yesterday (9/10/2024) at  [facility] s/p fall that showed no concerning abnormalities. This report is not available at time of visit today.    Plan:  -Labs ordered today - recheck CMP and B-12  -Decrease Lisinopril from 10 mg to 5 mg daily as per HTN a/p section.  -Referral to neurology placed today  -Patient reassured today that her exam cannot rule in or out parkinsonism, though progression and symmetry of tremor not typical. However, if labs inconslusive may consider trial of Sinemet to see if symptoms improve with this although there is low suspicion for parkinson's. Would defer to neurology.         Relevant Orders    Comprehensive Metabolic Panel    Vitamin B12    Referral to Neurology     Other Visit Diagnoses         Codes    Immunization due     Z23    Relevant Orders    Flu vaccine, trivalent, preservative free, HIGH-DOSE, age 65y+ (Fluzone) (Completed)            Follow-up as previously scheduled for recheck/routine care.  Call for sooner follow-up if needed.       Scribe Attestation  By signing my name below, IClaudia Scribe   attest that this documentation has been prepared under the direction and in the presence of Shoshana Olivarez DO.

## 2024-09-10 NOTE — ASSESSMENT & PLAN NOTE
Reviewed prior lab evaluations includin2024 CMP with Na 132 (mildly low but baseline per prior trends for patient), some decreased kidney fxn appears to be dehydration    2024 CBC with HGB 11.5 (mildly decreased but at baseline for patient)    2024 mag normal  2024 TSH normal  No recent B-12 level    She reports having imaging of head/brain yesterday (9/10/2024) [facility] s/p [facility] that showed no concerning abnormalities. This report is not available at time of visit today.    Plan:  -Labs ordered today - recheck CMP and B-12  -Decrease Lisinopril from 10 mg to 5 mg daily as per HTN a/p section.  -Referral to neurology placed today  -Patient reassured today that her exam cannot rule in or out parkinsonism, though progression and symmetry of tremor not typical. However, if labs inconslusive may consider trial of Sinemet to see if symptoms improve with this although there is low suspicion for parkinson's. Would defer to neurology.

## 2024-09-11 DIAGNOSIS — N28.9 FUNCTION KIDNEY DECREASED: ICD-10-CM

## 2024-09-11 DIAGNOSIS — E87.1 HYPONATREMIA: Primary | ICD-10-CM

## 2024-09-11 DIAGNOSIS — R94.4 DECREASED GFR: Primary | ICD-10-CM

## 2024-09-11 LAB
ALBUMIN SERPL BCP-MCNC: 4.6 G/DL (ref 3.4–5)
ALP SERPL-CCNC: 76 U/L (ref 33–136)
ALT SERPL W P-5'-P-CCNC: 22 U/L (ref 7–45)
ANION GAP SERPL CALC-SCNC: 15 MMOL/L (ref 10–20)
AST SERPL W P-5'-P-CCNC: 23 U/L (ref 9–39)
BILIRUB SERPL-MCNC: 0.4 MG/DL (ref 0–1.2)
BNP SERPL-MCNC: 5 PG/ML (ref 0–99)
BUN SERPL-MCNC: 20 MG/DL (ref 6–23)
CALCIUM SERPL-MCNC: 10 MG/DL (ref 8.6–10.6)
CARDIAC TROPONIN I PNL SERPL HS: 5 NG/L (ref 0–34)
CHLORIDE SERPL-SCNC: 93 MMOL/L (ref 98–107)
CO2 SERPL-SCNC: 27 MMOL/L (ref 21–32)
CREAT SERPL-MCNC: 1.11 MG/DL (ref 0.5–1.05)
EGFRCR SERPLBLD CKD-EPI 2021: 53 ML/MIN/1.73M*2
ERYTHROCYTE [DISTWIDTH] IN BLOOD BY AUTOMATED COUNT: 17.3 % (ref 11.5–14.5)
GLUCOSE SERPL-MCNC: 120 MG/DL (ref 74–99)
HCT VFR BLD AUTO: 40.3 % (ref 36–46)
HGB BLD-MCNC: 12.3 G/DL (ref 12–16)
MAGNESIUM SERPL-MCNC: 2.21 MG/DL (ref 1.6–2.4)
MCH RBC QN AUTO: 27.2 PG (ref 26–34)
MCHC RBC AUTO-ENTMCNC: 30.5 G/DL (ref 32–36)
MCV RBC AUTO: 89 FL (ref 80–100)
NRBC BLD-RTO: 0 /100 WBCS (ref 0–0)
PHOSPHATE SERPL-MCNC: 3.6 MG/DL (ref 2.5–4.9)
PLATELET # BLD AUTO: 348 X10*3/UL (ref 150–450)
POTASSIUM SERPL-SCNC: 4.4 MMOL/L (ref 3.5–5.3)
PROT SERPL-MCNC: 7.2 G/DL (ref 6.4–8.2)
RBC # BLD AUTO: 4.53 X10*6/UL (ref 4–5.2)
SODIUM SERPL-SCNC: 131 MMOL/L (ref 136–145)
VIT B12 SERPL-MCNC: 639 PG/ML (ref 211–911)
WBC # BLD AUTO: 5.8 X10*3/UL (ref 4.4–11.3)

## 2024-09-11 RX ORDER — METFORMIN HYDROCHLORIDE 500 MG/1
500 TABLET, EXTENDED RELEASE ORAL DAILY
Qty: 90 TABLET | Refills: 1 | Status: SHIPPED | OUTPATIENT
Start: 2024-09-11

## 2024-09-11 RX ORDER — LISINOPRIL 5 MG/1
5 TABLET ORAL DAILY
Qty: 90 TABLET | Refills: 1 | Status: SHIPPED | OUTPATIENT
Start: 2024-09-11 | End: 2025-10-16

## 2024-09-12 ENCOUNTER — APPOINTMENT (OUTPATIENT)
Dept: HEMATOLOGY/ONCOLOGY | Facility: CLINIC | Age: 74
End: 2024-09-12
Payer: MEDICARE

## 2024-09-12 ENCOUNTER — LAB (OUTPATIENT)
Dept: LAB | Facility: CLINIC | Age: 74
End: 2024-09-12
Payer: MEDICARE

## 2024-09-12 ENCOUNTER — INFUSION (OUTPATIENT)
Dept: HEMATOLOGY/ONCOLOGY | Facility: CLINIC | Age: 74
End: 2024-09-12
Payer: MEDICARE

## 2024-09-12 VITALS
SYSTOLIC BLOOD PRESSURE: 128 MMHG | RESPIRATION RATE: 18 BRPM | TEMPERATURE: 97.2 F | HEIGHT: 59 IN | WEIGHT: 175.27 LBS | BODY MASS INDEX: 35.33 KG/M2 | DIASTOLIC BLOOD PRESSURE: 75 MMHG | HEART RATE: 90 BPM | OXYGEN SATURATION: 90 %

## 2024-09-12 DIAGNOSIS — C50.911 MALIGNANT NEOPLASM OF BOTH BREASTS IN FEMALE, ESTROGEN RECEPTOR NEGATIVE, UNSPECIFIED SITE OF BREAST (MULTI): ICD-10-CM

## 2024-09-12 DIAGNOSIS — Z17.1 MALIGNANT NEOPLASM OF BOTH BREASTS IN FEMALE, ESTROGEN RECEPTOR NEGATIVE, UNSPECIFIED SITE OF BREAST (MULTI): ICD-10-CM

## 2024-09-12 DIAGNOSIS — I10 ESSENTIAL HYPERTENSION: ICD-10-CM

## 2024-09-12 DIAGNOSIS — R25.1 TREMOR: ICD-10-CM

## 2024-09-12 DIAGNOSIS — C50.912 MALIGNANT NEOPLASM OF BOTH BREASTS IN FEMALE, ESTROGEN RECEPTOR NEGATIVE, UNSPECIFIED SITE OF BREAST (MULTI): ICD-10-CM

## 2024-09-12 DIAGNOSIS — C50.112 MALIGNANT NEOPLASM OF CENTRAL PORTION OF LEFT BREAST IN FEMALE, ESTROGEN RECEPTOR NEGATIVE (MULTI): ICD-10-CM

## 2024-09-12 DIAGNOSIS — N28.9 FUNCTION KIDNEY DECREASED: ICD-10-CM

## 2024-09-12 DIAGNOSIS — Z17.1 MALIGNANT NEOPLASM OF CENTRAL PORTION OF LEFT BREAST IN FEMALE, ESTROGEN RECEPTOR NEGATIVE (MULTI): ICD-10-CM

## 2024-09-12 DIAGNOSIS — E87.1 HYPONATREMIA: ICD-10-CM

## 2024-09-12 LAB
ERYTHROCYTE [DISTWIDTH] IN BLOOD BY AUTOMATED COUNT: 16.9 % (ref 11.5–14.5)
HCT VFR BLD AUTO: 36.5 % (ref 36–46)
HGB BLD-MCNC: 11.6 G/DL (ref 12–16)
MCH RBC QN AUTO: 27.8 PG (ref 26–34)
MCHC RBC AUTO-ENTMCNC: 31.8 G/DL (ref 32–36)
MCV RBC AUTO: 87 FL (ref 80–100)
NRBC BLD-RTO: ABNORMAL /100{WBCS}
PLATELET # BLD AUTO: 281 X10*3/UL (ref 150–450)
RBC # BLD AUTO: 4.18 X10*6/UL (ref 4–5.2)
WBC # BLD AUTO: 6.4 X10*3/UL (ref 4.4–11.3)

## 2024-09-12 PROCEDURE — 2500000005 HC RX 250 GENERAL PHARMACY W/O HCPCS: Performed by: STUDENT IN AN ORGANIZED HEALTH CARE EDUCATION/TRAINING PROGRAM

## 2024-09-12 PROCEDURE — 2500000004 HC RX 250 GENERAL PHARMACY W/ HCPCS (ALT 636 FOR OP/ED): Performed by: STUDENT IN AN ORGANIZED HEALTH CARE EDUCATION/TRAINING PROGRAM

## 2024-09-12 PROCEDURE — 84439 ASSAY OF FREE THYROXINE: CPT | Performed by: FAMILY MEDICINE

## 2024-09-12 PROCEDURE — 2500000004 HC RX 250 GENERAL PHARMACY W/ HCPCS (ALT 636 FOR OP/ED): Mod: JG | Performed by: STUDENT IN AN ORGANIZED HEALTH CARE EDUCATION/TRAINING PROGRAM

## 2024-09-12 PROCEDURE — 84443 ASSAY THYROID STIM HORMONE: CPT | Performed by: FAMILY MEDICINE

## 2024-09-12 PROCEDURE — 85027 COMPLETE CBC AUTOMATED: CPT

## 2024-09-12 PROCEDURE — 82374 ASSAY BLOOD CARBON DIOXIDE: CPT | Performed by: FAMILY MEDICINE

## 2024-09-12 PROCEDURE — 84481 FREE ASSAY (FT-3): CPT | Performed by: FAMILY MEDICINE

## 2024-09-12 PROCEDURE — 96413 CHEMO IV INFUSION 1 HR: CPT

## 2024-09-12 RX ORDER — HEPARIN SODIUM 1000 [USP'U]/ML
2000 INJECTION, SOLUTION INTRAVENOUS; SUBCUTANEOUS AS NEEDED
OUTPATIENT
Start: 2024-09-12

## 2024-09-12 RX ORDER — HEPARIN 100 UNIT/ML
500 SYRINGE INTRAVENOUS AS NEEDED
OUTPATIENT
Start: 2024-09-12

## 2024-09-12 RX ORDER — DIPHENHYDRAMINE HYDROCHLORIDE 50 MG/ML
50 INJECTION INTRAMUSCULAR; INTRAVENOUS AS NEEDED
Status: DISCONTINUED | OUTPATIENT
Start: 2024-09-12 | End: 2024-09-12 | Stop reason: HOSPADM

## 2024-09-12 RX ORDER — PROCHLORPERAZINE EDISYLATE 5 MG/ML
10 INJECTION INTRAMUSCULAR; INTRAVENOUS EVERY 6 HOURS PRN
Status: DISCONTINUED | OUTPATIENT
Start: 2024-09-12 | End: 2024-09-12 | Stop reason: HOSPADM

## 2024-09-12 RX ORDER — EPINEPHRINE 0.3 MG/.3ML
0.3 INJECTION SUBCUTANEOUS EVERY 5 MIN PRN
Status: DISCONTINUED | OUTPATIENT
Start: 2024-09-12 | End: 2024-09-12 | Stop reason: HOSPADM

## 2024-09-12 RX ORDER — HEPARIN 100 UNIT/ML
500 SYRINGE INTRAVENOUS AS NEEDED
Status: DISCONTINUED | OUTPATIENT
Start: 2024-09-12 | End: 2024-09-12 | Stop reason: HOSPADM

## 2024-09-12 RX ORDER — ALBUTEROL SULFATE 0.83 MG/ML
3 SOLUTION RESPIRATORY (INHALATION) AS NEEDED
Status: DISCONTINUED | OUTPATIENT
Start: 2024-09-12 | End: 2024-09-12 | Stop reason: HOSPADM

## 2024-09-12 RX ORDER — HEPARIN SODIUM,PORCINE/PF 10 UNIT/ML
50 SYRINGE (ML) INTRAVENOUS AS NEEDED
OUTPATIENT
Start: 2024-09-12

## 2024-09-12 RX ORDER — FAMOTIDINE 10 MG/ML
20 INJECTION INTRAVENOUS ONCE AS NEEDED
Status: DISCONTINUED | OUTPATIENT
Start: 2024-09-12 | End: 2024-09-12 | Stop reason: HOSPADM

## 2024-09-12 RX ORDER — PROCHLORPERAZINE MALEATE 10 MG
10 TABLET ORAL EVERY 6 HOURS PRN
Status: DISCONTINUED | OUTPATIENT
Start: 2024-09-12 | End: 2024-09-12 | Stop reason: HOSPADM

## 2024-09-12 ASSESSMENT — PAIN SCALES - GENERAL: PAINLEVEL: 6

## 2024-09-12 NOTE — PROGRESS NOTES
"Pt here for infusion today. States she \"fell on Saturday\". Pt did go to ER and states they told her she \"had no bleeds anywhere\". Pt has bruising and abrasions to face, a black right eye and bruising to right leg. Dr. Bourne notified via secure chat and okay to proceed with treatment. Pt tolerated infusion without complaint. Denied any needs or concerns. Aware of next appointment. Independently ambulatory off unit with cane and slow steady gait.    "

## 2024-09-13 ENCOUNTER — TELEPHONE (OUTPATIENT)
Dept: ADMISSION | Facility: HOSPITAL | Age: 74
End: 2024-09-13
Payer: MEDICARE

## 2024-09-13 ENCOUNTER — TELEPHONE (OUTPATIENT)
Dept: HEMATOLOGY/ONCOLOGY | Facility: CLINIC | Age: 74
End: 2024-09-13
Payer: MEDICARE

## 2024-09-13 ENCOUNTER — HOSPITAL ENCOUNTER (OUTPATIENT)
Dept: RADIOLOGY | Facility: CLINIC | Age: 74
Discharge: HOME | End: 2024-09-13
Payer: MEDICARE

## 2024-09-13 DIAGNOSIS — Z17.1 MALIGNANT NEOPLASM OF CENTRAL PORTION OF LEFT BREAST IN FEMALE, ESTROGEN RECEPTOR NEGATIVE (MULTI): ICD-10-CM

## 2024-09-13 DIAGNOSIS — C50.112 MALIGNANT NEOPLASM OF CENTRAL PORTION OF LEFT BREAST IN FEMALE, ESTROGEN RECEPTOR NEGATIVE (MULTI): ICD-10-CM

## 2024-09-13 LAB
ANION GAP SERPL CALC-SCNC: 15 MMOL/L (ref 10–20)
BUN SERPL-MCNC: 19 MG/DL (ref 6–23)
CALCIUM SERPL-MCNC: 9.3 MG/DL (ref 8.6–10.6)
CHLORIDE SERPL-SCNC: 97 MMOL/L (ref 98–107)
CO2 SERPL-SCNC: 27 MMOL/L (ref 21–32)
CREAT SERPL-MCNC: 0.82 MG/DL (ref 0.5–1.05)
EGFRCR SERPLBLD CKD-EPI 2021: 77 ML/MIN/1.73M*2
GLUCOSE SERPL-MCNC: 100 MG/DL (ref 74–99)
POTASSIUM SERPL-SCNC: 4 MMOL/L (ref 3.5–5.3)
SODIUM SERPL-SCNC: 135 MMOL/L (ref 136–145)

## 2024-09-13 PROCEDURE — 78815 PET IMAGE W/CT SKULL-THIGH: CPT | Mod: PS

## 2024-09-13 PROCEDURE — 3430000001 HC RX 343 DIAGNOSTIC RADIOPHARMACEUTICALS: Performed by: STUDENT IN AN ORGANIZED HEALTH CARE EDUCATION/TRAINING PROGRAM

## 2024-09-13 PROCEDURE — A9552 F18 FDG: HCPCS | Performed by: STUDENT IN AN ORGANIZED HEALTH CARE EDUCATION/TRAINING PROGRAM

## 2024-09-13 RX ORDER — FLUDEOXYGLUCOSE F 18 200 MCI/ML
11.8 INJECTION, SOLUTION INTRAVENOUS
Status: COMPLETED | OUTPATIENT
Start: 2024-09-13 | End: 2024-09-13

## 2024-09-13 NOTE — TELEPHONE ENCOUNTER
Phone call placed to pt in response to her in-basket message this morning. Left VM on pt's [employer] message machine for pt. to call the office back at #[phone], to discuss the concerns noted in her in-backset message she sent today.

## 2024-09-14 DIAGNOSIS — R25.1 TREMOR: Primary | ICD-10-CM

## 2024-09-14 DIAGNOSIS — I10 ESSENTIAL HYPERTENSION: ICD-10-CM

## 2024-09-14 LAB — TSH SERPL-ACNC: 0.98 MIU/L (ref 0.44–3.98)

## 2024-09-15 LAB
T3FREE SERPL-MCNC: 2.8 PG/ML (ref 2.3–4.2)
T4 FREE SERPL-MCNC: 1.38 NG/DL (ref 0.78–1.48)

## 2024-09-16 ENCOUNTER — HOSPITAL ENCOUNTER (OUTPATIENT)
Dept: RADIOLOGY | Facility: CLINIC | Age: 74
End: 2024-09-16
Payer: MEDICARE

## 2024-09-16 DIAGNOSIS — C50.112 MALIGNANT NEOPLASM OF CENTRAL PORTION OF LEFT BREAST IN FEMALE, ESTROGEN RECEPTOR NEGATIVE: Primary | ICD-10-CM

## 2024-09-16 DIAGNOSIS — Z17.1 MALIGNANT NEOPLASM OF CENTRAL PORTION OF LEFT BREAST IN FEMALE, ESTROGEN RECEPTOR NEGATIVE: Primary | ICD-10-CM

## 2024-09-17 ENCOUNTER — PATIENT OUTREACH (OUTPATIENT)
Dept: PRIMARY CARE | Facility: CLINIC | Age: 74
End: 2024-09-17
Payer: MEDICARE

## 2024-09-18 ENCOUNTER — HOSPITAL ENCOUNTER (OUTPATIENT)
Dept: RADIOLOGY | Facility: CLINIC | Age: 74
Discharge: HOME | End: 2024-09-18
Payer: MEDICARE

## 2024-09-18 DIAGNOSIS — Z17.1 MALIGNANT NEOPLASM OF CENTRAL PORTION OF LEFT BREAST IN FEMALE, ESTROGEN RECEPTOR NEGATIVE: ICD-10-CM

## 2024-09-18 DIAGNOSIS — C50.112 MALIGNANT NEOPLASM OF CENTRAL PORTION OF LEFT BREAST IN FEMALE, ESTROGEN RECEPTOR NEGATIVE: ICD-10-CM

## 2024-09-18 PROCEDURE — 76536 US EXAM OF HEAD AND NECK: CPT

## 2024-09-18 PROCEDURE — 76536 US EXAM OF HEAD AND NECK: CPT | Performed by: STUDENT IN AN ORGANIZED HEALTH CARE EDUCATION/TRAINING PROGRAM

## 2024-09-18 NOTE — PROGRESS NOTES
History of Present Illness:  Raiza Nguyễn is a 73 y.o. female with a personal history of cancer  Raiza Nguyễn was referred to the Cancer Genetics Clinic at [facility] by Joanna Bourne MD. Raiza Nguyễn is interested in genetic testing to clarify their personal risk for cancer, as well as the risks to their family members.    Cancer Medical History:  Personal history of cancer? 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 S/p R PM and SLNBx; Oncotype 17; S/p radiation; Exemestane since 2028, currently on hold  : cT1 cN0 L IDC, G3, ER/OK negative and HER2 positive.  Status post bilateral completion mastectomy, pT1b pNx. Currently on immunotherapy.    History of other cancers: Yes, She had a lesion in her left lower back removed, and confirmed to be a 0.35 mm melanoma. It was not ulcerative and she did not require a sentinel node evaluation. She has since had multiple excisions of atypical myelomonocytic lesions without disease  discovered.     Prior genetic testing? Yes, BRCA1/2 testing sent to [facility] in . She was positive for 635delA mutation in BRCA2    Cancer screening history:  Colonoscopy? Yes:  -20: 0 polyps  - 1/10/24: 0 polyps    Reports other, previous colonoscopies with cumulative polyp count of 2.    Upper endoscopy? Yes, most recent 3/1/21    Other cancer screening? None    Reproductive History:  Hysterectomy? Yes   Oophorectomy? Yes , 2008    Family history:  A 4-generation pedigree was obtained and was significant for the following:   No daughters  Two sons (living, 40s) without cancer or genetic testing  One sister (, 74) who passed of uterine cancer diagnosed at 70 (no genetic testing, reportedly had somatic  testing that was negative for Gong Syndrome (but no records of this are available to us))  Mother (, 62) with a h/o breast cancer at 37 (no genetic testing).  Maternal uncle (, 71) with a h/o stomach cancer at 70 (no genetic testing). His son had colon cancer and passed at 59  Maternal uncle (, 64) who passed of colon cancer diagnosed at 64 (no genetic testing)  Maternal aunt (, 93) with a h/o melanoma in her 70s  Maternal aunt (, 78) without cancer or genetic testing  Maternal aunt (, 84) without cancer or genetic testing. Her daughter passed at 55 of breast cancer.  Maternal grandmother (, 60s) who passed of a gyn cancer diagnosed in her late 50s  Maternal grandfather (, 40s) without cancer or genetic testing  Father (, 83) who passed of lung cancer (smoker)  One paternal aunt (, 82) without cancer or genetic testing  Paternal uncle (, 80s) who had prostate cancer at 70 (no genetic testing)  Paternal grandparents both lived >60 without cancer or genetic testing  Maternal ancestry is Maori.  Paternal ancestry is Sinhala/Arabic. There is no known Ashkenazi Protestant ancestry. Consanguinity was denied.       Discussion:  Raiza Nguyễn is a 73 y.o. old female with a personal and family history of cancer.  Based on having three  or more 1st- or 2nd-degree relatives on one side with a Gong Syndrome related cancer, Raiza Nguyễn meets NCCN criteria for testing additional genetic testing of the Gong syndrome genes. She is interested in testing, which is recommended, and was ordered today via the 71-gene CancerNext-Expanded + iCouchnsight panel from [facility]. Our discussion is summarized below.    We reviewed genes and chromosomes and inherited forms of colon and breast cancer. We discussed that most cancers are not due to an inherited genetic susceptibility. However, in about 5-10% of families, there is an inherited  genetic mutation that can make a person more susceptible to developing certain forms of cancer. Within these families, we often see multiple family members with cancer, occurring in multiple generations. In addition, earlier onset cancers are suggestive of an inherited form of cancer. Finally, there is a clustering of certain types of cancer in these families.    We additionally discussed that, while she had testing in 2007, her genetic testing was  not thorough for colon cancer or even full BRCA analysis (though a second BRCA mutation is unlikely). For example, full BRCA analysis via sequencing and full gene rearrangement analysis was not widely available until 2013. Panel testing for other breast cancer and/or colon cancer genes was also not available until after 2013. As such, additional, more comprehensive analysis is reasonable.    We discussed Gong syndrome which is caused by germline mutations in one of five genes - MLH1, MSH2, MSH6, PMS2, and EPCAM. Individuals with Gong syndrome have increased risk to develop colon cancer over their lifetime, and an increased risk for a second colon primary. Women with Gong syndrome have an increased risk for endometrial cancer and ovarian cancer. Other cancers associated with Gong syndrome include gastric cancer, hepatobiliary, small bowel, urinary tract, and rarely pancreatic cancer. Understanding if an individual has this condition results in changes to their medical management such as more frequent colonoscopies.    We discussed that there are multiple genes associated with increased cancer risk. Some genes, like the Gong Syndrome genes, are considered highly penetrant cancer genes, meaning a mutation in the gene confers a high risk of cancer. Additionally, there are other intermediate (moderate risk) cancer genes. For some of the moderate risk genes, there is often limited information regarding the degree to which a mutation in the gene affects risk of different  types of cancers. Additionally, for some of these moderate risk genes, the appropriate management for individuals who have a mutation in one of these genes is not always clear. Our knowledge about the cancer risks associated with mutations in these moderate risk genes is always growing, and we will likely be able to provide more comprehensive information in the future.    Most cancer risk genes, like Gong syndrome genes, are inherited in an autosomal dominant fashion. This means that if an individual has a change in one of these genes, their siblings and children have a 50% chance of also having that gene change and a 50% chance of not having the gene change.    We reviewed the three results we can get back:  1. Positive- Identified a change in a cancer gene that confers an increased cancer risk. We will discuss potential changes in management for her and her family based on the specific gene mutation found.  2. Negative- Clears her for a majority of predisposition cancer syndromes that we are aware of, but cannot clear her for all cancer predisposition risks. She would continue to be screened based on her personal and family history.  3. Variant of Uncertain Significance (VUS)- We discussed should an uncertain result come back that this would be treated like a negative result (i.e. no management recommendation will be made no familial variant testing) as the implications of this finding are currently unknown.    We also reviewed her known BRCA2 mutation.     Raiza Nguyễn's genetic test result was POSITIVE for a pathogenic mutation in the BRCA2 gene known as 635delA.  This explains her personal history of bilateral breast cancers.      Because she has a BRCA2 mutation, Raiza Nguyễn is at a higher risk to develop other types of cancers. Extra cancer screening may help to find a cancer earlier where it is more easily treated. There are also options, such as surgery, to reduce the risk of cancer.    Changes  in this gene (sometimes referred to as mutations) confer more than a 60% lifetime risk for breast cancer (some sources quote up to 84% lifetime risk). This is elevated compared to the general population risk of about 13%.   Mutation carriers who have already been diagnosed with cancer have an increased risk to develop a second, contralateral breast cancer (cancer in the other breast). Current estimates of this risk are about 25%.  Based on current NCCN guidelines, at this time, unless you choose risk-reducing breast surgery (having both breasts surgically removed), you are a candidate for high-risk breast care. This generally includes yearly mammograms and yearly breast MRI screenings. Some women may even use medication (such as tamoxifen) to help reduce their risk of breast cancer.     In addition, BRCA2 mutation carriers have approximately a 13-29% lifetime risk for ovarian cancer, which is elevated over the ~1% general population risk. We discussed that the national guidelines (NCCN) recommend that women with a BRCA2 mutation undergo surgery to remove their ovaries and fallopian tubes (called a bilateral salpingo-oophorectomy or BSO) to reduce their chance of developing those cancers. This is recommended between the ages of 35 and 40 (or when childbearing is complete, if this is later).      Individuals with BRCA2 mutations have a 5-10% risk to develop pancreatic cancer during their lifetime (which is elevated over the general population risk of 1-2%).  The current NCCN guidelines state that currently most studies restrict pancreatic cancer screening to individuals with a pathogenic mutation in BRCA2  AND a family history of pancreatic cancer (1st degree or 2nd degree relative) from the same side of the family as the pathogenic mutation.  Raiza Nguyễn denies any maternal or paternal family history of pancreatic cancer.  However, currently the CAPS5 inclusion criteria includes anyone with a BRCA2 mutation,  regardless of family history. As such, referral to the [facility] recruitment team was discussed, offered, and accepted.    Men with a BRCA2 mutation also have other cancer risks such as up to a 7% lifetime risk for male breast cancer (general population risk is 0.1%) and an increased risk of prostate cancer (up to ~60% compared to average population risks of about 13%). Male relatives with a BRCA2 mutation would be recommended to begin annual prostate screening (via digital rectal exam and PSA test) starting at 40. Additionally, clinical and self-breast exams would be recommended starting at 35.    We also discussed the chance to have a child with the same BRCA2 mutation that Raiza Nguyễn carries. Since Raiza Nguyễn  has a BRCA2 mutation, this means that there is a 1 in 2 chance that any of her children will also have the same BRCA2 mutation. In addition, Raiza Nguyễn's siblings and parents would each have a 50% chance to have the same mutation as Raiza Nguyễn.      Individuals with a mutation in the BRCA2 gene who are at childbearing age are also at an increased risk to have children with a rare condition known as Fanconi Anemia if their partner is also a carrier for BRCA2. Fanconi Anemia is characterized by developmental abnormalities in major organ systems, early-onset bone marrow failure, and a high predisposition to cancer (typically being affected by 6 years of age). Bone marrow failure with pancytopenia often presents in the first decade of life. For there to be a risk to offspring of Fanconi Anemia, both parents would both have to have a mutation in the BRCA2 gene; in such a case, the risk to offspring is 25%. As such, those seeking to grow their family who have a BRCA2 mutation may wish to test their partner for BRCA2 as well.    Raiza Nguyễn was counseled about hereditary cancer susceptibility including cancer risks, options for increased screening and/or risk reduction, genetic  testing, and the implications for other family members. Raiza Nguyễn elected to move forward with genetic testing via a multi-gene panel.  The 71-gene CancerNext-Expanded + RNAinsight panel from [facility].    Results are typically available within 4 weeks, and Raiza Nguyễn will return to the Cancer Genetics Clinic to discuss her testing results. At that time, we will make recommendations for both Raiza Nguyễn and her family members in terms of cancer screening and/or cancer risk reduction options.         PLAN:  1.  Raiza Nguyễn  elected to undergo genetic testing via a panel test that analyzes 71 genes associated with breast, ovarian and other cancer risks. Consent for testing was signed and blood was drawn. The sample was sent to [facility] for analysis. Results are typically available in 4 weeks.    2. Raiza Nguyễn will return to the Cancer Genetics Clinic in approximately 4 weeks to discuss her  test results.     3. We remain available to Raiza Nguyễn or her  family members at [phone] if any questions arise regarding information discussed at today's visit.    Anaid Beverly MS, [facility]  Certified Genetic Counselor  [facility]  [phone]    Reviewed by:  Dr. Rosa Maria Mott  Clinical   [place] [facility]  [phone]

## 2024-09-20 ENCOUNTER — TELEPHONE (OUTPATIENT)
Dept: HEMATOLOGY/ONCOLOGY | Facility: CLINIC | Age: 74
End: 2024-09-20

## 2024-09-20 DIAGNOSIS — E04.1 THYROID NODULE: Primary | ICD-10-CM

## 2024-09-20 NOTE — RESULT ENCOUNTER NOTE
I called patient to review results of thyroid US. We discussed these findings in detail. I have recommended referral for evaluation and possible FNA. Patient was appreciative of the call. All of the patient's questions were answered and concerns were addressed. The patient has no further needs at this time. Follow-up in place for 10/3.

## 2024-09-25 ENCOUNTER — PATIENT MESSAGE (OUTPATIENT)
Dept: PRIMARY CARE | Facility: CLINIC | Age: 74
End: 2024-09-25
Payer: MEDICARE

## 2024-09-25 DIAGNOSIS — E11.9 TYPE 2 DIABETES MELLITUS WITHOUT COMPLICATION, WITHOUT LONG-TERM CURRENT USE OF INSULIN (MULTI): Primary | ICD-10-CM

## 2024-09-26 ENCOUNTER — LAB (OUTPATIENT)
Dept: LAB | Facility: LAB | Age: 74
End: 2024-09-26
Payer: MEDICARE

## 2024-09-26 ENCOUNTER — APPOINTMENT (OUTPATIENT)
Dept: RADIOLOGY | Facility: HOSPITAL | Age: 74
End: 2024-09-26
Payer: MEDICARE

## 2024-09-26 ENCOUNTER — APPOINTMENT (OUTPATIENT)
Dept: SURGICAL ONCOLOGY | Facility: HOSPITAL | Age: 74
End: 2024-09-26
Payer: MEDICARE

## 2024-09-26 ENCOUNTER — APPOINTMENT (OUTPATIENT)
Dept: GENETICS | Facility: CLINIC | Age: 74
End: 2024-09-26
Payer: MEDICARE

## 2024-09-26 VITALS — HEIGHT: 59 IN | BODY MASS INDEX: 35.33 KG/M2 | WEIGHT: 175.27 LBS

## 2024-09-26 DIAGNOSIS — Z80.49 FAMILY HISTORY OF MALIGNANT NEOPLASM OF GENITAL ORGAN: ICD-10-CM

## 2024-09-26 DIAGNOSIS — Z17.1 MALIGNANT NEOPLASM OF CENTRAL PORTION OF LEFT BREAST IN FEMALE, ESTROGEN RECEPTOR NEGATIVE: ICD-10-CM

## 2024-09-26 DIAGNOSIS — Z71.83 ENCOUNTER FOR NONPROCREATIVE GENETIC COUNSELING: Primary | ICD-10-CM

## 2024-09-26 DIAGNOSIS — Z80.0 FAMILY HISTORY OF MALIGNANT NEOPLASM OF GASTROINTESTINAL TRACT: ICD-10-CM

## 2024-09-26 DIAGNOSIS — Z80.3 FAMILY HISTORY OF MALIGNANT NEOPLASM OF BREAST: ICD-10-CM

## 2024-09-26 DIAGNOSIS — C50.112 MALIGNANT NEOPLASM OF CENTRAL PORTION OF LEFT BREAST IN FEMALE, ESTROGEN RECEPTOR NEGATIVE: ICD-10-CM

## 2024-09-26 PROCEDURE — GENMD PR GENETICS VISIT (MEDICAID/MEDICARE)

## 2024-09-26 ASSESSMENT — ENCOUNTER SYMPTOMS
OCCASIONAL FEELINGS OF UNSTEADINESS: 0
DEPRESSION: 0
LOSS OF SENSATION IN FEET: 0

## 2024-09-27 RX ORDER — TIRZEPATIDE 7.5 MG/.5ML
7.5 INJECTION, SOLUTION SUBCUTANEOUS WEEKLY
Qty: 2 ML | Refills: 3 | Status: SHIPPED | OUTPATIENT
Start: 2024-09-27

## 2024-10-01 ENCOUNTER — OFFICE VISIT (OUTPATIENT)
Dept: PRIMARY CARE | Facility: CLINIC | Age: 74
End: 2024-10-01
Payer: MEDICARE

## 2024-10-01 VITALS
HEART RATE: 86 BPM | SYSTOLIC BLOOD PRESSURE: 96 MMHG | RESPIRATION RATE: 12 BRPM | WEIGHT: 177.8 LBS | BODY MASS INDEX: 35.65 KG/M2 | DIASTOLIC BLOOD PRESSURE: 63 MMHG | OXYGEN SATURATION: 97 % | TEMPERATURE: 97.6 F

## 2024-10-01 DIAGNOSIS — H10.30 ACUTE CONJUNCTIVITIS, UNSPECIFIED ACUTE CONJUNCTIVITIS TYPE, UNSPECIFIED LATERALITY: Primary | ICD-10-CM

## 2024-10-01 DIAGNOSIS — H10.9 CONJUNCTIVITIS, UNSPECIFIED CONJUNCTIVITIS TYPE, UNSPECIFIED LATERALITY: ICD-10-CM

## 2024-10-01 PROCEDURE — 3074F SYST BP LT 130 MM HG: CPT | Performed by: FAMILY MEDICINE

## 2024-10-01 PROCEDURE — 1123F ACP DISCUSS/DSCN MKR DOCD: CPT | Performed by: FAMILY MEDICINE

## 2024-10-01 PROCEDURE — 1159F MED LIST DOCD IN RCRD: CPT | Performed by: FAMILY MEDICINE

## 2024-10-01 PROCEDURE — 3078F DIAST BP <80 MM HG: CPT | Performed by: FAMILY MEDICINE

## 2024-10-01 PROCEDURE — 1157F ADVNC CARE PLAN IN RCRD: CPT | Performed by: FAMILY MEDICINE

## 2024-10-01 PROCEDURE — 3048F LDL-C <100 MG/DL: CPT | Performed by: FAMILY MEDICINE

## 2024-10-01 PROCEDURE — 99212 OFFICE O/P EST SF 10 MIN: CPT | Performed by: FAMILY MEDICINE

## 2024-10-01 PROCEDURE — 1036F TOBACCO NON-USER: CPT | Performed by: FAMILY MEDICINE

## 2024-10-01 PROCEDURE — 4010F ACE/ARB THERAPY RXD/TAKEN: CPT | Performed by: FAMILY MEDICINE

## 2024-10-01 PROCEDURE — 3044F HG A1C LEVEL LT 7.0%: CPT | Performed by: FAMILY MEDICINE

## 2024-10-01 PROCEDURE — 1160F RVW MEDS BY RX/DR IN RCRD: CPT | Performed by: FAMILY MEDICINE

## 2024-10-01 PROCEDURE — 3062F POS MACROALBUMINURIA REV: CPT | Performed by: FAMILY MEDICINE

## 2024-10-01 RX ORDER — CIPROFLOXACIN HYDROCHLORIDE 3 MG/ML
1 SOLUTION/ DROPS OPHTHALMIC
Qty: 5 ML | Refills: 0 | Status: SHIPPED | OUTPATIENT
Start: 2024-10-01 | End: 2024-10-11

## 2024-10-01 NOTE — PROGRESS NOTES
Subjective        Raiza Nguyễn is a 73 y.o. female who presents for      HPI:    Dr Olivarez pt       Chief Complaint   Patient presents with    Conjunctivitis         What concern/ problem/pain/symptom  brings you here today? [place] [facility]      how long has pt had sxs? 1 wk      describe symptoms- barreto a lot, itches  Fever- No  Injury to eye- No  Change in vision- No      how often do symptoms occur? First time      has pt tried anything for current symptoms, including medications (OTC or prescription)? Eye drops from [facility]        what makes symptoms worse?      has pt been seen recently for this problem ( within past 2-3 weeks) ? No    if yes- where?    by who?    what treatment was provided?      Who is eye doctor?            Social History     Tobacco Use   Smoking Status Former    Current packs/day: 0.00    Average packs/day: 1.7 packs/day for 47.5 years (80.0 ttl pk-yrs)    Types: Cigarettes    Start date: 1968    Quit date: 1983    Years since quittin.7    Passive exposure: Past   Smokeless Tobacco Never         Social History     Substance and Sexual Activity   Alcohol Use Not Currently    Alcohol/week: 1.0 standard drink of alcohol    Types: 1 Glasses of wine per week          Review of Systems:    Review of Systems    Objective        Vitals:    10/01/24 1351   BP: 96/63   BP Location: Right arm   Patient Position: Sitting   BP Cuff Size: Adult   Pulse: 86   Resp: 12   Temp: 36.4 °C (97.6 °F)   TempSrc: Temporal   SpO2: 97%   Weight: 80.6 kg (177 lb 12.8 oz)         Patient is alert and oriented x 3 , NAD  HEAD- normocephalic and atraumatic   EYES-conjunctiva-left-red, mildly swollen, mild yellow discharge at medial canthus   Bilateral sclera ok  Right-normal  lids - normal  EARS/NOSE- normal external exam   NECK-supple,FROM              BP Readings from Last 3 Encounters:   10/01/24 96/63   24 128/75   09/10/24 101/67           Wt Readings from Last 3 Encounters:   10/01/24 80.6 kg  (177 lb 12.8 oz)   09/26/24 79.5 kg (175 lb 4.3 oz)   09/12/24 79.5 kg (175 lb 4.3 oz)         BMI Readings from Last 3 Encounters:   10/01/24 35.65 kg/m²   09/26/24 35.15 kg/m²   09/12/24 35.15 kg/m²           Assessment & Plan  Acute conjunctivitis, unspecified acute conjunctivitis type, unspecified laterality                            .start eye drops today      remove contacts if you wear them      wash pillowcases daily      wash hands often      avoid rubbing your eyes      call if symptoms worsen or if no better

## 2024-10-02 ENCOUNTER — HOSPITAL ENCOUNTER (OUTPATIENT)
Dept: CARDIOLOGY | Facility: CLINIC | Age: 74
Discharge: HOME | End: 2024-10-02
Payer: MEDICARE

## 2024-10-02 ENCOUNTER — OFFICE VISIT (OUTPATIENT)
Dept: CARDIOLOGY | Facility: CLINIC | Age: 74
End: 2024-10-02
Payer: MEDICARE

## 2024-10-02 VITALS
HEART RATE: 90 BPM | OXYGEN SATURATION: 96 % | DIASTOLIC BLOOD PRESSURE: 74 MMHG | TEMPERATURE: 96.8 F | RESPIRATION RATE: 18 BRPM | WEIGHT: 178.9 LBS | BODY MASS INDEX: 35.87 KG/M2 | SYSTOLIC BLOOD PRESSURE: 130 MMHG

## 2024-10-02 DIAGNOSIS — Z79.899 ENCOUNTER FOR MONITORING CARDIOTOXIC DRUG THERAPY: Primary | ICD-10-CM

## 2024-10-02 DIAGNOSIS — Z17.1 MALIGNANT NEOPLASM OF CENTRAL PORTION OF LEFT BREAST IN FEMALE, ESTROGEN RECEPTOR NEGATIVE: ICD-10-CM

## 2024-10-02 DIAGNOSIS — I42.8 OTHER CARDIOMYOPATHIES: ICD-10-CM

## 2024-10-02 DIAGNOSIS — Z51.81 ENCOUNTER FOR MONITORING CARDIOTOXIC DRUG THERAPY: Primary | ICD-10-CM

## 2024-10-02 DIAGNOSIS — Z51.81 ENCOUNTER FOR THERAPEUTIC DRUG LEVEL MONITORING: ICD-10-CM

## 2024-10-02 DIAGNOSIS — Z92.21 STATUS POST ADMINISTRATION OF CARDIOTOXIC CHEMOTHERAPY: ICD-10-CM

## 2024-10-02 DIAGNOSIS — Z09 ENCOUNTER FOR FOLLOW-UP SURVEILLANCE OF NEOPLASM OF UNCERTAIN BEHAVIOR: ICD-10-CM

## 2024-10-02 DIAGNOSIS — C50.112 MALIGNANT NEOPLASM OF CENTRAL PORTION OF LEFT BREAST IN FEMALE, ESTROGEN RECEPTOR NEGATIVE: ICD-10-CM

## 2024-10-02 DIAGNOSIS — Z01.810 PREOPERATIVE CARDIOVASCULAR EXAMINATION: ICD-10-CM

## 2024-10-02 DIAGNOSIS — Z86.03 ENCOUNTER FOR FOLLOW-UP SURVEILLANCE OF NEOPLASM OF UNCERTAIN BEHAVIOR: ICD-10-CM

## 2024-10-02 LAB
EJECTION FRACTION APICAL 4 CHAMBER: 58.6
EJECTION FRACTION: 63 %
LEFT ATRIUM VOLUME AREA LENGTH INDEX BSA: 23.7 ML/M2
LEFT VENTRICLE INTERNAL DIMENSION DIASTOLE: 4.44 CM (ref 3.5–6)
MITRAL VALVE E/A RATIO: 0.6
RIGHT VENTRICLE FREE WALL PEAK S': 10 CM/S
TRICUSPID ANNULAR PLANE SYSTOLIC EXCURSION: 1.8 CM

## 2024-10-02 PROCEDURE — 4010F ACE/ARB THERAPY RXD/TAKEN: CPT | Performed by: STUDENT IN AN ORGANIZED HEALTH CARE EDUCATION/TRAINING PROGRAM

## 2024-10-02 PROCEDURE — 1157F ADVNC CARE PLAN IN RCRD: CPT | Performed by: STUDENT IN AN ORGANIZED HEALTH CARE EDUCATION/TRAINING PROGRAM

## 2024-10-02 PROCEDURE — 93356 MYOCRD STRAIN IMG SPCKL TRCK: CPT | Performed by: INTERNAL MEDICINE

## 2024-10-02 PROCEDURE — 1036F TOBACCO NON-USER: CPT | Performed by: STUDENT IN AN ORGANIZED HEALTH CARE EDUCATION/TRAINING PROGRAM

## 2024-10-02 PROCEDURE — 1159F MED LIST DOCD IN RCRD: CPT | Performed by: STUDENT IN AN ORGANIZED HEALTH CARE EDUCATION/TRAINING PROGRAM

## 2024-10-02 PROCEDURE — 99214 OFFICE O/P EST MOD 30 MIN: CPT | Performed by: STUDENT IN AN ORGANIZED HEALTH CARE EDUCATION/TRAINING PROGRAM

## 2024-10-02 PROCEDURE — 93321 DOPPLER ECHO F-UP/LMTD STD: CPT | Performed by: INTERNAL MEDICINE

## 2024-10-02 PROCEDURE — 1126F AMNT PAIN NOTED NONE PRSNT: CPT | Performed by: STUDENT IN AN ORGANIZED HEALTH CARE EDUCATION/TRAINING PROGRAM

## 2024-10-02 PROCEDURE — 3048F LDL-C <100 MG/DL: CPT | Performed by: STUDENT IN AN ORGANIZED HEALTH CARE EDUCATION/TRAINING PROGRAM

## 2024-10-02 PROCEDURE — 1123F ACP DISCUSS/DSCN MKR DOCD: CPT | Performed by: STUDENT IN AN ORGANIZED HEALTH CARE EDUCATION/TRAINING PROGRAM

## 2024-10-02 PROCEDURE — 3075F SYST BP GE 130 - 139MM HG: CPT | Performed by: STUDENT IN AN ORGANIZED HEALTH CARE EDUCATION/TRAINING PROGRAM

## 2024-10-02 PROCEDURE — 76376 3D RENDER W/INTRP POSTPROCES: CPT | Performed by: INTERNAL MEDICINE

## 2024-10-02 PROCEDURE — 93308 TTE F-UP OR LMTD: CPT | Performed by: INTERNAL MEDICINE

## 2024-10-02 PROCEDURE — 3044F HG A1C LEVEL LT 7.0%: CPT | Performed by: STUDENT IN AN ORGANIZED HEALTH CARE EDUCATION/TRAINING PROGRAM

## 2024-10-02 PROCEDURE — 76376 3D RENDER W/INTRP POSTPROCES: CPT

## 2024-10-02 PROCEDURE — 93325 DOPPLER ECHO COLOR FLOW MAPG: CPT | Performed by: INTERNAL MEDICINE

## 2024-10-02 PROCEDURE — 3062F POS MACROALBUMINURIA REV: CPT | Performed by: STUDENT IN AN ORGANIZED HEALTH CARE EDUCATION/TRAINING PROGRAM

## 2024-10-02 PROCEDURE — 99214 OFFICE O/P EST MOD 30 MIN: CPT | Mod: 25 | Performed by: STUDENT IN AN ORGANIZED HEALTH CARE EDUCATION/TRAINING PROGRAM

## 2024-10-02 PROCEDURE — 3078F DIAST BP <80 MM HG: CPT | Performed by: STUDENT IN AN ORGANIZED HEALTH CARE EDUCATION/TRAINING PROGRAM

## 2024-10-02 ASSESSMENT — PAIN SCALES - GENERAL: PAINLEVEL: 0-NO PAIN

## 2024-10-02 NOTE — PROGRESS NOTES
Subjective   Raiza Nguyễn is a 73 y.o. female     Cancer Diagnosis: Left Breast 2003, melanoma 2018     Treatment:   AC x 4 2004   Cumulative Dose: Assume 240 mg/m2   Radiation: Left breast 2004     Risk Factors: AC, DM, HLD, HTN   Social Hx: Former Smoker     Echo 10/27/2020 EF 60% - updated echo today   CT Chest  3/4/2022 - Nuc stress - normal   EKG 5/29/2024     BNP 10/17/2019 - 8   Lipid 7/22/2022 - Total 179, LDL 91      Briefly patient is a 73-year-old female with a history of breast cancer that was diagnosed January 2003 treated with 4 cycles of Adriamycin/cyclophosphamide followed by left lumpectomy and axillary lymph node dissection.  Had a lesion in her left lower back removed that was found to be melanoma.  Had recurrent breast cancer 2018 for which she underwent excision.  Patient is BRCA2 positive.  PET scan 5/8/2024 shows mildly hypermetabolic soft tissue density in the left outer breast and mild heterogeneous activity was noted in the region of the liver with a small focus in the left hepatic lobe.    Cardiac issues include:    Status post cardiotoxic chemotherapy  -As mentioned above has received anthracycline based chemotherapy in 2004.  -Currently being treated with HER2/anabell based targeted therapy.  Overall serial echocardiograms performed June and October 2024 show preserved biventricular function  -Global longitudinal strain largely identical (-17.6 June 2024, -17.4 October 2)    Denies any exertional angina or shortness of breath.  Denies any orthopnea/PND.  Does not appear to have lower extremity edema    Review of Systems       Past Medical History:   Diagnosis Date    Amnesia 02/26/2024    Anxiety     Arthritis     Smith esophagus 2015    Breast cancer (Multi) 2002    Cancer (Multi)     Chronic pain disorder     Colon polyp 2016    Coronary artery disease     Depression     Diabetes mellitus (Multi)     Disease of thyroid gland     Diverticulitis of colon 2022    Diverticulosis 2019     GERD (gastroesophageal reflux disease)     Heart disease     Heart failure     History of total knee replacement 02/26/2024    History of total left knee replacement 01/18/2023    Hyperlipidemia     Hypersomnia, unspecified 11/30/2021    Hypersomnolence disorder, persistent    Hypertension     Hypothyroid     Lumbar disc herniation 01/18/2023    History of    Melanoma (Multi) 2016    Obesity 1959    Osteoporosis     Other conditions influencing health status     Breast Cancer    Other intervertebral disc degeneration, lumbar region 06/27/2014    Disc degeneration, lumbar    Other intervertebral disc displacement, lumbar region 06/27/2014    Lumbar disc herniation    Personal history of irradiation 2004    Personal history of malignant melanoma of skin 10/16/2019    History of malignant melanoma of skin    Personal history of other diseases of the digestive system     History of esophageal reflux    Personal history of other endocrine, nutritional and metabolic disease 02/20/2022    History of vitamin D deficiency    Personal history of other endocrine, nutritional and metabolic disease     History of hyperglycemia    PONV (postoperative nausea and vomiting) 2002    They give me a shot to prevent vomiting    Sleep apnea     Thrombosis 2004    from [facility] to [facility]    Thyrotoxicosis, unspecified without thyrotoxic crisis or storm     Hyperthyroidism    Type 2 diabetes mellitus        Past Surgical History:   Procedure Laterality Date    APPENDECTOMY      BREAST BIOPSY  2010    BREAST LUMPECTOMY  07/17/2013    Breast Surgery Lumpectomy    CHOLECYSTECTOMY  10/03/2017    Cholecystectomy Laparoscopic    COLONOSCOPY      JOINT REPLACEMENT  2021    LYMPH NODE BIOPSY  2004    MASTECTOMY COMPLETE / SIMPLE Bilateral 06/12/2024    Procedure: BILATERAL SIMPLE MASTECTOMY;  Surgeon: Jeniffer Langley DO;  Location: [facility];  Service: General Surgery Oncology;  Laterality: Bilateral;    OOPHORECTOMY  2012    OTHER SURGICAL HISTORY   05/14/2021    Knee replacement    OVARY SURGERY  07/17/2013    Ovarian Surgery    SKIN CANCER EXCISION      SMALL INTESTINE SURGERY         Allergies   Allergen Reactions    Erythromycin GI bleeding    Tetracycline GI bleeding    Cephalexin Unknown    Glucosamine Unknown    Adhesive Itching and Rash       Family History   Problem Relation Name Age of Onset    Breast cancer Mother Dorothea     Alcohol abuse Mother Dorothea     Cancer Mother Dorothea     Lung cancer Father Yuriy     Cancer Father Yuriy     Ovarian cysts Maternal Grandmother Maddy     Diabetes Maternal Grandmother Maddy     Colon cancer Other Materal Uncle     Stomach cancer Other Materal Uncle     Stomach cancer Maternal Cousin      Cancer Mother's Brother Kyrie Valadez     Cancer Sister Korin     Colon cancer Mother's Brother Igor Valadez        Objective   Visit Vitals  /74   Pulse 90   Temp 36 °C (96.8 °F)   Resp 18   Wt 81.1 kg (178 lb 14.4 oz)   SpO2 96%   BMI 35.87 kg/m²   OB Status Postmenopausal   Smoking Status Former   BSA 1.84 m²         General: awake, alert and oriented. No acute distress.   Skin: Skin is warm, dry and intact without rashes or lesions. Appropriate color for ethnicity. Nail beds pink with no cyanosis or clubbing  HEENT: normocephalic, atraumatic; conjunctivae are clear without exudates or hemorrhage. Sclera is non-icteric. Eyelids are normal in appearance without swelling or lesions. Hearing intact. Nares are patent bilaterally. Moist mucous membranes.   Cardiovascular: Regular. No murmurs, gallops, or rubs are auscultated. S1 and S2 are heard and are of normal intensity. No JVD, no carotid bruits  Respiratory: Thorax symmetric. CTAB, breath sounds vesicular. No crackles, wheezes or ronchi.   Gastrointestinal: soft, non-distended, BS + x 4  Genitourinary: exam deferred  Musculoskeletal: moves all extremities  Extremities: pulses palpable bilaterally; no swelling or erythema; no edema  Neurological: alert &  oriented x 3; no focal deficits  Psychiatric: appropriate mood and affect    Current Outpatient Medications   Medication Instructions    apixaban (ELIQUIS) 5 mg, oral, 2 times daily    aspirin 81 mg EC tablet 1 tablet, oral, Every evening    blood sugar diagnostic strip 90 strips, miscellaneous, Daily    calcium carbonate 600 mg, oral, Daily    ciprofloxacin (Ciloxan) 0.3 % ophthalmic solution 1 drop, Left Eye, Every 2 hours, While awake    DULoxetine (CYMBALTA) 30 mg, oral, 2 times daily    esomeprazole (NEXIUM) 20 mg, oral, 2 times daily, Do not open capsule.    gabapentin (Neurontin) 100 mg capsule     gabapentin (NEURONTIN) 900 mg, oral, 3 times daily    levothyroxine (SYNTHROID, LEVOXYL) 125 mcg, oral, Daily    lidocaine-prilocaine (Emla) 2.5-2.5 % cream PLEASE SEE ATTACHED FOR DETAILED DIRECTIONS    lisinopril 5 mg, oral, Daily    metFORMIN XR (GLUCOPHAGE-XR) 500 mg, oral, Daily    Mounjaro 7.5 mg, subcutaneous, Weekly    rosuvastatin (CRESTOR) 5 mg, oral, Daily    traMADol (Ultram) 50 mg tablet 1 tablet, oral, 2 times daily PRN         Lab Review   Lab Results   Component Value Date    HGB 11.6 (L) 09/12/2024    HGB 12.3 09/10/2024    HGB 11.5 (L) 09/05/2024     09/12/2024    WBC 6.4 09/12/2024     (L) 09/12/2024    K 4.0 09/12/2024    CREATININE 0.82 09/12/2024    CREATININE 1.11 (H) 09/10/2024    CREATININE 1.18 (H) 09/05/2024    BUN 19 09/12/2024    CALCIUM 9.3 09/12/2024    INR 1.1 10/21/2020    BNP 5 09/10/2024    TROPHS 5 09/10/2024    TROPHS 8 06/19/2024    LDLF 91 07/22/2022        Assessment/Plan    73-year-old female with a history of breast cancer that was diagnosed January 2003 treated with 4 cycles of Adriamycin/cyclophosphamide followed by left lumpectomy and axillary lymph node dissection.  Had a lesion in her left lower back removed that was found to be melanoma.  Had recurrent breast cancer 2018 for which she underwent excision.  Patient is BRCA2 positive.  PET scan 5/8/2024  shows mildly hypermetabolic soft tissue density in the left outer breast and mild heterogeneous activity was noted in the region of the liver with a small focus in the left hepatic lobe.    Cardiac issues include:    Status post cardiotoxic chemotherapy  -As mentioned above has received anthracycline based chemotherapy in 2004.  -Currently being treated with HER2/anabell based targeted therapy.  Overall serial echocardiograms performed June and October 2024 show preserved biventricular function  -Global longitudinal strain largely identical (-17.6 June 2024, -17.4 October 2)    Plan:  -Serial biomarkers are negative.  Patient is at high risk for cardiotoxic complications due to history of anthracycline exposure  -Serial echocardiograms every 3 months while she is actively getting HER2/anabell targeted therapy  -In person follow-up in 6 months     Dev Rubio MD  Advanced Heart Failure/Transplant Cardiology  Cardio-Oncology  [facility] [place]

## 2024-10-03 ENCOUNTER — INFUSION (OUTPATIENT)
Dept: HEMATOLOGY/ONCOLOGY | Facility: CLINIC | Age: 74
End: 2024-10-03
Payer: MEDICARE

## 2024-10-03 VITALS
BODY MASS INDEX: 35.91 KG/M2 | DIASTOLIC BLOOD PRESSURE: 83 MMHG | OXYGEN SATURATION: 96 % | RESPIRATION RATE: 18 BRPM | HEIGHT: 59 IN | HEART RATE: 81 BPM | TEMPERATURE: 97.2 F | WEIGHT: 178.13 LBS | SYSTOLIC BLOOD PRESSURE: 142 MMHG

## 2024-10-03 DIAGNOSIS — C50.112 MALIGNANT NEOPLASM OF CENTRAL PORTION OF LEFT BREAST IN FEMALE, ESTROGEN RECEPTOR NEGATIVE: ICD-10-CM

## 2024-10-03 DIAGNOSIS — Z17.1 MALIGNANT NEOPLASM OF CENTRAL PORTION OF LEFT BREAST IN FEMALE, ESTROGEN RECEPTOR NEGATIVE: ICD-10-CM

## 2024-10-03 PROCEDURE — 96401 CHEMO ANTI-NEOPL SQ/IM: CPT

## 2024-10-03 PROCEDURE — 2500000004 HC RX 250 GENERAL PHARMACY W/ HCPCS (ALT 636 FOR OP/ED): Mod: JZ,JG | Performed by: STUDENT IN AN ORGANIZED HEALTH CARE EDUCATION/TRAINING PROGRAM

## 2024-10-03 RX ORDER — DIPHENHYDRAMINE HYDROCHLORIDE 50 MG/ML
50 INJECTION INTRAMUSCULAR; INTRAVENOUS AS NEEDED
Status: DISCONTINUED | OUTPATIENT
Start: 2024-10-03 | End: 2024-10-03 | Stop reason: HOSPADM

## 2024-10-03 RX ORDER — ALBUTEROL SULFATE 0.83 MG/ML
3 SOLUTION RESPIRATORY (INHALATION) AS NEEDED
Status: DISCONTINUED | OUTPATIENT
Start: 2024-10-03 | End: 2024-10-03 | Stop reason: HOSPADM

## 2024-10-03 RX ORDER — EPINEPHRINE 0.3 MG/.3ML
0.3 INJECTION SUBCUTANEOUS EVERY 5 MIN PRN
Status: DISCONTINUED | OUTPATIENT
Start: 2024-10-03 | End: 2024-10-03 | Stop reason: HOSPADM

## 2024-10-03 RX ORDER — FAMOTIDINE 10 MG/ML
20 INJECTION INTRAVENOUS ONCE AS NEEDED
Status: DISCONTINUED | OUTPATIENT
Start: 2024-10-03 | End: 2024-10-03 | Stop reason: HOSPADM

## 2024-10-03 ASSESSMENT — PAIN SCALES - GENERAL: PAINLEVEL: 4

## 2024-10-03 NOTE — PROGRESS NOTES
[facility] Infusion Note     Raiza Nguyễn is a 73 y.o. year old female here today,10/03/24,  in the [facility] infusion center for her Hylecta subcutaneous injection.       Medications given:  Administrations This Visit       trastuzumab hyaluronidase (Herceptin Hylecta) 600 mg-10,000 units/5 mL subQ injection       Admin Date  10/03/2024 Action  Given Dose  600 mg Route  subcutaneous Documented By  Anna Velasco, RN                    Pt tolerated subcutaneous injection into her right outer thigh well and was discharged to home in stable condition. Pt ambulated to exit with the use of her cane moving with a slow and steady gait. Pt had no further questions or concerns at this time.

## 2024-10-07 DIAGNOSIS — Z17.1 MALIGNANT NEOPLASM OF CENTRAL PORTION OF LEFT BREAST IN FEMALE, ESTROGEN RECEPTOR NEGATIVE: ICD-10-CM

## 2024-10-07 DIAGNOSIS — C50.112 MALIGNANT NEOPLASM OF CENTRAL PORTION OF LEFT BREAST IN FEMALE, ESTROGEN RECEPTOR NEGATIVE: ICD-10-CM

## 2024-10-07 NOTE — TELEPHONE ENCOUNTER
Medication Refill-  [facility]       Pharmacy-  [facility] #[id number]

## 2024-10-10 ENCOUNTER — TELEPHONE (OUTPATIENT)
Dept: CARDIOLOGY | Facility: HOSPITAL | Age: 74
End: 2024-10-10
Payer: MEDICARE

## 2024-10-10 NOTE — TELEPHONE ENCOUNTER
----- Message from Dev Rubio sent at 10/9/2024  1:22 PM EDT -----  Stable echocardiogram.  No action required

## 2024-10-14 DIAGNOSIS — Z17.1 MALIGNANT NEOPLASM OF CENTRAL PORTION OF LEFT BREAST IN FEMALE, ESTROGEN RECEPTOR NEGATIVE: ICD-10-CM

## 2024-10-14 DIAGNOSIS — C50.112 MALIGNANT NEOPLASM OF CENTRAL PORTION OF LEFT BREAST IN FEMALE, ESTROGEN RECEPTOR NEGATIVE: ICD-10-CM

## 2024-10-16 ENCOUNTER — TELEPHONE (OUTPATIENT)
Dept: GENETICS | Facility: CLINIC | Age: 74
End: 2024-10-16
Payer: MEDICARE

## 2024-10-16 LAB
COMMENTS - MP RESULT TYPE: NORMAL
SCAN RESULT: NORMAL

## 2024-10-17 ENCOUNTER — APPOINTMENT (OUTPATIENT)
Dept: PRIMARY CARE | Facility: CLINIC | Age: 74
End: 2024-10-17
Payer: MEDICARE

## 2024-10-17 NOTE — PROGRESS NOTES
Counseling student, Dahiana Arteaga, was present for this appointment    - Raiza Nguyễn is a 73 y.o. year old female with a personal history of bilateral breast cancers and a family history of colon cancer.  - Raiza Nguyễn was seen in the [facility]etics [facility] on 9/26/24 for counseling and coordination of testing.  - Following that appointment, a blood sample was sent for genetic testing via the  [employer] from [employer].  - I called her to review the results of this testing, and our discussion is summarized below.    Raiza Nguyễn's genetic test result was POSITIVE for ONLY a pathogenic mutation in the BRCA2 gene.  This was a previously identified mutation. No other mutations were identified.     Because she has a BRCA2 mutation, Raiza Nguyễn is at a higher risk to develop other types of cancers. Extra cancer screening may help to find a cancer earlier where it is more easily treated. There are also options, such as surgery, to reduce the risk of cancer.    Changes in this gene (sometimes referred to as mutations) confer more than a 60% lifetime risk for breast cancer (some sources quote up to 84% lifetime risk). This is elevated compared to the general population risk of about 13%.   Mutation carriers who have already been diagnosed with cancer have an increased risk to develop a second, contralateral breast cancer (cancer in the other breast). Current estimates of this risk are about 25%.  Based on current NCCN guidelines, at this time, unless you choose risk-reducing breast surgery (having both breasts surgically removed), you are a candidate for high-risk breast care. This generally includes yearly mammograms and yearly breast MRI screenings. Some women may even use medication (such as tamoxifen) to help reduce their risk of breast cancer.  Ms. Nguyễn is already s/p bilateral mastectomy.    In addition, BRCA2 mutation carriers have approximately a 13-29% lifetime  risk for ovarian cancer, which is elevated over the ~1% general population risk. We discussed that the national guidelines (NCCN) recommend that women with a BRCA2 mutation undergo surgery to remove their ovaries and fallopian tubes (called a bilateral salpingo-oophorectomy or BSO) to reduce their chance of developing those cancers. This is recommended between the ages of 35 and 40 (or when childbearing is complete, if this is later).     Individuals with BRCA2 mutations have a 5-10% risk to develop pancreatic cancer during their lifetime (which is elevated over the general population risk of 1-2%).  The current NCCN guidelines state that currently most studies restrict pancreatic cancer screening to individuals with a pathogenic mutation in BRCA2  AND a family history of pancreatic cancer (1st degree or 2nd degree relative) from the same side of the family as the pathogenic mutation.  Raiza Nguyễn denies any maternal or paternal family history of pancreatic cancer.  However, currently the [facility] inclusion criteria includes anyone with a BRCA2 mutation, regardless of family history. As such, referral to the [facility] recruitment team was discussed, offered, and accepted.    Men with a BRCA2 mutation also have other cancer risks such as up to a 7% lifetime risk for male breast cancer (general population risk is 0.1%) and an increased risk of prostate cancer (up to ~60% compared to average population risks of about 13%). Male relatives with a BRCA2 mutation would be recommended to begin annual prostate screening (via digital rectal exam and PSA test) starting at 40. Additionally, clinical and self-breast exams would be recommended starting at 35.    The chance to have a child with the same BRCA2 mutation that Raiza Nguyễn carries. Since Raiza Nguyễn  has a BRCA2 mutation, this means that there is a 1 in 2 chance that any of her children will also have the same BRCA2 mutation. In addition, Raiza Nguyễn's  siblings and parents would each have a 50% chance to have the same mutation as Raiza Nguyễn.      Individuals with a mutation in the BRCA2 gene who are at childbearing age are also at an increased risk to have children with a rare condition known as Fanconi Anemia if their partner is also a carrier for BRCA2. Fanconi Anemia is characterized by developmental abnormalities in major organ systems, early-onset bone marrow failure, and a high predisposition to cancer (typically being affected by 6 years of age). Bone marrow failure with pancytopenia often presents in the first decade of life. For there to be a risk to offspring of Fanconi Anemia, both parents would both have to have a mutation in the BRCA2 gene; in such a case, the risk to offspring is 25%. This would potentially have implications for Raiza Nguyễn's grandchildren if one of her children tests positive for the BRCA2 gene mutation found in her.    Raiza Nguyễn was counseled about the cancer risks associated with mutations in the BRCA2 gene, options for increased screening and/or risk reduction surgery, and the implications for other family members. We discussed that the NCCN guidelines can change over time so it would beneficial for her to check in with a genetics provider every few years to know what the current guidelines are.  Raiza Nguyễn did not have any questions at the end of our discussion.       We remain available to Raiza Nguyễn or her family members at [phone] if any questions arise regarding information discussed at today's visit.    Anaid Beverly MS, [facility]  Certified Genetic Counselor  [facility]  [phone]    Reviewed by:  Dr. Rosa Maria Mott  Clinical   [facility]  [phone]         Time spent with patient: 30 minutes

## 2024-10-18 ENCOUNTER — CLINICAL SUPPORT (OUTPATIENT)
Dept: GENETICS | Facility: CLINIC | Age: 74
End: 2024-10-18
Payer: MEDICARE

## 2024-10-18 VITALS
OXYGEN SATURATION: 98 % | DIASTOLIC BLOOD PRESSURE: 78 MMHG | BODY MASS INDEX: 35.17 KG/M2 | WEIGHT: 175.38 LBS | SYSTOLIC BLOOD PRESSURE: 138 MMHG | HEART RATE: 85 BPM | RESPIRATION RATE: 17 BRPM | TEMPERATURE: 97.2 F

## 2024-10-18 DIAGNOSIS — Z71.83 ENCOUNTER FOR NONPROCREATIVE GENETIC COUNSELING: Primary | ICD-10-CM

## 2024-10-18 DIAGNOSIS — Z15.09 BRCA2 GENE MUTATION POSITIVE IN FEMALE: ICD-10-CM

## 2024-10-18 DIAGNOSIS — C50.112 MALIGNANT NEOPLASM OF CENTRAL PORTION OF LEFT BREAST IN FEMALE, ESTROGEN RECEPTOR NEGATIVE: Primary | ICD-10-CM

## 2024-10-18 DIAGNOSIS — Z15.01 BRCA2 GENE MUTATION POSITIVE IN FEMALE: ICD-10-CM

## 2024-10-18 DIAGNOSIS — Z15.02 BRCA2 GENE MUTATION POSITIVE IN FEMALE: ICD-10-CM

## 2024-10-18 DIAGNOSIS — Z17.1 MALIGNANT NEOPLASM OF CENTRAL PORTION OF LEFT BREAST IN FEMALE, ESTROGEN RECEPTOR NEGATIVE: Primary | ICD-10-CM

## 2024-10-18 PROCEDURE — GENMD PR GENETICS VISIT (MEDICAID/MEDICARE)

## 2024-10-18 RX ORDER — DIPHENHYDRAMINE HYDROCHLORIDE 50 MG/ML
50 INJECTION INTRAMUSCULAR; INTRAVENOUS AS NEEDED
Status: CANCELLED | OUTPATIENT
Start: 2024-10-24

## 2024-10-18 RX ORDER — PROCHLORPERAZINE MALEATE 10 MG
10 TABLET ORAL EVERY 6 HOURS PRN
OUTPATIENT
Start: 2024-11-14

## 2024-10-18 RX ORDER — ALBUTEROL SULFATE 0.83 MG/ML
3 SOLUTION RESPIRATORY (INHALATION) AS NEEDED
OUTPATIENT
Start: 2024-11-14

## 2024-10-18 RX ORDER — FAMOTIDINE 10 MG/ML
20 INJECTION INTRAVENOUS ONCE AS NEEDED
OUTPATIENT
Start: 2024-11-14

## 2024-10-18 RX ORDER — ALBUTEROL SULFATE 0.83 MG/ML
3 SOLUTION RESPIRATORY (INHALATION) AS NEEDED
Status: CANCELLED | OUTPATIENT
Start: 2024-10-24

## 2024-10-18 RX ORDER — DIPHENHYDRAMINE HYDROCHLORIDE 50 MG/ML
50 INJECTION INTRAMUSCULAR; INTRAVENOUS AS NEEDED
OUTPATIENT
Start: 2024-11-14

## 2024-10-18 RX ORDER — PROCHLORPERAZINE EDISYLATE 5 MG/ML
10 INJECTION INTRAMUSCULAR; INTRAVENOUS EVERY 6 HOURS PRN
Status: CANCELLED | OUTPATIENT
Start: 2024-10-24

## 2024-10-18 RX ORDER — PROCHLORPERAZINE MALEATE 10 MG
10 TABLET ORAL EVERY 6 HOURS PRN
Status: CANCELLED | OUTPATIENT
Start: 2024-10-24

## 2024-10-18 RX ORDER — EPINEPHRINE 0.3 MG/.3ML
0.3 INJECTION SUBCUTANEOUS EVERY 5 MIN PRN
Status: CANCELLED | OUTPATIENT
Start: 2024-10-24

## 2024-10-18 RX ORDER — FAMOTIDINE 10 MG/ML
20 INJECTION INTRAVENOUS ONCE AS NEEDED
Status: CANCELLED | OUTPATIENT
Start: 2024-10-24

## 2024-10-18 RX ORDER — EPINEPHRINE 0.3 MG/.3ML
0.3 INJECTION SUBCUTANEOUS EVERY 5 MIN PRN
OUTPATIENT
Start: 2024-11-14

## 2024-10-18 RX ORDER — PROCHLORPERAZINE EDISYLATE 5 MG/ML
10 INJECTION INTRAMUSCULAR; INTRAVENOUS EVERY 6 HOURS PRN
OUTPATIENT
Start: 2024-11-14

## 2024-10-18 ASSESSMENT — PAIN SCALES - GENERAL: PAINLEVEL_OUTOF10: 0-NO PAIN

## 2024-10-21 ENCOUNTER — OFFICE VISIT (OUTPATIENT)
Dept: HEMATOLOGY/ONCOLOGY | Facility: CLINIC | Age: 74
End: 2024-10-21
Payer: MEDICARE

## 2024-10-21 ENCOUNTER — INFUSION (OUTPATIENT)
Dept: HEMATOLOGY/ONCOLOGY | Facility: CLINIC | Age: 74
End: 2024-10-21
Payer: MEDICARE

## 2024-10-21 VITALS
BODY MASS INDEX: 34.75 KG/M2 | DIASTOLIC BLOOD PRESSURE: 75 MMHG | TEMPERATURE: 97.3 F | HEART RATE: 81 BPM | WEIGHT: 173.28 LBS | RESPIRATION RATE: 16 BRPM | OXYGEN SATURATION: 97 % | SYSTOLIC BLOOD PRESSURE: 158 MMHG

## 2024-10-21 DIAGNOSIS — C50.912 MALIGNANT NEOPLASM OF BOTH BREASTS IN FEMALE, ESTROGEN RECEPTOR NEGATIVE, UNSPECIFIED SITE OF BREAST: ICD-10-CM

## 2024-10-21 DIAGNOSIS — Z17.1 MALIGNANT NEOPLASM OF BOTH BREASTS IN FEMALE, ESTROGEN RECEPTOR NEGATIVE, UNSPECIFIED SITE OF BREAST: ICD-10-CM

## 2024-10-21 DIAGNOSIS — C50.112 MALIGNANT NEOPLASM OF CENTRAL PORTION OF LEFT BREAST IN FEMALE, ESTROGEN RECEPTOR NEGATIVE: ICD-10-CM

## 2024-10-21 DIAGNOSIS — Z17.1 MALIGNANT NEOPLASM OF CENTRAL PORTION OF LEFT BREAST IN FEMALE, ESTROGEN RECEPTOR NEGATIVE: ICD-10-CM

## 2024-10-21 DIAGNOSIS — C50.911 MALIGNANT NEOPLASM OF BOTH BREASTS IN FEMALE, ESTROGEN RECEPTOR NEGATIVE, UNSPECIFIED SITE OF BREAST: ICD-10-CM

## 2024-10-21 PROCEDURE — 3078F DIAST BP <80 MM HG: CPT | Performed by: STUDENT IN AN ORGANIZED HEALTH CARE EDUCATION/TRAINING PROGRAM

## 2024-10-21 PROCEDURE — 1125F AMNT PAIN NOTED PAIN PRSNT: CPT | Performed by: STUDENT IN AN ORGANIZED HEALTH CARE EDUCATION/TRAINING PROGRAM

## 2024-10-21 PROCEDURE — 96523 IRRIG DRUG DELIVERY DEVICE: CPT | Mod: 59

## 2024-10-21 PROCEDURE — 3077F SYST BP >= 140 MM HG: CPT | Performed by: STUDENT IN AN ORGANIZED HEALTH CARE EDUCATION/TRAINING PROGRAM

## 2024-10-21 PROCEDURE — 1159F MED LIST DOCD IN RCRD: CPT | Performed by: STUDENT IN AN ORGANIZED HEALTH CARE EDUCATION/TRAINING PROGRAM

## 2024-10-21 PROCEDURE — 1123F ACP DISCUSS/DSCN MKR DOCD: CPT | Performed by: STUDENT IN AN ORGANIZED HEALTH CARE EDUCATION/TRAINING PROGRAM

## 2024-10-21 PROCEDURE — 3048F LDL-C <100 MG/DL: CPT | Performed by: STUDENT IN AN ORGANIZED HEALTH CARE EDUCATION/TRAINING PROGRAM

## 2024-10-21 PROCEDURE — 99215 OFFICE O/P EST HI 40 MIN: CPT | Performed by: STUDENT IN AN ORGANIZED HEALTH CARE EDUCATION/TRAINING PROGRAM

## 2024-10-21 PROCEDURE — 3062F POS MACROALBUMINURIA REV: CPT | Performed by: STUDENT IN AN ORGANIZED HEALTH CARE EDUCATION/TRAINING PROGRAM

## 2024-10-21 PROCEDURE — 4010F ACE/ARB THERAPY RXD/TAKEN: CPT | Performed by: STUDENT IN AN ORGANIZED HEALTH CARE EDUCATION/TRAINING PROGRAM

## 2024-10-21 PROCEDURE — 2500000004 HC RX 250 GENERAL PHARMACY W/ HCPCS (ALT 636 FOR OP/ED): Performed by: STUDENT IN AN ORGANIZED HEALTH CARE EDUCATION/TRAINING PROGRAM

## 2024-10-21 PROCEDURE — 3044F HG A1C LEVEL LT 7.0%: CPT | Performed by: STUDENT IN AN ORGANIZED HEALTH CARE EDUCATION/TRAINING PROGRAM

## 2024-10-21 PROCEDURE — 1157F ADVNC CARE PLAN IN RCRD: CPT | Performed by: STUDENT IN AN ORGANIZED HEALTH CARE EDUCATION/TRAINING PROGRAM

## 2024-10-21 RX ORDER — HEPARIN SODIUM 1000 [USP'U]/ML
2000 INJECTION, SOLUTION INTRAVENOUS; SUBCUTANEOUS AS NEEDED
Status: CANCELLED | OUTPATIENT
Start: 2024-10-21

## 2024-10-21 RX ORDER — HEPARIN SODIUM,PORCINE/PF 10 UNIT/ML
50 SYRINGE (ML) INTRAVENOUS AS NEEDED
Status: CANCELLED | OUTPATIENT
Start: 2024-10-21

## 2024-10-21 RX ORDER — HEPARIN 100 UNIT/ML
500 SYRINGE INTRAVENOUS AS NEEDED
Status: DISCONTINUED | OUTPATIENT
Start: 2024-10-21 | End: 2024-10-21 | Stop reason: HOSPADM

## 2024-10-21 RX ORDER — HEPARIN 100 UNIT/ML
500 SYRINGE INTRAVENOUS AS NEEDED
Status: CANCELLED | OUTPATIENT
Start: 2024-10-21

## 2024-10-21 RX ORDER — EXEMESTANE 25 MG/1
25 TABLET ORAL DAILY
Qty: 90 TABLET | Refills: 1 | Status: SHIPPED | OUTPATIENT
Start: 2024-10-21

## 2024-10-21 ASSESSMENT — PAIN SCALES - GENERAL: PAINLEVEL_OUTOF10: 4

## 2024-10-21 NOTE — PROGRESS NOTES
Patient is here today for port flush, brisk blood return noted.-   b/h/ lung sounds not auscultated  Patient tolerated procedure well.  No complaints. Call back instructions reviewed.    Patient verbalizes understanding of plan of care.  Ambulated off unit without difficulty, steady gait.

## 2024-10-21 NOTE — PROGRESS NOTES
FUV completed. POC reviewed with pt who verbalizes understanding per teach back method and is agreeable. Pt to call for any new issues or concerns and FU as scheduled. Schedule given.

## 2024-10-21 NOTE — PROGRESS NOTES
She is  Breast [facility] Oncology Clinic  Location: [facility]    Visit Type: Follow-up Visit    Oncologic History:    She was diagnosed in January 2003 with a left-sided 1.5 cm infiltrating ductal carcinoma with positive axillary lymph nodes and extranodal extension. It was a grade 3 tumor with hormone receptors positive and HER-2/anabell not defined.   Four cycles of Adriamycin and cyclophosphamide were followed by 4 cycles of paclitaxel in March 2004.   Left lumpectomy and axillary node dissection followed by left breast radiation is also completed in 2004.   She had bilateral oophorectomies in May 2008.   Anastrozole through March 2009.   She had a lesion in her left lower back removed, and confirmed to be a 0.35 mm melanoma. It was not ulcerative and she did not require a sentinel node evaluation. She has since had multiple excisions of atypical myelomonocytic lesions without disease  discovered.   She did well until screening mammogram 8/2018 confirmed finding on PET (done related melanoma dx) .  She had excision of 1.5cm IDC with 1/2 SN with microscopic involvement. She did not want to receive chemotherapy and the lesion was strongly ER and NM  positive, HER 2 negative.   Started on exemestane November 2018.   She is BRCA2 positive   4/18/24: MRI breast screening: An irregular rim enhancing mass with irregular margins measuring 0.8 x 0.8 x 0.9 cm is seen in the superolateral breast at middle depth A prominent level III axillary lymph node measures 1.3 x 0.9 x 0.8 cm, and may have slightly increased in size when compared to breast MRI 10/03/2016 (previously measuring 1.0 x 0.8 x 0.8 cm). An internal mammary lymph node measuring 0.4 cm is noted and demonstrates a fatty hilum (series 401, image 136 of 232). This was not definitively seen on the prior MRI.  4/23/24: L breast mass 3:00 bx IDC G3, ER/NM negative, HER2 positive. L breast mass 2:00 bx: fibrous scar with associated calcifications.   5/8/24: PET scan: mildly  hypermetabolic soft tissue density measuring 1.6 x 0.8 cm at left outer breast.  Mild heterogeneous activity is noted in the region of the liver with a suggestion of a small focus along the medial tip of segment 2 of the left hepatic lobe.   5/9/24: TB discussion: Plan for surgery first approach.   5/17/2024: MRI of liver: No definite discrete hepatic lesion to correlate with mild FDG avid fit lesion seen on PET scan.  Few subcentimeter pancreatic cystic lesions represent IPMN most likely.  Partially visualized left uterine lesion likely representing a fibroid.  6/12/2024: Left breast simple mastectomy showed invasive ductal carcinoma, grade 3, single focus measuring 1.1 cm.  There is high-grade DCIS present.  All margins are negative.  No lymph nodes submitted.  Right breast simple completion mastectomy also performed negative for carcinoma.  7/15/2024: Patient started adjuvant weekly Taxol/Herceptin  Patient developed breast wound infection requiring antibiotics.  Open wound with delayed healing managed by wound care.  Chemotherapy held.    Subjective: Interval History    Patient presents today for follow-up visit.  She has been continuing on Herceptin she was told that she had a injections and tolerating it very well.  Since she was last seen fungal infection and a chest wound.  She was treated at [facility] wound center with fluconazole.  States that has improved significantly.  Patient reports that she also developed lesions around her left eye and redness of the eye that is painful and burning.  She denies any pain with movement of her eye.  She saw a provider for this recently and was started on eyedrops for possible pinkeye but states that this has worsened.  No visual changes.     Pertinent Family history:    Mother having bilateral breast cancer and a first cousin having breast cancer later in life. Her sister had a GYN malignancy, now metastatic and a maternal aunt with stomach cancer. A  maternal uncle had colon  cancer.     Social History  Raiza Nguyễn  reports that she quit smoking about 41 years ago. Her smoking use included cigarettes. She started smoking about 56 years ago. She has a 80 pack-year smoking history. She has been exposed to tobacco smoke. She has never used smokeless tobacco.  She  reports that she does not currently use alcohol after a past usage of about 1.0 standard drink of alcohol per week.  She  reports no history of drug use.    ROS:     Review of Systems   All other systems reviewed and are negative.       Physical Examination:    /75   Pulse 81   Temp 36.3 °C (97.3 °F) (Temporal)   Resp 16   Wt 78.6 kg (173 lb 4.5 oz)   SpO2 97%   BMI 34.75 kg/m²     Physical Exam  Vitals and nursing note reviewed.   Constitutional:       General: She is not in acute distress.     Appearance: Normal appearance. She is not toxic-appearing.   HENT:      Head: Normocephalic and atraumatic.   Eyes:      Conjunctiva/sclera: Conjunctivae normal.   Cardiovascular:      Rate and Rhythm: Normal rate.   Pulmonary:      Effort: Pulmonary effort is normal. No respiratory distress.   Abdominal:      General: Abdomen is flat.      Palpations: Abdomen is soft.   Musculoskeletal:         General: No swelling. Normal range of motion.      Cervical back: Normal range of motion.   Skin:     General: Skin is warm and dry.   Neurological:      General: No focal deficit present.      Mental Status: She is alert.   Psychiatric:         Mood and Affect: Mood normal.       Breast Examination:    Deferred this visit    ECOG Performance Status:     [x] 0 Fully active, able to carry on all pre-disease performance without restriction  [] 1 Restricted in physically strenuous activity but ambulatory and able to carry out work of a light or sedentary nature, e.g., light house work, office work  [] 2 Ambulatory and capable of all selfcare but unable to carry out any work activities; up and about more than  50% of waking hours  [] 3 Capable of only limited selfcare; confined to bed or chair more than 50% of waking hours  [] 4 Completely disabled; cannot carry on any selfcare; totally confined to bed or chair  [] 5 Dead     Results:    Labs:      Lab Results   Component Value Date    WBC 6.4 09/12/2024    HGB 11.6 (L) 09/12/2024    HCT 36.5 09/12/2024    MCV 87 09/12/2024     09/12/2024       Chemistry    Lab Results   Component Value Date/Time     (L) 09/12/2024 1105    K 4.0 09/12/2024 1105    CL 97 (L) 09/12/2024 1105    CO2 27 09/12/2024 1105    BUN 19 09/12/2024 1105    CREATININE 0.82 09/12/2024 1105    Lab Results   Component Value Date/Time    CALCIUM 9.3 09/12/2024 1105    ALKPHOS 76 09/10/2024 1139    AST 23 09/10/2024 1139    ALT 22 09/10/2024 1139    BILITOT 0.4 09/10/2024 1139             Imaging:  Reviewed above in Onc History    Pathology:  Reviewed above in Onc History    Assessment:     2003: Stage II Left IDC grade 3 with positive LNs and NASH, ER+ CO+; S/p L PM and SLNBx; S/p AC+T; S/p radiation; anastrozole x5 years  2018: Stage IB (Prognostic Stage IA), [id number], grade 2 IDC, ER+95% CO+95% HER2 equivocal, FISH-. S/p R PM and SLNBx; Oncotype 17; S/p radiation; Exemestane since 11/2018, currently on hold  2024: cT1 cN0 L IDC, G3, ER/CO negative and HER2 positive.  Status post bilateral completion mastectomy, pT1b pNx  BRCA2 germline mutation    Plan:    Surgical Plan: S/p L PMSLNBx in 2003; S/p R PMSLNBx 2018; S/p BL completion mastectomy in 2024.  Additional biopsy: No further biopsy indicated  Radiation Plan: S/p L radiation in 2003; S/p R breast radiation 2018  Additional staging scans/DEXA/echo: Baseline staging scans reviewed.  Baseline echocardiogram reviewed; she has established with onco cardiology.  Additional Path info (i.e Ki67, PDL1): Not indicated  Gene assays: Not indicated    Systemic treatment plan: Treatment course complicated by chest wall infection for which chemotherapy  has been held.  We continued on Herceptin alone to complete 1 year of therapy  .   Intent: Curative   Clinical trial: Not eligible for our current trials   Endocrine therapy: She will resume exemestane for her ER/NJ positive breast cancer diagnosed in 2018   HER2 treatment: As discussed above   Targeted agents: not indicated   Chemotherapy: Received 2 doses of weekly paclitaxel, discontinued given development of breast infection and delayed wound healing.   BMA: Will discuss at future visit.    Access: Mediport in place will refer for removal once we understand duration of anticoagulation  Supportive meds: N/A  Genetic testing: gBRCA2 mutation  Fertility preservation: Not indicated  Other active problems/orders:     Mild FDG avid liver lesion without MRI correlate: Short-term follow-up has been recommended per radiologist.  Repeat PET scan completed 9/13/2024 and previously seen lesions are no longer visualized likely representing background heterogeneity  Pancreatic cystic lesions likely IPMN: She has established with Dr. Dodson with gastroenterology.  Will need closer monitoring as patient has germline BRCA mutation.  Partially visualized left uterine lesion likely representing fibroid: Discussed ultrasound with patient down the line.  Chest wall infection: Managed by wound care at [facility], improved  Right IJ DVT: Port related. Currently on apixaban and tolerating well.  This is patient's second related port related clot. Will refer to hematology for guidance on duration of anticoagulation.  Conjunctivitis: Seen by PCP about 3 weeks ago.  Started on eyedrops however patient reports this has been progressively worsening.  Referral to ophthalmology placed on her behalf    Surveillance plan: No routine breast imaging indicated    Follow-up: 12 weeks    Patient expressed understanding of the plan outlined above. She had ample time to ask questions. She understands she can contact us should she  have additional questions or issues arise in the interim.    Joanna Bourne MD  Breast Medical Oncology  [facility]    Portions of this note were dictated utilizing voice recognition software.

## 2024-10-22 ENCOUNTER — OFFICE VISIT (OUTPATIENT)
Dept: OPHTHALMOLOGY | Facility: CLINIC | Age: 74
End: 2024-10-22
Payer: MEDICARE

## 2024-10-22 DIAGNOSIS — B30.9 ACUTE VIRAL CONJUNCTIVITIS OF LEFT EYE: Primary | ICD-10-CM

## 2024-10-22 DIAGNOSIS — C50.112 MALIGNANT NEOPLASM OF CENTRAL PORTION OF LEFT BREAST IN FEMALE, ESTROGEN RECEPTOR NEGATIVE: ICD-10-CM

## 2024-10-22 DIAGNOSIS — Z17.1 MALIGNANT NEOPLASM OF CENTRAL PORTION OF LEFT BREAST IN FEMALE, ESTROGEN RECEPTOR NEGATIVE: ICD-10-CM

## 2024-10-22 PROCEDURE — 99203 OFFICE O/P NEW LOW 30 MIN: CPT | Performed by: OPTOMETRIST

## 2024-10-22 RX ORDER — TOBRAMYCIN AND DEXAMETHASONE 3; 1 MG/ML; MG/ML
1 SUSPENSION/ DROPS OPHTHALMIC 3 TIMES DAILY
Qty: 5 ML | Refills: 0 | Status: SHIPPED | OUTPATIENT
Start: 2024-10-22

## 2024-10-22 ASSESSMENT — ENCOUNTER SYMPTOMS
ENDOCRINE NEGATIVE: 0
MUSCULOSKELETAL NEGATIVE: 0
CONSTITUTIONAL NEGATIVE: 0
RESPIRATORY NEGATIVE: 0
GASTROINTESTINAL NEGATIVE: 0
PSYCHIATRIC NEGATIVE: 0
HEMATOLOGIC/LYMPHATIC NEGATIVE: 0
NEUROLOGICAL NEGATIVE: 0
CARDIOVASCULAR NEGATIVE: 0
EYES NEGATIVE: 0
ALLERGIC/IMMUNOLOGIC NEGATIVE: 0

## 2024-10-22 ASSESSMENT — TONOMETRY
OD_IOP_MMHG: 18
IOP_METHOD: GOLDMANN APPLANATION
OS_IOP_MMHG: 18

## 2024-10-22 ASSESSMENT — REFRACTION_MANIFEST
OD_ADD: +2.50
OD_AXIS: 090
OD_CYLINDER: -0.50
OS_ADD: +2.50
OS_SPHERE: +0.50
OS_CYLINDER: -0.75
OD_SPHERE: +1.25
OS_AXIS: 090

## 2024-10-22 ASSESSMENT — CONF VISUAL FIELD
OS_INFERIOR_TEMPORAL_RESTRICTION: 0
OD_SUPERIOR_NASAL_RESTRICTION: 0
OS_NORMAL: 1
OS_INFERIOR_NASAL_RESTRICTION: 0
OD_INFERIOR_TEMPORAL_RESTRICTION: 0
OD_NORMAL: 1
METHOD: COUNTING FINGERS
OD_INFERIOR_NASAL_RESTRICTION: 0
OS_SUPERIOR_TEMPORAL_RESTRICTION: 0
OS_SUPERIOR_NASAL_RESTRICTION: 0
OD_SUPERIOR_TEMPORAL_RESTRICTION: 0

## 2024-10-22 ASSESSMENT — EXTERNAL EXAM - RIGHT EYE: OD_EXAM: NORMAL

## 2024-10-22 ASSESSMENT — VISUAL ACUITY
METHOD: SNELLEN - LINEAR
OS_SC: 20/30
OD_SC: 20/70

## 2024-10-22 ASSESSMENT — CUP TO DISC RATIO
OS_RATIO: 0.4
OD_RATIO: 0.4

## 2024-10-22 ASSESSMENT — EXTERNAL EXAM - LEFT EYE: OS_EXAM: NORMAL

## 2024-10-22 NOTE — PROGRESS NOTES
Assessment/Plan   Problem List Items Addressed This Visit          Eye/Vision problems    Acute viral conjunctivitis of left eye - Primary     Pt reports redness and pain left eye ongoing for last 1 month. Was Rx'ed ciprofloxacin which have not helped. Took oral antihistamine 1 day ago, which seems to have improved the redness. Anterior evaluation today shows diffuse small SEI's left eye only. Mild conjunctival injection. Intraocular pressure (IOP) 18/18mmHg. No NaFl staining in both eyes.  Pt educated on findings and possible viral etiology. Start tobradex TID for 1 week to relieve symptoms and RTC for follow-up. Pt voiced understanding.          Relevant Medications    tobramycin-dexamethasone (Tobradex) ophthalmic suspension       Other    Malignant neoplasm of central portion of left breast in female, estrogen receptor negative

## 2024-10-22 NOTE — ASSESSMENT & PLAN NOTE
Pt reports redness and pain left eye ongoing for last 1 month. Was Rx'ed ciprofloxacin which have not helped. Took oral antihistamine 1 day ago, which seems to have improved the redness. Anterior evaluation today shows diffuse small SEI's left eye only. Mild conjunctival injection. Intraocular pressure (IOP) 18/18mmHg. No NaFl staining in both eyes.  Pt educated on findings and possible viral etiology. Start tobradex TID for 1 week to relieve symptoms and RTC for follow-up. Pt voiced understanding.

## 2024-10-24 ENCOUNTER — INFUSION (OUTPATIENT)
Dept: HEMATOLOGY/ONCOLOGY | Facility: CLINIC | Age: 74
End: 2024-10-24
Payer: MEDICARE

## 2024-10-24 ENCOUNTER — APPOINTMENT (OUTPATIENT)
Dept: HEMATOLOGY/ONCOLOGY | Facility: CLINIC | Age: 74
End: 2024-10-24
Payer: MEDICARE

## 2024-10-24 ENCOUNTER — SOCIAL WORK (OUTPATIENT)
Dept: HEMATOLOGY/ONCOLOGY | Facility: CLINIC | Age: 74
End: 2024-10-24

## 2024-10-24 VITALS
RESPIRATION RATE: 18 BRPM | DIASTOLIC BLOOD PRESSURE: 83 MMHG | BODY MASS INDEX: 35.84 KG/M2 | HEART RATE: 78 BPM | OXYGEN SATURATION: 96 % | TEMPERATURE: 97.2 F | WEIGHT: 177.8 LBS | SYSTOLIC BLOOD PRESSURE: 138 MMHG | HEIGHT: 59 IN

## 2024-10-24 DIAGNOSIS — C50.911 MALIGNANT NEOPLASM OF BOTH BREASTS IN FEMALE, ESTROGEN RECEPTOR NEGATIVE, UNSPECIFIED SITE OF BREAST: ICD-10-CM

## 2024-10-24 DIAGNOSIS — C50.912 MALIGNANT NEOPLASM OF BOTH BREASTS IN FEMALE, ESTROGEN RECEPTOR NEGATIVE, UNSPECIFIED SITE OF BREAST: ICD-10-CM

## 2024-10-24 DIAGNOSIS — Z17.1 MALIGNANT NEOPLASM OF CENTRAL PORTION OF LEFT BREAST IN FEMALE, ESTROGEN RECEPTOR NEGATIVE: ICD-10-CM

## 2024-10-24 DIAGNOSIS — C50.112 MALIGNANT NEOPLASM OF CENTRAL PORTION OF LEFT BREAST IN FEMALE, ESTROGEN RECEPTOR NEGATIVE: ICD-10-CM

## 2024-10-24 DIAGNOSIS — Z17.1 MALIGNANT NEOPLASM OF BOTH BREASTS IN FEMALE, ESTROGEN RECEPTOR NEGATIVE, UNSPECIFIED SITE OF BREAST: ICD-10-CM

## 2024-10-24 PROCEDURE — 96401 CHEMO ANTI-NEOPL SQ/IM: CPT

## 2024-10-24 PROCEDURE — 2500000004 HC RX 250 GENERAL PHARMACY W/ HCPCS (ALT 636 FOR OP/ED): Mod: JZ,JG | Performed by: STUDENT IN AN ORGANIZED HEALTH CARE EDUCATION/TRAINING PROGRAM

## 2024-10-24 RX ORDER — DIPHENHYDRAMINE HYDROCHLORIDE 50 MG/ML
50 INJECTION INTRAMUSCULAR; INTRAVENOUS AS NEEDED
Status: DISCONTINUED | OUTPATIENT
Start: 2024-10-24 | End: 2024-10-24 | Stop reason: HOSPADM

## 2024-10-24 RX ORDER — HEPARIN 100 UNIT/ML
500 SYRINGE INTRAVENOUS AS NEEDED
OUTPATIENT
Start: 2024-10-24

## 2024-10-24 RX ORDER — HEPARIN SODIUM 1000 [USP'U]/ML
2000 INJECTION, SOLUTION INTRAVENOUS; SUBCUTANEOUS AS NEEDED
OUTPATIENT
Start: 2024-10-24

## 2024-10-24 RX ORDER — PROCHLORPERAZINE MALEATE 10 MG
10 TABLET ORAL EVERY 6 HOURS PRN
Status: DISCONTINUED | OUTPATIENT
Start: 2024-10-24 | End: 2024-10-24 | Stop reason: HOSPADM

## 2024-10-24 RX ORDER — FAMOTIDINE 10 MG/ML
20 INJECTION INTRAVENOUS ONCE AS NEEDED
Status: DISCONTINUED | OUTPATIENT
Start: 2024-10-24 | End: 2024-10-24 | Stop reason: HOSPADM

## 2024-10-24 RX ORDER — HEPARIN SODIUM,PORCINE/PF 10 UNIT/ML
50 SYRINGE (ML) INTRAVENOUS AS NEEDED
OUTPATIENT
Start: 2024-10-24

## 2024-10-24 RX ORDER — ALBUTEROL SULFATE 0.83 MG/ML
3 SOLUTION RESPIRATORY (INHALATION) AS NEEDED
Status: DISCONTINUED | OUTPATIENT
Start: 2024-10-24 | End: 2024-10-24 | Stop reason: HOSPADM

## 2024-10-24 RX ORDER — PROCHLORPERAZINE EDISYLATE 5 MG/ML
10 INJECTION INTRAMUSCULAR; INTRAVENOUS EVERY 6 HOURS PRN
Status: DISCONTINUED | OUTPATIENT
Start: 2024-10-24 | End: 2024-10-24 | Stop reason: HOSPADM

## 2024-10-24 RX ORDER — HEPARIN 100 UNIT/ML
500 SYRINGE INTRAVENOUS AS NEEDED
Status: DISCONTINUED | OUTPATIENT
Start: 2024-10-24 | End: 2024-10-24 | Stop reason: HOSPADM

## 2024-10-24 RX ORDER — EPINEPHRINE 0.3 MG/.3ML
0.3 INJECTION SUBCUTANEOUS EVERY 5 MIN PRN
Status: DISCONTINUED | OUTPATIENT
Start: 2024-10-24 | End: 2024-10-24 | Stop reason: HOSPADM

## 2024-10-24 ASSESSMENT — PAIN SCALES - GENERAL: PAINLEVEL_OUTOF10: 6

## 2024-10-24 NOTE — PROGRESS NOTES
Patient tolerated injection without complaints.  2x2 and paper tape applied.  Patient sates she has her next appt on her calendar. Patient independently ambulatory off unit in NAD and without complaints.  Gait steady with cane.  Call back instructions reviewed.  Patient verbalized understanding.

## 2024-10-24 NOTE — PROGRESS NOTES
The  stopped in to follow up with the pt. The pt reports that she is doing well and has no needs at this time. The pt confirmed that all medications/insurance issues are good and having no issues.   The pt was appreciative of assistance. The SW will continue to follow up.

## 2024-10-24 NOTE — PROGRESS NOTES
Patient ID: Raiza Nguyễn is a 73 y.o. female.    Subjective    HPI      Objective    BSA: There is no height or weight on file to calculate BSA.  There were no vitals taken for this visit.     Physical Exam    Performance Status:  {ECOG performance status:88939}      Assessment/Plan       Cancer Staging   Malignant neoplasm of central portion of left breast in female, estrogen receptor negative  Staging form: Breast, AJCC 8th Edition  - Clinical: Stage IA (cT1, cN0, cM0, G3, ER-, IA-, HER2+) - Signed by Jeniffer Langley DO on 5/13/2024      Oncology History   Malignant neoplasm of central portion of left breast in female, estrogen receptor negative   5/7/2024 Initial Diagnosis    Malignant neoplasm of central portion of left breast in female, estrogen receptor negative (Multi)     5/13/2024 Cancer Staged    Staging form: Breast, AJCC 8th Edition, Clinical: Stage IA (cT1, cN0, cM0, G3, ER-, IA-, HER2+) - Signed by Jeniffer Langley DO on 5/13/2024     7/15/2024 - 9/5/2024 Chemotherapy    PACLitaxel + Trastuzumab, Weekly Followed by Trastuzumab - Breast     9/12/2024 -  Chemotherapy    Trastuzumab, 21 Day Cycles          {Assess/PlanSmartLinks:81043}         Marbella Moy MD

## 2024-10-24 NOTE — PROGRESS NOTES
Patient ID: Raiza Nguyễn is a 73 y.o. female.  Referring Physician: Joanna Bourne MD  [id number] [id number]  Primary Care Provider: Shoshana Olivarez DO  Oncology History   Malignant neoplasm of central portion of left breast in female, estrogen receptor negative   5/7/2024 Initial Diagnosis    Malignant neoplasm of central portion of left breast in female, estrogen receptor negative (Multi)     5/13/2024 Cancer Staged    Staging form: Breast, AJCC 8th Edition, Clinical: Stage IA (cT1, cN0, cM0, G3, ER-, IN-, HER2+) - Signed by Jeniffer Langley DO on 5/13/2024     7/15/2024 - 9/5/2024 Chemotherapy    PACLitaxel + Trastuzumab, Weekly Followed by Trastuzumab - Breast     9/12/2024 -  Chemotherapy    Trastuzumab, 21 Day Cycles         Subjective    HPI  Ms. Raiza Nguyễn is a 73 y/oF presenting for initial consultation at the request of Dr. Bourne for recurrent clot with port placement.    Initially she was diagnosed in January 2003 with a left-sided 1.5 cm infiltrating ductal carcinoma with positive axillary lymph nodes and extranodal extension and treated with Acx4 plus paclitaxel x 4 completed 3/2004. S/p bilateral oopherectomies in May 2008 followed by anastrazole. Did well until screening mammogram 8/2018 with new lesions s/p resection and no adjuvant tx.  She had excision of 1.5cm IDC with 1/2 SN with microscopic involvement. She did not want to receive chemotherapy and the lesion was strongly ER and IN  positive, HER 2 negative.   Started on exemestane November 2018.  Then on 4/18/24: MRI breast screening: An irregular rim enhancing mass with irregular margins measuring 0.8 x 0.8 x 0.9 cm is seen in the superolateral breast at middle depth. Biopsy 4/23/24: L breast mass 3:00 bx IDC G3, ER/IN negative, HER2 positive. L breast mass 2:00. On 6/12/2024 underwent Left breast simple mastectomy showed invasive ductal carcinoma, grade 3, single focus measuring 1.1 cm. There is  high-grade DCIS present. All margins are negative. No lymph nodes submitted. Right breast simple completion mastectomy also performed negative for carcinoma. Patient started adjuvant weekly Taxol/Herceptin 7/2024 and shortly after developed breast wound infection requiring antibiotics and paclitaxel discontinued on herceptin subcutaneous alone. She developed a clot in her jugular vein  August 2024 likely from port placement, she also developed with port placement 20 years ago.  Patient reports having 4 miscarriages and a mother with  history of miscarriages.    Overall no new pain in arm/neck, LE or new cough/chest pain or SOB    Patient's past medical history, surgical history, family history and social history reviewed.  Past Medical History:   Diagnosis Date    Amnesia 02/26/2024    Anxiety     Arthritis     Smith esophagus 2015    Breast cancer (Multi) 2002    Cancer (Multi)     Chronic pain disorder     Colon polyp 2016    Coronary artery disease     Depression     Diabetes mellitus (Multi)     Disease of thyroid gland     Diverticulitis of colon 2022    Diverticulosis 2019    GERD (gastroesophageal reflux disease)     Heart disease     Heart failure     History of total knee replacement 02/26/2024    History of total left knee replacement 01/18/2023    Hyperlipidemia     Hypersomnia, unspecified 11/30/2021    Hypersomnolence disorder, persistent    Hypertension     Hypothyroid     Lumbar disc herniation 01/18/2023    History of    Melanoma (Multi) 2016    Obesity 1959    Osteoporosis     Other conditions influencing health status     Breast Cancer    Other intervertebral disc degeneration, lumbar region 06/27/2014    Disc degeneration, lumbar    Other intervertebral disc displacement, lumbar region 06/27/2014    Lumbar disc herniation    Personal history of irradiation 2004    Personal history of malignant melanoma of skin 10/16/2019    History of malignant melanoma of skin    Personal history of other  diseases of the digestive system     History of esophageal reflux    Personal history of other endocrine, nutritional and metabolic disease 02/20/2022    History of vitamin D deficiency    Personal history of other endocrine, nutritional and metabolic disease     History of hyperglycemia    PONV (postoperative nausea and vomiting) 2002    They give me a shot to prevent vomiting    Sleep apnea     Thrombosis 2004    [facility] to [facility]    Thyrotoxicosis, unspecified without thyrotoxic crisis or storm     Hyperthyroidism    Type 2 diabetes mellitus      Past Surgical History:   Procedure Laterality Date    APPENDECTOMY      BREAST BIOPSY  2010    BREAST LUMPECTOMY  07/17/2013    Breast Surgery Lumpectomy    CHOLECYSTECTOMY  10/03/2017    Cholecystectomy Laparoscopic    COLONOSCOPY      JOINT REPLACEMENT  2021    LYMPH NODE BIOPSY  2004    MASTECTOMY COMPLETE / SIMPLE Bilateral 06/12/2024    Procedure: BILATERAL SIMPLE MASTECTOMY;  Surgeon: Jeniffer Langley DO;  Location: [facility] OR;  Service: General Surgery Oncology;  Laterality: Bilateral;    OOPHORECTOMY  2012    OTHER SURGICAL HISTORY  05/14/2021    Knee replacement    OVARY SURGERY  07/17/2013    Ovarian Surgery    SKIN CANCER EXCISION      SMALL INTESTINE SURGERY       Family History   Problem Relation Name Age of Onset    Breast cancer Mother Dorothea     Alcohol abuse Mother Dorothea     Cancer Mother Dorothea     Cataracts Mother Dorothea     Lung cancer Father Yuriy     Cancer Father Yuriy     Cancer Sister Korin     Cancer Mother's Brother Kyrie Rory     Colon cancer Mother's Brother Igor Rory     Ovarian cysts Maternal Grandmother Maddy     Diabetes Maternal Grandmother Maddy     Stomach cancer Maternal Cousin      Colon cancer Other Materal Uncle     Stomach cancer Other Materal Uncle           Objective     Vitals:    10/25/24 1257   BP: 140/74   Pulse: 73   Resp: 16   Temp: 35.8 °C (96.4 °F)   SpO2: 98%       Review of Systems:   Review of  Systems:    Positive per HPI, otherwise negative.   Physical Exam  Gen: appears well in clinic, NAD  HEENT: atraumatic head, normocephalic, EOMI, conjunctiva normal  LUNG: no increased WOB, CTAB  CV: No JVD. RRR  GI: soft, NT, ND  LE: no LE edema  Skin: no obvious rashes or lesions on visible skin  Neuro: interactive, no focal deficits noted  Psych: normal mood and affect  Performance Status:  Symptomatic; fully ambulatory    Labs/Imaging/Pathology: personally reviewed reports and images in Epic electronic medical record system. Pertinent results as it related to the plan represented in below in assessment and plan.   Assessment/Plan    Recurrent clot with port placement dx 8/21/24, prior 20 years ago in 2003    - Discussed that with the only clots she has had being provoked by port placement the suspicion is low for a hypercoaguable disorder  - however with personal history of miscarriages and a mother with history of miscarriages I would like to screen her for anti-phospholipid syndrome.   - while being on Eliquis we discussed there could be a false positive for lupus anticoagulant and if this is the case we may do blood work off of anticoagulants.   - We discussed there is no need for her port and no contraindications to get that removed.  - She is currently receiving Herceptin sub Q.  - We discussed we would recommend being on the eliquis for 30 days after port removal (hold 2 days prior to removal and resume evening of removal)  - if there is a concern for APS, recommend to remain on anticoagulant until testing is complete.  - RTC TBD.  - We will follow labs and guide her as needed.     RTC TBD. This note has been transcribed using a medical scribe and there is a possibility of unintentional typing misprints.    Diagnoses and all orders for this visit:  Malignant neoplasm of central portion of left breast in female, estrogen receptor negative  -     Referral to Hematology and Oncology  -     Anti-Cardiolipin  Antibody (IgA, IgG, and IgM); Future  -     Lupus Anticoag. With Interpretation[Gill]; Future  -     Beta-2 Glycoprotein Antibodies; Future  Miscarriage (HHS-HCC)  -     Lupus Anticoag. With Interpretation[Gill]; Future           Marbella Moy MD  Hematology/Oncology  [facility]    Hansaibe Attestation  By signing my name below, IKareen Scribe   attest that this documentation has been prepared under the direction and in the presence of Marbella Moy MD.         Time Spent  Prep time on day of patient encounter: 5 minutes  Time spent directly with patient, family or caregiver: 25 minutes  Additional Time Spent on Patient Care Activities: 5 minutes  Documentation Time: 7 minutes  Other Time Spent: 0 minutes  Total: 42 minutes

## 2024-10-25 ENCOUNTER — OFFICE VISIT (OUTPATIENT)
Dept: HEMATOLOGY/ONCOLOGY | Facility: CLINIC | Age: 74
End: 2024-10-25
Payer: MEDICARE

## 2024-10-25 ENCOUNTER — PATIENT OUTREACH (OUTPATIENT)
Dept: PRIMARY CARE | Facility: CLINIC | Age: 74
End: 2024-10-25
Payer: MEDICARE

## 2024-10-25 ENCOUNTER — LAB (OUTPATIENT)
Dept: LAB | Facility: CLINIC | Age: 74
End: 2024-10-25
Payer: MEDICARE

## 2024-10-25 VITALS
HEIGHT: 59 IN | HEART RATE: 73 BPM | RESPIRATION RATE: 16 BRPM | OXYGEN SATURATION: 98 % | DIASTOLIC BLOOD PRESSURE: 74 MMHG | WEIGHT: 177.47 LBS | TEMPERATURE: 96.4 F | SYSTOLIC BLOOD PRESSURE: 140 MMHG | BODY MASS INDEX: 35.78 KG/M2

## 2024-10-25 DIAGNOSIS — O03.9 MISCARRIAGE (HHS-HCC): ICD-10-CM

## 2024-10-25 DIAGNOSIS — C50.112 MALIGNANT NEOPLASM OF CENTRAL PORTION OF LEFT BREAST IN FEMALE, ESTROGEN RECEPTOR NEGATIVE: ICD-10-CM

## 2024-10-25 DIAGNOSIS — Z17.1 MALIGNANT NEOPLASM OF CENTRAL PORTION OF LEFT BREAST IN FEMALE, ESTROGEN RECEPTOR NEGATIVE: ICD-10-CM

## 2024-10-25 PROCEDURE — 3078F DIAST BP <80 MM HG: CPT | Performed by: INTERNAL MEDICINE

## 2024-10-25 PROCEDURE — 1125F AMNT PAIN NOTED PAIN PRSNT: CPT | Performed by: INTERNAL MEDICINE

## 2024-10-25 PROCEDURE — 86147 CARDIOLIPIN ANTIBODY EA IG: CPT

## 2024-10-25 PROCEDURE — 85613 RUSSELL VIPER VENOM DILUTED: CPT

## 2024-10-25 PROCEDURE — 86146 BETA-2 GLYCOPROTEIN ANTIBODY: CPT

## 2024-10-25 PROCEDURE — 4010F ACE/ARB THERAPY RXD/TAKEN: CPT | Performed by: INTERNAL MEDICINE

## 2024-10-25 PROCEDURE — 3044F HG A1C LEVEL LT 7.0%: CPT | Performed by: INTERNAL MEDICINE

## 2024-10-25 PROCEDURE — 1159F MED LIST DOCD IN RCRD: CPT | Performed by: INTERNAL MEDICINE

## 2024-10-25 PROCEDURE — 1123F ACP DISCUSS/DSCN MKR DOCD: CPT | Performed by: INTERNAL MEDICINE

## 2024-10-25 PROCEDURE — 1157F ADVNC CARE PLAN IN RCRD: CPT | Performed by: INTERNAL MEDICINE

## 2024-10-25 PROCEDURE — 3077F SYST BP >= 140 MM HG: CPT | Performed by: INTERNAL MEDICINE

## 2024-10-25 PROCEDURE — 99215 OFFICE O/P EST HI 40 MIN: CPT | Performed by: INTERNAL MEDICINE

## 2024-10-25 PROCEDURE — 3048F LDL-C <100 MG/DL: CPT | Performed by: INTERNAL MEDICINE

## 2024-10-25 PROCEDURE — 3062F POS MACROALBUMINURIA REV: CPT | Performed by: INTERNAL MEDICINE

## 2024-10-25 PROCEDURE — 3008F BODY MASS INDEX DOCD: CPT | Performed by: INTERNAL MEDICINE

## 2024-10-25 ASSESSMENT — PAIN SCALES - GENERAL: PAINLEVEL_OUTOF10: 4

## 2024-10-26 LAB
B2 GLYCOPROT1 IGA SER-ACNC: 1.4 U/ML
B2 GLYCOPROT1 IGG SER-ACNC: <1.4 U/ML
B2 GLYCOPROT1 IGM SER-ACNC: 3.4 U/ML
CARDIOLIPIN IGA SERPL-ACNC: 1.2 APL U/ML
CARDIOLIPIN IGG SER IA-ACNC: <1.6 GPL U/ML
CARDIOLIPIN IGM SER IA-ACNC: 4 MPL U/ML

## 2024-10-28 ENCOUNTER — APPOINTMENT (OUTPATIENT)
Dept: OPHTHALMOLOGY | Facility: CLINIC | Age: 74
End: 2024-10-28
Payer: MEDICARE

## 2024-10-28 DIAGNOSIS — B30.9 ACUTE VIRAL CONJUNCTIVITIS OF LEFT EYE: Primary | ICD-10-CM

## 2024-10-28 DIAGNOSIS — H04.123 DRY EYES, BILATERAL: ICD-10-CM

## 2024-10-28 LAB
DRVVT SCREEN TO CONFIRM RATIO: 1.5 RATIO
DRVVT/DRVVT CFM NRMLZD PPP-RTO: 1.68 RATIO
DRVVT/DRVVT CFM P DOAC NEUT NORM PPP-RTO: 0.89 RATIO
LA 2 SCREEN W REFLEX-IMP: NORMAL
NORMALIZED SCT PPP-RTO: 1.02 RATIO
SILICA CLOTTING TIME CONFIRMATION: 1.24 RATIO
SILICA CLOTTING TIME SCREEN: 1.26 RATIO

## 2024-10-28 PROCEDURE — 99213 OFFICE O/P EST LOW 20 MIN: CPT | Performed by: OPTOMETRIST

## 2024-10-28 ASSESSMENT — CONF VISUAL FIELD
OS_NORMAL: 1
OD_SUPERIOR_TEMPORAL_RESTRICTION: 0
OS_SUPERIOR_TEMPORAL_RESTRICTION: 0
OD_NORMAL: 1
OS_INFERIOR_TEMPORAL_RESTRICTION: 0
OD_INFERIOR_NASAL_RESTRICTION: 0
OS_INFERIOR_NASAL_RESTRICTION: 0
OS_SUPERIOR_NASAL_RESTRICTION: 0
OD_SUPERIOR_NASAL_RESTRICTION: 0
OD_INFERIOR_TEMPORAL_RESTRICTION: 0

## 2024-10-28 ASSESSMENT — EXTERNAL EXAM - LEFT EYE: OS_EXAM: NORMAL

## 2024-10-28 ASSESSMENT — EXTERNAL EXAM - RIGHT EYE: OD_EXAM: NORMAL

## 2024-10-28 ASSESSMENT — VISUAL ACUITY
METHOD: SNELLEN - LINEAR
OS_SC: 20/25
OD_SC: 20/50

## 2024-10-28 ASSESSMENT — TONOMETRY
IOP_METHOD: GOLDMANN APPLANATION
OD_IOP_MMHG: 17
OS_IOP_MMHG: 16

## 2024-10-31 ENCOUNTER — OFFICE VISIT (OUTPATIENT)
Dept: SURGERY | Facility: CLINIC | Age: 74
End: 2024-10-31
Payer: MEDICARE

## 2024-10-31 ENCOUNTER — TELEPHONE (OUTPATIENT)
Dept: HEMATOLOGY/ONCOLOGY | Facility: CLINIC | Age: 74
End: 2024-10-31
Payer: MEDICARE

## 2024-10-31 VITALS
DIASTOLIC BLOOD PRESSURE: 77 MMHG | WEIGHT: 176 LBS | HEART RATE: 84 BPM | BODY MASS INDEX: 35.48 KG/M2 | SYSTOLIC BLOOD PRESSURE: 127 MMHG | HEIGHT: 59 IN

## 2024-10-31 DIAGNOSIS — E04.1 THYROID NODULE: ICD-10-CM

## 2024-10-31 PROCEDURE — 1159F MED LIST DOCD IN RCRD: CPT | Performed by: SURGERY

## 2024-10-31 PROCEDURE — 3062F POS MACROALBUMINURIA REV: CPT | Performed by: SURGERY

## 2024-10-31 PROCEDURE — 3044F HG A1C LEVEL LT 7.0%: CPT | Performed by: SURGERY

## 2024-10-31 PROCEDURE — 3008F BODY MASS INDEX DOCD: CPT | Performed by: SURGERY

## 2024-10-31 PROCEDURE — 1157F ADVNC CARE PLAN IN RCRD: CPT | Performed by: SURGERY

## 2024-10-31 PROCEDURE — 4010F ACE/ARB THERAPY RXD/TAKEN: CPT | Performed by: SURGERY

## 2024-10-31 PROCEDURE — 1036F TOBACCO NON-USER: CPT | Performed by: SURGERY

## 2024-10-31 PROCEDURE — 99215 OFFICE O/P EST HI 40 MIN: CPT | Performed by: SURGERY

## 2024-10-31 PROCEDURE — 3074F SYST BP LT 130 MM HG: CPT | Performed by: SURGERY

## 2024-10-31 PROCEDURE — 1160F RVW MEDS BY RX/DR IN RCRD: CPT | Performed by: SURGERY

## 2024-10-31 PROCEDURE — 3048F LDL-C <100 MG/DL: CPT | Performed by: SURGERY

## 2024-10-31 PROCEDURE — 3078F DIAST BP <80 MM HG: CPT | Performed by: SURGERY

## 2024-10-31 PROCEDURE — 1123F ACP DISCUSS/DSCN MKR DOCD: CPT | Performed by: SURGERY

## 2024-10-31 ASSESSMENT — ENCOUNTER SYMPTOMS
PSYCHIATRIC NEGATIVE: 1
CARDIOVASCULAR NEGATIVE: 1
ENDOCRINE NEGATIVE: 1
MUSCULOSKELETAL NEGATIVE: 1
RESPIRATORY NEGATIVE: 1
NEUROLOGICAL NEGATIVE: 1
FATIGUE: 1
ACTIVITY CHANGE: 1

## 2024-11-01 ENCOUNTER — LAB (OUTPATIENT)
Dept: LAB | Facility: LAB | Age: 74
End: 2024-11-01
Payer: MEDICARE

## 2024-11-01 ENCOUNTER — APPOINTMENT (OUTPATIENT)
Dept: LAB | Facility: LAB | Age: 74
End: 2024-11-01
Payer: MEDICARE

## 2024-11-01 ENCOUNTER — APPOINTMENT (OUTPATIENT)
Dept: PRIMARY CARE | Facility: CLINIC | Age: 74
End: 2024-11-01
Payer: MEDICARE

## 2024-11-01 VITALS
SYSTOLIC BLOOD PRESSURE: 131 MMHG | TEMPERATURE: 98.3 F | HEART RATE: 78 BPM | OXYGEN SATURATION: 96 % | BODY MASS INDEX: 35.64 KG/M2 | HEIGHT: 59 IN | WEIGHT: 176.8 LBS | DIASTOLIC BLOOD PRESSURE: 77 MMHG

## 2024-11-01 DIAGNOSIS — E04.1 THYROID NODULE: ICD-10-CM

## 2024-11-01 DIAGNOSIS — E66.01 OBESITY, MORBID (MULTI): ICD-10-CM

## 2024-11-01 DIAGNOSIS — I70.0 ATHEROSCLEROSIS OF AORTA (CMS-HCC): ICD-10-CM

## 2024-11-01 DIAGNOSIS — I10 ESSENTIAL HYPERTENSION: ICD-10-CM

## 2024-11-01 DIAGNOSIS — E11.9 TYPE 2 DIABETES MELLITUS WITHOUT COMPLICATION, WITHOUT LONG-TERM CURRENT USE OF INSULIN (MULTI): ICD-10-CM

## 2024-11-01 DIAGNOSIS — Z71.89 DNR (DO NOT RESUSCITATE) DISCUSSION: ICD-10-CM

## 2024-11-01 DIAGNOSIS — Z17.1 MALIGNANT NEOPLASM OF BOTH BREASTS IN FEMALE, ESTROGEN RECEPTOR NEGATIVE, UNSPECIFIED SITE OF BREAST: ICD-10-CM

## 2024-11-01 DIAGNOSIS — D64.9 ANEMIA, UNSPECIFIED TYPE: ICD-10-CM

## 2024-11-01 DIAGNOSIS — E78.49 OTHER HYPERLIPIDEMIA: ICD-10-CM

## 2024-11-01 DIAGNOSIS — E03.9 HYPOTHYROIDISM, UNSPECIFIED TYPE: ICD-10-CM

## 2024-11-01 DIAGNOSIS — C50.912 MALIGNANT NEOPLASM OF BOTH BREASTS IN FEMALE, ESTROGEN RECEPTOR NEGATIVE, UNSPECIFIED SITE OF BREAST: ICD-10-CM

## 2024-11-01 DIAGNOSIS — Z13.89 ENCOUNTER FOR SCREENING FOR OTHER DISORDER: ICD-10-CM

## 2024-11-01 DIAGNOSIS — C50.911 MALIGNANT NEOPLASM OF BOTH BREASTS IN FEMALE, ESTROGEN RECEPTOR NEGATIVE, UNSPECIFIED SITE OF BREAST: ICD-10-CM

## 2024-11-01 DIAGNOSIS — Z00.00 MEDICARE ANNUAL WELLNESS VISIT, SUBSEQUENT: Primary | ICD-10-CM

## 2024-11-01 PROBLEM — L02.213 CHEST WALL ABSCESS: Status: RESOLVED | Noted: 2024-08-12 | Resolved: 2024-11-01

## 2024-11-01 LAB — POC HEMOGLOBIN A1C: 5.9 % (ref 4.2–6.5)

## 2024-11-01 PROCEDURE — 80053 COMPREHEN METABOLIC PANEL: CPT

## 2024-11-01 PROCEDURE — 3062F POS MACROALBUMINURIA REV: CPT | Performed by: FAMILY MEDICINE

## 2024-11-01 PROCEDURE — 99214 OFFICE O/P EST MOD 30 MIN: CPT | Performed by: FAMILY MEDICINE

## 2024-11-01 PROCEDURE — 1160F RVW MEDS BY RX/DR IN RCRD: CPT | Performed by: FAMILY MEDICINE

## 2024-11-01 PROCEDURE — 1159F MED LIST DOCD IN RCRD: CPT | Performed by: FAMILY MEDICINE

## 2024-11-01 PROCEDURE — 3044F HG A1C LEVEL LT 7.0%: CPT | Performed by: FAMILY MEDICINE

## 2024-11-01 PROCEDURE — 3008F BODY MASS INDEX DOCD: CPT | Performed by: FAMILY MEDICINE

## 2024-11-01 PROCEDURE — 1123F ACP DISCUSS/DSCN MKR DOCD: CPT | Performed by: FAMILY MEDICINE

## 2024-11-01 PROCEDURE — 85025 COMPLETE CBC W/AUTO DIFF WBC: CPT

## 2024-11-01 PROCEDURE — G0439 PPPS, SUBSEQ VISIT: HCPCS | Performed by: FAMILY MEDICINE

## 2024-11-01 PROCEDURE — 1157F ADVNC CARE PLAN IN RCRD: CPT | Performed by: FAMILY MEDICINE

## 2024-11-01 PROCEDURE — 83036 HEMOGLOBIN GLYCOSYLATED A1C: CPT | Performed by: FAMILY MEDICINE

## 2024-11-01 PROCEDURE — 1170F FXNL STATUS ASSESSED: CPT | Performed by: FAMILY MEDICINE

## 2024-11-01 PROCEDURE — 86376 MICROSOMAL ANTIBODY EACH: CPT

## 2024-11-01 PROCEDURE — 4010F ACE/ARB THERAPY RXD/TAKEN: CPT | Performed by: FAMILY MEDICINE

## 2024-11-01 PROCEDURE — 3075F SYST BP GE 130 - 139MM HG: CPT | Performed by: FAMILY MEDICINE

## 2024-11-01 PROCEDURE — 3078F DIAST BP <80 MM HG: CPT | Performed by: FAMILY MEDICINE

## 2024-11-01 PROCEDURE — 3048F LDL-C <100 MG/DL: CPT | Performed by: FAMILY MEDICINE

## 2024-11-01 RX ORDER — TIRZEPATIDE 2.5 MG/.5ML
2.5 INJECTION, SOLUTION SUBCUTANEOUS WEEKLY
Qty: 4 ML | Refills: 3 | Status: SHIPPED | OUTPATIENT
Start: 2024-11-01

## 2024-11-01 ASSESSMENT — PATIENT HEALTH QUESTIONNAIRE - PHQ9
SUM OF ALL RESPONSES TO PHQ9 QUESTIONS 1 AND 2: 5
1. LITTLE INTEREST OR PLEASURE IN DOING THINGS: NEARLY EVERY DAY
2. FEELING DOWN, DEPRESSED OR HOPELESS: MORE THAN HALF THE DAYS

## 2024-11-01 ASSESSMENT — ACTIVITIES OF DAILY LIVING (ADL)
MANAGING_FINANCES: INDEPENDENT
BATHING: INDEPENDENT
DOING_HOUSEWORK: INDEPENDENT
DRESSING: INDEPENDENT
TAKING_MEDICATION: INDEPENDENT
GROCERY_SHOPPING: INDEPENDENT

## 2024-11-02 LAB
ALBUMIN SERPL BCP-MCNC: 4.3 G/DL (ref 3.4–5)
ALP SERPL-CCNC: 73 U/L (ref 33–136)
ALT SERPL W P-5'-P-CCNC: 19 U/L (ref 7–45)
ANION GAP SERPL CALC-SCNC: 11 MMOL/L (ref 10–20)
AST SERPL W P-5'-P-CCNC: 25 U/L (ref 9–39)
BASOPHILS # BLD AUTO: 0.03 X10*3/UL (ref 0–0.1)
BASOPHILS NFR BLD AUTO: 0.5 %
BILIRUB SERPL-MCNC: 0.4 MG/DL (ref 0–1.2)
BUN SERPL-MCNC: 25 MG/DL (ref 6–23)
CALCIUM SERPL-MCNC: 9.4 MG/DL (ref 8.6–10.6)
CHLORIDE SERPL-SCNC: 98 MMOL/L (ref 98–107)
CO2 SERPL-SCNC: 32 MMOL/L (ref 21–32)
CREAT SERPL-MCNC: 0.78 MG/DL (ref 0.5–1.05)
EGFRCR SERPLBLD CKD-EPI 2021: 81 ML/MIN/1.73M*2
EOSINOPHIL # BLD AUTO: 0.23 X10*3/UL (ref 0–0.4)
EOSINOPHIL NFR BLD AUTO: 4.1 %
ERYTHROCYTE [DISTWIDTH] IN BLOOD BY AUTOMATED COUNT: 17.5 % (ref 11.5–14.5)
GLUCOSE SERPL-MCNC: 84 MG/DL (ref 74–99)
HCT VFR BLD AUTO: 40.2 % (ref 36–46)
HGB BLD-MCNC: 12.1 G/DL (ref 12–16)
IMM GRANULOCYTES # BLD AUTO: 0.01 X10*3/UL (ref 0–0.5)
IMM GRANULOCYTES NFR BLD AUTO: 0.2 % (ref 0–0.9)
LYMPHOCYTES # BLD AUTO: 1.35 X10*3/UL (ref 0.8–3)
LYMPHOCYTES NFR BLD AUTO: 23.9 %
MCH RBC QN AUTO: 26.9 PG (ref 26–34)
MCHC RBC AUTO-ENTMCNC: 30.1 G/DL (ref 32–36)
MCV RBC AUTO: 89 FL (ref 80–100)
MONOCYTES # BLD AUTO: 0.62 X10*3/UL (ref 0.05–0.8)
MONOCYTES NFR BLD AUTO: 11 %
NEUTROPHILS # BLD AUTO: 3.42 X10*3/UL (ref 1.6–5.5)
NEUTROPHILS NFR BLD AUTO: 60.3 %
NRBC BLD-RTO: 0 /100 WBCS (ref 0–0)
PLATELET # BLD AUTO: 307 X10*3/UL (ref 150–450)
POTASSIUM SERPL-SCNC: 4.2 MMOL/L (ref 3.5–5.3)
PROT SERPL-MCNC: 6.9 G/DL (ref 6.4–8.2)
RBC # BLD AUTO: 4.5 X10*6/UL (ref 4–5.2)
SODIUM SERPL-SCNC: 137 MMOL/L (ref 136–145)
THYROPEROXIDASE AB SERPL-ACNC: 687 IU/ML
WBC # BLD AUTO: 5.7 X10*3/UL (ref 4.4–11.3)

## 2024-11-04 DIAGNOSIS — Z17.1 MALIGNANT NEOPLASM OF CENTRAL PORTION OF LEFT BREAST IN FEMALE, ESTROGEN RECEPTOR NEGATIVE: Primary | ICD-10-CM

## 2024-11-04 DIAGNOSIS — C50.112 MALIGNANT NEOPLASM OF CENTRAL PORTION OF LEFT BREAST IN FEMALE, ESTROGEN RECEPTOR NEGATIVE: Primary | ICD-10-CM

## 2024-11-06 ENCOUNTER — PATIENT MESSAGE (OUTPATIENT)
Dept: PRIMARY CARE | Facility: CLINIC | Age: 74
End: 2024-11-06
Payer: MEDICARE

## 2024-11-08 DIAGNOSIS — M25.562 LEFT KNEE PAIN, UNSPECIFIED CHRONICITY: Primary | ICD-10-CM

## 2024-11-08 DIAGNOSIS — R29.898 LEFT LEG WEAKNESS: ICD-10-CM

## 2024-11-11 ENCOUNTER — TELEPHONE (OUTPATIENT)
Dept: ADMISSION | Facility: HOSPITAL | Age: 74
End: 2024-11-11
Payer: MEDICARE

## 2024-11-11 DIAGNOSIS — Z17.1 MALIGNANT NEOPLASM OF CENTRAL PORTION OF LEFT BREAST IN FEMALE, ESTROGEN RECEPTOR NEGATIVE: ICD-10-CM

## 2024-11-11 DIAGNOSIS — C50.112 MALIGNANT NEOPLASM OF CENTRAL PORTION OF LEFT BREAST IN FEMALE, ESTROGEN RECEPTOR NEGATIVE: ICD-10-CM

## 2024-11-12 ENCOUNTER — PATIENT MESSAGE (OUTPATIENT)
Dept: HEMATOLOGY/ONCOLOGY | Facility: CLINIC | Age: 74
End: 2024-11-12
Payer: MEDICARE

## 2024-11-13 ENCOUNTER — HOSPITAL ENCOUNTER (INPATIENT)
Facility: HOSPITAL | Age: 74
LOS: 3 days | Discharge: HOME | End: 2024-11-16
Attending: EMERGENCY MEDICINE | Admitting: STUDENT IN AN ORGANIZED HEALTH CARE EDUCATION/TRAINING PROGRAM
Payer: MEDICARE

## 2024-11-13 ENCOUNTER — OFFICE VISIT (OUTPATIENT)
Dept: GASTROENTEROLOGY | Facility: HOSPITAL | Age: 74
End: 2024-11-13
Payer: MEDICARE

## 2024-11-13 ENCOUNTER — TELEPHONE (OUTPATIENT)
Dept: HEMATOLOGY/ONCOLOGY | Facility: CLINIC | Age: 74
End: 2024-11-13
Payer: MEDICARE

## 2024-11-13 ENCOUNTER — APPOINTMENT (OUTPATIENT)
Dept: RADIOLOGY | Facility: HOSPITAL | Age: 74
End: 2024-11-13
Payer: MEDICARE

## 2024-11-13 DIAGNOSIS — M19.019 OSTEOARTHRITIS OF SHOULDER, UNSPECIFIED LATERALITY, UNSPECIFIED OSTEOARTHRITIS TYPE: ICD-10-CM

## 2024-11-13 DIAGNOSIS — Z45.2 ENCOUNTER FOR REMOVAL OF TUNNELED CENTRAL VENOUS CATHETER (CVC) WITH PORT: ICD-10-CM

## 2024-11-13 DIAGNOSIS — I82.C21 CHRONIC THROMBOSIS OF RIGHT INTERNAL JUGULAR VEIN (MULTI): ICD-10-CM

## 2024-11-13 DIAGNOSIS — M79.89 OTHER SPECIFIED SOFT TISSUE DISORDERS: ICD-10-CM

## 2024-11-13 DIAGNOSIS — T80.212A: Primary | ICD-10-CM

## 2024-11-13 DIAGNOSIS — Z15.01 BRCA2 POSITIVE: Primary | ICD-10-CM

## 2024-11-13 DIAGNOSIS — K22.70 BARRETT'S ESOPHAGUS WITHOUT DYSPLASIA: ICD-10-CM

## 2024-11-13 DIAGNOSIS — Z15.09 BRCA2 POSITIVE: Primary | ICD-10-CM

## 2024-11-13 LAB
ALBUMIN SERPL BCP-MCNC: 4.1 G/DL (ref 3.4–5)
ALP SERPL-CCNC: 63 U/L (ref 33–136)
ALT SERPL W P-5'-P-CCNC: 19 U/L (ref 7–45)
ANION GAP SERPL CALC-SCNC: 11 MMOL/L (ref 10–20)
AST SERPL W P-5'-P-CCNC: 24 U/L (ref 9–39)
BASOPHILS # BLD AUTO: 0.03 X10*3/UL (ref 0–0.1)
BASOPHILS NFR BLD AUTO: 0.5 %
BILIRUB SERPL-MCNC: 0.3 MG/DL (ref 0–1.2)
BUN SERPL-MCNC: 22 MG/DL (ref 6–23)
CALCIUM SERPL-MCNC: 9.7 MG/DL (ref 8.6–10.3)
CHLORIDE SERPL-SCNC: 95 MMOL/L (ref 98–107)
CO2 SERPL-SCNC: 32 MMOL/L (ref 21–32)
CREAT SERPL-MCNC: 0.73 MG/DL (ref 0.5–1.05)
EGFRCR SERPLBLD CKD-EPI 2021: 88 ML/MIN/1.73M*2
EOSINOPHIL # BLD AUTO: 0.2 X10*3/UL (ref 0–0.4)
EOSINOPHIL NFR BLD AUTO: 3.5 %
ERYTHROCYTE [DISTWIDTH] IN BLOOD BY AUTOMATED COUNT: 17.1 % (ref 11.5–14.5)
GLUCOSE BLD MANUAL STRIP-MCNC: 107 MG/DL (ref 74–99)
GLUCOSE BLD MANUAL STRIP-MCNC: 74 MG/DL (ref 74–99)
GLUCOSE SERPL-MCNC: 116 MG/DL (ref 74–99)
HCT VFR BLD AUTO: 38.9 % (ref 36–46)
HGB BLD-MCNC: 12.1 G/DL (ref 12–16)
IMM GRANULOCYTES # BLD AUTO: 0.01 X10*3/UL (ref 0–0.5)
IMM GRANULOCYTES NFR BLD AUTO: 0.2 % (ref 0–0.9)
LACTATE SERPL-SCNC: 1.2 MMOL/L (ref 0.4–2)
LYMPHOCYTES # BLD AUTO: 1.35 X10*3/UL (ref 0.8–3)
LYMPHOCYTES NFR BLD AUTO: 23.4 %
MCH RBC QN AUTO: 27.6 PG (ref 26–34)
MCHC RBC AUTO-ENTMCNC: 31.1 G/DL (ref 32–36)
MCV RBC AUTO: 89 FL (ref 80–100)
MONOCYTES # BLD AUTO: 0.67 X10*3/UL (ref 0.05–0.8)
MONOCYTES NFR BLD AUTO: 11.6 %
NEUTROPHILS # BLD AUTO: 3.51 X10*3/UL (ref 1.6–5.5)
NEUTROPHILS NFR BLD AUTO: 60.8 %
NRBC BLD-RTO: 0 /100 WBCS (ref 0–0)
PLATELET # BLD AUTO: 256 X10*3/UL (ref 150–450)
POTASSIUM SERPL-SCNC: 3.8 MMOL/L (ref 3.5–5.3)
PROT SERPL-MCNC: 6.7 G/DL (ref 6.4–8.2)
RBC # BLD AUTO: 4.38 X10*6/UL (ref 4–5.2)
SODIUM SERPL-SCNC: 134 MMOL/L (ref 136–145)
WBC # BLD AUTO: 5.8 X10*3/UL (ref 4.4–11.3)

## 2024-11-13 PROCEDURE — 85025 COMPLETE CBC W/AUTO DIFF WBC: CPT

## 2024-11-13 PROCEDURE — 3062F POS MACROALBUMINURIA REV: CPT | Performed by: INTERNAL MEDICINE

## 2024-11-13 PROCEDURE — 82947 ASSAY GLUCOSE BLOOD QUANT: CPT

## 2024-11-13 PROCEDURE — 96366 THER/PROPH/DIAG IV INF ADDON: CPT

## 2024-11-13 PROCEDURE — 36415 COLL VENOUS BLD VENIPUNCTURE: CPT

## 2024-11-13 PROCEDURE — 1036F TOBACCO NON-USER: CPT | Performed by: INTERNAL MEDICINE

## 2024-11-13 PROCEDURE — 96365 THER/PROPH/DIAG IV INF INIT: CPT

## 2024-11-13 PROCEDURE — 4010F ACE/ARB THERAPY RXD/TAKEN: CPT | Performed by: INTERNAL MEDICINE

## 2024-11-13 PROCEDURE — 1157F ADVNC CARE PLAN IN RCRD: CPT | Performed by: INTERNAL MEDICINE

## 2024-11-13 PROCEDURE — 99285 EMERGENCY DEPT VISIT HI MDM: CPT | Mod: 25

## 2024-11-13 PROCEDURE — 1123F ACP DISCUSS/DSCN MKR DOCD: CPT | Performed by: INTERNAL MEDICINE

## 2024-11-13 PROCEDURE — 96367 TX/PROPH/DG ADDL SEQ IV INF: CPT

## 2024-11-13 PROCEDURE — 3044F HG A1C LEVEL LT 7.0%: CPT | Performed by: INTERNAL MEDICINE

## 2024-11-13 PROCEDURE — 1200000002 HC GENERAL ROOM WITH TELEMETRY DAILY

## 2024-11-13 PROCEDURE — 2500000001 HC RX 250 WO HCPCS SELF ADMINISTERED DRUGS (ALT 637 FOR MEDICARE OP)

## 2024-11-13 PROCEDURE — 2500000005 HC RX 250 GENERAL PHARMACY W/O HCPCS

## 2024-11-13 PROCEDURE — 71045 X-RAY EXAM CHEST 1 VIEW: CPT

## 2024-11-13 PROCEDURE — 87040 BLOOD CULTURE FOR BACTERIA: CPT

## 2024-11-13 PROCEDURE — 2500000002 HC RX 250 W HCPCS SELF ADMINISTERED DRUGS (ALT 637 FOR MEDICARE OP, ALT 636 FOR OP/ED)

## 2024-11-13 PROCEDURE — 83605 ASSAY OF LACTIC ACID: CPT

## 2024-11-13 PROCEDURE — 71045 X-RAY EXAM CHEST 1 VIEW: CPT | Performed by: RADIOLOGY

## 2024-11-13 PROCEDURE — 99285 EMERGENCY DEPT VISIT HI MDM: CPT | Performed by: EMERGENCY MEDICINE

## 2024-11-13 PROCEDURE — 3048F LDL-C <100 MG/DL: CPT | Performed by: INTERNAL MEDICINE

## 2024-11-13 PROCEDURE — 80053 COMPREHEN METABOLIC PANEL: CPT

## 2024-11-13 PROCEDURE — 99442 PR PHYS/QHP TELEPHONE EVALUATION 11-20 MIN: CPT | Performed by: INTERNAL MEDICINE

## 2024-11-13 PROCEDURE — 2500000004 HC RX 250 GENERAL PHARMACY W/ HCPCS (ALT 636 FOR OP/ED)

## 2024-11-13 RX ORDER — GABAPENTIN 300 MG/1
600 CAPSULE ORAL 3 TIMES DAILY
Status: DISCONTINUED | OUTPATIENT
Start: 2024-11-13 | End: 2024-11-16 | Stop reason: HOSPADM

## 2024-11-13 RX ORDER — ROSUVASTATIN CALCIUM 5 MG/1
5 TABLET, COATED ORAL NIGHTLY
Status: DISCONTINUED | OUTPATIENT
Start: 2024-11-13 | End: 2024-11-16 | Stop reason: HOSPADM

## 2024-11-13 RX ORDER — ACETAMINOPHEN 325 MG/1
650 TABLET ORAL EVERY 6 HOURS PRN
Status: DISCONTINUED | OUTPATIENT
Start: 2024-11-13 | End: 2024-11-16 | Stop reason: HOSPADM

## 2024-11-13 RX ORDER — POLYETHYLENE GLYCOL 3350 17 G/17G
17 POWDER, FOR SOLUTION ORAL DAILY
Status: DISCONTINUED | OUTPATIENT
Start: 2024-11-13 | End: 2024-11-16 | Stop reason: HOSPADM

## 2024-11-13 RX ORDER — VANCOMYCIN HYDROCHLORIDE 1 G/20ML
INJECTION, POWDER, LYOPHILIZED, FOR SOLUTION INTRAVENOUS DAILY PRN
Status: DISCONTINUED | OUTPATIENT
Start: 2024-11-13 | End: 2024-11-13

## 2024-11-13 RX ORDER — CALCIUM CARBONATE 500(1250)
625 TABLET ORAL DAILY
Status: DISCONTINUED | OUTPATIENT
Start: 2024-11-14 | End: 2024-11-16 | Stop reason: HOSPADM

## 2024-11-13 RX ORDER — TRAMADOL HYDROCHLORIDE 50 MG/1
100 TABLET ORAL NIGHTLY
COMMUNITY

## 2024-11-13 RX ORDER — LIDOCAINE 560 MG/1
1 PATCH PERCUTANEOUS; TOPICAL; TRANSDERMAL DAILY
Status: DISCONTINUED | OUTPATIENT
Start: 2024-11-13 | End: 2024-11-16 | Stop reason: HOSPADM

## 2024-11-13 RX ORDER — LEVOTHYROXINE SODIUM 125 UG/1
125 TABLET ORAL DAILY
Status: DISCONTINUED | OUTPATIENT
Start: 2024-11-14 | End: 2024-11-16 | Stop reason: HOSPADM

## 2024-11-13 RX ORDER — DEXTROSE 50 % IN WATER (D50W) INTRAVENOUS SYRINGE
12.5
Status: DISCONTINUED | OUTPATIENT
Start: 2024-11-13 | End: 2024-11-16 | Stop reason: HOSPADM

## 2024-11-13 RX ORDER — INSULIN LISPRO 100 [IU]/ML
0-10 INJECTION, SOLUTION INTRAVENOUS; SUBCUTANEOUS
Status: DISCONTINUED | OUTPATIENT
Start: 2024-11-14 | End: 2024-11-16 | Stop reason: HOSPADM

## 2024-11-13 RX ORDER — TRAMADOL HYDROCHLORIDE 50 MG/1
50 TABLET ORAL DAILY
Status: DISCONTINUED | OUTPATIENT
Start: 2024-11-14 | End: 2024-11-16 | Stop reason: HOSPADM

## 2024-11-13 RX ORDER — DULOXETIN HYDROCHLORIDE 30 MG/1
30 CAPSULE, DELAYED RELEASE ORAL 2 TIMES DAILY
Status: DISCONTINUED | OUTPATIENT
Start: 2024-11-13 | End: 2024-11-16 | Stop reason: HOSPADM

## 2024-11-13 RX ORDER — TRAMADOL HYDROCHLORIDE 50 MG/1
100 TABLET ORAL NIGHTLY
Status: DISCONTINUED | OUTPATIENT
Start: 2024-11-13 | End: 2024-11-16 | Stop reason: HOSPADM

## 2024-11-13 RX ORDER — DEXTROSE 50 % IN WATER (D50W) INTRAVENOUS SYRINGE
25
Status: DISCONTINUED | OUTPATIENT
Start: 2024-11-13 | End: 2024-11-16 | Stop reason: HOSPADM

## 2024-11-13 RX ORDER — HYDROCHLOROTHIAZIDE 25 MG/1
12.5 TABLET ORAL DAILY
COMMUNITY
End: 2024-11-19 | Stop reason: SDUPTHER

## 2024-11-13 RX ORDER — HYDROCHLOROTHIAZIDE 12.5 MG/1
12.5 TABLET ORAL DAILY
Status: DISCONTINUED | OUTPATIENT
Start: 2024-11-14 | End: 2024-11-16 | Stop reason: HOSPADM

## 2024-11-13 RX ORDER — LISINOPRIL 5 MG/1
5 TABLET ORAL DAILY
Status: DISCONTINUED | OUTPATIENT
Start: 2024-11-14 | End: 2024-11-16 | Stop reason: HOSPADM

## 2024-11-13 RX ADMIN — LIDOCAINE 4% 1 PATCH: 40 PATCH TOPICAL at 22:24

## 2024-11-13 RX ADMIN — ACETAMINOPHEN 325MG 650 MG: 325 TABLET ORAL at 22:23

## 2024-11-13 RX ADMIN — VANCOMYCIN HYDROCHLORIDE 1500 MG: 1.5 INJECTION, POWDER, LYOPHILIZED, FOR SOLUTION INTRAVENOUS at 15:16

## 2024-11-13 RX ADMIN — ROSUVASTATIN CALCIUM 5 MG: 5 TABLET, FILM COATED ORAL at 22:16

## 2024-11-13 RX ADMIN — TRAMADOL HYDROCHLORIDE 100 MG: 50 TABLET, COATED ORAL at 22:16

## 2024-11-13 RX ADMIN — DULOXETINE HYDROCHLORIDE 30 MG: 30 CAPSULE, DELAYED RELEASE ORAL at 22:16

## 2024-11-13 RX ADMIN — GABAPENTIN 600 MG: 300 CAPSULE ORAL at 22:16

## 2024-11-13 RX ADMIN — PIPERACILLIN SODIUM AND TAZOBACTAM SODIUM 3.38 G: 3; .375 INJECTION, SOLUTION INTRAVENOUS at 14:45

## 2024-11-13 SDOH — SOCIAL STABILITY: SOCIAL INSECURITY: WITHIN THE LAST YEAR, HAVE YOU BEEN HUMILIATED OR EMOTIONALLY ABUSED IN OTHER WAYS BY YOUR PARTNER OR EX-PARTNER?: NO

## 2024-11-13 SDOH — ECONOMIC STABILITY: INCOME INSECURITY: IN THE PAST 12 MONTHS HAS THE ELECTRIC, GAS, OIL, OR WATER COMPANY THREATENED TO SHUT OFF SERVICES IN YOUR HOME?: NO

## 2024-11-13 SDOH — ECONOMIC STABILITY: HOUSING INSECURITY: IN THE LAST 12 MONTHS, WAS THERE A TIME WHEN YOU WERE NOT ABLE TO PAY THE MORTGAGE OR RENT ON TIME?: NO

## 2024-11-13 SDOH — SOCIAL STABILITY: SOCIAL INSECURITY: WITHIN THE LAST YEAR, HAVE YOU BEEN AFRAID OF YOUR PARTNER OR EX-PARTNER?: NO

## 2024-11-13 SDOH — SOCIAL STABILITY: SOCIAL INSECURITY
WITHIN THE LAST YEAR, HAVE YOU BEEN KICKED, HIT, SLAPPED, OR OTHERWISE PHYSICALLY HURT BY YOUR PARTNER OR EX-PARTNER?: NO

## 2024-11-13 SDOH — ECONOMIC STABILITY: FOOD INSECURITY: WITHIN THE PAST 12 MONTHS, THE FOOD YOU BOUGHT JUST DIDN'T LAST AND YOU DIDN'T HAVE MONEY TO GET MORE.: NEVER TRUE

## 2024-11-13 SDOH — ECONOMIC STABILITY: HOUSING INSECURITY: IN THE PAST 12 MONTHS, HOW MANY TIMES HAVE YOU MOVED WHERE YOU WERE LIVING?: 0

## 2024-11-13 SDOH — ECONOMIC STABILITY: FOOD INSECURITY: HOW HARD IS IT FOR YOU TO PAY FOR THE VERY BASICS LIKE FOOD, HOUSING, MEDICAL CARE, AND HEATING?: NOT HARD AT ALL

## 2024-11-13 SDOH — SOCIAL STABILITY: SOCIAL INSECURITY: HAS ANYONE EVER THREATENED TO HURT YOUR FAMILY OR YOUR PETS?: NO

## 2024-11-13 SDOH — ECONOMIC STABILITY: FOOD INSECURITY: WITHIN THE PAST 12 MONTHS, YOU WORRIED THAT YOUR FOOD WOULD RUN OUT BEFORE YOU GOT THE MONEY TO BUY MORE.: NEVER TRUE

## 2024-11-13 SDOH — SOCIAL STABILITY: SOCIAL INSECURITY: WERE YOU ABLE TO COMPLETE ALL THE BEHAVIORAL HEALTH SCREENINGS?: YES

## 2024-11-13 SDOH — ECONOMIC STABILITY: HOUSING INSECURITY: AT ANY TIME IN THE PAST 12 MONTHS, WERE YOU HOMELESS OR LIVING IN A SHELTER (INCLUDING NOW)?: NO

## 2024-11-13 SDOH — SOCIAL STABILITY: SOCIAL INSECURITY
WITHIN THE LAST YEAR, HAVE YOU BEEN RAPED OR FORCED TO HAVE ANY KIND OF SEXUAL ACTIVITY BY YOUR PARTNER OR EX-PARTNER?: NO

## 2024-11-13 SDOH — SOCIAL STABILITY: SOCIAL INSECURITY: DO YOU FEEL ANYONE HAS EXPLOITED OR TAKEN ADVANTAGE OF YOU FINANCIALLY OR OF YOUR PERSONAL PROPERTY?: NO

## 2024-11-13 SDOH — SOCIAL STABILITY: SOCIAL INSECURITY: ARE THERE ANY APPARENT SIGNS OF INJURIES/BEHAVIORS THAT COULD BE RELATED TO ABUSE/NEGLECT?: NO

## 2024-11-13 SDOH — SOCIAL STABILITY: SOCIAL INSECURITY: HAVE YOU HAD THOUGHTS OF HARMING ANYONE ELSE?: NO

## 2024-11-13 SDOH — SOCIAL STABILITY: SOCIAL INSECURITY: ABUSE: ADULT

## 2024-11-13 SDOH — SOCIAL STABILITY: SOCIAL INSECURITY: DO YOU FEEL UNSAFE GOING BACK TO THE PLACE WHERE YOU ARE LIVING?: NO

## 2024-11-13 SDOH — SOCIAL STABILITY: SOCIAL INSECURITY: DOES ANYONE TRY TO KEEP YOU FROM HAVING/CONTACTING OTHER FRIENDS OR DOING THINGS OUTSIDE YOUR HOME?: NO

## 2024-11-13 SDOH — SOCIAL STABILITY: SOCIAL INSECURITY: HAVE YOU HAD ANY THOUGHTS OF HARMING ANYONE ELSE?: NO

## 2024-11-13 SDOH — SOCIAL STABILITY: SOCIAL INSECURITY: ARE YOU OR HAVE YOU BEEN THREATENED OR ABUSED PHYSICALLY, EMOTIONALLY, OR SEXUALLY BY ANYONE?: NO

## 2024-11-13 SDOH — ECONOMIC STABILITY: TRANSPORTATION INSECURITY: IN THE PAST 12 MONTHS, HAS LACK OF TRANSPORTATION KEPT YOU FROM MEDICAL APPOINTMENTS OR FROM GETTING MEDICATIONS?: NO

## 2024-11-13 ASSESSMENT — COGNITIVE AND FUNCTIONAL STATUS - GENERAL
DAILY ACTIVITIY SCORE: 24
CLIMB 3 TO 5 STEPS WITH RAILING: A LITTLE
MOBILITY SCORE: 21
WALKING IN HOSPITAL ROOM: A LITTLE
PATIENT BASELINE BEDBOUND: NO
STANDING UP FROM CHAIR USING ARMS: A LITTLE

## 2024-11-13 ASSESSMENT — PAIN SCALES - GENERAL
PAINLEVEL_OUTOF10: 8
PAINLEVEL_OUTOF10: 5 - MODERATE PAIN
PAINLEVEL_OUTOF10: 6
PAINLEVEL_OUTOF10: 8

## 2024-11-13 ASSESSMENT — LIFESTYLE VARIABLES
EVER FELT BAD OR GUILTY ABOUT YOUR DRINKING: NO
HOW OFTEN DO YOU HAVE A DRINK CONTAINING ALCOHOL: NEVER
HAVE PEOPLE ANNOYED YOU BY CRITICIZING YOUR DRINKING: NO
HAVE YOU EVER FELT YOU SHOULD CUT DOWN ON YOUR DRINKING: NO
HOW OFTEN DO YOU HAVE 6 OR MORE DRINKS ON ONE OCCASION: NEVER
TOTAL SCORE: 0
EVER HAD A DRINK FIRST THING IN THE MORNING TO STEADY YOUR NERVES TO GET RID OF A HANGOVER: NO
AUDIT-C TOTAL SCORE: 0
HOW MANY STANDARD DRINKS CONTAINING ALCOHOL DO YOU HAVE ON A TYPICAL DAY: PATIENT DOES NOT DRINK
AUDIT-C TOTAL SCORE: 0
SKIP TO QUESTIONS 9-10: 1

## 2024-11-13 ASSESSMENT — ACTIVITIES OF DAILY LIVING (ADL)
LACK_OF_TRANSPORTATION: NO
TOILETING: INDEPENDENT
GROOMING: INDEPENDENT
HEARING - LEFT EAR: FUNCTIONAL
JUDGMENT_ADEQUATE_SAFELY_COMPLETE_DAILY_ACTIVITIES: YES
PATIENT'S MEMORY ADEQUATE TO SAFELY COMPLETE DAILY ACTIVITIES?: YES
LACK_OF_TRANSPORTATION: NO
BATHING: INDEPENDENT
DRESSING YOURSELF: INDEPENDENT
HEARING - RIGHT EAR: FUNCTIONAL
FEEDING YOURSELF: INDEPENDENT
WALKS IN HOME: INDEPENDENT
ADEQUATE_TO_COMPLETE_ADL: YES

## 2024-11-13 ASSESSMENT — PAIN DESCRIPTION - ORIENTATION
ORIENTATION: RIGHT
ORIENTATION: RIGHT

## 2024-11-13 ASSESSMENT — PATIENT HEALTH QUESTIONNAIRE - PHQ9
2. FEELING DOWN, DEPRESSED OR HOPELESS: SEVERAL DAYS
SUM OF ALL RESPONSES TO PHQ9 QUESTIONS 1 & 2: 1
1. LITTLE INTEREST OR PLEASURE IN DOING THINGS: NOT AT ALL

## 2024-11-13 ASSESSMENT — COLUMBIA-SUICIDE SEVERITY RATING SCALE - C-SSRS
2. HAVE YOU ACTUALLY HAD ANY THOUGHTS OF KILLING YOURSELF?: NO
6. HAVE YOU EVER DONE ANYTHING, STARTED TO DO ANYTHING, OR PREPARED TO DO ANYTHING TO END YOUR LIFE?: NO
1. IN THE PAST MONTH, HAVE YOU WISHED YOU WERE DEAD OR WISHED YOU COULD GO TO SLEEP AND NOT WAKE UP?: NO

## 2024-11-13 ASSESSMENT — PAIN DESCRIPTION - LOCATION
LOCATION: SHOULDER
LOCATION: SHOULDER

## 2024-11-13 ASSESSMENT — PAIN - FUNCTIONAL ASSESSMENT
PAIN_FUNCTIONAL_ASSESSMENT: 0-10
PAIN_FUNCTIONAL_ASSESSMENT: 0-10

## 2024-11-13 NOTE — HOSPITAL COURSE
Patient is a 72 y/o F with PMHx significant for T2DM, HTN, HLD, CAD, hypothyroidism, BOLIVAR not on CPAP, breast cancer s/p paclitaxel plus trastuzumab 7/15/2024-9/5/2024 and trastuzumab 21 cycles, hx of known occlusive thrombus in the right internal jugular and right cephalic vein, who initially presented with pain around her mediport site. On initial evaluation the patient was HDS on RA, and labs were largely unremarkable. The patient was started on Vancomycin and Zosyn then admitted to the general medicine service.    The patient was without any erythema around mediport site, and with a normal CBC. Antibiotics were discontinued due to low suspicion for infection. Upper extremity duplex was ordered to assess for additional clots, and redemonstrated chronically occluded right IJV, but without other evidence of DVT. XR R shoulder was obtained and redemonstrated severe degenerative changes of the right shoulder with chronic complete rotator cuff tear. IR was consulted for removal of mediport which was performed on 11/15 without complication. Blood cultures remained without growth, and the patient was deemed stable for discharge from the hospital back home. Instructions to follow up with primary care for hospital follow up, with oncology for further management, and with Ortho for chronic right shoulder pain were given. Prescription for Eliquis was sent ***. Return precautions given.

## 2024-11-13 NOTE — H&P
Internal Medicine    Admission H&P      Patient Raiza Nguyễn PCP Shoshana Olivarez DO    MRN [id number]  Admission Date 11/13/2024      Chief Complaint/Reason for Admission:  Raiza is a 73 y.o. female who presented today with Mediport site pain    Subjective    Subjective   History of Presenting Illness  Ms. Raiza Nguyễn is a 73 y.o. female with a past medical history of T2DM, HTN, HLD/CAD/aortic atherosclerosis, hypothyroidism, BOLIVAR severe not on CPAP, chronic pain, breast cancer (Dr. Santoro) paclitaxel plus trastuzumab 7/15/2024-9/5/2024, trastuzumab 21 cycles, 9/12/2024 - , coming in today with 3 days of pain at her Mediport site that was 6/10 earlier in the day, currently 0 out of 10. Patient feels the site was erythematous and swollen earlier today, currently experiencing 3 out of 10 pain to palpation.  Patient denies fever, chills, headache, lightheadedness, shortness of breath, pain in her upper extremities/lower extremities, pain does not radiate from the site, denies pleuritic chest pain, calf swelling/tenderness.      ED course:  V/S: Tmax 30 6.2C, HR 83, RR 18, /73, SpO2 99 on room air  Labs: Glucose 116, sodium 134, chloride 95, lactate 1.2, WBC 5.8, Hgb 12.1,  EKG:  Imaging: CXR: No consolidation, effusion, edema.  No evidence of acute intrathoracic abnormality  Intervention: Broad-spectrum antibiotics, blood cultures, imaging, CMP/CBC    Past Medical History  Active Ambulatory Problems     Diagnosis Date Noted    Abnormal EKG 01/18/2023    Arteriosclerotic coronary artery disease 01/18/2023    Atherosclerosis of aorta (CMS-HCC) 01/18/2023    Smith's esophagus 01/18/2023    Bilateral breast cancer (Multi) 01/18/2023    Bilateral carpal tunnel syndrome 01/18/2023    BRCA2 positive 01/18/2023    Chronic pain 01/18/2023    Diabetes mellitus (Multi) 11/07/2016    Essential hypertension 01/18/2023    Hyperlipidemia 01/18/2023    Hypothyroidism 01/18/2023    Low back pain 01/18/2023     Obstructive sleep apnea hypopnea, severe 01/18/2023    Osteoarthritis of shoulder 01/18/2023    Spondylolisthesis of lumbar region 01/18/2023    Complication of ventilation therapy 01/18/2023    Abnormal MRI, breast 05/31/2023    Brachial neuritis 05/31/2023    Cervical spinal stenosis 05/31/2023    Cervical spondylosis 05/31/2023    Lumbar stenosis 08/23/2011    Lumbosacral spondylosis 05/31/2023    Gastroesophageal reflux disease 11/07/2016    Homocystinemia 05/31/2023    Intervertebral disc disorder of lumbar region with myelopathy 03/31/2011    Osteoporosis 05/31/2023    Chronic rhinitis 07/06/2023    Trigger finger of left thumb 02/26/2024    Personal history of malignant melanoma of skin 06/14/2023    Mixed anxiety depressive disorder 02/26/2024    Lumbosacral plexus lesion 04/01/2020    Cystocele with prolapse 02/26/2024    Cholelithiasis 02/26/2024    Asymptomatic postmenopausal status 02/26/2024    Spinal stenosis of lumbar region with neurogenic claudication 06/13/2023    DDD (degenerative disc disease), lumbosacral 06/13/2023    Arthritis of knee 02/26/2024    Long term (current) use of aromatase inhibitors 04/10/2023    Skin changes due to chronic exposure to nonionizing radiation 01/31/2022    Medicare annual wellness visit, subsequent 02/26/2024    Diverticulosis of large intestine without perforation or abscess without bleeding 07/07/2023    Malignant neoplasm of central portion of left breast in female, estrogen receptor negative 05/07/2024    Anemia 06/27/2024    S/P mastectomy, bilateral 06/27/2024    Poor balance 07/23/2024    Acute deep vein thrombosis (DVT) of non-extremity vein 08/12/2024    Tremor 09/10/2024    Acute viral conjunctivitis of left eye 10/22/2024    Dry eyes, bilateral 10/28/2024    Obesity, morbid (Multi) 11/01/2024     Resolved Ambulatory Problems     Diagnosis Date Noted    (HFpEF) heart failure with preserved ejection fraction 01/18/2023    Arthritis 01/18/2023    Gluteal  pain 01/18/2023    History of total left knee replacement 01/18/2023    Joint pain in both hands 01/18/2023    Onychomycosis 01/18/2023    Piriformis syndrome 01/18/2023    Primary osteoarthritis of right knee 01/18/2023    History of vitamin D deficiency 01/18/2023    Lumbar disc herniation 01/18/2023    Chronic systolic congestive heart failure 02/23/2023    Bilateral shoulder pain 05/31/2023    Cervicalgia 05/31/2023    Gait instability 05/31/2023    Lumbosacral neuritis 05/31/2023    Muscle pain 03/04/2013    Sacroiliitis, not elsewhere classified (CMS-HCC) 05/31/2023    Generalized abdominal pain 07/06/2023    Early satiety 07/06/2023    Cellulitis of lower extremity 02/26/2024    History of artificial joint 02/26/2024    Hyperthyroidism 01/18/2023    Abnormal gait 05/31/2023    Abnormal finding of diagnostic imaging 02/26/2024    Vitamin D deficiency 02/26/2024    Viral wart, unspecified 06/14/2023    Other viral warts 01/05/2022    Swelling of lower extremity 02/26/2024    Xerosis cutis 03/08/2023    Rash 09/28/2021    Shoulder pain 05/31/2023    Pain in wrist 02/26/2024    Erythema intertrigo 06/14/2023    Actinic keratosis 03/08/2023    Neoplasm of uncertain behavior of skin 09/28/2021    Nail problem 02/26/2024    Melanocytic nevi of other parts of face 06/14/2023    Melanocytic nevi of trunk 06/14/2023    Mass of left axilla 02/26/2024    Hematoma of axilla 02/26/2024    Mass of breast 02/26/2024    Loss of hair 02/26/2024    Leukopenia 02/26/2024    Irritant contact dermatitis due to other agents 06/14/2023    Hepatomegaly 07/06/2023    Mass on back 02/26/2024    Hemangioma of skin and subcutaneous tissue 06/14/2023    Other fatigue 07/06/2023    Difficulty sleeping 02/26/2024    Daytime somnolence 02/26/2024    Apnea 02/26/2024    Amnesia 02/26/2024    Acute maxillary sinusitis 02/26/2024    Preoperative clearance 02/26/2024    Melanocytic nevi of ear and external auricular canal 06/14/2023    Medicare  annual wellness visit, initial 02/26/2024    Palpitations 01/30/2024    Breast screening 01/18/2023    History of total knee replacement 02/26/2024    Sciatica 08/23/2011    Osteoarthritis of knee 02/26/2024    Osteoarthritis of right knee 02/26/2024    Tinea unguium 06/14/2023    Breathing-related sleep disorder 02/26/2024    Melanocytic nevi, unspecified 06/14/2023    Immunization due 02/26/2024    Dry skin 02/26/2024    Breast cancer of upper-outer quadrant of left female breast 06/12/2024    Chest pain 06/19/2024    Hospital discharge follow-up 06/27/2024    Chest wall abscess 08/12/2024     Past Medical History:   Diagnosis Date    Anxiety     Smith esophagus 2015    Breast cancer (Multi) 2002    Cancer (Multi)     Chronic pain disorder     Colon polyp 2016    Coronary artery disease     Depression     Disease of thyroid gland     Diverticulitis of colon 2022    Diverticulosis 2019    GERD (gastroesophageal reflux disease)     Heart disease     Heart failure     Hypersomnia, unspecified 11/30/2021    Hypertension     Hypothyroid     Melanoma (Multi) 2016    Obesity 1959    Other conditions influencing health status     Other intervertebral disc degeneration, lumbar region 06/27/2014    Other intervertebral disc displacement, lumbar region 06/27/2014    Personal history of irradiation 2004    Personal history of other diseases of the digestive system     Personal history of other endocrine, nutritional and metabolic disease 02/20/2022    Personal history of other endocrine, nutritional and metabolic disease     PONV (postoperative nausea and vomiting) 2002    Sleep apnea     Thrombosis 2004    Thyrotoxicosis, unspecified without thyrotoxic crisis or storm     Type 2 diabetes mellitus        Past Surgical History   Past Surgical History:   Procedure Laterality Date    APPENDECTOMY      BREAST BIOPSY  2010    BREAST LUMPECTOMY  07/17/2013    Breast Surgery Lumpectomy    CHOLECYSTECTOMY  10/03/2017     Cholecystectomy Laparoscopic    COLONOSCOPY      JOINT REPLACEMENT      LYMPH NODE BIOPSY      MASTECTOMY COMPLETE / SIMPLE Bilateral 2024    Procedure: BILATERAL SIMPLE MASTECTOMY;  Surgeon: Jeniffer Langley DO;  [facility]: [facility] OR;  Service: General Surgery Oncology;  Laterality: Bilateral;    OOPHORECTOMY      OTHER SURGICAL HISTORY  2021    Knee replacement    OVARY SURGERY  2013    Ovarian Surgery    SKIN CANCER EXCISION      SMALL INTESTINE SURGERY         Social History  Social History     Socioeconomic History    Marital status:    Tobacco Use    Smoking status: Former     Current packs/day: 0.00     Average packs/day: 1.7 packs/day for 47.5 years (80.0 ttl pk-yrs)     Types: Cigarettes     Start date: 1968     Quit date: 1983     Years since quittin.8     Passive exposure: Past    Smokeless tobacco: Never   Vaping Use    Vaping status: Never Used   Substance and Sexual Activity    Alcohol use: Not Currently     Alcohol/week: 1.0 standard drink of alcohol     Types: 1 Glasses of wine per week    Drug use: Never    Sexual activity: Not Currently     Partners: Male     Birth control/protection: Emergency Contraception     Social Drivers of Health     Financial Resource Strain: Low Risk  (2024)    Overall Financial Resource Strain (CARDIA)     Difficulty of Paying Living Expenses: Not hard at all   Food Insecurity: No Food Insecurity (2024)    Hunger Vital Sign     Worried About Running Out of Food in the Last Year: Never true     Ran Out of Food in the Last Year: Never true   Transportation Needs: No Transportation Needs (7/3/2024)    OASIS : Transportation     Lack of Transportation (Medical): No     Lack of Transportation (Non-Medical): No     Patient Unable or Declines to Respond: No   Social Connections: Feeling Socially Integrated (7/3/2024)    OASIS : Social Isolation     Frequency of experiencing loneliness or isolation: Never    Housing Stability: Low Risk  (6/19/2024)    Housing Stability Vital Sign     Unable to Pay for Housing in the Last Year: No     Number of Places Lived in the Last Year: 1     Unstable Housing in the Last Year: No       Home Medication:  Prior to Admission medications    Medication Sig Start Date End Date Taking? Authorizing Provider   apixaban (Eliquis) 5 mg tablet Take 1 tablet (5 mg) by mouth 2 times a day. 11/11/24 11/11/25  Joanna Bourne MD   blood sugar diagnostic strip 90 strips once daily. 6/25/24   Shoshana Olivarez DO   calcium carbonate 600 mg calcium (1,500 mg) tablet Take 600 mg by mouth once daily.    Historical Provider, MD   DULoxetine (Cymbalta) 30 mg DR capsule Take 1 capsule (30 mg) by mouth 2 times a day. 4/11/24   Shoshana Olivarez DO   gabapentin (Neurontin) 300 mg capsule Take 3 capsules (900 mg) by mouth 3 times a day. 7/23/24   Shoshana Olivarez DO   levothyroxine (Synthroid, Levoxyl) 125 mcg tablet Take 1 tablet (125 mcg) by mouth early in the morning.. 9/9/24   Shoshana Olivarez DO   lidocaine-prilocaine (Emla) 2.5-2.5 % cream PLEASE SEE ATTACHED FOR DETAILED DIRECTIONS 7/3/24   Historical Provider, MD   lisinopril 5 mg tablet Take 1 tablet (5 mg) by mouth once daily. 9/11/24 10/16/25  Shoshana Olivarez DO   metFORMIN  mg 24 hr tablet Take 1 tablet (500 mg) by mouth once daily. 9/11/24   Shoshana Olivarez DO   rosuvastatin (Crestor) 5 mg tablet TAKE 1 TABLET ONE TIME DAILY 4/7/24   Shoshana Olivarez DO   tirzepatide (Mounjaro) 2.5 mg/0.5 mL pen injector Inject 2.5 mg under the skin 1 (one) time per week. 11/1/24   Shoshana Olivarez DO   traMADol (Ultram) 50 mg tablet Take 1 tablet (50 mg) by mouth 2 times a day as needed for severe pain (7 - 10).    Historical Provider, MD   apixaban (Eliquis) 5 mg tablet Take 1 tablet (5 mg) by mouth 2 times a day. 10/8/24 11/11/24  Joanna Bourne MD        Family History  Family History   Problem Relation Name  "Age of Onset    Breast cancer Mother Dorothea     Alcohol abuse Mother Dorothea     Cancer Mother Dorothea     Cataracts Mother Dorothea     Lung cancer Father Yuriy     Cancer Father Yuriy     Cancer Sister Korin     Cancer Mother's Brother Kyrie Valadez     Colon cancer Mother's Brother Igor Valadez     Ovarian cysts Maternal Grandmother Maddy     Diabetes Maternal Grandmother Maddy     Stomach cancer Maternal Cousin      Colon cancer Other Materal Uncle     Stomach cancer Other Materal Uncle            Allergies:  Allergies   Allergen Reactions    Erythromycin GI bleeding    Tetracycline GI bleeding    Cephalexin Unknown    Glucosamine Unknown    Adhesive Itching and Rash        Review of System:  12 system review completed and negative unless noted in HPI      Objective    Objective   Vital Signs:  Visit Vitals  /68 (BP Location: Right arm, Patient Position: Lying)   Pulse 77   Temp 36.7 °C (98.1 °F) (Temporal)   Resp 18   Ht 1.499 m (4' 11\")   Wt 80.3 kg (177 lb)   SpO2 98%   BMI 35.75 kg/m²   OB Status Postmenopausal   Smoking Status Former   BSA 1.83 m²        Physical Examination:    Constitutional: A&Ox4, NAD, resting comfortable   Head and Face: Atraumatic, normocephalic   Eyes: Normal external exam, EOMI  ENT: Normal external inspection of ears and nose. Oropharynx normal.  Cardiovascular: RRR, S1/S2, no murmurs, rubs, or gallops, radial pulses +2  Pulmonary: CTAB, no respiratory distress, no wheezing, rales or rhonchi, on RA  Abdomen: +BS, soft, non-tender, nondistended, no guarding rigidity or rebound tenderness, no masses noted  MSK: Negative for edema, No joint swelling, normal movements of all extremities.   Neuro: No focal deficits, normal motor function, normal sensation, follows all commands  Skin-MediPort in place right anterior chest below the clavicle, no erythema, no warmth, mild pain to palpation 3/10, no lesions, contusions, or erythema.  Psychiatric: Judgment intact. Appropriate " mood, affect and behavior    Laboratory Data:    Results from last 7 days   Lab Units 11/13/24  1436   WBC AUTO x10*3/uL 5.8   RBC AUTO x10*6/uL 4.38   HEMOGLOBIN g/dL 12.1     Results from last 7 days   Lab Units 11/13/24  1436   SODIUM mmol/L 134*   POTASSIUM mmol/L 3.8   CHLORIDE mmol/L 95*   CO2 mmol/L 32   BUN mg/dL 22   CREATININE mg/dL 0.73   CALCIUM mg/dL 9.7   BILIRUBIN TOTAL mg/dL 0.3   ALT U/L 19   AST U/L 24       Imaging:  XR chest 1 view    Result Date: 11/13/2024  Interpreted By:  Devin Sierra, STUDY: XR CHEST 1 VIEW   INDICATION: Signs/Symptoms:Rule out lung abcess.   COMPARISON: April 25, 2008   ACCESSION NUMBER(S): [id number]   ORDERING CLINICIAN: REMBERTO SUMMERS   FINDINGS: No consolidation, effusion, edema, or pneumothorax. Heart size within normal limits. MediPort catheter.         No evidence of acute intrathoracic abnormality.   Signed by: Devin Sierra 11/13/2024 3:17 PM Dictation workstation:   [id number]      Medications:  Scheduled medications  [Held by provider] apixaban, 5 mg, oral, BID  [START ON 11/14/2024] calcium carbonate, 625 mg, oral, Daily  DULoxetine, 30 mg, oral, BID  gabapentin, 600 mg, oral, TID  [START ON 11/14/2024] hydroCHLOROthiazide, 12.5 mg, oral, Daily  [START ON 11/14/2024] insulin lispro, 0-10 Units, subcutaneous, TID AC  [START ON 11/14/2024] levothyroxine, 125 mcg, oral, Daily  [START ON 11/14/2024] lisinopril, 5 mg, oral, Daily  oxygen, , inhalation, Continuous - Inhalation  [START ON 11/14/2024] piperacillin-tazobactam, 3.375 g, intravenous, q8h  polyethylene glycol, 17 g, oral, Daily  rosuvastatin, 5 mg, oral, Nightly  traMADol, 100 mg, oral, Nightly  [START ON 11/14/2024] traMADol, 50 mg, oral, Daily  [START ON 11/14/2024] vancomycin, 1,500 mg, intravenous, Once      Continuous medications     PRN medications  PRN medications: dextrose, dextrose, glucagon, glucagon    Assessment    Assessment & Plan   Ms. Raiza AVALOS Josephniles is a 73 y.o. female with a past  medical history of T2DM, HTN, HLD/CAD/aortic atherosclerosis, hypothyroidism, BOLIVAR severe not on CPAP, chronic pain, breast cancer (Dr. Santoro) paclitaxel plus trastuzumab 7/15/2024-9/5/2024, trastuzumab 21 cycles, 9/12/2024 - , coming in today with 3 days of pain at her Mediport site that was 6/10 earlier in the day, currently 0 out of 10.   Principal Problem:    Infection of tunnel of tunneled central venous catheter (CVC)       #?Infection tunneled catheter  #Breast cancer on chemotherapy  SIRS criteria 0/4, qSOFA score 0, MEWS score 1. Patient reported 3 days of pain at her Mediport site that was 6/10 earlier in the day, currently 0 out of 10. Patient denies fever, chills, headache, lightheadedness, shortness of breath, pain in her upper extremities/lower extremities, pain does not radiate from the site, denies pleuritic chest pain, calf swelling/tenderness.  History of right internal jugular thrombus that is occlusive and a occlusive thrombus in the right cephalic vein noted on vascular ultrasound upper extremity venous duplex, 8/21/2024.  Concern for infection is low due to patient having no constitutional symptoms, labs are unremarkable, and physical exam findings.  -Interventional radiology consult for possible port removal  -Continue broad-spectrum antibiotics, vancomycin start date 11/13/2024 - , piperacillin-tazobactam start date 11/13/2024 -   -Vascular ultrasound upper extremity venous duplex bilateral  -CBC, daily  -RFP, daily    #Non-insulin-dependent type 2 diabetes mellitus  -Holding metformin  -Sliding scale #2  -Hypoglycemia protocol  -POCT glucose with meals and at bedtime    #HTN  -Continue home medication    #BOLIVAR, severe  Patient reports she cannot tolerate CPAP with several failed attempts.  Will attempt reintroduction this hospital stay.  -CPAP ordered    CHRONIC PROBLEMS:  # HLD  # CAD  # Hypothyroidism  -Continue home medications and encourage CPAP compliance    Global Plan of Care  Fluid:  PRN  Electrolytes: Replete electrolytes as needed. Goal K>4, Mg>2.  Nutrition: Diabetic  DVT Prophylaxis: Holding in the setting of possible intervention tomorrow  GI Prophylaxis: MiraLAX  Code Status: DNR and No Intubation     Emergency Contact: Extended Emergency Contact Information  Primary Emergency Contact: Fredi Nguyễn  Address: [address] [place] [address] of [facility]  Home Phone: [phone]  Mobile Phone: [phone]  Relation: Child  Secondary Emergency Contact: HELENE NGUYỄN  Mobile Phone: [phone]  Relation: Son  Preferred language: English   needed? No    Patient seen and discussed with the attending.  To be staffed with attending.  Signature: Rei Hagen, DO  Date: November 13, 2024  This note has been transcribed using Dragon voice recognition system and there is a possibility of unintentional typing misprints.  Any information found to be copied from previous providers is done in the best interest of the patient to provide accurate, quality, and continuity of care.      ______________________________________    Patient is a 73-year-old female who presents to [facility] ED with 3 days of worsening pain at right anterior chest wall port site with complaints of some edema. On interview with patient in ED bay, she has no symptoms currently, though does feel some pain on palpation. Per recent telephone note today with nurse Reprogle, the patient's port is no longer being used for treatment as she is receiving hyaluronidase (patient due for another IM injection 11/14).  Port was successfully flushed approx. 1 week ago. The patient does not report any fevers, chills, arm edema. Patient reports she is to have her port removed next week on 11/19.  Of note, she states she ran out of Eliquis 2 days ago, and just took a dose of Eliquis the morning of 11/13/24.    Of note, follows with Dr. Santoro, oncologist for her BRCA2 bilateral breast cancer. Recent visit  "10/25/2024 where she was diagnosed with recurrent clot with port placement that was diagnosed 8/21/2024, and prior 20 years ago in 2003.  On 8/7/2024, ultrasound of the neck demonstrated acute occluding DVT within the right jugular vein.  Upper extremity DVT US 8/21/24 demonstrating persistent and grossly stable occlusive thrombus in the right internal jugular vein and also acute appearing thrombotic occlusion of the right cephalic vein.  It was discussed that there is low suspicion for hypercoagulable disorder, however with personal history of miscarriages and mother with miscarriages, she was screened for antiphospholipid syndrome.  At this office visit, Dr. Santoro discussed \"there is no need for her port and no contraindications to get that removed\".  Patient scheduled to have port removed 11/19/2024, given conclusion of chemotherapy via port. Per oncology note 10/25/24, patient is to hold Eliquis 2 days prior to removal and resume the evening of removal.  She is to remain on Eliquis for 30 days after port removal. Last chemotherapy documented as 9/12/2024 with trastuzumab (21-day cycle). Of note, on 7/15/2024, she was started adjuvant weekly Taxol/Herceptin, however chemotherapy was held given the patient developed breast wound infection requiring antibiotics and delayed wound healing.    PMH: BRCA2 bilateral breast cancer (cT1 cN0 L IDC, G3, ER/[place] negative and HER2 positive. Status post bilateral completion mastectomy, pT1b pNx) on immunotherapy (IM hyaluronidase), HTN, hypothyroid, HTN    ED course:  Vitals: T97.2, P83, RR 18, 131/73, 99% RA  CBC: Grossly unremarkable  CMP: Grossly unremarkable  Lactate 1.2  CXR: No acute CP process.  Right tunneled thoracic port in place  Intervention in the ED: Blood cultures, vancomycin and Zosyn    On exam, comfortable appearing and VSS HDS.  Lungs CTAB.  Heart sounds RRR and no MRG.  Right anterior chest wall mildly tender to palpation.  No edema or erythema.  No JVD.  " Abdomen soft nontender with bowel sounds.  No lower extremity edema.    # Right port infection vs recurrent port clots (on Eliquis)  # Known occlusive thrombus in the right internal jugular vein  # Known thrombotic occlusion of right cephalic vein  -Afebrile.  Normal labs.  CXR negative.  -Given vancomycin and Zosyn in the ED, continue at this time pending blood cultures negative for 48 hours.  Low suspicion for infectious process, will continue antibiotics overnight based on symptomatology over the last several days. Follow-up blood cultures  -CXR negative for acute process  -Hold Eliquis at this time for evaluation catheter removal. SCDs ordered. No subQ DVT prophy, as would be held at MN and patient already had dose of eliquis AM of 11/13  -Consult interventional radiology. NPO at MN  -Repeat vascular US upper extremity duplex venous, right    #HTN  -Cont. Lisinopril and HCTZ. Would recommend DCing HCTZ on DC given risk of hyponatremia        Other chronic medical conditions as stated above      Patient to be seen and discussed with attending physician    Dereck Reyes, DO  Internal Medicine, PGY-III

## 2024-11-13 NOTE — Clinical Note
Right chest mediport removed intact with no complications. Site sutured closed with absorbable stitches then covered with dermabond, currently drying. Site free from bleeding/hematoma. Patient tolerated procedure well, denies pain/nausea.

## 2024-11-13 NOTE — PROGRESS NOTES
Subjective   Patient ID: Raiza Nguyễn is a 73 y.o. female who presents for BRCA2 mutationNo chief complaint on file..  HPI  Pt with hx breast Ca, on immunotherapy, in remission who has BRCA2 mutation. Strong history of cancers on maternal side including breast in mother but no pancreas cancer. She is doing well otherwise. Non-smoker.   Review of Systems    Objective   Physical Exam    Assessment/Plan     Reviewed chart. Explained rationale behind screening and guidelines Also explained research. She has BE also which is controlled and is due surveillance.     Plan EUS plus EGD ofr BE under same anesthesia    Took 20 minutes       Rikki Montez MD 11/13/24 1:25 PM

## 2024-11-13 NOTE — TELEPHONE ENCOUNTER
Per Dr Bourne patient to proceed to ER to have it evaluated.  Call returned to patient.  Notifeid of providers recommendation and is agreeable to plan and will go to [place].  Report called to Noreen at [place].  Call back instructions reviewed.  Patient verbalized understanding.

## 2024-11-13 NOTE — ED PROVIDER NOTES
Emergency Department Provider Note        History of Present Illness     History provided by: Patient  Limitations to History: None  External Records Reviewed with Brief Summary: None    HPI:  Raiza Nguyễn is a 73 y.o. obese female with a history of bilateral breast cancer, CAD, DM, HTN, HLD, hypothyroidism, BOLIVAR, GERD, DVT, and cervical stenosis who presents to the ED with complaint of infection of chemo-port. Patient stated that she began to have pain at the site 3 days ago.  The patient has developed lower back pain as well.  Rates the pain a 8/10.  Patient states the Chemo-Port site has become warm, slightly red, and swollen.  Per triage notes, patient states port was flushed on the day of her last treatment and provided blood return at that time.  Port is no longer used for her treatment as she is receiving Hyluronidase.  Patient stated that the port has a blood clot from the incision site up to the jugular vein for which she was scheduled to have the port removed on November 19th.  Patient reported her symptoms to her oncologist, Dr. Bourne, who referred her to the ED for further evaluation and management.  Patient denies fevers, swelling of arms, neck or face.  She denies headache, dizziness, lightheadedness, nausea, vomiting, diarrhea, or abdominal pain.    Physical Exam   Triage vitals:  T 36.2 °C (97.2 °F)  HR 83  /73  RR 18  O2 99 % None (Room air)    General: Awake, alert, in no acute distress  Eyes: Gaze conjugate.  No scleral icterus or injection  HENT: Normo-cephalic, atraumatic. No stridor  CV: Regular rate, regular rhythm. Radial pulses 2+ bilaterally  Resp: Breathing non-labored, speaking in full sentences.  Clear to auscultation bilaterally  GI: Soft, non-distended, non-tender. No rebound or guarding.  : Not assessed.  MSK/Extremities: No gross bony deformities. Moving all extremities   Skin: Warm, erythematous, and swelling around the Chemo-Port site.  Neuro: Alert. Oriented.  Face symmetric. Speech is fluent.  Gross strength and sensation intact in b/l UE and LEs  Psych: Appropriate mood and affect    Medical Decision Making & ED Course   Medical Decision Makin y.o. obese female with a history of bilateral breast cancer, CAD, DM, HTN, HLD, hypothyroidism, BOLIVAR, GERD, DVT, and cervical stenosis who presents to the ED with complaint of infection of chemo Mediport.     On arrival to the ED, the patient was stable, A&Ox3, non-toxic, well-appearing, and in no acute distress. Primary assessment is evident for intact ABC. The patient was able to give account of current event and verbalize presenting symptoms.    My personal assessment is directed towards ordering basic labs including CBC, CMP, coagulation status and key cardiac enzyme to rule out anemia, and electrolyte abnormality. Also, I ordered chest x-ray to rule out possible soft tissue or lung abscess, and subcutaneous air.    CBC results unremarkable.  Given history of pilonidal shows normal renal function, normal liver enzyme, without significant electrolyte abnormality.  Lactate level is normal.  Chest x-ray shows no evidence of acute intrathoracic abnormality.    The case was discussed with the attending, Dr. Longoria, who saw and evaluated patient at the bedside.  Patient was updated with the lab and imaging result.  She was determined to benefit from inpatient management and the Chemo-Port removal.  Patient was updated with management plan.  She was amenable to the plan.  Medicine was consulted for admission.  The case was discussed with teaching services, and patient was admitted to the service of Dr. Hanson.  Patient was transferred to medicine in stable condition.    Differential diagnoses considered include but are not limited to: Infection of tunneled of tunneled central nervous catheter.      ED Course:  Diagnoses as of 24 0257   Infection of tunnel of tunneled central venous catheter (CVC)   Encounter for  removal of tunneled central venous catheter (CVC) with port     Disposition   As a result of their workup, the patient will require admission to the hospital.  The patient was informed of her diagnosis.  The patient was given the opportunity to ask questions and I answered them. The patient agreed to be admitted to the hospital.    Procedures   Procedures    This was a shared visit with an ED attending.  The patient was seen and discussed with the ED attending Swati.    Bryce Longoria DO  Emergency Medicine     Shannon Soliman MD  Resident  11/14/24 1005       Shannon Soliman MD  Resident  11/14/24 1027        The patient was seen by the resident/fellow.  I have personally performed a substantive portion of the encounter.  I have seen and examined the patient; agree with the workup, evaluation, MDM, management and diagnosis.  The care plan has been discussed with the resident; I have reviewed the resident’s note and agree with the documented findings.                                                      Bryce Longoria DO  11/16/24 0259

## 2024-11-13 NOTE — TELEPHONE ENCOUNTER
Message from patient received from [place] regarding port site discomfort.  Call placed to patient to discuss.  Patient indicating pain at site of 6/10.  Started about 3 days ago. States site is slightly red and swollen.  States port was flushed on the day of her last treatment and provided blood return at that time.  Port is no longer used for her treatment as she is receiving Hyluronidase.   Patient denies fevers of swelling of arms, neck or face.  Patient is having port removed next week as scheduled. Will send message to provider to advise.  Call back instructions reviewed.  Patient verbalized understanding.

## 2024-11-13 NOTE — CONSULTS
"Vancomycin Dosing by Pharmacy - Emergency Department    Raiza Nguyễn is a 73 y.o. female who pharmacy has been consulted for vancomycin one-time dosing for other mediport infection/ soft tissue or lung abscess  by an Emergency Department (ED) provider.    CrCl cannot be calculated (Patient's most recent lab result is older than the maximum 3 days allowed.).      Blood pressure 131/73, pulse 83, temperature 36.2 °C (97.2 °F), resp. rate 18, height 1.499 m (4' 11\"), weight 79.8 kg (176 lb), SpO2 99%.    Concern for Severe Sepsis and/or Septic Shock: No    Assessment/Plan:  Patient will not be given a loading dose.   Will initiate vancomycin ED dose, 1500 mg (18 mg/kg) x 1 dose.  Pharmacy will now discontinue the one time dose consult. Maintenance dose and continuation of vancomycin to be determined by the admitting team. If vancomycin appropriate for continuation, another pharmacy to dose vancomycin consult will be placed at that time.      Thank you for allowing me to take part in the care of this patient. Please contact pharmacy with any questions.    ALANA LERNER   11/13/2024 2:47 PM       "

## 2024-11-14 ENCOUNTER — APPOINTMENT (OUTPATIENT)
Dept: RADIOLOGY | Facility: HOSPITAL | Age: 74
End: 2024-11-14
Payer: MEDICARE

## 2024-11-14 ENCOUNTER — APPOINTMENT (OUTPATIENT)
Dept: HEMATOLOGY/ONCOLOGY | Facility: CLINIC | Age: 74
End: 2024-11-14
Payer: MEDICARE

## 2024-11-14 ENCOUNTER — PATIENT OUTREACH (OUTPATIENT)
Dept: HEMATOLOGY/ONCOLOGY | Facility: CLINIC | Age: 74
End: 2024-11-14

## 2024-11-14 ENCOUNTER — APPOINTMENT (OUTPATIENT)
Dept: NEUROLOGY | Facility: CLINIC | Age: 74
End: 2024-11-14
Payer: MEDICARE

## 2024-11-14 ENCOUNTER — TELEPHONE (OUTPATIENT)
Dept: HEMATOLOGY/ONCOLOGY | Facility: CLINIC | Age: 74
End: 2024-11-14

## 2024-11-14 ENCOUNTER — APPOINTMENT (OUTPATIENT)
Dept: CARDIOLOGY | Facility: HOSPITAL | Age: 74
End: 2024-11-14
Payer: MEDICARE

## 2024-11-14 DIAGNOSIS — C50.911 MALIGNANT NEOPLASM OF BOTH BREASTS IN FEMALE, ESTROGEN RECEPTOR NEGATIVE, UNSPECIFIED SITE OF BREAST: ICD-10-CM

## 2024-11-14 DIAGNOSIS — Z17.1 MALIGNANT NEOPLASM OF BOTH BREASTS IN FEMALE, ESTROGEN RECEPTOR NEGATIVE, UNSPECIFIED SITE OF BREAST: ICD-10-CM

## 2024-11-14 DIAGNOSIS — C50.912 MALIGNANT NEOPLASM OF BOTH BREASTS IN FEMALE, ESTROGEN RECEPTOR NEGATIVE, UNSPECIFIED SITE OF BREAST: ICD-10-CM

## 2024-11-14 LAB
ALBUMIN SERPL BCP-MCNC: 3.8 G/DL (ref 3.4–5)
ANION GAP SERPL CALC-SCNC: 10 MMOL/L
BASOPHILS # BLD AUTO: 0.02 X10*3/UL (ref 0–0.1)
BASOPHILS NFR BLD AUTO: 0.4 %
BUN SERPL-MCNC: 21 MG/DL (ref 6–23)
CALCIUM SERPL-MCNC: 9.1 MG/DL (ref 8.6–10.3)
CHLORIDE SERPL-SCNC: 99 MMOL/L (ref 98–107)
CO2 SERPL-SCNC: 29 MMOL/L (ref 21–32)
CREAT SERPL-MCNC: 0.88 MG/DL (ref 0.5–1.05)
EGFRCR SERPLBLD CKD-EPI 2021: 70 ML/MIN/1.73M*2
EOSINOPHIL # BLD AUTO: 0.23 X10*3/UL (ref 0–0.4)
EOSINOPHIL NFR BLD AUTO: 4.7 %
ERYTHROCYTE [DISTWIDTH] IN BLOOD BY AUTOMATED COUNT: 17 % (ref 11.5–14.5)
ERYTHROCYTE [DISTWIDTH] IN BLOOD BY AUTOMATED COUNT: 17 % (ref 11.5–14.5)
GLUCOSE BLD MANUAL STRIP-MCNC: 121 MG/DL (ref 74–99)
GLUCOSE BLD MANUAL STRIP-MCNC: 130 MG/DL (ref 74–99)
GLUCOSE BLD MANUAL STRIP-MCNC: 82 MG/DL (ref 74–99)
GLUCOSE BLD MANUAL STRIP-MCNC: 85 MG/DL (ref 74–99)
GLUCOSE SERPL-MCNC: 85 MG/DL (ref 74–99)
HCT VFR BLD AUTO: 36.2 % (ref 36–46)
HCT VFR BLD AUTO: 36.2 % (ref 36–46)
HGB BLD-MCNC: 11.2 G/DL (ref 12–16)
HGB BLD-MCNC: 11.2 G/DL (ref 12–16)
IMM GRANULOCYTES # BLD AUTO: 0.01 X10*3/UL (ref 0–0.5)
IMM GRANULOCYTES NFR BLD AUTO: 0.2 % (ref 0–0.9)
LYMPHOCYTES # BLD AUTO: 1.25 X10*3/UL (ref 0.8–3)
LYMPHOCYTES NFR BLD AUTO: 25.6 %
MAGNESIUM SERPL-MCNC: 1.99 MG/DL (ref 1.6–2.4)
MCH RBC QN AUTO: 27.1 PG (ref 26–34)
MCH RBC QN AUTO: 27.1 PG (ref 26–34)
MCHC RBC AUTO-ENTMCNC: 30.9 G/DL (ref 32–36)
MCHC RBC AUTO-ENTMCNC: 30.9 G/DL (ref 32–36)
MCV RBC AUTO: 88 FL (ref 80–100)
MCV RBC AUTO: 88 FL (ref 80–100)
MONOCYTES # BLD AUTO: 0.64 X10*3/UL (ref 0.05–0.8)
MONOCYTES NFR BLD AUTO: 13.1 %
NEUTROPHILS # BLD AUTO: 2.74 X10*3/UL (ref 1.6–5.5)
NEUTROPHILS NFR BLD AUTO: 56 %
NRBC BLD-RTO: 0 /100 WBCS (ref 0–0)
NRBC BLD-RTO: 0 /100 WBCS (ref 0–0)
PHOSPHATE SERPL-MCNC: 4 MG/DL (ref 2.5–4.9)
PLATELET # BLD AUTO: 222 X10*3/UL (ref 150–450)
PLATELET # BLD AUTO: 222 X10*3/UL (ref 150–450)
POTASSIUM SERPL-SCNC: 3.4 MMOL/L (ref 3.5–5.3)
RBC # BLD AUTO: 4.13 X10*6/UL (ref 4–5.2)
RBC # BLD AUTO: 4.13 X10*6/UL (ref 4–5.2)
SODIUM SERPL-SCNC: 135 MMOL/L (ref 136–145)
WBC # BLD AUTO: 5 X10*3/UL (ref 4.4–11.3)
WBC # BLD AUTO: 5 X10*3/UL (ref 4.4–11.3)

## 2024-11-14 PROCEDURE — 80069 RENAL FUNCTION PANEL: CPT

## 2024-11-14 PROCEDURE — 2500000002 HC RX 250 W HCPCS SELF ADMINISTERED DRUGS (ALT 637 FOR MEDICARE OP, ALT 636 FOR OP/ED)

## 2024-11-14 PROCEDURE — 99223 1ST HOSP IP/OBS HIGH 75: CPT

## 2024-11-14 PROCEDURE — 93971 EXTREMITY STUDY: CPT | Performed by: INTERNAL MEDICINE

## 2024-11-14 PROCEDURE — 2500000001 HC RX 250 WO HCPCS SELF ADMINISTERED DRUGS (ALT 637 FOR MEDICARE OP)

## 2024-11-14 PROCEDURE — 73030 X-RAY EXAM OF SHOULDER: CPT | Mod: RT

## 2024-11-14 PROCEDURE — 85027 COMPLETE CBC AUTOMATED: CPT

## 2024-11-14 PROCEDURE — 93971 EXTREMITY STUDY: CPT

## 2024-11-14 PROCEDURE — 73030 X-RAY EXAM OF SHOULDER: CPT | Mod: RIGHT SIDE | Performed by: RADIOLOGY

## 2024-11-14 PROCEDURE — 1200000002 HC GENERAL ROOM WITH TELEMETRY DAILY

## 2024-11-14 PROCEDURE — 2500000004 HC RX 250 GENERAL PHARMACY W/ HCPCS (ALT 636 FOR OP/ED)

## 2024-11-14 PROCEDURE — 83735 ASSAY OF MAGNESIUM: CPT

## 2024-11-14 PROCEDURE — 85025 COMPLETE CBC W/AUTO DIFF WBC: CPT

## 2024-11-14 PROCEDURE — 82947 ASSAY GLUCOSE BLOOD QUANT: CPT

## 2024-11-14 RX ORDER — POTASSIUM CHLORIDE 20 MEQ/1
40 TABLET, EXTENDED RELEASE ORAL ONCE
Status: COMPLETED | OUTPATIENT
Start: 2024-11-14 | End: 2024-11-14

## 2024-11-14 RX ORDER — OXYCODONE HYDROCHLORIDE 5 MG/1
5 TABLET ORAL ONCE
Status: COMPLETED | OUTPATIENT
Start: 2024-11-14 | End: 2024-11-14

## 2024-11-14 RX ADMIN — ROSUVASTATIN CALCIUM 5 MG: 5 TABLET, FILM COATED ORAL at 20:38

## 2024-11-14 RX ADMIN — DULOXETINE HYDROCHLORIDE 30 MG: 30 CAPSULE, DELAYED RELEASE ORAL at 08:37

## 2024-11-14 RX ADMIN — HYDROCHLOROTHIAZIDE 12.5 MG: 12.5 TABLET ORAL at 08:38

## 2024-11-14 RX ADMIN — CALCIUM 625 MG: 500 TABLET ORAL at 08:39

## 2024-11-14 RX ADMIN — GABAPENTIN 600 MG: 300 CAPSULE ORAL at 20:38

## 2024-11-14 RX ADMIN — OXYCODONE HYDROCHLORIDE 5 MG: 5 TABLET ORAL at 04:08

## 2024-11-14 RX ADMIN — GABAPENTIN 600 MG: 300 CAPSULE ORAL at 08:37

## 2024-11-14 RX ADMIN — TRAMADOL HYDROCHLORIDE 50 MG: 50 TABLET, COATED ORAL at 08:38

## 2024-11-14 RX ADMIN — PIPERACILLIN SODIUM AND TAZOBACTAM SODIUM 3.38 G: 3; .375 INJECTION, SOLUTION INTRAVENOUS at 00:37

## 2024-11-14 RX ADMIN — TRAMADOL HYDROCHLORIDE 100 MG: 50 TABLET, COATED ORAL at 20:39

## 2024-11-14 RX ADMIN — GABAPENTIN 600 MG: 300 CAPSULE ORAL at 15:20

## 2024-11-14 RX ADMIN — DULOXETINE HYDROCHLORIDE 30 MG: 30 CAPSULE, DELAYED RELEASE ORAL at 20:38

## 2024-11-14 RX ADMIN — POTASSIUM CHLORIDE 40 MEQ: 1500 TABLET, EXTENDED RELEASE ORAL at 08:38

## 2024-11-14 RX ADMIN — PIPERACILLIN SODIUM AND TAZOBACTAM SODIUM 3.38 G: 3; .375 INJECTION, SOLUTION INTRAVENOUS at 08:44

## 2024-11-14 RX ADMIN — LISINOPRIL 5 MG: 5 TABLET ORAL at 08:40

## 2024-11-14 RX ADMIN — APIXABAN 5 MG: 5 TABLET, FILM COATED ORAL at 20:39

## 2024-11-14 ASSESSMENT — COGNITIVE AND FUNCTIONAL STATUS - GENERAL
MOBILITY SCORE: 24
MOBILITY SCORE: 24
DAILY ACTIVITIY SCORE: 24
DAILY ACTIVITIY SCORE: 24

## 2024-11-14 ASSESSMENT — PAIN SCALES - GENERAL
PAINLEVEL_OUTOF10: 5 - MODERATE PAIN
PAINLEVEL_OUTOF10: 4
PAINLEVEL_OUTOF10: 5 - MODERATE PAIN
PAINLEVEL_OUTOF10: 7
PAINLEVEL_OUTOF10: 7
PAINLEVEL_OUTOF10: 4

## 2024-11-14 ASSESSMENT — PAIN DESCRIPTION - ORIENTATION
ORIENTATION: RIGHT
ORIENTATION: RIGHT

## 2024-11-14 ASSESSMENT — PAIN DESCRIPTION - LOCATION
LOCATION: SHOULDER
LOCATION: SHOULDER

## 2024-11-14 ASSESSMENT — PAIN - FUNCTIONAL ASSESSMENT: PAIN_FUNCTIONAL_ASSESSMENT: 0-10

## 2024-11-14 ASSESSMENT — PAIN SCALES - WONG BAKER: WONGBAKER_NUMERICALRESPONSE: NO HURT

## 2024-11-14 ASSESSMENT — ACTIVITIES OF DAILY LIVING (ADL): LACK_OF_TRANSPORTATION: NO

## 2024-11-14 NOTE — PROGRESS NOTES
Vancomycin Dosing by Pharmacy- Cessation of Therapy    Consult to pharmacy for vancomycin dosing has been discontinued by the prescriber, pharmacy will sign off at this time.    Please call pharmacy if there are further questions or re-enter a consult if vancomycin is resumed.     Miles Zee, GitaD

## 2024-11-14 NOTE — PROGRESS NOTES
11/14/24 1328   Discharge Planning   Assistance Needed cane   Type of Residence Private residence   Home or Post Acute Services None   Expected Discharge Disposition Home   Does the patient need discharge transport arranged? No   Financial Resource Strain   How hard is it for you to pay for the very basics like food, housing, medical care, and heating? Not hard   Housing Stability   In the last 12 months, was there a time when you were not able to pay the mortgage or rent on time? N   At any time in the past 12 months, were you homeless or living in a shelter (including now)? N   Transportation Needs   In the past 12 months, has lack of transportation kept you from medical appointments or from getting medications? no   In the past 12 months, has lack of transportation kept you from meetings, work, or from getting things needed for daily living? No     Met with patient at the bedside, confirmed demographics, insurance, and pcp is Dr. Olivarez. She currently is being treated for breast cancer with injections. She denies any needs for discharge. She lives alone, able to drive and take care of her needs. She plans to discharge home without any further needs. Her treatment plan is pending while medical is determining if the med port needs to be removed.  She states she is able to purchase her medications, takes as ordered, and received education. She will discharge  home pending any changes to the treatment plan. Transitional care will continue to monitor for discharge plans.

## 2024-11-14 NOTE — CARE PLAN
Problem: Pain - Adult  Goal: Verbalizes/displays adequate comfort level or baseline comfort level  Outcome: Progressing     Problem: Safety - Adult  Goal: Free from fall injury  Outcome: Progressing     Problem: Discharge Planning  Goal: Discharge to home or other facility with appropriate resources  Outcome: Progressing     Problem: Chronic Conditions and Co-morbidities  Goal: Patient's chronic conditions and co-morbidity symptoms are monitored and maintained or improved  Outcome: Progressing     Problem: Infection related to problem list condition  Goal: Infection will resolve through treatment  Outcome: Progressing     Problem: Fall/Injury  Goal: Not fall by end of shift  Outcome: Progressing  Goal: Be free from injury by end of the shift  Outcome: Progressing  Goal: Verbalize understanding of personal risk factors for fall in the hospital  Outcome: Progressing  Goal: Verbalize understanding of risk factor reduction measures to prevent injury from fall in the home  Outcome: Progressing  Goal: Use assistive devices by end of the shift  Outcome: Progressing  Goal: Pace activities to prevent fatigue by end of the shift  Outcome: Progressing   The patient's goals for the shift include      The clinical goals for the shift include pt will remain hemodynamically stable

## 2024-11-14 NOTE — TELEPHONE ENCOUNTER
Called patient to check-in on recent hospitalization.  She was admitted for port pain.  Port site infection has been ruled out.  She states that her port will be removed while she is inpatient.  Patient understands it is recommended she should continue anticoagulation for 1 additional month after removal of her port.  She reports that overall she is feeling well and anticipates discharge this weekend.  We will work with our scheduling team to reschedule her Herceptin for next week.  Patient was appreciative of the call, no further needs at this time.  Follow-up appointment with me in place.

## 2024-11-14 NOTE — PROGRESS NOTES
Raiza Nguyễn is a 73 y.o. female on day 1 of admission presenting with Infection of tunnel of tunneled central venous catheter (CVC).    Subjective   NAEON. HDS on RA.    Patient seen and evaluated at bedside. She reports that she is still having some pain around her Mediport. Denies any fevers, chills, chest pain, shortness of breath, or other acute complaints at this time.    Objective     Last Recorded Vitals  /71 (BP Location: Right arm, Patient Position: Sitting)   Pulse 91   Temp 36.7 °C (98.1 °F) (Temporal)   Resp 18   Wt 80.3 kg (177 lb)   SpO2 96%   Intake/Output last 3 Shifts:    Intake/Output Summary (Last 24 hours) at 11/14/2024 0709  Last data filed at 11/14/2024 0342  Gross per 24 hour   Intake 50 ml   Output --   Net 50 ml     Admission Weight  Weight: 79.8 kg (176 lb) (11/13/24 1316)    Daily Weight  11/13/24 : 80.3 kg (177 lb)    Image Results  XR chest 1 view  Narrative: Interpreted By:  Devin Sierra,   STUDY:  XR CHEST 1 VIEW      INDICATION:  Signs/Symptoms:Rule out lung abcess.      COMPARISON:  April 25, 2008      ACCESSION NUMBER(S):  [id number]      ORDERING CLINICIAN:  REMBERTO SUMMERS      FINDINGS:  No consolidation, effusion, edema, or pneumothorax. Heart size within  normal limits. MediPort catheter.          Impression: No evidence of acute intrathoracic abnormality.      Signed by: Devin Sierra 11/13/2024 3:17 PM  Dictation workstation:   [id number]    Physical Exam  Constitutional:       General: She is not in acute distress.     Appearance: She is obese. She is not ill-appearing.   HENT:      Head: Normocephalic and atraumatic.      Mouth/Throat:      Mouth: Mucous membranes are moist.      Pharynx: No posterior oropharyngeal erythema.   Eyes:      Extraocular Movements: Extraocular movements intact.   Cardiovascular:      Rate and Rhythm: Normal rate and regular rhythm.      Pulses: Normal pulses.      Heart sounds: No murmur heard.     No gallop.   Pulmonary:       Effort: Pulmonary effort is normal. No respiratory distress.      Breath sounds: No wheezing or rales.   Abdominal:      General: Abdomen is flat. Bowel sounds are normal. There is no distension.      Palpations: Abdomen is soft.      Tenderness: There is no abdominal tenderness. There is no guarding.   Musculoskeletal:         General: Tenderness (Surrounding mediport) present. No swelling.      Cervical back: Neck supple. Tenderness (Mild right sided) present.   Skin:     General: Skin is warm and dry.      Comments: Mediport appears clean without obvious surrounding erythema   Neurological:      General: No focal deficit present.      Mental Status: She is alert and oriented to person, place, and time.   Psychiatric:         Mood and Affect: Mood normal.     Relevant Results  Scheduled medications  [Held by provider] apixaban, 5 mg, oral, BID  calcium carbonate, 625 mg, oral, Daily  DULoxetine, 30 mg, oral, BID  gabapentin, 600 mg, oral, TID  hydroCHLOROthiazide, 12.5 mg, oral, Daily  insulin lispro, 0-10 Units, subcutaneous, TID AC  levothyroxine, 125 mcg, oral, Daily  lidocaine, 1 patch, transdermal, Daily  lisinopril, 5 mg, oral, Daily  oxygen, , inhalation, Continuous - Inhalation  piperacillin-tazobactam, 3.375 g, intravenous, q8h  polyethylene glycol, 17 g, oral, Daily  potassium chloride CR, 40 mEq, oral, Once  rosuvastatin, 5 mg, oral, Nightly  traMADol, 100 mg, oral, Nightly  traMADol, 50 mg, oral, Daily  vancomycin, 1,500 mg, intravenous, Once    Continuous medications     PRN medications  PRN medications: acetaminophen, dextrose, dextrose, glucagon, glucagon  Results for orders placed or performed during the hospital encounter of 11/13/24 (from the past 24 hours)   CBC and Auto Differential   Result Value Ref Range    WBC 5.8 4.4 - 11.3 x10*3/uL    nRBC 0.0 0.0 - 0.0 /100 WBCs    RBC 4.38 4.00 - 5.20 x10*6/uL    Hemoglobin 12.1 12.0 - 16.0 g/dL    Hematocrit 38.9 36.0 - 46.0 %    MCV 89 80 - 100 fL     MCH 27.6 26.0 - 34.0 pg    MCHC 31.1 (L) 32.0 - 36.0 g/dL    RDW 17.1 (H) 11.5 - 14.5 %    Platelets 256 150 - 450 x10*3/uL    Neutrophils % 60.8 40.0 - 80.0 %    Immature Granulocytes %, Automated 0.2 0.0 - 0.9 %    Lymphocytes % 23.4 13.0 - 44.0 %    Monocytes % 11.6 2.0 - 10.0 %    Eosinophils % 3.5 0.0 - 6.0 %    Basophils % 0.5 0.0 - 2.0 %    Neutrophils Absolute 3.51 1.60 - 5.50 x10*3/uL    Immature Granulocytes Absolute, Automated 0.01 0.00 - 0.50 x10*3/uL    Lymphocytes Absolute 1.35 0.80 - 3.00 x10*3/uL    Monocytes Absolute 0.67 0.05 - 0.80 x10*3/uL    Eosinophils Absolute 0.20 0.00 - 0.40 x10*3/uL    Basophils Absolute 0.03 0.00 - 0.10 x10*3/uL   Comprehensive metabolic panel   Result Value Ref Range    Glucose 116 (H) 74 - 99 mg/dL    Sodium 134 (L) 136 - 145 mmol/L    Potassium 3.8 3.5 - 5.3 mmol/L    Chloride 95 (L) 98 - 107 mmol/L    Bicarbonate 32 21 - 32 mmol/L    Anion Gap 11 10 - 20 mmol/L    Urea Nitrogen 22 6 - 23 mg/dL    Creatinine 0.73 0.50 - 1.05 mg/dL    eGFR 88 >60 mL/min/1.73m*2    Calcium 9.7 8.6 - 10.3 mg/dL    Albumin 4.1 3.4 - 5.0 g/dL    Alkaline Phosphatase 63 33 - 136 U/L    Total Protein 6.7 6.4 - 8.2 g/dL    AST 24 9 - 39 U/L    Bilirubin, Total 0.3 0.0 - 1.2 mg/dL    ALT 19 7 - 45 U/L   Lactate   Result Value Ref Range    Lactate 1.2 0.4 - 2.0 mmol/L   Blood Culture    Specimen: Mediport; Blood culture   Result Value Ref Range    Blood Culture Loaded on Instrument - Culture in progress    Blood Culture    Specimen: Peripheral Venipuncture; Blood culture   Result Value Ref Range    Blood Culture Loaded on Instrument - Culture in progress    POCT GLUCOSE   Result Value Ref Range    POCT Glucose 74 74 - 99 mg/dL   POCT GLUCOSE   Result Value Ref Range    POCT Glucose 107 (H) 74 - 99 mg/dL   CBC   Result Value Ref Range    WBC 5.0 4.4 - 11.3 x10*3/uL    nRBC 0.0 0.0 - 0.0 /100 WBCs    RBC 4.13 4.00 - 5.20 x10*6/uL    Hemoglobin 11.2 (L) 12.0 - 16.0 g/dL    Hematocrit 36.2 36.0 -  46.0 %    MCV 88 80 - 100 fL    MCH 27.1 26.0 - 34.0 pg    MCHC 30.9 (L) 32.0 - 36.0 g/dL    RDW 17.0 (H) 11.5 - 14.5 %    Platelets 222 150 - 450 x10*3/uL   Magnesium   Result Value Ref Range    Magnesium 1.99 1.60 - 2.40 mg/dL   Renal Function Panel   Result Value Ref Range    Glucose 85 74 - 99 mg/dL    Sodium 135 (L) 136 - 145 mmol/L    Potassium 3.4 (L) 3.5 - 5.3 mmol/L    Chloride 87 (L) 98 - 107 mmol/L    Bicarbonate 29 21 - 32 mmol/L    Anion Gap 22 mmol/L    Urea Nitrogen 21 6 - 23 mg/dL    Creatinine 0.88 0.50 - 1.05 mg/dL    eGFR 70 >60 mL/min/1.73m*2    Calcium 9.1 8.6 - 10.3 mg/dL    Phosphorus 4.0 2.5 - 4.9 mg/dL    Albumin 3.8 3.4 - 5.0 g/dL   POCT GLUCOSE   Result Value Ref Range    POCT Glucose 85 74 - 99 mg/dL     *Note: Due to a large number of results and/or encounters for the requested time period, some results have not been displayed. A complete set of results can be found in Results Review.     XR chest 1 view    Result Date: 11/13/2024  Interpreted By:  Devin Sierra, STUDY: XR CHEST 1 VIEW   INDICATION: Signs/Symptoms:Rule out lung abcess.   COMPARISON: April 25, 2008   ACCESSION NUMBER(S): [id number]   ORDERING CLINICIAN: REMBERTO SUMMERS   FINDINGS: No consolidation, effusion, edema, or pneumothorax. Heart size within normal limits. MediPort catheter.         No evidence of acute intrathoracic abnormality.   Signed by: Devin Sierra 11/13/2024 3:17 PM Dictation workstation:   [id number]     Assessment/Plan   Assessment & Plan  Infection of tunnel of tunneled central venous catheter (CVC)    Patient is a 74 y/o F with PMHx significant for T2DM, HTN, HLD, CAD, hypothyroidism, BOLIVAR not on CPAP, breast cancer s/p paclitaxel plus trastuzumab 7/15/2024-9/5/2024 and trastuzumab 21 cycles, hx of known occlusive thrombus in the right internal jugular and right cephalic vein, who initially presented with pain around her mediport site. On initial evaluation the patient was HDS on RA, and labs were  largely unremarkable. The patient was started on Vancomycin and Zosyn then admitted to the general medicine service.    # Right upper chest pain 2/2 ?port infection vs recurrent port clots (on Eliquis)  # Known occlusive thrombus in the right internal jugular vein  # Known thrombotic occlusion of right cephalic vein  -CXR negative  -Blood cultures 11/13 pending  -Low suspicion for infection, discontinue Vancomycin and Zosyn and monitor fever curve  -Upper extremity duplex ordered  -Holding Eliquis for IR evaluation, will resume after removal of tunneled line. If port removal cannot be done expeditiously, may need to resume therapeutic anticoagulation while awaiting removal  -IR consulted for removal of tunneled line, appreciate recs    # Right shoulder pain  May be referred pain from Mediport or chronic. Has noted history of bilateral shoulder pain with imaging in 2020 that suggested chronic rotator cuff tear.   -Right shoulder XR showing severe degenerative changes with chronic complete rotator cuff tear  -May benefit from outpatient follow up with orthopedics and physical therapy    # NIDDM2  -Holding home metformin  -SSI #2 with hypoglycemia protocol     # BOLIVAR  Patient reports she intolerance to CPAP, will attempt reintroduction.  -CPAP ordered     Chronic Conditions:  # HTN - Lisinopril, hydrochlorothiazide  # HLD - Crestor  # CAD  # Hypothyroidism - Levothyroxine  # Hx of breast CA s/p chemo  -Continue home medications and encourage CPAP compliance    IVF: None  Diet: NPO for IR evaluation, cardiac diet after clearance  ABx: None  Consults: IR  DVT: SCDs, holding home Eliquis until after Mediport removal    Dispo: Plan of IR removal of Mediport, anticipate discharge in the next 24-48 hours.    The assessment and plan were discussed with the attending physician,  Stefano Bruno D.O.  Internal Medicine PGY-2

## 2024-11-15 ENCOUNTER — APPOINTMENT (OUTPATIENT)
Dept: RADIOLOGY | Facility: CLINIC | Age: 74
End: 2024-11-15
Payer: MEDICARE

## 2024-11-15 ENCOUNTER — APPOINTMENT (OUTPATIENT)
Dept: RADIOLOGY | Facility: HOSPITAL | Age: 74
End: 2024-11-15
Payer: MEDICARE

## 2024-11-15 LAB
ALBUMIN SERPL BCP-MCNC: 3.9 G/DL (ref 3.4–5)
ANION GAP SERPL CALC-SCNC: 12 MMOL/L (ref 10–20)
BUN SERPL-MCNC: 20 MG/DL (ref 6–23)
CALCIUM SERPL-MCNC: 9 MG/DL (ref 8.6–10.3)
CHLORIDE SERPL-SCNC: 99 MMOL/L (ref 98–107)
CO2 SERPL-SCNC: 29 MMOL/L (ref 21–32)
CREAT SERPL-MCNC: 0.75 MG/DL (ref 0.5–1.05)
EGFRCR SERPLBLD CKD-EPI 2021: 85 ML/MIN/1.73M*2
ERYTHROCYTE [DISTWIDTH] IN BLOOD BY AUTOMATED COUNT: 16.9 % (ref 11.5–14.5)
GLUCOSE BLD MANUAL STRIP-MCNC: 122 MG/DL (ref 74–99)
GLUCOSE BLD MANUAL STRIP-MCNC: 77 MG/DL (ref 74–99)
GLUCOSE BLD MANUAL STRIP-MCNC: 82 MG/DL (ref 74–99)
GLUCOSE BLD MANUAL STRIP-MCNC: 85 MG/DL (ref 74–99)
GLUCOSE SERPL-MCNC: 86 MG/DL (ref 74–99)
HCT VFR BLD AUTO: 38.5 % (ref 36–46)
HGB BLD-MCNC: 11.8 G/DL (ref 12–16)
INR PPP: 1.3 (ref 0.9–1.1)
MAGNESIUM SERPL-MCNC: 2.06 MG/DL (ref 1.6–2.4)
MCH RBC QN AUTO: 27.2 PG (ref 26–34)
MCHC RBC AUTO-ENTMCNC: 30.6 G/DL (ref 32–36)
MCV RBC AUTO: 89 FL (ref 80–100)
NRBC BLD-RTO: 0 /100 WBCS (ref 0–0)
PHOSPHATE SERPL-MCNC: 3.7 MG/DL (ref 2.5–4.9)
PLATELET # BLD AUTO: 226 X10*3/UL (ref 150–450)
POTASSIUM SERPL-SCNC: 3.7 MMOL/L (ref 3.5–5.3)
PROTHROMBIN TIME: 15 SECONDS (ref 9.8–12.8)
RBC # BLD AUTO: 4.34 X10*6/UL (ref 4–5.2)
SODIUM SERPL-SCNC: 136 MMOL/L (ref 136–145)
WBC # BLD AUTO: 4.6 X10*3/UL (ref 4.4–11.3)

## 2024-11-15 PROCEDURE — 2500000002 HC RX 250 W HCPCS SELF ADMINISTERED DRUGS (ALT 637 FOR MEDICARE OP, ALT 636 FOR OP/ED)

## 2024-11-15 PROCEDURE — 80069 RENAL FUNCTION PANEL: CPT

## 2024-11-15 PROCEDURE — 85610 PROTHROMBIN TIME: CPT

## 2024-11-15 PROCEDURE — 2500000005 HC RX 250 GENERAL PHARMACY W/O HCPCS: Performed by: RADIOLOGY

## 2024-11-15 PROCEDURE — 0JPT0XZ REMOVAL OF TUNNELED VASCULAR ACCESS DEVICE FROM TRUNK SUBCUTANEOUS TISSUE AND FASCIA, OPEN APPROACH: ICD-10-PCS | Performed by: RADIOLOGY

## 2024-11-15 PROCEDURE — 2500000001 HC RX 250 WO HCPCS SELF ADMINISTERED DRUGS (ALT 637 FOR MEDICARE OP)

## 2024-11-15 PROCEDURE — 36590 REMOVAL TUNNELED CV CATH: CPT | Mod: RT | Performed by: RADIOLOGY

## 2024-11-15 PROCEDURE — 82947 ASSAY GLUCOSE BLOOD QUANT: CPT

## 2024-11-15 PROCEDURE — 99233 SBSQ HOSP IP/OBS HIGH 50: CPT

## 2024-11-15 PROCEDURE — 99153 MOD SED SAME PHYS/QHP EA: CPT | Performed by: RADIOLOGY

## 2024-11-15 PROCEDURE — 99152 MOD SED SAME PHYS/QHP 5/>YRS: CPT | Performed by: RADIOLOGY

## 2024-11-15 PROCEDURE — 2720000007 HC OR 272 NO HCPCS

## 2024-11-15 PROCEDURE — 83735 ASSAY OF MAGNESIUM: CPT

## 2024-11-15 PROCEDURE — 1200000002 HC GENERAL ROOM WITH TELEMETRY DAILY

## 2024-11-15 PROCEDURE — 99152 MOD SED SAME PHYS/QHP 5/>YRS: CPT

## 2024-11-15 PROCEDURE — 85027 COMPLETE CBC AUTOMATED: CPT

## 2024-11-15 PROCEDURE — 36590 REMOVAL TUNNELED CV CATH: CPT | Performed by: RADIOLOGY

## 2024-11-15 PROCEDURE — 05PY03Z REMOVAL OF INFUSION DEVICE FROM UPPER VEIN, OPEN APPROACH: ICD-10-PCS | Performed by: RADIOLOGY

## 2024-11-15 PROCEDURE — 2500000004 HC RX 250 GENERAL PHARMACY W/ HCPCS (ALT 636 FOR OP/ED): Performed by: RADIOLOGY

## 2024-11-15 RX ORDER — LIDOCAINE HYDROCHLORIDE AND EPINEPHRINE 10; 10 UG/ML; MG/ML
INJECTION, SOLUTION INFILTRATION; PERINEURAL
Status: COMPLETED | OUTPATIENT
Start: 2024-11-15 | End: 2024-11-15

## 2024-11-15 RX ORDER — MIDAZOLAM HYDROCHLORIDE 1 MG/ML
INJECTION, SOLUTION INTRAMUSCULAR; INTRAVENOUS
Status: COMPLETED | OUTPATIENT
Start: 2024-11-15 | End: 2024-11-15

## 2024-11-15 RX ORDER — FENTANYL CITRATE 50 UG/ML
INJECTION, SOLUTION INTRAMUSCULAR; INTRAVENOUS
Status: COMPLETED | OUTPATIENT
Start: 2024-11-15 | End: 2024-11-15

## 2024-11-15 RX ADMIN — GABAPENTIN 600 MG: 300 CAPSULE ORAL at 14:59

## 2024-11-15 RX ADMIN — ROSUVASTATIN CALCIUM 5 MG: 5 TABLET, FILM COATED ORAL at 20:13

## 2024-11-15 RX ADMIN — DULOXETINE HYDROCHLORIDE 30 MG: 30 CAPSULE, DELAYED RELEASE ORAL at 20:13

## 2024-11-15 RX ADMIN — APIXABAN 5 MG: 5 TABLET, FILM COATED ORAL at 08:59

## 2024-11-15 RX ADMIN — Medication 3 L/MIN: at 08:20

## 2024-11-15 RX ADMIN — TRAMADOL HYDROCHLORIDE 100 MG: 50 TABLET, COATED ORAL at 20:13

## 2024-11-15 RX ADMIN — MIDAZOLAM 1 MG: 1 INJECTION INTRAMUSCULAR; INTRAVENOUS at 08:25

## 2024-11-15 RX ADMIN — GABAPENTIN 600 MG: 300 CAPSULE ORAL at 09:00

## 2024-11-15 RX ADMIN — FENTANYL CITRATE 50 MCG: 50 INJECTION, SOLUTION INTRAMUSCULAR; INTRAVENOUS at 08:26

## 2024-11-15 RX ADMIN — DULOXETINE HYDROCHLORIDE 30 MG: 30 CAPSULE, DELAYED RELEASE ORAL at 08:59

## 2024-11-15 RX ADMIN — LEVOTHYROXINE SODIUM 125 MCG: 125 TABLET ORAL at 05:29

## 2024-11-15 RX ADMIN — CALCIUM 625 MG: 500 TABLET ORAL at 09:00

## 2024-11-15 RX ADMIN — LIDOCAINE HYDROCHLORIDE,EPINEPHRINE BITARTRATE 8 ML: 10; .01 INJECTION, SOLUTION INFILTRATION; PERINEURAL at 08:26

## 2024-11-15 RX ADMIN — HYDROCHLOROTHIAZIDE 12.5 MG: 12.5 TABLET ORAL at 09:00

## 2024-11-15 RX ADMIN — LISINOPRIL 5 MG: 5 TABLET ORAL at 09:00

## 2024-11-15 RX ADMIN — ACETAMINOPHEN 325MG 650 MG: 325 TABLET ORAL at 11:34

## 2024-11-15 RX ADMIN — TRAMADOL HYDROCHLORIDE 50 MG: 50 TABLET, COATED ORAL at 09:00

## 2024-11-15 RX ADMIN — APIXABAN 5 MG: 5 TABLET, FILM COATED ORAL at 20:13

## 2024-11-15 RX ADMIN — GABAPENTIN 600 MG: 300 CAPSULE ORAL at 20:14

## 2024-11-15 SDOH — ECONOMIC STABILITY: HOUSING INSECURITY

## 2024-11-15 SDOH — ECONOMIC STABILITY: TRANSPORTATION INSECURITY

## 2024-11-15 SDOH — ECONOMIC STABILITY: GENERAL

## 2024-11-15 SDOH — ECONOMIC STABILITY: FOOD INSECURITY

## 2024-11-15 ASSESSMENT — COGNITIVE AND FUNCTIONAL STATUS - GENERAL
MOBILITY SCORE: 23
CLIMB 3 TO 5 STEPS WITH RAILING: A LITTLE
MOBILITY SCORE: 24
DAILY ACTIVITIY SCORE: 24
DAILY ACTIVITIY SCORE: 24

## 2024-11-15 ASSESSMENT — PAIN SCALES - GENERAL
PAINLEVEL_OUTOF10: 3
PAINLEVEL_OUTOF10: 0 - NO PAIN
PAINLEVEL_OUTOF10: 3
PAINLEVEL_OUTOF10: 0 - NO PAIN
PAINLEVEL_OUTOF10: 2
PAINLEVEL_OUTOF10: 1
PAINLEVEL_OUTOF10: 6
PAINLEVEL_OUTOF10: 0 - NO PAIN

## 2024-11-15 ASSESSMENT — PAIN SCALES - WONG BAKER: WONGBAKER_NUMERICALRESPONSE: HURTS LITTLE BIT

## 2024-11-15 ASSESSMENT — PAIN DESCRIPTION - ORIENTATION: ORIENTATION: RIGHT

## 2024-11-15 NOTE — POST-PROCEDURE NOTE
Interventional Radiology Brief Postprocedure Note    Attending: Troy Angulo MD    Assistant:   Staff Role   Troy Angulo MD Radiologist   Leander Guerrero, RT Radiology Technologist   Fadumo Pino, RN Radiology Nurse       Diagnosis:   1. Infection of tunnel of tunneled central venous catheter (CVC)        2. Encounter for removal of tunneled central venous catheter (CVC) with port        3. 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 Other specified soft tissue disorders  Vascular US Upper Extremity Venous Duplex Right          Description of procedure: right chest port removal    Timeout:  Yes    Procedure Area: Procedure Area     Anesthesia:   Conscious Sedation    Complications: None    Estimated Blood Loss: minimal    Medications (Filter: Administrations occurring from 0838 to 0838 on 11/15/24) As of 11/15/24 0838      None          No specimens collected      See detailed result report with images in PACS.    The patient tolerated the procedure well without incident or complication and is in stable condition.

## 2024-11-15 NOTE — CARE PLAN
Problem: Pain - Adult  Goal: Verbalizes/displays adequate comfort level or baseline comfort level  Outcome: Progressing     Problem: Safety - Adult  Goal: Free from fall injury  Outcome: Progressing     Problem: Discharge Planning  Goal: Discharge to home or other facility with appropriate resources  Outcome: Progressing     Problem: Chronic Conditions and Co-morbidities  Goal: Patient's chronic conditions and co-morbidity symptoms are monitored and maintained or improved  Outcome: Progressing     Problem: Infection related to problem list condition  Goal: Infection will resolve through treatment  Outcome: Progressing     Problem: Fall/Injury  Goal: Not fall by end of shift  Outcome: Progressing  Goal: Be free from injury by end of the shift  Outcome: Progressing  Goal: Verbalize understanding of personal risk factors for fall in the hospital  Outcome: Progressing  Goal: Verbalize understanding of risk factor reduction measures to prevent injury from fall in the home  Outcome: Progressing  Goal: Use assistive devices by end of the shift  Outcome: Progressing  Goal: Pace activities to prevent fatigue by end of the shift  Outcome: Progressing     Problem: Pain  Goal: Takes deep breaths with improved pain control throughout the shift  Outcome: Progressing  Goal: Turns in bed with improved pain control throughout the shift  Outcome: Progressing  Goal: Walks with improved pain control throughout the shift  Outcome: Progressing  Goal: Performs ADL's with improved pain control throughout shift  Outcome: Progressing  Goal: Participates in PT with improved pain control throughout the shift  Outcome: Progressing  Goal: Free from opioid side effects throughout the shift  Outcome: Progressing  Goal: Free from acute confusion related to pain meds throughout the shift  Outcome: Progressing     Problem: Skin  Goal: Participates in plan/prevention/treatment measures  Outcome: Progressing  Goal: Prevent/manage excess  moisture  Outcome: Progressing  Goal: Prevent/minimize sheer/friction injuries  Outcome: Progressing  Goal: Promote/optimize nutrition  Outcome: Progressing  Goal: Promote skin healing  Outcome: Progressing   The patient's goals for the shift include      The clinical goals for the shift include pt will reamin afebrile t/o the shift

## 2024-11-15 NOTE — DISCHARGE INSTRUCTIONS
Please follow-up with your primary care provider within 7 days for hospital follow-up.  You will also need to follow-up with hematology/oncology for further management, and we recommend following up with orthopedics for your right shoulder pain.  Please call to make these appointments if they have not already been made.    Please resume taking your Eliquis. No medication changes, but we recommend discussing your hydrochlorothiazide with your primary care provider as this medication can potentially cause your sodium levels to decrease. Please continue taking your medications as prescribed.    If you have any new or worsening symptoms, seek medical attention.    Thank you for allowing us to participate in your medical care!    -[facility] inpatient medicine teaching service

## 2024-11-15 NOTE — POST-PROCEDURE NOTE
Interventional Radiology Brief Postprocedure Note    Attending: Troy Angulo MD    Assistant: none    Diagnosis: s/p port placement.  Port site pain    Description of procedure: Successful removal of right chest mediport     Anesthesia:  MAC Moderate    Complications: None    Estimated Blood Loss: none    Medications  As of 11/15/24 0837      piperacillin-tazobactam (Zosyn) 3.375 g in dextrose (iso) IV 50 mL (g) Total dose:  10.125 g Dosing weight:  79.8      Date/Time Rate/Dose/Volume Action       11/13/24  1445 3.375 g (over 30 min) New Bag      1520  (over 30 min) Stopped     11/14/24  0037 3.375 g (over 240 min) New Bag      0342 50 mL Stopped      0844 3.375 g (over 240 min) New Bag      0922 50 mL       1244  (over 240 min) Stopped               vancomycin (Vancocin) 1,500 mg in sodium chloride 0.9%  mL (mL/hr) Total volume:  Not documented* Dosing weight:  79.8   *Total volume has not been documented. View each administration to see the amount administered.     Date/Time Rate/Dose/Volume Action       11/13/24  1516 1,500 mg - 333.3 mL/hr (over 90 min) New Bag      1833  (over 90 min) Stopped               polyethylene glycol (Glycolax, Miralax) packet 17 g (g) Total dose:  0 g* Dosing weight:  79.8   *Administration not included in total     Date/Time Rate/Dose/Volume Action       11/13/24  2221 *17 g Missed     11/14/24  0856 *17 g Missed               insulin lispro injection 0-10 Units (Units) Total dose:  Cannot be calculated* Dosing weight:  79.8   *Administration dose not documented     Date/Time Rate/Dose/Volume Action       11/14/24  0751 *Not included in total Missed      1200 *Not included in total Missed      1610 *Not included in total Missed     11/15/24  0809 *Not included in total Missed               apixaban (Eliquis) tablet 5 mg (mg) Total dose:  5 mg* Dosing weight:  79.8   *Administration not included in total     Date/Time Rate/Dose/Volume Action       11/13/24  1955 *Not  included in total Held by provider      2100 *Not included in total Automatically Held     11/14/24  0900 *5 mg Missed      1316 *Not included in total Unheld by provider      2039 5 mg Given               calcium carbonate (Oscal) tablet 625 mg (mg) Total dose:  625 mg Dosing weight:  79.8      Date/Time Rate/Dose/Volume Action       11/14/24 0839 625 mg Given               DULoxetine (Cymbalta) DR capsule 30 mg (mg) Total dose:  90 mg Dosing weight:  79.8      Date/Time Rate/Dose/Volume Action       11/13/24 2216 30 mg Given     11/14/24 0837 30 mg Given      2038 30 mg Given               gabapentin (Neurontin) capsule 600 mg (mg) Total dose:  2,400 mg Dosing weight:  79.8      Date/Time Rate/Dose/Volume Action       11/13/24 2216 600 mg Given     11/14/24 0837 600 mg Given      1520 600 mg Given      2038 600 mg Given               hydroCHLOROthiazide (Microzide) tablet 12.5 mg (mg) Total dose:  12.5 mg Dosing weight:  79.8      Date/Time Rate/Dose/Volume Action       11/14/24 0838 12.5 mg Given               levothyroxine (Synthroid, Levoxyl) tablet 125 mcg (mcg) Total dose:  125 mcg*   *Administration not included in total     Date/Time Rate/Dose/Volume Action       11/14/24  0538 *125 mcg Missed     11/15/24  0529 125 mcg Given               lisinopril tablet 5 mg (mg) Total dose:  5 mg Dosing weight:  79.8      Date/Time Rate/Dose/Volume Action       11/14/24  0840 5 mg Given               rosuvastatin (Crestor) tablet 5 mg (mg) Total dose:  10 mg      Date/Time Rate/Dose/Volume Action       11/13/24 2216 5 mg Given     11/14/24 2038 5 mg Given               traMADol (Ultram) tablet 50 mg (mg) Total dose:  50 mg Dosing weight:  79.8      Date/Time Rate/Dose/Volume Action       11/14/24 0838 50 mg Given               traMADol (Ultram) tablet 100 mg (mg) Total dose:  200 mg Dosing weight:  79.8      Date/Time Rate/Dose/Volume Action       11/13/24 2216 100 mg Given     11/14/24 2039 100 mg Given                oxygen (O2) therapy Total dose:  Cannot be calculated Dosing weight:  79.8      Date/Time Rate/Dose/Volume Action       11/14/24  0856  Stopped               oxygen (O2) therapy (L/min) Total volume:  Not documented*   *Total volume has not been documented. View each administration to see the amount administered.     Date/Time Rate/Dose/Volume Action       11/15/24  0820 3 L/min - 180,000 mL/hr New Bag               acetaminophen (Tylenol) tablet 650 mg (mg) Total dose:  650 mg Dosing weight:  80.3      Date/Time Rate/Dose/Volume Action       11/13/24  2223 650 mg Given               lidocaine 4 % patch 1 patch (patch) Total dose:  1 patch Dosing weight:  80.3      Date/Time Rate/Dose/Volume Action       11/13/24  2224 1 patch (over 720 min) Medication Applied     11/14/24  1028  (over 720 min) Medication Removed               oxyCODONE (Roxicodone) immediate release tablet 5 mg (mg) Total dose:  5 mg Dosing weight:  80.3      Date/Time Rate/Dose/Volume Action       11/14/24  0408 5 mg Given               potassium chloride CR (Klor-Con M20) ER tablet 40 mEq (mEq) Total dose:  40 mEq Dosing weight:  80.3      Date/Time Rate/Dose/Volume Action       11/14/24  0838 40 mEq Given               midazolam (Versed) injection (mg) Total dose:  1 mg      Date/Time Rate/Dose/Volume Action       11/15/24  0825 1 mg Given               fentaNYL PF (Sublimaze) injection (mcg) Total dose:  50 mcg      Date/Time Rate/Dose/Volume Action       11/15/24  0826 50 mcg Given               lidocaine-epinephrine (Xylocaine W/EPI) 1 %-1:100,000 injection (mL) Total volume:  8 mL      Date/Time Rate/Dose/Volume Action       11/15/24  0826 8 mL Given                   No specimens collected      See detailed result report with images in PACS.    The patient tolerated the procedure well without incident or complication and is in stable condition.

## 2024-11-15 NOTE — CARE PLAN
The patient's goals for the shift include      The clinical goals for the shift include pt will remain hemodynamically stable throughout the shift      Problem: Fall/Injury  Goal: Be free from injury by end of the shift  Outcome: Progressing     Problem: Skin  Goal: Participates in plan/prevention/treatment measures  Flowsheets (Taken 11/14/2024 2145)  Participates in plan/prevention/treatment measures:   Discuss with provider PT/OT consult   Elevate heels   Increase activity/out of bed for meals

## 2024-11-15 NOTE — PRE-PROCEDURE NOTE
Interventional Radiology Preprocedure Note    Indication for procedure: The primary encounter diagnosis was Infection of tunnel of tunneled central venous catheter (CVC). 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    Relevant review of systems: NA    Relevant Labs:   Lab Results   Component Value Date    CREATININE 0.75 11/15/2024    EGFR 85 11/15/2024    INR 1.3 (H) 11/15/2024    PROTIME 15.0 (H) 11/15/2024       Planned Sedation/Anesthesia: Moderate    Airway assessment: normal    Directed physical examination:    RRR, lungs CTA-B    Mallampati: III (soft and hard palate and base of uvula visible)    ASA Score: ASA 2 - Patient with mild systemic disease with no functional limitations    Benefits, risks and alternatives of procedure and planned sedation have been discussed with the patient and/or their representative. All questions answered and they agree to proceed.

## 2024-11-15 NOTE — PROGRESS NOTES
Raiza Nguyễn is a 73 y.o. female on day 2 of admission presenting with Infection of tunnel of tunneled central venous catheter (CVC).    Subjective   NAEON. HDS on RA.    Patient seen and evaluated at bedside. Tolerated removal of mediport earlier this morning. Notes improved pain around her right shoulder/upper chest. Continues to deny any fevers, chills, chest pain, shortness of breath, or other acute complaints at this time.    Objective     Last Recorded Vitals  /67   Pulse 86   Temp 35.6 °C (96.1 °F) (Temporal)   Resp 23   Wt 80.3 kg (177 lb)   SpO2 97%   Intake/Output last 3 Shifts:  No intake or output data in the 24 hours ending 11/15/24 1018    Admission Weight  Weight: 79.8 kg (176 lb) (11/13/24 1316)    Daily Weight  11/13/24 : 80.3 kg (177 lb)    Image Results  Vascular US Upper Extremity Venous Duplex Right              [facility]  [address] [place] [address]      Tel [phone] Fax [phone]       Vascular Lab Report     VASC US UPPER EXTREMITY VENOUS DUPLEX RIGHT    Patient Name:      RAIZA NGUYỄN     Reading Physician:  [id number] Carina Jones MD, RPVI  Study Date:        11/14/2024            Ordering Provider:  [id number] MEME VIRK  MRN/PID:           [id number]              Fellow:  Accession#:        [id number]          Technologist:       Hoa Araujo RVT  Date of Birth/Age: 1950 / 73 years Technologist 2:     Katie Guajardo                                                               Student  Gender:            F                     Encounter#:         [id number]  Admission Status:  Inpatient             Location Performed: [facility]       Diagnosis/ICD: Other specified soft  tissue disorders-M79.89  Indication:    hx right IJV DVT  CPT Codes:     09259 Peripheral venous duplex scan for DVT Limited       Pertinent History: Hx right IJV occlusive DVT done in radiology 8/21/24.       CONCLUSIONS:     Right Upper Venous: The IJV appears chronically occluded. Collaterals are noted. Unable to visualize    innominate vein. Remainder of veins appear negative for deep vein thrombosis.    Left Upper Venous: The subclavian vein demonstrates a normal spontaneous and phasic flow.     Imaging & Doppler Findings:     Right               Compressible Thrombus        Flow  Internal Jugular         No      Chronic         None  Subclavian              Yes        None  Subclavian Proximal     Yes        None   Spontaneous/Phasic  Subclavian Mid          Yes        None  Subclavian Distal       Yes        None   Spontaneous/Phasic  Axillary                Yes        None   Spontaneous/Phasic  Brachial                Yes        None  Cephalic                Yes        None  Basilic                 Yes        None       Left              Flow  Subclavian Spontaneous/Phasic       [id number] Carina Jones MD, RPVI  Electronically signed by [id number] Carina Jones MD, EMILIANO on 11/14/2024 at 12:04:26 PM       ** Final **  XR shoulder right 2+ views  Narrative: Interpreted By:  Perez Guajardo,   STUDY:  XR SHOULDER RIGHT 2+ VIEWS;  11/14/2024 10:05 am      INDICATION:  Signs/Symptoms:Right shoulder pain.      COMPARISON:  None.      ACCESSION NUMBER(S):  [id number]      ORDERING CLINICIAN:  MEME VIRK      FINDINGS:  No acute fracture or dislocation. Glenohumeral and acromioclavicular  osteophytes are present. There is superior humeral head migration  with subchondral sclerosis and osteophyte formation at the  pseudoarthrosis with the acromion.      Partially imaged chest port catheter.      Impression: Severe degenerative changes of the right shoulder with chronic  complete rotator cuff tear.      MACRO:  None       Signed by: Perez Guajardo 11/14/2024 10:12 AM  Dictation workstation:   [id number]    Physical Exam  Constitutional:       General: She is not in acute distress.     Appearance: She is obese. She is not ill-appearing.   HENT:      Head: Normocephalic and atraumatic.      Mouth/Throat:      Mouth: Mucous membranes are moist.      Pharynx: No posterior oropharyngeal erythema.   Eyes:      Extraocular Movements: Extraocular movements intact.   Cardiovascular:      Rate and Rhythm: Normal rate and regular rhythm.      Pulses: Normal pulses.      Heart sounds: No murmur heard.     No gallop.   Pulmonary:      Effort: Pulmonary effort is normal. No respiratory distress.      Breath sounds: No wheezing or rales.   Abdominal:      General: Abdomen is flat. Bowel sounds are normal. There is no distension.      Palpations: Abdomen is soft.      Tenderness: There is no abdominal tenderness. There is no guarding.   Musculoskeletal:         General: Tenderness (Surrounding right upper chest) present. No swelling.      Cervical back: Neck supple. Tenderness (Mild right sided - improving) present.   Skin:     General: Skin is warm and dry.      Comments: Site of mediport removal without obvious signs of erythema or purulent discharge   Neurological:      General: No focal deficit present.      Mental Status: She is alert and oriented to person, place, and time.   Psychiatric:         Mood and Affect: Mood normal.     Relevant Results  Scheduled medications  apixaban, 5 mg, oral, BID  calcium carbonate, 625 mg, oral, Daily  DULoxetine, 30 mg, oral, BID  gabapentin, 600 mg, oral, TID  hydroCHLOROthiazide, 12.5 mg, oral, Daily  insulin lispro, 0-10 Units, subcutaneous, TID AC  levothyroxine, 125 mcg, oral, Daily  lidocaine, 1 patch, transdermal, Daily  lisinopril, 5 mg, oral, Daily  oxygen, , inhalation, Continuous - Inhalation  polyethylene glycol, 17 g, oral, Daily  rosuvastatin, 5 mg, oral, Nightly  traMADol, 100 mg, oral,  Nightly  traMADol, 50 mg, oral, Daily    Continuous medications     PRN medications  PRN medications: acetaminophen, dextrose, dextrose, glucagon, glucagon  Results for orders placed or performed during the hospital encounter of 11/13/24 (from the past 24 hours)   POCT GLUCOSE   Result Value Ref Range    POCT Glucose 82 74 - 99 mg/dL   POCT GLUCOSE   Result Value Ref Range    POCT Glucose 121 (H) 74 - 99 mg/dL   POCT GLUCOSE   Result Value Ref Range    POCT Glucose 130 (H) 74 - 99 mg/dL   CBC   Result Value Ref Range    WBC 4.6 4.4 - 11.3 x10*3/uL    nRBC 0.0 0.0 - 0.0 /100 WBCs    RBC 4.34 4.00 - 5.20 x10*6/uL    Hemoglobin 11.8 (L) 12.0 - 16.0 g/dL    Hematocrit 38.5 36.0 - 46.0 %    MCV 89 80 - 100 fL    MCH 27.2 26.0 - 34.0 pg    MCHC 30.6 (L) 32.0 - 36.0 g/dL    RDW 16.9 (H) 11.5 - 14.5 %    Platelets 226 150 - 450 x10*3/uL   Magnesium   Result Value Ref Range    Magnesium 2.06 1.60 - 2.40 mg/dL   Renal Function Panel   Result Value Ref Range    Glucose 86 74 - 99 mg/dL    Sodium 136 136 - 145 mmol/L    Potassium 3.7 3.5 - 5.3 mmol/L    Chloride 99 98 - 107 mmol/L    Bicarbonate 29 21 - 32 mmol/L    Anion Gap 12 10 - 20 mmol/L    Urea Nitrogen 20 6 - 23 mg/dL    Creatinine 0.75 0.50 - 1.05 mg/dL    eGFR 85 >60 mL/min/1.73m*2    Calcium 9.0 8.6 - 10.3 mg/dL    Phosphorus 3.7 2.5 - 4.9 mg/dL    Albumin 3.9 3.4 - 5.0 g/dL   Protime-INR   Result Value Ref Range    Protime 15.0 (H) 9.8 - 12.8 seconds    INR 1.3 (H) 0.9 - 1.1   POCT GLUCOSE   Result Value Ref Range    POCT Glucose 82 74 - 99 mg/dL     *Note: Due to a large number of results and/or encounters for the requested time period, some results have not been displayed. A complete set of results can be found in Results Review.     Vascular US Upper Extremity Venous Duplex Right    Result Date: 11/14/2024            [facility] [address] [place] [address]     Tel [phone] Fax [phone]  [facility] Report  [facility] Patient Name:      DULCE AVALOS ASHTYN     Reading Physician:  [id number] Carina Jones MD, RPVI Study Date:        11/14/2024            Ordering Provider:  [id number] EMME VIRK MRN/PID:           [id number]              Fellow: Accession#:        [id number]          Technologist:       Hoa Araujo                                                              RVROBERT Date of Birth/Age: 1950 / 73 years Technologist 2:     Katie Guajardo                                                              Student Gender:            F                     Encounter#:         [id number] Admission Status:  Inpatient             Location Performed: [facility]  Diagnosis/ICD: Other specified soft tissue disorders-M79.89 Indication:    hx right IJV DVT CPT Codes:     26154 Peripheral venous duplex scan for DVT Limited  Pertinent History: Hx right IJV occlusive DVT done in radiology 8/21/24.  CONCLUSIONS:  Right Upper Venous: The IJV appears chronically occluded. Collaterals are noted. Unable to visualize innominate vein. Remainder of veins appear negative for deep vein thrombosis. Left Upper Venous: The subclavian vein demonstrates a normal spontaneous and phasic flow.  Imaging & Doppler Findings:  Right               Compressible Thrombus        Flow Internal Jugular         No      Chronic         None Subclavian              Yes        None Subclavian Proximal     Yes        None   Spontaneous/Phasic Subclavian Mid          Yes        None Subclavian Distal       Yes        None   Spontaneous/Phasic Axillary                Yes        None   Spontaneous/Phasic Brachial                Yes        None Cephalic                Yes        None Basilic                 Yes        None  Left              Flow Subclavian  Spontaneous/Phasic  [id number] Carina Jones MD, RPVI Electronically signed by [id number] EMILIANO Orozco MD on 11/14/2024 at 12:04:26 PM  ** Final **     XR shoulder right 2+ views    Result Date: 11/14/2024  Interpreted By:  Perez Guajardo, STUDY: XR SHOULDER RIGHT 2+ VIEWS;  11/14/2024 10:05 am   INDICATION: Signs/Symptoms:Right shoulder pain.   COMPARISON: None.   ACCESSION NUMBER(S): [id number]   ORDERING CLINICIAN: MEME VIRK   FINDINGS: No acute fracture or dislocation. Glenohumeral and acromioclavicular osteophytes are present. There is superior humeral head migration with subchondral sclerosis and osteophyte formation at the pseudoarthrosis with the acromion.   Partially imaged chest port catheter.       Severe degenerative changes of the right shoulder with chronic complete rotator cuff tear.   MACRO: None   Signed by: Perez Guajardo 11/14/2024 10:12 AM Dictation workstation:   [id number]    XR chest 1 view    Result Date: 11/13/2024  Interpreted By:  Devin Sierra, STUDY: XR CHEST 1 VIEW   INDICATION: Signs/Symptoms:Rule out lung abcess.   COMPARISON: April 25, 2008   ACCESSION NUMBER(S): [id number]   ORDERING CLINICIAN: REMBERTO SUMMERS   FINDINGS: No consolidation, effusion, edema, or pneumothorax. Heart size within normal limits. MediPort catheter.         No evidence of acute intrathoracic abnormality.   Signed by: Devin Sierra 11/13/2024 3:17 PM Dictation workstation:   [id number]     Assessment/Plan   Assessment & Plan  Infection of tunnel of tunneled central venous catheter (CVC)    Patient is a 74 y/o F with PMHx significant for T2DM, HTN, HLD, CAD, hypothyroidism, BOLIVAR not on CPAP, breast cancer s/p paclitaxel plus trastuzumab 7/15/2024-9/5/2024 and trastuzumab 21 cycles, hx of known occlusive thrombus in the right internal jugular and right cephalic vein, who initially presented with pain around her mediport site. On initial evaluation the patient was HDS on RA, and labs were largely  unremarkable. The patient was started on Vancomycin and Zosyn then admitted to the general medicine service.    # Right upper chest pain 2/2 ?port infection vs recurrent port clots (on Eliquis)  # Known occlusive thrombus in the right internal jugular vein  # Known thrombotic occlusion of right cephalic vein  -CXR negative  -Upper extremity duplex showing chronically occluded right internal jugular vein, no other DVT noted  -Blood cultures 11/13 NGTD  -Mediport removal performed by Dr. Angulo on 11/15  -Low suspicion for infection, discontinue Vancomycin and Zosyn and monitor fever curve  -Resumed Eliquis    # Right shoulder pain  May be referred pain from Mediport or chronic. Has noted history of bilateral shoulder pain with imaging in 2020 that suggested chronic rotator cuff tear.   -Right shoulder XR showing severe degenerative changes with chronic complete rotator cuff tear  -May benefit from outpatient follow up with orthopedics and physical therapy    # NIDDM2  -Holding home metformin  -SSI #2 with hypoglycemia protocol     # BOLIVAR  Patient reports she intolerance to CPAP, will attempt reintroduction.  -CPAP ordered     Chronic Conditions:  # HTN - Lisinopril, hydrochlorothiazide  # HLD - Crestor  # CAD  # Hypothyroidism - Levothyroxine  # Hx of breast CA s/p chemo  -Continue home medications and management as appropriate    IVF: None  Diet: Cardiac  ABx: None  Consults: IR  DVT: Eliquis, SCDs    Dispo: Approaching medical clearance, pending negative blood cultures x 48 hours. Anticipate discharge home tomorrow.    The assessment and plan were discussed with the attending physician,  Stefano Bruno D.O.  Internal Medicine PGY-2

## 2024-11-16 VITALS
BODY MASS INDEX: 35.68 KG/M2 | RESPIRATION RATE: 16 BRPM | OXYGEN SATURATION: 98 % | HEART RATE: 90 BPM | TEMPERATURE: 96.4 F | HEIGHT: 59 IN | DIASTOLIC BLOOD PRESSURE: 66 MMHG | SYSTOLIC BLOOD PRESSURE: 137 MMHG | WEIGHT: 177 LBS

## 2024-11-16 PROBLEM — T80.212A: Status: RESOLVED | Noted: 2024-11-13 | Resolved: 2024-11-16

## 2024-11-16 LAB
ALBUMIN SERPL BCP-MCNC: 3.8 G/DL (ref 3.4–5)
ANION GAP SERPL CALC-SCNC: 13 MMOL/L (ref 10–20)
BUN SERPL-MCNC: 17 MG/DL (ref 6–23)
CALCIUM SERPL-MCNC: 8.9 MG/DL (ref 8.6–10.3)
CHLORIDE SERPL-SCNC: 99 MMOL/L (ref 98–107)
CO2 SERPL-SCNC: 26 MMOL/L (ref 21–32)
CREAT SERPL-MCNC: 0.76 MG/DL (ref 0.5–1.05)
EGFRCR SERPLBLD CKD-EPI 2021: 84 ML/MIN/1.73M*2
ERYTHROCYTE [DISTWIDTH] IN BLOOD BY AUTOMATED COUNT: 16.8 % (ref 11.5–14.5)
GLUCOSE BLD MANUAL STRIP-MCNC: 119 MG/DL (ref 74–99)
GLUCOSE BLD MANUAL STRIP-MCNC: 80 MG/DL (ref 74–99)
GLUCOSE SERPL-MCNC: 81 MG/DL (ref 74–99)
HCT VFR BLD AUTO: 37.6 % (ref 36–46)
HGB BLD-MCNC: 11.3 G/DL (ref 12–16)
MCH RBC QN AUTO: 27.4 PG (ref 26–34)
MCHC RBC AUTO-ENTMCNC: 30.1 G/DL (ref 32–36)
MCV RBC AUTO: 91 FL (ref 80–100)
NRBC BLD-RTO: 0 /100 WBCS (ref 0–0)
PHOSPHATE SERPL-MCNC: 3.5 MG/DL (ref 2.5–4.9)
PLATELET # BLD AUTO: 197 X10*3/UL (ref 150–450)
POTASSIUM SERPL-SCNC: 3.5 MMOL/L (ref 3.5–5.3)
RBC # BLD AUTO: 4.13 X10*6/UL (ref 4–5.2)
SODIUM SERPL-SCNC: 134 MMOL/L (ref 136–145)
WBC # BLD AUTO: 3.9 X10*3/UL (ref 4.4–11.3)

## 2024-11-16 PROCEDURE — 80069 RENAL FUNCTION PANEL: CPT

## 2024-11-16 PROCEDURE — 85027 COMPLETE CBC AUTOMATED: CPT

## 2024-11-16 PROCEDURE — 2500000001 HC RX 250 WO HCPCS SELF ADMINISTERED DRUGS (ALT 637 FOR MEDICARE OP)

## 2024-11-16 PROCEDURE — 2500000002 HC RX 250 W HCPCS SELF ADMINISTERED DRUGS (ALT 637 FOR MEDICARE OP, ALT 636 FOR OP/ED)

## 2024-11-16 PROCEDURE — 99239 HOSP IP/OBS DSCHRG MGMT >30: CPT

## 2024-11-16 PROCEDURE — 82947 ASSAY GLUCOSE BLOOD QUANT: CPT

## 2024-11-16 PROCEDURE — 84100 ASSAY OF PHOSPHORUS: CPT

## 2024-11-16 PROCEDURE — 36415 COLL VENOUS BLD VENIPUNCTURE: CPT

## 2024-11-16 RX ADMIN — GABAPENTIN 600 MG: 300 CAPSULE ORAL at 09:02

## 2024-11-16 RX ADMIN — LISINOPRIL 5 MG: 5 TABLET ORAL at 09:01

## 2024-11-16 RX ADMIN — DULOXETINE HYDROCHLORIDE 30 MG: 30 CAPSULE, DELAYED RELEASE ORAL at 09:02

## 2024-11-16 RX ADMIN — LEVOTHYROXINE SODIUM 125 MCG: 125 TABLET ORAL at 05:24

## 2024-11-16 RX ADMIN — HYDROCHLOROTHIAZIDE 12.5 MG: 12.5 TABLET ORAL at 09:01

## 2024-11-16 RX ADMIN — TRAMADOL HYDROCHLORIDE 50 MG: 50 TABLET, COATED ORAL at 09:01

## 2024-11-16 RX ADMIN — APIXABAN 5 MG: 5 TABLET, FILM COATED ORAL at 09:02

## 2024-11-16 RX ADMIN — CALCIUM 625 MG: 500 TABLET ORAL at 09:01

## 2024-11-16 ASSESSMENT — PAIN SCALES - GENERAL: PAINLEVEL_OUTOF10: 0 - NO PAIN

## 2024-11-16 NOTE — CARE PLAN
The patient's goals for the shift include      The clinical goals for the shift include pt will reamin afebrile t/o the shift    Problem: Infection related to problem list condition  Goal: Infection will resolve through treatment  Outcome: Progressing     Problem: Chronic Conditions and Co-morbidities  Goal: Patient's chronic conditions and co-morbidity symptoms are monitored and maintained or improved  Outcome: Progressing

## 2024-11-16 NOTE — DISCHARGE SUMMARY
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    Eliquis resumed per Hematology/Oncology. Hydrochlorothiazide should be discussed with primary care due to possible side effects including hyponatremia.    Discharge Meds     Medication List      CHANGE how you take these medications     lisinopril 5 mg tablet; Take 1 tablet (5 mg) by mouth once daily.; What   changed: when to take this   rosuvastatin 5 mg tablet; Commonly known as: Crestor; TAKE 1 TABLET ONE   TIME DAILY; What changed: when to take this     CONTINUE taking these medications     apixaban 5 mg tablet; Commonly known as: Eliquis; Take 1 tablet (5 mg)   by mouth 2 times a day.   blood sugar diagnostic strip; 90 strips once daily.   calcium carbonate 600 mg calcium (1,500 mg) tablet   DULoxetine 30 mg DR capsule; Commonly known as: Cymbalta; Take 1 capsule   (30 mg) by mouth 2 times a day.   gabapentin 300 mg capsule; Commonly known as: Neurontin; Take 3 capsules   (900 mg) by mouth 3 times a day.   hydroCHLOROthiazide 25 mg tablet; Commonly known as: HYDRODiuril   levothyroxine 125 mcg tablet; Commonly known as: Synthroid, Levoxyl;   Take 1 tablet (125 mcg) by mouth early in the morning..   metFORMIN  mg 24 hr tablet; Commonly known as: Glucophage-XR; Take   1 tablet (500 mg) by mouth once daily.   Mounjaro 2.5 mg/0.5 mL pen injector; Generic drug: tirzepatide; Inject   2.5 mg under the skin 1 (one) time per week.   * traMADol 50 mg tablet; Commonly known as: Ultram   * traMADol 50 mg tablet; Commonly known as: Ultram  * This list has 2 medication(s) that are  the same as other medications   prescribed for you. Read the directions carefully, and ask your doctor or   other care provider to review them with you.       Test Results Pending At Discharge  Pending Labs       Order Current Status    Blood Culture Preliminary result    Blood Culture Preliminary result          Hospital Course  Patient is a 74 y/o F with PMHx significant for T2DM, HTN, HLD, CAD, hypothyroidism, BOLIVAR not on CPAP, breast cancer s/p paclitaxel plus trastuzumab 7/15/2024 - 9/5/2024 and trastuzumab 21 cycles, hx of known occlusive thrombus in the right internal jugular and right cephalic vein, who initially presented with pain around her mediport site. On initial evaluation the patient was HDS on RA, and labs were largely unremarkable. The patient was started on Vancomycin and Zosyn then admitted to the general medicine service.    The patient was without any erythema around mediport site, had a normal CBC with differential, and antibiotics were discontinued due to low suspicion for infection. Upper extremity duplex was ordered to assess for additional clots, and redemonstrated chronically occluded right IJV, but without other evidence of DVT. XR R shoulder was obtained and redemonstrated severe degenerative changes of the right shoulder with chronic complete rotator cuff tear. IR was consulted for removal of mediport which was performed on 11/15 without complication. The patient remained hemodynamically stable and blood cultures remained without growth for 48 hours.  The patient was then deemed stable for discharge from the hospital back home. Instructions to follow up with primary care for hospital follow up, with hematology/oncology for further management, and with Ortho for chronic right shoulder pain were given. Return precautions given.    Pertinent Physical Exam At Time of Discharge  Physical Exam  Constitutional:       General: She is not in acute distress.     Appearance: She is obese. She is  not ill-appearing.   HENT:      Head: Normocephalic and atraumatic.      Mouth/Throat:      Mouth: Mucous membranes are moist.   Eyes:      Extraocular Movements: Extraocular movements intact.   Cardiovascular:      Rate and Rhythm: Normal rate and regular rhythm.      Pulses: Normal pulses.      Heart sounds: Normal heart sounds.   Pulmonary:      Effort: Pulmonary effort is normal. No respiratory distress.      Breath sounds: No wheezing or rales.   Abdominal:      General: Abdomen is flat. Bowel sounds are normal. There is no distension.      Palpations: Abdomen is soft.      Tenderness: There is no abdominal tenderness. There is no guarding.   Musculoskeletal:         General: No swelling or tenderness.      Cervical back: Neck supple. No tenderness.   Skin:     General: Skin is warm and dry.   Neurological:      Mental Status: She is alert and oriented to person, place, and time.      Sensory: No sensory deficit.      Motor: No weakness.   Psychiatric:         Mood and Affect: Mood normal.     Outpatient Follow-Up  Future Appointments   Date Time Provider Department Center   11/20/2024  1:00 PM INF 09 SHOSHANA [id number] [facility]   11/22/2024  3:00 PM [facility]LE [facility]   12/6/2024  4:15 PM Tori Chinchilla, PT PARSTYPT [place]   12/10/2024  9:45 AM Tara Sarabia OD [id number] [place]   1/2/2025 11:00 AM MED ECHO/STRESS [id number] [facility]   1/2/2025  2:30 PM INF 07 SHOSHANA [id number] [facility]   1/6/2025 10:30 AM Jeniffer Langley DO STJONCS [place]   1/20/2025 12:30 PM Joanna Bourne MD [id number] [facility]   1/22/2025 11:00 AM Shoshana Olivarez, DO [id number] [facility]   1/23/2025  2:00 PM INF 02 SHOSHANA [id number] [facility]   4/2/2025 10:50 AM MIN ECHO [id number] [facility] [place]    4/2/2025 11:45 AM Dev Rubio MD [id number] [facility]   8/27/2025  9:00 AM Verna Tabor DO [id number] [place]     The discharge summary was discussed with the attending physician,  Stefano Bruno DO  Internal Medicine  PGY-2

## 2024-11-17 LAB
BACTERIA BLD CULT: NORMAL
BACTERIA BLD CULT: NORMAL

## 2024-11-18 ENCOUNTER — PATIENT OUTREACH (OUTPATIENT)
Dept: PRIMARY CARE | Facility: CLINIC | Age: 74
End: 2024-11-18
Payer: MEDICARE

## 2024-11-18 NOTE — PROGRESS NOTES
Discharge Facility:[facility]  Discharge Diagnosis: Pain around mediport site   Admission Date:11/13/2024  Discharge Date: 11/16/2024    PCP Appointment Date:Declined   Specialist Appointment Date:   11/20/2024 Hem/Onc  11/22/2024 Radiology   Hospital Encounter and Summary Linked: Yes  See discharge assessment below for further details  Engagement  Call Start Time: 1408 (11/18/2024  2:20 PM)    Medications  Medications reviewed with patient/caregiver?: Yes (No medication changes) (11/18/2024  2:20 PM)  Is the patient having any side effects they believe may be caused by any medication additions or changes?: No (11/18/2024  2:20 PM)  Does the patient have all medications ordered at discharge?: Not applicable (11/18/2024  2:20 PM)  Care Management Interventions: No intervention needed (11/18/2024  2:20 PM)  Prescription Comments: -- (N/A) (11/18/2024  2:20 PM)  Is the patient taking all medications as directed (includes completed medication regime)?: Yes (11/18/2024  2:20 PM)  Medication Comments: -- (N/A) (11/18/2024  2:20 PM)    Appointments  Does the patient have a primary care provider?: Yes (11/18/2024  2:20 PM)  Care Management Interventions: -- (Declined PCP follow up, will follow up with specialists) (11/18/2024  2:20 PM)  Has the patient kept scheduled appointments due by today?: Yes (11/18/2024  2:20 PM)    Self Management  What is the home health agency?: -- (N/A) (11/18/2024  2:20 PM)  What Durable Medical Equipment (DME) was ordered?: -- (N/A) (11/18/2024  2:20 PM)    Patient Teaching  Does the patient have access to their discharge instructions?: Yes (11/18/2024  2:20 PM)  What is the patient's perception of their health status since discharge?: Improving (11/18/2024  2:20 PM)  Is the patient/caregiver able to teach back the hierarchy of who to call/visit for symptoms/problems? PCP, Specialist, Home Health nurse, Urgent Care, ED, 911: Yes (11/18/2024  2:20 PM)    Wrap Up  Wrap Up Additional Comments: --  (Raiza is doing much better. She states tylenol manages the pain. She will follow up with her specialists and contact provider if any concerns.) (11/18/2024  2:20 PM)

## 2024-11-19 ENCOUNTER — APPOINTMENT (OUTPATIENT)
Dept: CARDIOLOGY | Facility: HOSPITAL | Age: 74
End: 2024-11-19
Payer: MEDICARE

## 2024-11-19 DIAGNOSIS — I10 ESSENTIAL HYPERTENSION: ICD-10-CM

## 2024-11-20 ENCOUNTER — INFUSION (OUTPATIENT)
Dept: HEMATOLOGY/ONCOLOGY | Facility: CLINIC | Age: 74
End: 2024-11-20
Payer: MEDICARE

## 2024-11-20 ENCOUNTER — LAB (OUTPATIENT)
Dept: LAB | Facility: LAB | Age: 74
End: 2024-11-20
Payer: MEDICARE

## 2024-11-20 VITALS
HEIGHT: 59 IN | SYSTOLIC BLOOD PRESSURE: 132 MMHG | WEIGHT: 174.05 LBS | RESPIRATION RATE: 18 BRPM | OXYGEN SATURATION: 98 % | TEMPERATURE: 97 F | HEART RATE: 72 BPM | BODY MASS INDEX: 35.09 KG/M2 | DIASTOLIC BLOOD PRESSURE: 83 MMHG

## 2024-11-20 DIAGNOSIS — Z17.1 MALIGNANT NEOPLASM OF CENTRAL PORTION OF LEFT BREAST IN FEMALE, ESTROGEN RECEPTOR NEGATIVE: ICD-10-CM

## 2024-11-20 DIAGNOSIS — I10 ESSENTIAL HYPERTENSION: ICD-10-CM

## 2024-11-20 DIAGNOSIS — C50.112 MALIGNANT NEOPLASM OF CENTRAL PORTION OF LEFT BREAST IN FEMALE, ESTROGEN RECEPTOR NEGATIVE: ICD-10-CM

## 2024-11-20 PROCEDURE — 96401 CHEMO ANTI-NEOPL SQ/IM: CPT

## 2024-11-20 PROCEDURE — 2500000004 HC RX 250 GENERAL PHARMACY W/ HCPCS (ALT 636 FOR OP/ED): Mod: JZ,JG | Performed by: STUDENT IN AN ORGANIZED HEALTH CARE EDUCATION/TRAINING PROGRAM

## 2024-11-20 PROCEDURE — 36415 COLL VENOUS BLD VENIPUNCTURE: CPT

## 2024-11-20 PROCEDURE — 80048 BASIC METABOLIC PNL TOTAL CA: CPT

## 2024-11-20 RX ORDER — FAMOTIDINE 10 MG/ML
20 INJECTION INTRAVENOUS ONCE AS NEEDED
Status: DISCONTINUED | OUTPATIENT
Start: 2024-11-20 | End: 2024-11-20 | Stop reason: HOSPADM

## 2024-11-20 RX ORDER — ALBUTEROL SULFATE 0.83 MG/ML
3 SOLUTION RESPIRATORY (INHALATION) AS NEEDED
Status: DISCONTINUED | OUTPATIENT
Start: 2024-11-20 | End: 2024-11-20 | Stop reason: HOSPADM

## 2024-11-20 RX ORDER — HYDROCHLOROTHIAZIDE 25 MG/1
25 TABLET ORAL 2 TIMES DAILY
Qty: 180 TABLET | Refills: 1 | Status: SHIPPED | OUTPATIENT
Start: 2024-11-20

## 2024-11-20 RX ORDER — EPINEPHRINE 0.3 MG/.3ML
0.3 INJECTION SUBCUTANEOUS EVERY 5 MIN PRN
Status: DISCONTINUED | OUTPATIENT
Start: 2024-11-20 | End: 2024-11-20 | Stop reason: HOSPADM

## 2024-11-20 RX ORDER — DIPHENHYDRAMINE HYDROCHLORIDE 50 MG/ML
50 INJECTION INTRAMUSCULAR; INTRAVENOUS AS NEEDED
Status: DISCONTINUED | OUTPATIENT
Start: 2024-11-20 | End: 2024-11-20 | Stop reason: HOSPADM

## 2024-11-20 RX ORDER — PROCHLORPERAZINE MALEATE 10 MG
10 TABLET ORAL EVERY 6 HOURS PRN
Status: DISCONTINUED | OUTPATIENT
Start: 2024-11-20 | End: 2024-11-20 | Stop reason: HOSPADM

## 2024-11-20 RX ORDER — PROCHLORPERAZINE EDISYLATE 5 MG/ML
10 INJECTION INTRAMUSCULAR; INTRAVENOUS EVERY 6 HOURS PRN
Status: DISCONTINUED | OUTPATIENT
Start: 2024-11-20 | End: 2024-11-20 | Stop reason: HOSPADM

## 2024-11-20 ASSESSMENT — PAIN SCALES - GENERAL: PAINLEVEL_OUTOF10: 9

## 2024-11-20 NOTE — PROGRESS NOTES
Patient is here today for C4D1 Herceptin Hylecta injection- no complications since last being seen-  Independent double check done prior to injection today-   b/h/ lung sounds not auscultated  Patient tolerated injection well.  No complaints. Call back instructions reviewed.    Patient verbalizes understanding of plan of care.  Ambulated off unit using cane without difficulty, slow/steady gait.

## 2024-11-20 NOTE — TELEPHONE ENCOUNTER
Pls call pt -   we need to recheck her electrolytes after discharge     Her potassium was borderline low last labs and we may need to add / increase potassium if  levels are low ,   hydrochlorothiazide makes you spill potassium into urine     We usually do not exceed 25 mg hydrochlorothiazide due to risk of low potassium      Would not recommend taking 2 daily          Lab order placed  -  please do within the next few days     Not required to fast

## 2024-11-20 NOTE — TELEPHONE ENCOUNTER
Called and spoke with pt, and she states that if her bp is high she will take the 25 mg twice daily, if not she only takes the 25 mg once daily.

## 2024-11-21 LAB
ANION GAP SERPL CALC-SCNC: 13 MMOL/L (ref 10–20)
BUN SERPL-MCNC: 18 MG/DL (ref 6–23)
CALCIUM SERPL-MCNC: 9.7 MG/DL (ref 8.6–10.6)
CHLORIDE SERPL-SCNC: 97 MMOL/L (ref 98–107)
CO2 SERPL-SCNC: 31 MMOL/L (ref 21–32)
CREAT SERPL-MCNC: 0.76 MG/DL (ref 0.5–1.05)
EGFRCR SERPLBLD CKD-EPI 2021: 84 ML/MIN/1.73M*2
GLUCOSE SERPL-MCNC: 100 MG/DL (ref 74–99)
POTASSIUM SERPL-SCNC: 4.1 MMOL/L (ref 3.5–5.3)
SODIUM SERPL-SCNC: 137 MMOL/L (ref 136–145)

## 2024-11-22 ENCOUNTER — HOSPITAL ENCOUNTER (OUTPATIENT)
Dept: RADIOLOGY | Facility: CLINIC | Age: 74
Discharge: HOME | End: 2024-11-22
Payer: MEDICARE

## 2024-11-22 ENCOUNTER — PATIENT MESSAGE (OUTPATIENT)
Dept: PRIMARY CARE | Facility: CLINIC | Age: 74
End: 2024-11-22
Payer: MEDICARE

## 2024-11-22 DIAGNOSIS — Z15.09 BRCA2 POSITIVE: ICD-10-CM

## 2024-11-22 DIAGNOSIS — E11.9 TYPE 2 DIABETES MELLITUS WITHOUT COMPLICATION, WITHOUT LONG-TERM CURRENT USE OF INSULIN (MULTI): Primary | ICD-10-CM

## 2024-11-22 DIAGNOSIS — Z15.01 BRCA2 POSITIVE: ICD-10-CM

## 2024-11-22 DIAGNOSIS — K86.2 PANCREAS CYST (HHS-HCC): ICD-10-CM

## 2024-11-22 PROCEDURE — 74183 MRI ABD W/O CNTR FLWD CNTR: CPT

## 2024-11-22 PROCEDURE — A9575 INJ GADOTERATE MEGLUMI 0.1ML: HCPCS | Performed by: STUDENT IN AN ORGANIZED HEALTH CARE EDUCATION/TRAINING PROGRAM

## 2024-11-22 PROCEDURE — 2550000001 HC RX 255 CONTRASTS: Performed by: STUDENT IN AN ORGANIZED HEALTH CARE EDUCATION/TRAINING PROGRAM

## 2024-11-22 RX ORDER — GADOTERATE MEGLUMINE 376.9 MG/ML
14 INJECTION INTRAVENOUS
Status: COMPLETED | OUTPATIENT
Start: 2024-11-22 | End: 2024-11-22

## 2024-11-24 RX ORDER — TIRZEPATIDE 5 MG/.5ML
5 INJECTION, SOLUTION SUBCUTANEOUS WEEKLY
Qty: 2 ML | Refills: 3 | Status: SHIPPED | OUTPATIENT
Start: 2024-11-24

## 2024-11-25 ENCOUNTER — NUTRITION (OUTPATIENT)
Dept: HEMATOLOGY/ONCOLOGY | Facility: CLINIC | Age: 74
End: 2024-11-25
Payer: MEDICARE

## 2024-11-25 ENCOUNTER — TELEPHONE (OUTPATIENT)
Dept: HEMATOLOGY/ONCOLOGY | Facility: CLINIC | Age: 74
End: 2024-11-25
Payer: MEDICARE

## 2024-11-25 DIAGNOSIS — E11.9 TYPE 2 DIABETES MELLITUS WITHOUT COMPLICATION, WITHOUT LONG-TERM CURRENT USE OF INSULIN (MULTI): Primary | ICD-10-CM

## 2024-11-25 NOTE — PROGRESS NOTES
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 Status post bilateral completion mastectomy, pT1b pNx   -Received C4 Phesgo on 11/20/24; Diarrhea began after .       REASON FOR COMMUNICATION: RN referral as patient phoned in the office with increasing diarrhea with last treatment and decreased oral intake.     Phoned patient this afternoon to review diet guidelines for diarrhea.   Patient driving during the call.  Offered to phone back at later time but patient states she is able to talk.     Encouraged the following:  -Avoiding any greasy/fried foods and lactose containing foods such as regular cow's milk, ice cream, and pudding.    -Did discuss that cheese and yogurt are fine to consume with diarrhea.  -May wish to consider plant based milk (Oat, soy, almond etc) temporarily for cereal or Lactose-free milk (Lactaid/Fairlife)    -Suggested starchy foods such as white rice, potatoes (no skin) , or toast/bread/crackers.     -Discussed the Premier protein shakes she is taking should be fine as they are low sugar and suitable for lactose intolerance.      -Suggested lean protein sources such as fish, chicken, (not fried) , eggs, and creamy peanut butter.     -May also have fruits (minus the skin) or applesauce, peaches, pears, bananas, melon.     -Encouraged Pedialyte .  Patient appears to be getting in at least 64 oz fluids at this time.     Patient appreciative of call. Encouraged to call back with any other nutrition questions/concerns.   Patient also aware to phone office if no improvement in diarrhea with Imodium.                  Time Spent  Prep time on day of patient encounter: 10 minutes  Time spent directly with patient, family or caregiver: 15 minutes  Additional Time Spent on Patient Care Activities: 5 minutes  Documentation Time: 25 minutes  Other Time Spent: 0 minutes  Total: 55 minutes

## 2024-11-25 NOTE — DOCUMENTATION CLARIFICATION NOTE
"    PATIENT:               DULCE CHAMORRO  ACCT #:                  [id number]  MRN:                       [id number]  :                       1950  ADMIT DATE:       2024 1:43 PM  DISCH DATE:        2024 1:40 PM  RESPONDING PROVIDER #:        [id number]          PROVIDER RESPONSE TEXT:    Mediport site pain related to port clots.    CDI QUERY TEXT:    Clarification        Instruction:    Based on your assessment of the patient and the clinical information, please provide the requested documentation by clicking on the appropriate radio button and enter any additional information if prompted.    Question: Please clarify if a relationship exists between Mediport pain and etiology.    When answering this query, please exercise your independent professional judgment. The fact that a question is being asked, does not imply that any particular answer is desired or expected.    The patient's clinical indicators include:  Clinical Information: 24 Discharge Summary Dr. Bruno \"72 y/o F with PMHx significant for T2DM, HTN, HLD, CAD, hypothyroidism, BOLIVAR not on CPAP, breast cancer s/p paclitaxel plus trastuzumab 7/15/2024 - 2024 and trastuzumab 21 cycles, hx of known occlusive thrombus in the right internal jugular and right cephalic vein, who initially presented with pain around her Mediport site. The patient was without any erythema around Mediport site, had a normal CBC with differential, and antibiotics were discontinued due to low suspicion for infection. Upper extremity duplex was ordered to assess for additional clots, and redemonstrated chronically occluded right IJV, but without other evidence of DVT. XR R shoulder was obtained and redemonstrated severe degenerative changes of the right shoulder with chronic complete rotator cuff tear. \" Diagnosis \"Pain around Mediport site.\"    Clinical Indicators:  1. WBC 24 5.8,  5.0, 11/15 4.6,  3.9  2.  Blood C&S final no growth.  3. " "H/P \" coming in today with 3 days of pain at her Mediport site that was 6/10 earlier in the day, currently 0 out of 10. Patient feels the site was erythematous and swollen earlier today, currently experiencing 3 out of 10 pain to palpation.\" Diagnosis \"?Infection tunneled catheter. Breast cancer on chemotherapy.\"  4. Vital signs: 11/13/24 Temperature 36.7, HR 83, RR 18, B/P 139/91. 11/14/24 Temperature 35.0, HR 77, RR 18, B/P 118/65. 11/15/24 Temperature 36.0, HR 81, RR 18, B/P 137/76.      Treatment: Mediport removed by IR on 11/15/24. Zosyn IVPB x 2 days. Vancomycin IVPB x 1 day.    Risk Factors: Hx of known occlusive thrombus in the right internal jugular and right cephalic vein, who initially presented with pain around her Mediport site. Mediport removed by IR on 11/15/24. Zosyn IVPB x 2 days. Vancomycin IVPB x 1 day  Options provided:  -- Mediport site pain related to port clots.  -- Mediport  site pain related to infection.  -- Mediport  site pain related to  nonocclusive thrombus in the right internal jugular vein and thrombotic occlusion of the right cephalic vein.  -- Other - I will add my own diagnosis  -- Refer to Clinical Documentation Reviewer    Query created by: Fredi Cabrera on 11/21/2024 12:28 PM      Electronically signed by:  CAMILA GAMINO DO 11/25/2024 9:00 AM          "

## 2024-11-25 NOTE — TELEPHONE ENCOUNTER
"Pt sent message yesterday (Sunday) regarding sick symptoms below:    Call placed to patient this morning.  Last C#4 Phesgo on 11/20/24.  Reports:    DIARRHEA:  started after last Phesgo injection 11/20/24.  No more than 2 episodes/24 hr period.  Last episode yesterday afternoon. Did not take any Imodium.  Encouraged use of Imodium to aid in controlling diarrhea and to push fluids.  Pt reports she usually drinks 5-6 bottles or water/sport drinks/24 hours.     DECREASED APPETITE:  x approx 1 week.  Eating toast/cereal/applesauce last couple of days.  Denies nausea just has no appetite.  Encouraged high protein diet & use of Supplements.  Will send message to mary alice Hollingsworth to also follow up with pt.    NOSEBLEEDS:  Reports 2 episodes of nosebleeds since last week's dose of Phesgo on 11/20/24. Reports she uses a humidifier and had 2 bright red, dripping nose bleeds that last x 5 minutes.  Platelet count on 11/16/24 = 197,000.  Reports she was started on Eliquis 5mg bid last week for blood clot in \"jugular from port\" and that her port was removed last week. Informed I would message Dr. Bourne regarding symptoms and if she has a continued nosebleed that is unable to be controlled x 30 minutes she would need to report to an emergency room.     DENIES FEVERS.    Please advise regarding any change to plan of care and I will message pt back with your advise.  Message also forwarded to mary alice Hollingsworth.   "

## 2024-12-02 ENCOUNTER — TELEPHONE (OUTPATIENT)
Dept: HEMATOLOGY/ONCOLOGY | Facility: CLINIC | Age: 74
End: 2024-12-02
Payer: MEDICARE

## 2024-12-02 ENCOUNTER — TELEPHONE (OUTPATIENT)
Dept: ADMISSION | Facility: HOSPITAL | Age: 74
End: 2024-12-02
Payer: MEDICARE

## 2024-12-02 NOTE — TELEPHONE ENCOUNTER
Call placed to patient and she states she just spoke to a nurse Karen who took down information and is getting in touch with Dr. Bourne.  Pt denies further needs at this time.

## 2024-12-02 NOTE — TELEPHONE ENCOUNTER
Patient starting taking OTC Magnesium 400 mg daily to help with leg cramps experienced with exemestane medication.  She reports having looser/liquid stools for x10 days. She took Imodium daily, but then did not have a BM 1-2 days, stopped imodium and yesterday experienced diarrhea. She is afebrile, denies any abdominal pain or cramping, eating and drinking normally. She has not yet taken Magnesium today. Asked to hold taking for now and will forward to provider to advise further.  Instructed if the diarrhea occurs today, she can take Imodium, reviewed BRAT diet foods.

## 2024-12-02 NOTE — TELEPHONE ENCOUNTER
Message received from patient below:      12/1/24  2:12 PM  It's been  10 days and I still have diahrea if I miss an imodium dose. I was released from [facility] a few days before this started could I have picked something up there? The only other different thing since Nov 21st was having my pessary cleaned and re inserted.   Thank you

## 2024-12-02 NOTE — TELEPHONE ENCOUNTER
Spoke with patient, again reinforced plan per Dr. Bourne to hold OTC Magnesium, maintain fluids,  BRAT diet and Imodium PRN. Patient to call back in a few days to update on bowels, or sooner if worsening symptoms, or fever.  She was agreeable and verbalized back understanding.

## 2024-12-03 ENCOUNTER — TELEPHONE (OUTPATIENT)
Dept: HEMATOLOGY/ONCOLOGY | Facility: HOSPITAL | Age: 74
End: 2024-12-03
Payer: MEDICARE

## 2024-12-03 ENCOUNTER — TELEPHONE (OUTPATIENT)
Dept: HEMATOLOGY/ONCOLOGY | Facility: CLINIC | Age: 74
End: 2024-12-03
Payer: MEDICARE

## 2024-12-03 ENCOUNTER — PATIENT OUTREACH (OUTPATIENT)
Dept: PRIMARY CARE | Facility: CLINIC | Age: 74
End: 2024-12-03
Payer: MEDICARE

## 2024-12-03 DIAGNOSIS — H10.30 ACUTE CONJUNCTIVITIS, UNSPECIFIED ACUTE CONJUNCTIVITIS TYPE, UNSPECIFIED LATERALITY: ICD-10-CM

## 2024-12-03 DIAGNOSIS — E78.49 OTHER HYPERLIPIDEMIA: Primary | ICD-10-CM

## 2024-12-03 DIAGNOSIS — D75.839 THROMBOCYTOSIS: ICD-10-CM

## 2024-12-03 DIAGNOSIS — D64.9 ANEMIA, UNSPECIFIED TYPE: ICD-10-CM

## 2024-12-03 NOTE — TELEPHONE ENCOUNTER
States has been using soothing eye drops BID from eye dr for months. States yesterday eyes were very red, more watery than they have been and woke up this am with eyes crusty. Patient will notify eye dr as well. Also states MD aware of nosebleeds but wants to update that they are still happening. States it is only a tiny amount over a couple hours. No current bleeding. hospitals does have humidifier on furnace. Unsure if these are connected, if she should call PCP, or if related to treatment.

## 2024-12-03 NOTE — PROGRESS NOTES
Call after hospitalization.  At time of outreach call the patient feels as if their condition has worsened since last visit.  Raiza was having a moment when I outreached her today. She is discouraged about her health. She has spoken to Oncology and they want her to   Follow up with eye doctor. She stated was going to go shopping to perk up.

## 2024-12-03 NOTE — TELEPHONE ENCOUNTER
Call returned to patient and advised per message below from Dr. Bourne.  Pt aware that no Phesgo treatment on 12/11/24 and 1/2/25.  Pt aware that the follow up appt with Dr. Bourne is being rescheduled for 1/6/24.  Pt aware to call if these appt changes are not made correctly in her MyChart.  Message sent to the [facility] Scheduling Error/Follow up pool to make changes.       Joanna Bourne MD  You; Kacey Aquino, RN   If nose bleeds are lasting more than 15 mins then at this point we can have her stop the course early. If they are still manageable with pressure, I would encourage completing the two week course just to try to complete the course recommended by Dr. Santoro. In regards to the teary eyes and runny nose- this truly sounds like allergies to me. This is not something I have previously seen from patient's on phesgo but I guess could be possible. Why don't we hold further phesgo until further notice, at least until I see her next month and then we can decide whether to resume. Has she tried anything like Claritin or zyrtec?     MD Liat Dodd, CARLOS A; Kacey Aquino, RN  Caller: Unspecified (Today, 11:39 AM)  Lets cancel 12/11 treatment and 1/2. Reschedule her fu with me to 1/6 and we can decide what to do at that time. Thanks!

## 2024-12-03 NOTE — TELEPHONE ENCOUNTER
"Received [employer] message today 12/3/24 from patient:  \"Hi Liat  Please note I stopped the magnesium and the diahrea has stopped. Thank you  I'm tired of complaining this often (seems daily) i apologize which is not like me but i am having issues with nose bleeds and watery red eyes that discharge slimy crusty\"    Call returned to patient to assess over the phone.     NOSEBLEEDS:  Reports she has been on Eliquis 5mg bid and was told to take x 30 days after the port removal.  Has 2 weeks left to take the Eliquis.  Reports she continues daily nosebleeds even with using a humidifier.  Last night she had \"dripping in her throat that tasted like blood\" x 2 hours.  When she blows her nose it is bright red.  Typical nosebleed lasts for 2 minutes and stops on its own.  Informed pt I will message Dr. Bourne for her advise and will get back with her.  Patient verbalizes understanding of plan of care via teachback method.       WATERY RED EYES WITH DISCHARGE:  Reports x 3 mos she has had red watery eyes with a crusty \"slimy\" discharge and completed steroid eye gtts.  She saw Dr. Sarabia, ophthalmologist at [facility] and has a FUV with him next week 12/10/24.  Advised pt to call Dr. Sarabia and inform him of ongoing redness and eye discharge to see if she can get a soon appt with him this week.  Pt agrees and will call his office now.  Patient verbalizes understanding of plan of care via teachback method.       DIARRHEA:  pt reports she has no further diarrhea since stopping the Magnesium.     Message forwarded to Dr. Bourne's team for advise on plan of care.       "

## 2024-12-04 ENCOUNTER — PATIENT MESSAGE (OUTPATIENT)
Dept: PRIMARY CARE | Facility: CLINIC | Age: 74
End: 2024-12-04

## 2024-12-04 ENCOUNTER — LAB (OUTPATIENT)
Dept: LAB | Facility: LAB | Age: 74
End: 2024-12-04
Payer: MEDICARE

## 2024-12-04 DIAGNOSIS — D75.839 THROMBOCYTOSIS: ICD-10-CM

## 2024-12-04 DIAGNOSIS — D64.9 ANEMIA, UNSPECIFIED TYPE: ICD-10-CM

## 2024-12-04 LAB
ALBUMIN SERPL BCP-MCNC: 4.2 G/DL (ref 3.4–5)
ALP SERPL-CCNC: 71 U/L (ref 33–136)
ALT SERPL W P-5'-P-CCNC: 20 U/L (ref 7–45)
ANION GAP SERPL CALC-SCNC: 13 MMOL/L (ref 10–20)
AST SERPL W P-5'-P-CCNC: 23 U/L (ref 9–39)
BASOPHILS # BLD AUTO: 0.03 X10*3/UL (ref 0–0.1)
BASOPHILS NFR BLD AUTO: 0.5 %
BILIRUB SERPL-MCNC: 0.4 MG/DL (ref 0–1.2)
BUN SERPL-MCNC: 16 MG/DL (ref 6–23)
CALCIUM SERPL-MCNC: 9.3 MG/DL (ref 8.6–10.6)
CHLORIDE SERPL-SCNC: 101 MMOL/L (ref 98–107)
CO2 SERPL-SCNC: 29 MMOL/L (ref 21–32)
CREAT SERPL-MCNC: 0.68 MG/DL (ref 0.5–1.05)
EGFRCR SERPLBLD CKD-EPI 2021: >90 ML/MIN/1.73M*2
EOSINOPHIL # BLD AUTO: 0.16 X10*3/UL (ref 0–0.4)
EOSINOPHIL NFR BLD AUTO: 2.5 %
ERYTHROCYTE [DISTWIDTH] IN BLOOD BY AUTOMATED COUNT: 16.4 % (ref 11.5–14.5)
GLUCOSE SERPL-MCNC: 95 MG/DL (ref 74–99)
HCT VFR BLD AUTO: 39.3 % (ref 36–46)
HGB BLD-MCNC: 12.5 G/DL (ref 12–16)
IMM GRANULOCYTES # BLD AUTO: 0.02 X10*3/UL (ref 0–0.5)
IMM GRANULOCYTES NFR BLD AUTO: 0.3 % (ref 0–0.9)
LYMPHOCYTES # BLD AUTO: 1.2 X10*3/UL (ref 0.8–3)
LYMPHOCYTES NFR BLD AUTO: 18.9 %
MCH RBC QN AUTO: 28.2 PG (ref 26–34)
MCHC RBC AUTO-ENTMCNC: 31.8 G/DL (ref 32–36)
MCV RBC AUTO: 89 FL (ref 80–100)
MONOCYTES # BLD AUTO: 0.58 X10*3/UL (ref 0.05–0.8)
MONOCYTES NFR BLD AUTO: 9.1 %
NEUTROPHILS # BLD AUTO: 4.37 X10*3/UL (ref 1.6–5.5)
NEUTROPHILS NFR BLD AUTO: 68.7 %
NRBC BLD-RTO: 0 /100 WBCS (ref 0–0)
PLATELET # BLD AUTO: 253 X10*3/UL (ref 150–450)
POTASSIUM SERPL-SCNC: 4.2 MMOL/L (ref 3.5–5.3)
PROT SERPL-MCNC: 6.9 G/DL (ref 6.4–8.2)
RBC # BLD AUTO: 4.44 X10*6/UL (ref 4–5.2)
SODIUM SERPL-SCNC: 139 MMOL/L (ref 136–145)
WBC # BLD AUTO: 6.4 X10*3/UL (ref 4.4–11.3)

## 2024-12-04 PROCEDURE — 80053 COMPREHEN METABOLIC PANEL: CPT

## 2024-12-04 PROCEDURE — 85025 COMPLETE CBC W/AUTO DIFF WBC: CPT

## 2024-12-04 PROCEDURE — 36415 COLL VENOUS BLD VENIPUNCTURE: CPT

## 2024-12-05 ENCOUNTER — APPOINTMENT (OUTPATIENT)
Dept: PHARMACY | Facility: HOSPITAL | Age: 74
End: 2024-12-05
Payer: MEDICARE

## 2024-12-05 ENCOUNTER — OFFICE VISIT (OUTPATIENT)
Dept: OPHTHALMOLOGY | Facility: CLINIC | Age: 74
End: 2024-12-05
Payer: MEDICARE

## 2024-12-05 ENCOUNTER — APPOINTMENT (OUTPATIENT)
Dept: HEMATOLOGY/ONCOLOGY | Facility: CLINIC | Age: 74
End: 2024-12-05
Payer: MEDICARE

## 2024-12-05 ENCOUNTER — TELEPHONE (OUTPATIENT)
Dept: GASTROENTEROLOGY | Facility: CLINIC | Age: 74
End: 2024-12-05

## 2024-12-05 DIAGNOSIS — H04.129 DRY EYE: Primary | ICD-10-CM

## 2024-12-05 DIAGNOSIS — E11.9 TYPE 2 DIABETES MELLITUS WITHOUT COMPLICATION, WITHOUT LONG-TERM CURRENT USE OF INSULIN (MULTI): ICD-10-CM

## 2024-12-05 PROCEDURE — 99212 OFFICE O/P EST SF 10 MIN: CPT | Performed by: OPHTHALMOLOGY

## 2024-12-05 ASSESSMENT — VISUAL ACUITY
OD_PH_SC: 20/50
OS_SC: 20/25
OD_PH_SC+: -2
OD_SC: 20/60
METHOD: SNELLEN - LINEAR

## 2024-12-05 ASSESSMENT — TONOMETRY
OD_IOP_MMHG: DEFER
OS_IOP_MMHG: DEFER
IOP_METHOD: GOLDMANN APPLANATION

## 2024-12-05 NOTE — PROGRESS NOTES
Assessment/Plan   Diagnoses and all orders for this visit:  Dry eye  recommend use of artificial tears 4 times a day, may use gel or ointment at night if symptoms are present in the morning.     Fu Dr Sarabia as sched for full exam

## 2024-12-05 NOTE — TELEPHONE ENCOUNTER
Called patient to relay results of MRI showing stable pancreatic cysts. Patient notably has established with genetics and is planned for EUS +EGD for surveillance of BE and further evaluation. She is requesting message sent as she is driving. [employer] message with results sent.    Mona Dodson MD

## 2024-12-05 NOTE — PROGRESS NOTES
Clinical Pharmacy Appointment    Patient ID: Raiza Nguyễn is a 73 y.o. female who presents for Diabetes.    Pt is here for First appointment.     Referring Provider: Shoshana Olivarez, *  PCP: Shoshana Olivarez DO   Last visit with PCP: 11/1/2024   Next visit with PCP: 1/22/2025      Subjective     Interval History      HPI  Type II Diabetes  Current  Pharmacotherapy:   Mounjaro 5 mg once weekly injection   Metformin  mg once daily      SECONDARY PREVENTION  - Statin? Yes  - ACE-I/ARB? No   - Aspirin? No     -The ASCVD Risk score (Librado ALLEN, et al., 2019) failed to calculate for the following reasons:    Risk score cannot be calculated because patient has a medical history suggesting prior/existing ASCVD      Current monitoring regimen:   Patient is using: glucometer      SMBG Fasting Readings: average of 130     Hypoglycemia?  - Lowest blood sugar was in the 90's but patient felt signs and symptoms of     Pertinent PMH Review:  - PMH of Pancreatitis: No  - PMH/FH of Medullary Thyroid Cancer: No  - PMH of Retinopathy: No  - PMH of Urinary Tract Infections: No     Diet/Lifestyle:   Does carb counting to help control blood sugar      Patient Assistance Screening (VAF)    Patient verbally reports monthly or yearly income which is less than 400% federal poverty level     Application for program has been submitted for the following medications: Mounjaro    Patient has been informed that program team will be reaching out to them to discuss necessary documentation, instructed to answer phone/return voicemail.     Patient aware this process may take up to 6 weeks.     If approved medication must be filled through [facility] pharmacy and may be picked up or mailed to patient.       Drug Interactions  No relevant drug interactions were noted.    Medication System Management  Patient's preferred pharmacy: [facility]   Adherence/Organization: None  Affordability/Accessibility: Mounjaro is expensive        Objective   Allergies   Allergen Reactions    Erythromycin GI bleeding    Tetracycline GI bleeding    Cephalexin Unknown    Glucosamine Unknown    Adhesive Itching and Rash     Social History     Social History Narrative    Not on file      Medication Review  Current Outpatient Medications   Medication Instructions    apixaban (ELIQUIS) 5 mg, oral, 2 times daily    blood sugar diagnostic strip 90 strips, miscellaneous, Daily    calcium carbonate 600 mg, Daily    DULoxetine (CYMBALTA) 30 mg, oral, 2 times daily    gabapentin (NEURONTIN) 900 mg, oral, 3 times daily    hydroCHLOROthiazide (HYDRODIURIL) 25 mg, oral, 2 times daily    levothyroxine (SYNTHROID, LEVOXYL) 125 mcg, oral, Daily    lisinopril 5 mg, oral, Daily    metFORMIN XR (GLUCOPHAGE-XR) 500 mg, oral, Daily    Mounjaro 5 mg, subcutaneous, Weekly    rosuvastatin (CRESTOR) 5 mg, oral, Daily    traMADol (ULTRAM) 100 mg, Nightly      Vitals  BP Readings from Last 2 Encounters:   11/20/24 132/83   11/16/24 137/66     BMI Readings from Last 1 Encounters:   11/20/24 34.90 kg/m²      Labs  A1C  Lab Results   Component Value Date    HGBA1C 5.9 11/01/2024    HGBA1C 6.9 (H) 05/29/2024    HGBA1C 6.5 (H) 01/30/2024     BMP  Lab Results   Component Value Date    CALCIUM 9.3 12/04/2024     12/04/2024    K 4.2 12/04/2024    CO2 29 12/04/2024     12/04/2024    BUN 16 12/04/2024    CREATININE 0.68 12/04/2024    EGFR >90 12/04/2024     LFTs  Lab Results   Component Value Date    ALT 20 12/04/2024    AST 23 12/04/2024    ALKPHOS 71 12/04/2024    BILITOT 0.4 12/04/2024     FLP  Lab Results   Component Value Date    TRIG 70 06/19/2024    CHOL 138 06/19/2024    LDLF 91 07/22/2022    LDLCALC 59 06/19/2024    HDL 64.7 06/19/2024     Urine Microalbumin  Lab Results   Component Value Date    MICROALBCREA  08/28/2024      Comment:      One or more analytes used in this calculation is outside of the analytical measurement range. Calculation cannot be performed.      Weight Management  Wt Readings from Last 3 Encounters:   11/20/24 78.9 kg (174 lb 0.9 oz)   11/13/24 80.3 kg (177 lb)   11/01/24 80.2 kg (176 lb 12.8 oz)      There is no height or weight on file to calculate BMI.     Mounjaro Education:     Counseled patient on Mounjaro MOA, expectations, side effects, duration of therapy, administration, and monitoring parameters.  Provided detailed dosing and administration counseling to ensure proper technique.   Reviewed Mounjaro titration schedule, starting with 2.5 mg once weekly to a goal of 15 mg once weekly if tolerated  Counseled patient on the benefits of GLP-1ra glycemic control and weight loss  Reviewed storage requirements of Mounjaro when not in use, and when to administer the medication if a dose is missed.  Advised patient that they may experience improved satiety after meals and portion sizes of meals may be reduced as doses of Mounjaro increase.      Assessment/Plan   Problem List Items Addressed This Visit       Diabetes mellitus (Multi)     Patient is well controlled with A1C of 6.9%.  The patient test blood sugar daily and readings are usually around 130.  Patient has been taking Mounjaro and metformin to control blood sugar with no side effects.  Discussed  patient assistance to help with the cost of Mounjaro and patient would likely qualify based on stated income.      PLAN:   -   Continue Mounjaro 5 mg once weekly injection. Prescription sent to [facility] pharmacy for cost assistance   - Continue Metformin 500 mg once daily  - Continue to test blood sugar once daily and keep a log to review at next appointment.           Relevant Medications    tirzepatide (Mounjaro) 5 mg/0.5 mL pen injector    Other Relevant Orders    Referral to Clinical Pharmacy       Clinical Pharmacist follow-up: 6 weeks, Telehealth visit    Continue all meds under the continuation of care with the referring provider and clinical pharmacy team.    Thank you,  Charmaine Nj,  PharmD   Clinical Pharmacist  [phone]    Verbal consent to manage patient's drug therapy was obtained from the patient. They were informed they may decline to participate or withdraw from participation in pharmacy services at any time.

## 2024-12-06 ENCOUNTER — EVALUATION (OUTPATIENT)
Dept: PHYSICAL THERAPY | Facility: CLINIC | Age: 74
End: 2024-12-06
Payer: MEDICARE

## 2024-12-06 DIAGNOSIS — M25.562 LEFT KNEE PAIN, UNSPECIFIED CHRONICITY: Primary | ICD-10-CM

## 2024-12-06 DIAGNOSIS — R29.898 LEFT LEG WEAKNESS: ICD-10-CM

## 2024-12-06 PROCEDURE — 97110 THERAPEUTIC EXERCISES: CPT | Mod: GP

## 2024-12-06 PROCEDURE — 97161 PT EVAL LOW COMPLEX 20 MIN: CPT | Mod: GP

## 2024-12-06 ASSESSMENT — ENCOUNTER SYMPTOMS
LOSS OF SENSATION IN FEET: 0
OCCASIONAL FEELINGS OF UNSTEADINESS: 1
DEPRESSION: 1

## 2024-12-06 ASSESSMENT — PATIENT HEALTH QUESTIONNAIRE - PHQ9
1. LITTLE INTEREST OR PLEASURE IN DOING THINGS: SEVERAL DAYS
2. FEELING DOWN, DEPRESSED OR HOPELESS: SEVERAL DAYS
SUM OF ALL RESPONSES TO PHQ9 QUESTIONS 1 AND 2: 2
10. IF YOU CHECKED OFF ANY PROBLEMS, HOW DIFFICULT HAVE THESE PROBLEMS MADE IT FOR YOU TO DO YOUR WORK, TAKE CARE OF THINGS AT HOME, OR GET ALONG WITH OTHER PEOPLE: SOMEWHAT DIFFICULT

## 2024-12-06 NOTE — PROGRESS NOTES
Patient Name Raiza Nguyễn  MRN: [id number]  Today's Date: 12/06/24  Time Calculation  Start Time: 1052  Stop Time: 1135  Time Calculation (min): 43 min    Insurance:   Visit #: 1  Insurance Reviewed  (per information provided by  pre-cert team)   Authorization required:  N  Approved # of visits: MN  Authorization/MCR certification date range:  12/6/2024 to 3/6/2025    Therapy Diagnoses:   Problem List Items Addressed This Visit    None  Visit Diagnoses         Codes    Left knee pain, unspecified chronicity    -  Primary M25.562    Relevant Orders    Follow Up In Physical Therapy    Left leg weakness     R29.898    Relevant Orders    Follow Up In Physical Therapy          General:  Reason for visit: left knee pain and weakness   Referred by: Dr Dl Mansfield MD appt: 1/22/2025  Preferred Name:  Raiza  Script:  PT eval and treat  Onset Date:  11/8/2024    Assessment:    Patient presents with left knee pain and instability that has been occurring since her left TKA in 2020. She has limited AROM as well. She has marked weakness of right glut med and chronic shoulder issues as well. Pt has fallen in the past 2 months and she states that it has to due with her left leg giving out.  She will benefit from skilled PT for strengthening, A/PROM, balance, and gait training to help prevent falls.     Problems:    Pain:  __x_  Posture/Body mechanics deficits:  __x_  Decreased knowledge HEP:  x  Decreased knowledge of Precautions:  _x__  Activity Limitations:   __x_  ADL's/IADL's/Self-care skills:  __x_  ROM/Joint Mobility:  _x__  Strength:  __x_  Decreased functional level:   __x_  Flexibility:  _x__  Tenderness/decreased soft tissue mobility:  _x__  Gait/Stairs:  __x_  Fall Risk:  _x__  Balance:  __x_  Edema:  ___  Participation restrictions:  ___  Sensory:  ___  Transfers:  ___  Decreased knowledge of brace:   ___  Other:  ___    X Indicates included in problem list    Goals:      STG:  In 4 weeks.     Decreased pain by  "1-2 points on the VAS scale allowing patient to put increased weight through left leg for safer gait    Increase LE strength by 1/2 grade to help ease car transfers    Compliant with HEP    LTG: by discharge    Increased postural awareness and posture WFL.    Increased function with ADL's and IADL's.  Improve LEFS to: 30 %  limitation of function.    Normal gait pattern on level and uneven surfaces community level distances for improved function in the community.     Pt will be able to step up on to curb or the one step in her home with improved balance and with only straight cane    Increase  L SLS to 8\"    Increase L knee AROM to 5/100 for improved function with car transfers and chair transfers.     Increase R L strength to WNL for improved function with chores and being able to get to her appointments easier and safer.    I and compliant with HEP and proper: L LE care.      Rehab potential to achieve the above goals is good.    Patient is aware of diagnosis and prognosis and agrees with established plan of care and goals.    Plan:   Skilled PT 2 x/week for 4-6 weeks( Until goals achieved, maximum rehab potential has been achieved, or patient has plateaued)  for:    Aquatics  _____  CP   __x__  Dry needling  ____  Education  __x__  Electrical Stimulation  _x___  Gait training  _x___  HEP  __x__  HP  ____  Manual  ____  Mechanical Traction  ____  NMR  _x___  Self-care/home management  ____  Therapeutic Exercise   _x___  Therapeutic Activities  _x___  US  ____  Work Conditioning  ____  Re-assessment  ____  Other  ____    X Indicates included in treatment plan    Subjective:    HPI: Pt had TKA in 2020 and did not have good outcome for functional knee AROM and pain reduction. Pt has a mod/high risk for falls due to multiple risk factors including gross right glut med weakness, recent mastectomy, spondylolisthesis, and shoulder dysfunction    Pain 7/10     Type of pain: dull/achy pain Left med knee pain, mid knee from " superior patella to inferior patella   What increases pain: turning a certain way, keeping left leg down when driving, turning too fast, up/down step  What decreases pain:  nothing significant    Medical Hx/  Spondylolisthesis, breast CA and mastectomy, heart disease, HTN, Thyroid disorder, Ulcers, and UTI  Precautions:       Adult Screening Risk:      Fall Risk:  mod/high risk    Patient goal:  walk without cane, have more balance to avoid falls, and how to get up if she does fall  Patient is aware of diagnosis and prognosis.    Living Environment:    Social Support: Has 2 sons in the area; lives lone  DME: straight cane/rollater    Prior Function:   Pt has had decreased functional ability since TKA, but also due to her health struggles the past couple of years.    Function:    A and O x 3  Sleep:  not disturbed  ADL's: indep  Chores: can't mop/sweep. Takes frequent breaks  Driving: Indep  Work: retired  Recreation: unable  Sitting: no problem  Standing:  not for long periods     Walking: Uses cane or rollater    Objective:    Outcome Measures:  Other Measures  Lower Extremity Funtional Score (LEFS): 32     Posture:  stands asymmetrically as left knee ext is not full, but right also isn't  Gait/stairs:  Decreased stance time, decreased hip ext and trunk rotation, L LE ER throughout cycle and step to pattern on stairs with B UE support. Decreased WB left LE,  R LE IR at hip due to weakness of glut med/ external rotaters  Balance:   L SLS:  2 sec    AROM:  R/L knee ext/flex:  R  20 to 100 /L 15 to 72    MMT:   R  L  Hip   flex:  4-  4-  ext:  Poor  poor  abd:  2  4-    Quads:  4  4  Hams:  4+  4+  DF:   4+  4+  PF:  4+  4+    Flexibility:    Hams:  SLR: Bc approx 75 deg  Hip flexors:  mod tightness    Palpation:  mod tenderness medial joint line, patellar tendon,     Treatment:    Evaluation:  30 minutes  Low complexity  **= HEP  NV= Next visit  np= not performed  nb= non-billable  G= group  HEP= discharged to [facility].    Next visit: Add Nustep, unweighted ex to start.  Address right glut med weakness  Therapeutic Exercise:  13  Therapeutic ex taught to patient via handouts below  Access Code: [email or web address]  URL: [email or web address]  Date: 12/06/2024  Prepared by: Tori Chinchilla    Exercises  - Seated Long Arc Quad  - 1-2 x daily - 7 x weekly - 1-2 sets - 10 reps  - Standing 3-way Hip with Walker  - 1-2 x daily - 7 x weekly - 1 sets - 10 reps  - Seated Hip Abduction  - 1-2 x daily - 7 x weekly - 1-2 sets - 10 reps  - Seated Hip Adduction Isometrics with Ball  - 1-2 x daily - 7 x weekly - 1-2 sets - 10 reps - 5-10 seconds hold  - Hip Flexion  - 1-2 x daily - 7 x weekly - 1-2 sets - 10 reps - 5 count hold    Education:  poc, anatomy, physiology, posture, body mechanics, safety awareness, HEP.  Preferred learning:  pictures, demonstration.  Verbalized good understanding.

## 2024-12-09 RX ORDER — TIRZEPATIDE 5 MG/.5ML
5 INJECTION, SOLUTION SUBCUTANEOUS WEEKLY
Qty: 2 ML | Refills: 3 | Status: SHIPPED | OUTPATIENT
Start: 2024-12-09

## 2024-12-10 ENCOUNTER — APPOINTMENT (OUTPATIENT)
Dept: OPHTHALMOLOGY | Facility: CLINIC | Age: 74
End: 2024-12-10
Payer: MEDICARE

## 2024-12-11 ENCOUNTER — TREATMENT (OUTPATIENT)
Dept: PHYSICAL THERAPY | Facility: CLINIC | Age: 74
End: 2024-12-11
Payer: MEDICARE

## 2024-12-11 DIAGNOSIS — M25.562 LEFT KNEE PAIN, UNSPECIFIED CHRONICITY: ICD-10-CM

## 2024-12-11 DIAGNOSIS — R29.898 LEFT LEG WEAKNESS: ICD-10-CM

## 2024-12-11 PROCEDURE — 97110 THERAPEUTIC EXERCISES: CPT | Mod: GP,CQ

## 2024-12-11 NOTE — PROGRESS NOTES
"Physical Therapy  Physical Therapy Progress Note    Patient Name Raiza Nguyễn   MRN: [id number]  Today's Date: 12/11/2024  Time Calculation  Start Time: 0400  Stop Time: 0438  Time Calculation (min): 38 min    Insurance:    Visit #:   2   Insurance Reviewed  (per information provided by  pre-cert team)   Authorization required:  N  Approved # of visits: MN  Authorization/MCR certification date range:  12/6/2024 to 3/6/2025     Therapy Diagnoses:   Problem List Items Addressed This Visit             ICD-10-CM    Left knee pain M25.562    Left leg weakness R29.898       General:  Reason for visit: left knee pain and weakness   Referred by: Dr Dl Mansfield MD appt: 1/22/2025  Preferred Name:  Raiza  Script:  PT eval and treat  Onset Date:  11/8/2024    Subjective:   Patient reports:  amb to dept with cane, uses rollator only for long distances    Have you fallen since last visit:  no    Precautions:    Medical Hx/  Spondylolisthesis, breast CA and mastectomy, heart disease, HTN, Thyroid disorder, Ulcers, and UTI  Precautions:     Adult Screening Risk:       Fall Risk:  mod/high risk    Pain:  4/10  Location/Type of pain:  medial L knee  / soreness    HEP compliance/understanding:  compliance with the instructed home exercises.    Objective:   Objective Measurements:    Function:   Patient reports there has not been a significant change in functional abilities.  Posture:   Gait:  Balance:   ROM:    Strength:  Flexibility:      Treatment:   **= HEP progression today NV= Next visit  np= not performed  nb= non-billable  G= group HEP= discharged to [facility]  Therapeutic Exercise:     38 minutes  Nu step L2 10'  LAQ 1# 2 x 10  Seated hip ball squeeze 5 sec 2 x10   Seated GTB hip abduction GTB 2 x 10   Sidely clamshell 2 x 10 **  STS from 21\" mat with arms crossed 2 x 10  **  Standing alternating hip abd/ext  2 x 10 with UE support         Education:  exercise progression    Assessment:  Patient tolerated treatment well, " did well with progression this date.  Required some momentary rest breaks between exercises .Updated HEP Pt. Appropriately fatigued post rx  Patient needs continued work on/skilled PT for: ROM/flexibility/strengthening to address remaining functional, objective and subjective deficits to allow them to return to prior /optimal level of function with ADLs.  Patient is progressing with goals: compliant with HEP  Skilled care:  exercise progression    Plan:    Continue to progress per poc:   NV attempt step up    HEP:    Access Code: [email or web address]  URL: [email or web address]  Date: 12/11/2024  Prepared by: Delicia    Exercises  - Seated Long Arc Quad  - 1-2 x daily - 7 x weekly - 1-2 sets - 10 reps  - Standing 3-way Hip with Walker  - 1-2 x daily - 7 x weekly - 1 sets - 10 reps  - Seated Hip Abduction  - 1-2 x daily - 7 x weekly - 1-2 sets - 10 reps  - Seated Hip Adduction Isometrics with Ball  - 1-2 x daily - 7 x weekly - 1-2 sets - 10 reps - 5-10 seconds hold  - Hip Flexion  - 1-2 x daily - 7 x weekly - 1-2 sets - 10 reps - 5 count hold  - Clamshell  - 1 x daily - 7 x weekly - 1-2 sets - 10 reps - 5 hold  - Sit to Stand with Arms Crossed  - 1 x daily - 7 x weekly - 1-2 sets - 10 reps - 5 hold

## 2024-12-13 ENCOUNTER — TELEPHONE (OUTPATIENT)
Dept: CARDIOLOGY | Facility: HOSPITAL | Age: 74
End: 2024-12-13
Payer: MEDICARE

## 2024-12-13 NOTE — TELEPHONE ENCOUNTER
Spoke with the patient and she would like her follow up with Beatriz to be a virtual as she does not want to make the trip 2 days and she would like the Echo results when she meets with Beatriz in 4/25.

## 2024-12-16 PROCEDURE — RXMED WILLOW AMBULATORY MEDICATION CHARGE

## 2024-12-16 NOTE — ASSESSMENT & PLAN NOTE
Patient is well controlled with A1C of 6.9%.  The patient test blood sugar daily and readings are usually around 130.  Patient has been taking Mounjaro and metformin to control blood sugar with no side effects.  Discussed  patient assistance to help with the cost of Mounjaro and patient would likely qualify based on stated income.      PLAN:   -   Continue Mounjaro 5 mg once weekly injection. Prescription sent to [facility] pharmacy for cost assistance   - Continue Metformin 500 mg once daily  - Continue to test blood sugar once daily and keep a log to review at next appointment.

## 2024-12-17 ENCOUNTER — TREATMENT (OUTPATIENT)
Dept: PHYSICAL THERAPY | Facility: CLINIC | Age: 74
End: 2024-12-17
Payer: MEDICARE

## 2024-12-17 DIAGNOSIS — M25.562 LEFT KNEE PAIN, UNSPECIFIED CHRONICITY: ICD-10-CM

## 2024-12-17 DIAGNOSIS — R29.898 LEFT LEG WEAKNESS: ICD-10-CM

## 2024-12-17 PROCEDURE — 97110 THERAPEUTIC EXERCISES: CPT | Mod: GP

## 2024-12-17 NOTE — PROGRESS NOTES
"Physical Therapy  Physical Therapy Progress Note    Patient Name Raiza Nguyễn   MRN: [id number]  Today's Date: 12/17/2024  Time Calculation  Start Time: 1000  Stop Time: 1040  Time Calculation (min): 40 min    Insurance:    Visit #:   3   Insurance Reviewed  (per information provided by  pre-cert team)   Authorization required:  N  Approved # of visits: MN  Authorization/MCR certification date range:  12/6/2024 to 3/6/2025    Therapy Diagnoses:   Problem List Items Addressed This Visit             ICD-10-CM    Left knee pain M25.562    Left leg weakness R29.898     General:  Reason for visit: left knee pain and weakness   Referred by: Dr Dl Mansfield MD appt: 1/22/2025  Preferred Name:  Raiza  Script:  PT eval and treat  Onset Date:  11/8/2024    Subjective:   Patient reports:  Her left knee only hurts while bending    Have you fallen since last visit:  no    Medical Hx/  Spondylolisthesis, breast CA and mastectomy, heart disease, HTN, Thyroid disorder, Ulcers, and UTI  Precautions:  Fall Risk:  mod/high risk    Pain:  0/10  Location/Type of pain:  left knee, \"7\" sharp only while bending    HEP compliance/understanding:  good    Objective:   Objective Measurements:    Function:     Posture:   Gait: ambulates with cane  Balance:  Pt missteps in PT and stumbles slightly but regains her balance  ROM:  no change in left knee AROM   Strength:  Flexibility:      Treatment:   **= HEP progression today NV= Next visit  np= not performed  nb= non-billable  G= group HEP= discharged to HEP  Therapeutic Exercise:     40 minutes  Nu step L2 10'  Left standing HS/HF stretch, 30\" x 2, ea  Airex, 3 way hip, 10x ea, R/L  LAQ 2# 2 x 10  SLR, 0#, 2 x 10, left  Supine hip ball squeeze 5 sec 2 x10   Supine GTB hip abduction GTB 2 x 10   PROM left knee flexion x 20 reps with end range stretch, lightly      Sidely clamshell 2 x 10 **  STS from 21\" mat with arms crossed 2 x 10  **    Education:  Ex technique.    Assessment:  Patient " tolerated treatment fair, left knee AA/PROM has very firm end feel. Pt will benefit from progression of functional and PRE strengthening ex    to address remaining functional, objective and subjective deficits to allow them to return to prior /optimal level of function with ADLs.  Patient is progressing with goals: not significantly  Skilled care:  ex guidance and manual    Plan:    Continue to progress per poc:   NV add additional functional standing and balance ex    HEP:  Access Code: [email or web address]  URL: [email or web address]  Date: 12/11/2024  Prepared by: Delicia    Exercises  - Seated Long Arc Quad  - 1-2 x daily - 7 x weekly - 1-2 sets - 10 reps  - Standing 3-way Hip with Walker  - 1-2 x daily - 7 x weekly - 1 sets - 10 reps  - Seated Hip Abduction  - 1-2 x daily - 7 x weekly - 1-2 sets - 10 reps  - Seated Hip Adduction Isometrics with Ball  - 1-2 x daily - 7 x weekly - 1-2 sets - 10 reps - 5-10 seconds hold  - Hip Flexion  - 1-2 x daily - 7 x weekly - 1-2 sets - 10 reps - 5 count hold  - Clamshell  - 1 x daily - 7 x weekly - 1-2 sets - 10 reps - 5 hold  - Sit to Stand with Arms Crossed  - 1 x daily - 7 x weekly - 1-2 sets - 10 reps - 5 hold

## 2024-12-18 ENCOUNTER — PHARMACY VISIT (OUTPATIENT)
Dept: PHARMACY | Facility: CLINIC | Age: 74
End: 2024-12-18
Payer: COMMERCIAL

## 2024-12-19 ENCOUNTER — APPOINTMENT (OUTPATIENT)
Dept: PHYSICAL THERAPY | Facility: CLINIC | Age: 74
End: 2024-12-19
Payer: MEDICARE

## 2024-12-20 ENCOUNTER — DOCUMENTATION (OUTPATIENT)
Dept: PHYSICAL THERAPY | Facility: CLINIC | Age: 74
End: 2024-12-20
Payer: MEDICARE

## 2024-12-20 NOTE — PROGRESS NOTES
Physical Therapy                 Therapy Communication Note    Patient Name: Raiza Nguyễn  MRN: [id number]  Department: [facility]  Room: Room/bed info not found  Today's Date: 12/20/2024     Discipline: Physical Therapy          Missed Visit Reason:  Pt ill    Missed Time: Cancel    Comment:

## 2024-12-26 ENCOUNTER — APPOINTMENT (OUTPATIENT)
Dept: HEMATOLOGY/ONCOLOGY | Facility: CLINIC | Age: 74
End: 2024-12-26
Payer: MEDICARE

## 2024-12-31 ENCOUNTER — TREATMENT (OUTPATIENT)
Dept: PHYSICAL THERAPY | Facility: CLINIC | Age: 74
End: 2024-12-31
Payer: MEDICARE

## 2024-12-31 ENCOUNTER — APPOINTMENT (OUTPATIENT)
Dept: DERMATOLOGY | Facility: CLINIC | Age: 74
End: 2024-12-31
Payer: MEDICARE

## 2024-12-31 DIAGNOSIS — L81.4 LENTIGO: ICD-10-CM

## 2024-12-31 DIAGNOSIS — L82.1 SEBORRHEIC KERATOSIS: ICD-10-CM

## 2024-12-31 DIAGNOSIS — R29.898 LEFT LEG WEAKNESS: ICD-10-CM

## 2024-12-31 DIAGNOSIS — L57.0 ACTINIC KERATOSIS: ICD-10-CM

## 2024-12-31 DIAGNOSIS — M25.562 LEFT KNEE PAIN, UNSPECIFIED CHRONICITY: ICD-10-CM

## 2024-12-31 DIAGNOSIS — L57.8 DIFFUSE PHOTODAMAGE OF SKIN: ICD-10-CM

## 2024-12-31 DIAGNOSIS — L30.9 HAND ECZEMA: Primary | ICD-10-CM

## 2024-12-31 PROCEDURE — 3062F POS MACROALBUMINURIA REV: CPT | Performed by: DERMATOLOGY

## 2024-12-31 PROCEDURE — 1159F MED LIST DOCD IN RCRD: CPT | Performed by: DERMATOLOGY

## 2024-12-31 PROCEDURE — 3048F LDL-C <100 MG/DL: CPT | Performed by: DERMATOLOGY

## 2024-12-31 PROCEDURE — 4010F ACE/ARB THERAPY RXD/TAKEN: CPT | Performed by: DERMATOLOGY

## 2024-12-31 PROCEDURE — 17000 DESTRUCT PREMALG LESION: CPT | Performed by: DERMATOLOGY

## 2024-12-31 PROCEDURE — 97110 THERAPEUTIC EXERCISES: CPT | Mod: GP

## 2024-12-31 PROCEDURE — 17003 DESTRUCT PREMALG LES 2-14: CPT | Performed by: DERMATOLOGY

## 2024-12-31 PROCEDURE — 99214 OFFICE O/P EST MOD 30 MIN: CPT | Performed by: DERMATOLOGY

## 2024-12-31 PROCEDURE — 1123F ACP DISCUSS/DSCN MKR DOCD: CPT | Performed by: DERMATOLOGY

## 2024-12-31 PROCEDURE — 1157F ADVNC CARE PLAN IN RCRD: CPT | Performed by: DERMATOLOGY

## 2024-12-31 PROCEDURE — 3044F HG A1C LEVEL LT 7.0%: CPT | Performed by: DERMATOLOGY

## 2024-12-31 RX ORDER — TRIAMCINOLONE ACETONIDE 1 MG/G
OINTMENT TOPICAL
Qty: 80 G | Refills: 0 | Status: SHIPPED | OUTPATIENT
Start: 2024-12-31

## 2024-12-31 ASSESSMENT — DERMATOLOGY QUALITY OF LIFE (QOL) ASSESSMENT
RATE HOW EMOTIONALLY BOTHERED YOU ARE BY YOUR SKIN PROBLEM (FOR EXAMPLE, WORRY, EMBARRASSMENT, FRUSTRATION): 4
RATE HOW BOTHERED YOU ARE BY SYMPTOMS OF YOUR SKIN PROBLEM (EG, ITCHING, STINGING BURNING, HURTING OR SKIN IRRITATION): 6 - ALWAYS BOTHERED
RATE HOW BOTHERED YOU ARE BY EFFECTS OF YOUR SKIN PROBLEMS ON YOUR ACTIVITIES (EG, GOING OUT, ACCOMPLISHING WHAT YOU WANT, WORK ACTIVITIES OR YOUR RELATIONSHIPS WITH OTHERS): 6 - ALWAYS BOTHERED
RATE HOW BOTHERED YOU ARE BY SYMPTOMS OF YOUR SKIN PROBLEM (EG, ITCHING, STINGING BURNING, HURTING OR SKIN IRRITATION): 6 - ALWAYS BOTHERED
ARE THERE EXCLUSIONS OR EXCEPTIONS FOR THE QUALITY OF LIFE ASSESSMENT: NO
RATE HOW EMOTIONALLY BOTHERED YOU ARE BY YOUR SKIN PROBLEM (FOR EXAMPLE, WORRY, EMBARRASSMENT, FRUSTRATION): 4
RATE HOW BOTHERED YOU ARE BY EFFECTS OF YOUR SKIN PROBLEMS ON YOUR ACTIVITIES (EG, GOING OUT, ACCOMPLISHING WHAT YOU WANT, WORK ACTIVITIES OR YOUR RELATIONSHIPS WITH OTHERS): 6 - ALWAYS BOTHERED
DATE THE QUALITY-OF-LIFE ASSESSMENT WAS COMPLETED: 67205

## 2024-12-31 NOTE — PROGRESS NOTES
"Physical Therapy  Physical Therapy Progress Note    Patient Name Raiza Nguyễn   MRN: [id number]  Today's Date: 12/31/2024  Time Calculation  Start Time: 1030  Stop Time: 1113  Time Calculation (min): 43 min    Insurance:    Visit #:   4   Insurance Reviewed  (per information provided by  pre-cert team)   Authorization required:  N  Approved # of visits: MN  Authorization/MCR certification date range:  12/6/2024 to 3/6/2025    Therapy Diagnoses:   Problem List Items Addressed This Visit             ICD-10-CM    Left knee pain M25.562    Left leg weakness R29.898     General:  Reason for visit: left knee pain and weakness   Referred by: Dr Dl Mansfield MD appt: 1/22/2025  Preferred Name:  Raiza  Script:  PT eval and treat  Onset Date:  11/8/2024    Subjective:   Patient reports:  Her left knee only hurts while bending, no pain otherwise    Have you fallen since last visit:  no    Medical Hx/  Spondylolisthesis, breast CA and mastectomy, heart disease, HTN, Thyroid disorder, Ulcers, and UTI  Precautions:  Fall Risk:  mod/high risk    Pain:  0/10  Location/Type of pain:  left knee, \"7\" sharp only while bending    HEP compliance/understanding:  good    Objective:   Objective Measurements:    Function:     Posture:   Gait: ambulates with cane  Balance:  Pt missteps in PT and stumbles slightly but regains her balance  ROM:  no change in left knee AROM   Strength:  Flexibility:      Treatment:   **= HEP progression today NV= Next visit  np= not performed  nb= non-billable  G= group HEP= discharged to [facility]  Therapeutic Exercise:     43 minutes  Nu step L2 10'  Left standing HS/HF stretch, 30\" x 2, ea  Airex, 3 way hip, 10x ea, R/L-NP,painful  LAQ 3# 2 x 10**  SLR, 0#, 2 x 10, left  Sidely clamshell with green TB 2 x 10   Supine hip ball squeeze 5 sec 2 x10   Supine GTB hip abduction GTB 2 x 10   PROM left knee flexion x 20 reps with end range stretch, lightly-NP  Side stepping with red TB loop x 4**    STS from 21\" " mat with arms crossed 2 x 10  **    Education:  Ex technique.    Assessment:  Patient tolerated treatment well, able to tolerate side stepping with red TB and increased resistance with LAQ.  Airex 3 way hip not performed today due to increased symptoms standing on one leg.    Pt will benefit from progression of functional and PRE strengthening ex  to address remaining functional, objective and subjective deficits to allow them to return to prior /optimal level of function with ADLs.  Patient is progressing with goals: not significantly  Skilled care:  ex guidance and manual    Plan:    Continue to progress per poc:   NV add additional functional standing and balance ex    HEP:  Access Code: [email or web address]  URL: [email or web address]  Date: 12/11/2024  Prepared by: Delicia    Exercises  - Seated Long Arc Quad  - 1-2 x daily - 7 x weekly - 1-2 sets - 10 reps  - Standing 3-way Hip with Walker  - 1-2 x daily - 7 x weekly - 1 sets - 10 reps  - Seated Hip Abduction  - 1-2 x daily - 7 x weekly - 1-2 sets - 10 reps  - Seated Hip Adduction Isometrics with Ball  - 1-2 x daily - 7 x weekly - 1-2 sets - 10 reps - 5-10 seconds hold  - Hip Flexion  - 1-2 x daily - 7 x weekly - 1-2 sets - 10 reps - 5 count hold  - Clamshell  - 1 x daily - 7 x weekly - 1-2 sets - 10 reps - 5 hold  - Sit to Stand with Arms Crossed  - 1 x daily - 7 x weekly - 1-2 sets - 10 reps - 5 hold  12/31/24  -Side stepping with red TB loop x 4  -Squats with UE support x 10  -

## 2024-12-31 NOTE — PROGRESS NOTES
Subjective     Raiza Nguyễn is a 74 y.o. female who presents for the following: Rash (On fingers, especially right 2nd fingertip).  The patient states she developed a rash on her right second finger 2.5 months ago and then tore the nail off, and since then, she reports she has a habit of picking and peeling the skin on her finger and the area has not healed.  She also states she tends to wash her hands frequently.  She denies any other areas of involvement.  She denies any other new, changing, or concerning skin lesions; no bleeding, itching, or burning lesions.      Review of Systems:  No other skin or systemic complaints other than what is documented elsewhere in the note.    The following portions of the chart were reviewed this encounter and updated as appropriate:       Skin Cancer History  No skin cancer on file.    Specialty Problems          Dermatology Problems    Skin changes due to chronic exposure to nonionizing radiation    Personal history of malignant melanoma of skin       Past Dermatologic / Past Relevant Medical History:    - history of melanoma in 2017  - AKs    Family History:    No family history of melanoma or skin cancer    Allergies:  Erythromycin, Tetracycline, Cephalexin, Glucosamine, and Adhesive    Current Medications / CAM's:    Current Outpatient Medications:     apixaban (Eliquis) 5 mg tablet, Take 1 tablet (5 mg) by mouth 2 times a day., Disp: 60 tablet, Rfl: 0    blood sugar diagnostic strip, 90 strips once daily., Disp: 100 strip, Rfl: 3    calcium carbonate 600 mg calcium (1,500 mg) tablet, Take 600 mg by mouth once daily., Disp: , Rfl:     DULoxetine (Cymbalta) 30 mg DR capsule, Take 1 capsule (30 mg) by mouth 2 times a day., Disp: , Rfl:     gabapentin (Neurontin) 300 mg capsule, Take 3 capsules (900 mg) by mouth 3 times a day., Disp: , Rfl:     hydroCHLOROthiazide (HYDRODiuril) 25 mg tablet, Take 1 tablet (25 mg) by mouth 2 times a day., Disp: 180 tablet, Rfl: 1     levothyroxine (Synthroid, Levoxyl) 125 mcg tablet, Take 1 tablet (125 mcg) by mouth early in the morning.., Disp: 90 tablet, Rfl: 3    lisinopril 5 mg tablet, Take 1 tablet (5 mg) by mouth once daily. (Patient taking differently: Take 1 tablet (5 mg) by mouth 2 times a day.), Disp: 90 tablet, Rfl: 1    metFORMIN  mg 24 hr tablet, Take 1 tablet (500 mg) by mouth once daily., Disp: 90 tablet, Rfl: 1    rosuvastatin (Crestor) 5 mg tablet, TAKE 1 TABLET ONE TIME DAILY, Disp: 90 tablet, Rfl: 3    tirzepatide (Mounjaro) 5 mg/0.5 mL pen injector, Inject 5 mg under the skin 1 (one) time per week., Disp: 2 mL, Rfl: 3    traMADol (Ultram) 50 mg tablet, Take 2 tablets (100 mg) by mouth once daily at bedtime., Disp: , Rfl:     triamcinolone (Kenalog) 0.1 % ointment, Apply twice daily to affected areas of fingers (avoid face, groin, body folds) for 2 weeks, then taper to twice daily on weekends only; repeat every 6-8 weeks as needed for flares, Disp: 80 g, Rfl: 0     Objective   Well appearing patient in no apparent distress; mood and affect are within normal limits.    A skin examination was performed including: Face, neck, and bilateral hands. All findings within normal limits unless otherwise noted below.    Assessment/Plan   1. Hand eczema  Right Hand - Anterior  On the patient's right second fingertip, there is an erythematous, scaly, slightly lichenified, slightly thickened plaque with superficial fissures.  On the remainder of her bilateral fingertips, there are similar-appearing pink to erythematous, scaly patches and thin plaques.    Hand Dermatitis -fingertips, especially right 2nd fingertip.  The potentially chronic and intermittently flaring nature of this condition and treatment options were discussed extensively with the patient today.  At this time, I recommend topical steroid therapy with Triamcinolone 0.1% ointment, which the patient was instructed to apply twice daily to the affected areas of her fingers  (avoid face, groin, body folds) for the next 2 weeks, followed by taper to twice daily on weekends only for persistent areas; the patient may repeat treatment in a 2-week burst-and-taper fashion every 6-8 weeks as needed for future flares.  I also emphasized the importance of dry, sensitive skin care and good hand hygiene, including minimizing the frequency and duration of hand-washing as much as is safely possible, given the current coronavirus pandemic; using a lukewarm, but not hot water and a mild soap, such as Dove; and frequent and aggressive moisturization, at least twice daily and immediately following hand-washing, with recommended over-the-counter moisturizing creams, such as Eucerin, Cetaphil, Cerave, or Aveeno; Vaseline or Aquaphor ointments; or Neutrogena Norwegian Formula Hand Cream.  The risks, benefits, and side effects of this medication, including possible skin atrophy with overuse of topical steroids, were discussed.  Lastly, I recommend she keep her right 2nd fingertip wrapped with Vaseline and a bandage every day for the next 1 to 2 months both to help facilitate healing of the skin and also to prevent her from picking and peeling the skin any further.  If this area has not healed within the next 1-2 months, she was instructed to call our office to schedule a return visit for re-evaluation and possible biopsy if indicated at that time.  She expressed understanding and is in agreement with this plan.    triamcinolone (Kenalog) 0.1 % ointment - Right Hand - Anterior  Apply twice daily to affected areas of fingers (avoid face, groin, body folds) for 2 weeks, then taper to twice daily on weekends only; repeat every 6-8 weeks as needed for flares    2. Actinic keratosis (2)  Head - Anterior (Face) (2)  On her right mid cheek and left mid forehead, there are 2 erythematous, gritty, scaly macules     Actinic Keratoses -right mid cheek and left mid forehead.  The pre-cancerous nature of these lesions  and treatment options were discussed with the patient today.  At this time, I recommend treatment with liquid nitrogen cryotherapy.  The patient expressed understanding, is in agreement with this plan, and wishes to proceed with cryotherapy today.    Destr of lesion - Head - Anterior (Face) (2)  Complexity: simple    Destruction method: cryotherapy    Informed consent: discussed and consent obtained    Lesion destroyed using liquid nitrogen: Yes    Cryotherapy cycles:  1  Outcome: patient tolerated procedure well with no complications    Post-procedure details: wound care instructions given      3. Seborrheic keratosis  Scattered on the patient's face and neck, there are several tan- to light brown-colored, hyperkeratotic, stuck-on appearing papules of varying size and shape    Seborrheic Keratoses - the benign nature of these lesions was discussed with the patient today and reassurance provided.  No treatment is medically indicated for these lesions at this time.    4. Lentigo  Photodistributed  Multiple tan- to light brown-colored, round- to oval-shaped, symmetric and uniform-appearing macules and small patches consistent with lentigines scattered in sun-exposed areas.    Solar Lentigines and photodamage.  The clinically benign-appearing nature of these lesions and their relation to chronic sun exposure were discussed with the patient today and reassurance provided.  None of these lesions meet threshold for biopsy today, and thus no treatment is medically indicated for these lesions at this time.  The signs and symptoms of skin cancer were reviewed and the patient was advised to practice sun protection and sun avoidance, use daily sunscreen, and perform regular self skin exams.  The patient was instructed to monitor these lesions for any changes, such as in size, shape, or color, or associated symptoms and to call our office to schedule a return visit for re-evaluation if any such changes or symptoms are noticed  in the future.  The patient expressed understanding and is in agreement with this plan.    5. Diffuse photodamage of skin  Photodistributed  Diffuse photodamage with actinic changes with telangiectasia and mottled pigmentation in sun-exposed areas.    History of melanoma and actinic keratoses and photodamage.  The patient was recently seen in our office for routine follow-up and total body skin exam by Dr. Tabor on 8/28/24, at which time no evidence of recurrence was noted.  I emphasized the importance of continuing to perform monthly self-skin and lymph node exams using the ABCDs of monitoring moles, which were reviewed with the patient, as well as the importance of sun avoidance and sun protection with daily sunscreen use.  I will have the patient return to our office in 6 to 12 months from the date of her last visit with Dr. Tabor for routine melanoma follow-up and total body skin exam, and the patient was instructed to call our office should the patient notice any new, changing, symptomatic, or otherwise concerning skin lesions before then.  The patient expressed understanding and is in agreement with this plan.

## 2025-01-02 ENCOUNTER — HOSPITAL ENCOUNTER (OUTPATIENT)
Dept: CARDIOLOGY | Facility: CLINIC | Age: 75
Discharge: HOME | End: 2025-01-02
Payer: MEDICARE

## 2025-01-02 ENCOUNTER — APPOINTMENT (OUTPATIENT)
Dept: HEMATOLOGY/ONCOLOGY | Facility: CLINIC | Age: 75
End: 2025-01-02
Payer: MEDICARE

## 2025-01-02 DIAGNOSIS — Z51.81 ENCOUNTER FOR MONITORING CARDIOTOXIC DRUG THERAPY: ICD-10-CM

## 2025-01-02 DIAGNOSIS — Z79.899 ENCOUNTER FOR MONITORING CARDIOTOXIC DRUG THERAPY: ICD-10-CM

## 2025-01-02 PROCEDURE — 93356 MYOCRD STRAIN IMG SPCKL TRCK: CPT

## 2025-01-03 ENCOUNTER — APPOINTMENT (OUTPATIENT)
Dept: PHYSICAL THERAPY | Facility: CLINIC | Age: 75
End: 2025-01-03
Payer: MEDICARE

## 2025-01-03 ENCOUNTER — PATIENT OUTREACH (OUTPATIENT)
Dept: PRIMARY CARE | Facility: CLINIC | Age: 75
End: 2025-01-03
Payer: MEDICARE

## 2025-01-06 ENCOUNTER — OFFICE VISIT (OUTPATIENT)
Dept: HEMATOLOGY/ONCOLOGY | Facility: CLINIC | Age: 75
End: 2025-01-06
Payer: MEDICARE

## 2025-01-06 ENCOUNTER — APPOINTMENT (OUTPATIENT)
Dept: SURGICAL ONCOLOGY | Facility: HOSPITAL | Age: 75
End: 2025-01-06
Payer: MEDICARE

## 2025-01-06 VITALS
SYSTOLIC BLOOD PRESSURE: 160 MMHG | WEIGHT: 170.42 LBS | BODY MASS INDEX: 34.42 KG/M2 | RESPIRATION RATE: 16 BRPM | TEMPERATURE: 96.4 F | HEART RATE: 82 BPM | DIASTOLIC BLOOD PRESSURE: 84 MMHG | OXYGEN SATURATION: 99 %

## 2025-01-06 DIAGNOSIS — Z17.1 MALIGNANT NEOPLASM OF CENTRAL PORTION OF LEFT BREAST IN FEMALE, ESTROGEN RECEPTOR NEGATIVE: Primary | ICD-10-CM

## 2025-01-06 DIAGNOSIS — Z09 ENCOUNTER FOR FOLLOW-UP EXAMINATION AFTER COMPLETED TREATMENT FOR CONDITIONS OTHER THAN MALIGNANT NEOPLASM: ICD-10-CM

## 2025-01-06 DIAGNOSIS — C50.112 MALIGNANT NEOPLASM OF CENTRAL PORTION OF LEFT BREAST IN FEMALE, ESTROGEN RECEPTOR NEGATIVE: Primary | ICD-10-CM

## 2025-01-06 LAB
AORTIC VALVE PEAK VELOCITY: 1.14 M/S
AV PEAK GRADIENT: 5 MMHG
EJECTION FRACTION APICAL 4 CHAMBER: 64.7
EJECTION FRACTION: 63 %
GLOBAL LONGITUDINAL STRAIN: 20.1 %
LEFT ATRIUM VOLUME AREA LENGTH INDEX BSA: 31.1 ML/M2
MITRAL VALVE E/A RATIO: 0.66
RIGHT VENTRICLE FREE WALL PEAK S': 10.01 CM/S
TRICUSPID ANNULAR PLANE SYSTOLIC EXCURSION: 1.7 CM

## 2025-01-06 PROCEDURE — 3077F SYST BP >= 140 MM HG: CPT | Performed by: STUDENT IN AN ORGANIZED HEALTH CARE EDUCATION/TRAINING PROGRAM

## 2025-01-06 PROCEDURE — 1159F MED LIST DOCD IN RCRD: CPT | Performed by: STUDENT IN AN ORGANIZED HEALTH CARE EDUCATION/TRAINING PROGRAM

## 2025-01-06 PROCEDURE — 1125F AMNT PAIN NOTED PAIN PRSNT: CPT | Performed by: STUDENT IN AN ORGANIZED HEALTH CARE EDUCATION/TRAINING PROGRAM

## 2025-01-06 PROCEDURE — 4010F ACE/ARB THERAPY RXD/TAKEN: CPT | Performed by: STUDENT IN AN ORGANIZED HEALTH CARE EDUCATION/TRAINING PROGRAM

## 2025-01-06 PROCEDURE — 1157F ADVNC CARE PLAN IN RCRD: CPT | Performed by: STUDENT IN AN ORGANIZED HEALTH CARE EDUCATION/TRAINING PROGRAM

## 2025-01-06 PROCEDURE — 3079F DIAST BP 80-89 MM HG: CPT | Performed by: STUDENT IN AN ORGANIZED HEALTH CARE EDUCATION/TRAINING PROGRAM

## 2025-01-06 PROCEDURE — 99215 OFFICE O/P EST HI 40 MIN: CPT | Performed by: STUDENT IN AN ORGANIZED HEALTH CARE EDUCATION/TRAINING PROGRAM

## 2025-01-06 PROCEDURE — 1123F ACP DISCUSS/DSCN MKR DOCD: CPT | Performed by: STUDENT IN AN ORGANIZED HEALTH CARE EDUCATION/TRAINING PROGRAM

## 2025-01-06 RX ORDER — DIPHENHYDRAMINE HYDROCHLORIDE 50 MG/ML
50 INJECTION INTRAMUSCULAR; INTRAVENOUS AS NEEDED
OUTPATIENT
Start: 2025-02-03

## 2025-01-06 RX ORDER — PROCHLORPERAZINE EDISYLATE 5 MG/ML
10 INJECTION INTRAMUSCULAR; INTRAVENOUS EVERY 6 HOURS PRN
OUTPATIENT
Start: 2025-02-03

## 2025-01-06 RX ORDER — ALBUTEROL SULFATE 0.83 MG/ML
3 SOLUTION RESPIRATORY (INHALATION) AS NEEDED
OUTPATIENT
Start: 2025-02-03

## 2025-01-06 RX ORDER — PROCHLORPERAZINE EDISYLATE 5 MG/ML
10 INJECTION INTRAMUSCULAR; INTRAVENOUS EVERY 6 HOURS PRN
OUTPATIENT
Start: 2025-02-24

## 2025-01-06 RX ORDER — ALBUTEROL SULFATE 0.83 MG/ML
3 SOLUTION RESPIRATORY (INHALATION) AS NEEDED
OUTPATIENT
Start: 2025-01-13

## 2025-01-06 RX ORDER — PROCHLORPERAZINE EDISYLATE 5 MG/ML
10 INJECTION INTRAMUSCULAR; INTRAVENOUS EVERY 6 HOURS PRN
OUTPATIENT
Start: 2025-03-17

## 2025-01-06 RX ORDER — EPINEPHRINE 0.3 MG/.3ML
0.3 INJECTION SUBCUTANEOUS EVERY 5 MIN PRN
OUTPATIENT
Start: 2025-04-07

## 2025-01-06 RX ORDER — EPINEPHRINE 0.3 MG/.3ML
0.3 INJECTION SUBCUTANEOUS EVERY 5 MIN PRN
OUTPATIENT
Start: 2025-03-17

## 2025-01-06 RX ORDER — PROCHLORPERAZINE MALEATE 10 MG
10 TABLET ORAL EVERY 6 HOURS PRN
OUTPATIENT
Start: 2025-04-07

## 2025-01-06 RX ORDER — FAMOTIDINE 10 MG/ML
20 INJECTION INTRAVENOUS ONCE AS NEEDED
OUTPATIENT
Start: 2025-01-13

## 2025-01-06 RX ORDER — EPINEPHRINE 0.3 MG/.3ML
0.3 INJECTION SUBCUTANEOUS EVERY 5 MIN PRN
OUTPATIENT
Start: 2025-02-24

## 2025-01-06 RX ORDER — PROCHLORPERAZINE MALEATE 10 MG
10 TABLET ORAL EVERY 6 HOURS PRN
OUTPATIENT
Start: 2025-01-13

## 2025-01-06 RX ORDER — PROCHLORPERAZINE MALEATE 10 MG
10 TABLET ORAL EVERY 6 HOURS PRN
OUTPATIENT
Start: 2025-02-03

## 2025-01-06 RX ORDER — ALBUTEROL SULFATE 0.83 MG/ML
3 SOLUTION RESPIRATORY (INHALATION) AS NEEDED
OUTPATIENT
Start: 2025-03-17

## 2025-01-06 RX ORDER — PROCHLORPERAZINE EDISYLATE 5 MG/ML
10 INJECTION INTRAMUSCULAR; INTRAVENOUS EVERY 6 HOURS PRN
OUTPATIENT
Start: 2025-01-13

## 2025-01-06 RX ORDER — PROCHLORPERAZINE EDISYLATE 5 MG/ML
10 INJECTION INTRAMUSCULAR; INTRAVENOUS EVERY 6 HOURS PRN
OUTPATIENT
Start: 2025-04-07

## 2025-01-06 RX ORDER — PROCHLORPERAZINE MALEATE 10 MG
10 TABLET ORAL EVERY 6 HOURS PRN
OUTPATIENT
Start: 2025-02-24

## 2025-01-06 RX ORDER — EPINEPHRINE 0.3 MG/.3ML
0.3 INJECTION SUBCUTANEOUS EVERY 5 MIN PRN
OUTPATIENT
Start: 2025-01-13

## 2025-01-06 RX ORDER — DIPHENHYDRAMINE HYDROCHLORIDE 50 MG/ML
50 INJECTION INTRAMUSCULAR; INTRAVENOUS AS NEEDED
OUTPATIENT
Start: 2025-04-07

## 2025-01-06 RX ORDER — DIPHENHYDRAMINE HYDROCHLORIDE 50 MG/ML
50 INJECTION INTRAMUSCULAR; INTRAVENOUS AS NEEDED
OUTPATIENT
Start: 2025-02-24

## 2025-01-06 RX ORDER — FAMOTIDINE 10 MG/ML
20 INJECTION INTRAVENOUS ONCE AS NEEDED
OUTPATIENT
Start: 2025-02-24

## 2025-01-06 RX ORDER — FAMOTIDINE 10 MG/ML
20 INJECTION INTRAVENOUS ONCE AS NEEDED
OUTPATIENT
Start: 2025-03-17

## 2025-01-06 RX ORDER — PROCHLORPERAZINE MALEATE 10 MG
10 TABLET ORAL EVERY 6 HOURS PRN
OUTPATIENT
Start: 2025-03-17

## 2025-01-06 RX ORDER — FAMOTIDINE 10 MG/ML
20 INJECTION INTRAVENOUS ONCE AS NEEDED
OUTPATIENT
Start: 2025-02-03

## 2025-01-06 RX ORDER — ALBUTEROL SULFATE 0.83 MG/ML
3 SOLUTION RESPIRATORY (INHALATION) AS NEEDED
OUTPATIENT
Start: 2025-02-24

## 2025-01-06 RX ORDER — DIPHENHYDRAMINE HYDROCHLORIDE 50 MG/ML
50 INJECTION INTRAMUSCULAR; INTRAVENOUS AS NEEDED
OUTPATIENT
Start: 2025-03-17

## 2025-01-06 RX ORDER — EPINEPHRINE 0.3 MG/.3ML
0.3 INJECTION SUBCUTANEOUS EVERY 5 MIN PRN
OUTPATIENT
Start: 2025-02-03

## 2025-01-06 RX ORDER — DIPHENHYDRAMINE HYDROCHLORIDE 50 MG/ML
50 INJECTION INTRAMUSCULAR; INTRAVENOUS AS NEEDED
OUTPATIENT
Start: 2025-01-13

## 2025-01-06 RX ORDER — ALBUTEROL SULFATE 0.83 MG/ML
3 SOLUTION RESPIRATORY (INHALATION) AS NEEDED
OUTPATIENT
Start: 2025-04-07

## 2025-01-06 RX ORDER — FAMOTIDINE 10 MG/ML
20 INJECTION INTRAVENOUS ONCE AS NEEDED
OUTPATIENT
Start: 2025-04-07

## 2025-01-06 ASSESSMENT — PAIN SCALES - GENERAL: PAINLEVEL_OUTOF10: 2

## 2025-01-06 NOTE — PROGRESS NOTES
She is  Breast [facility] Oncology Clinic  Location: [facility]    Visit Type: Follow-up Visit    Oncologic History:    She was diagnosed in January 2003 with a left-sided 1.5 cm infiltrating ductal carcinoma with positive axillary lymph nodes and extranodal extension. It was a grade 3 tumor with hormone receptors positive and HER-2/anabell not defined.   Four cycles of Adriamycin and cyclophosphamide were followed by 4 cycles of paclitaxel in March 2004.   Left lumpectomy and axillary node dissection followed by left breast radiation is also completed in 2004.   She had bilateral oophorectomies in May 2008.   Anastrozole through March 2009.   She had a lesion in her left lower back removed, and confirmed to be a 0.35 mm melanoma. It was not ulcerative and she did not require a sentinel node evaluation. She has since had multiple excisions of atypical myelomonocytic lesions without disease  discovered.   She did well until screening mammogram 8/2018 confirmed finding on PET (done related melanoma dx) .  She had excision of 1.5cm IDC with 1/2 SN with microscopic involvement. She did not want to receive chemotherapy and the lesion was strongly ER and NJ  positive, HER 2 negative.   Started on exemestane November 2018.   She is BRCA2 positive   4/18/24: MRI breast screening: An irregular rim enhancing mass with irregular margins measuring 0.8 x 0.8 x 0.9 cm is seen in the superolateral breast at middle depth A prominent level III axillary lymph node measures 1.3 x 0.9 x 0.8 cm, and may have slightly increased in size when compared to breast MRI 10/03/2016 (previously measuring 1.0 x 0.8 x 0.8 cm). An internal mammary lymph node measuring 0.4 cm is noted and demonstrates a fatty hilum (series 401, image 136 of 232). This was not definitively seen on the prior MRI.  4/23/24: L breast mass 3:00 bx IDC G3, ER/NJ negative, HER2 positive. L breast mass 2:00 bx: fibrous scar with associated calcifications.   5/8/24: PET scan: mildly  hypermetabolic soft tissue density measuring 1.6 x 0.8 cm at left outer breast.  Mild heterogeneous activity is noted in the region of the liver with a suggestion of a small focus along the medial tip of segment 2 of the left hepatic lobe.   5/9/24: TB discussion: Plan for surgery first approach.   5/17/2024: MRI of liver: No definite discrete hepatic lesion to correlate with mild FDG avid fit lesion seen on PET scan.  Few subcentimeter pancreatic cystic lesions represent IPMN most likely.  Partially visualized left uterine lesion likely representing a fibroid.  6/12/2024: Left breast simple mastectomy showed invasive ductal carcinoma, grade 3, single focus measuring 1.1 cm.  There is high-grade DCIS present.  All margins are negative.  No lymph nodes submitted.  Right breast simple completion mastectomy also performed negative for carcinoma.  7/15/2024: Patient started adjuvant weekly Taxol/Herceptin  Patient developed breast wound infection requiring antibiotics.  Open wound with delayed healing managed by wound care.  Chemotherapy held.  10/24/24: Paused herceptin due to question of side effects?    Subjective: Interval History    Presents for follow-up visit. Has been off of herceptin since 10/24 after patient reported consellation of symptoms without known etiology, question if side effect from treatment.     Chest wall wound has finally closed. Released from wound care.     Eyes complaints was 95% better. She was having redness, itching, tearing. Ophtho initially thought viral conjunctivits, then dry eyes. Rinsing eyes, massage eye lashes. Uses eye cup to rinse. Uses artificial tears.     Sinus sensitivity and nose bleeding resolved- completed blood thinner course. Port out.    Healing of right index finger- saw derm. Cream prescribed, could be skin cancer. She has follow-up in place.     Has been taking the exemestane. Tolerating well.     Overall in good spirits. Motivated to re challenge herceptin.        Pertinent Family history:    Mother having bilateral breast cancer and a first cousin having breast cancer later in life. Her sister had a GYN malignancy, now metastatic and a maternal aunt with stomach cancer. A maternal uncle had colon  cancer.     Social History  Raiza Nguyễn  reports that she quit smoking about 42 years ago. Her smoking use included cigarettes. She started smoking about 57 years ago. She has a 80 pack-year smoking history. She has been exposed to tobacco smoke. She has never used smokeless tobacco.  She  reports that she does not currently use alcohol after a past usage of about 1.0 standard drink of alcohol per week.  She  reports no history of drug use.    ROS:     Review of Systems   All other systems reviewed and are negative.       Physical Examination:    /84   Pulse 82   Temp 35.8 °C (96.4 °F)   Resp 16   Wt 77.3 kg (170 lb 6.7 oz)   SpO2 99%   BMI 34.42 kg/m²     Physical Exam  Vitals and nursing note reviewed.   Constitutional:       General: She is not in acute distress.     Appearance: Normal appearance. She is not toxic-appearing.   HENT:      Head: Normocephalic and atraumatic.   Eyes:      Conjunctiva/sclera: Conjunctivae normal.   Cardiovascular:      Rate and Rhythm: Normal rate.   Pulmonary:      Effort: Pulmonary effort is normal. No respiratory distress.   Abdominal:      General: Abdomen is flat.      Palpations: Abdomen is soft.   Musculoskeletal:         General: No swelling. Normal range of motion.      Cervical back: Normal range of motion.   Skin:     General: Skin is warm and dry.   Neurological:      General: No focal deficit present.      Mental Status: She is alert.   Psychiatric:         Mood and Affect: Mood normal.       Breast Examination:    S/p bilateral mastectomies. Chest wall without any concerns.     ECOG Performance Status:     [x] 0 Fully active, able to carry on all pre-disease performance without restriction  [] 1 Restricted in  physically strenuous activity but ambulatory and able to carry out work of a light or sedentary nature, e.g., light house work, office work  [] 2 Ambulatory and capable of all selfcare but unable to carry out any work activities; up and about more than 50% of waking hours  [] 3 Capable of only limited selfcare; confined to bed or chair more than 50% of waking hours  [] 4 Completely disabled; cannot carry on any selfcare; totally confined to bed or chair  [] 5 Dead     Results:    Labs:      Lab Results   Component Value Date    WBC 6.4 12/04/2024    HGB 12.5 12/04/2024    HCT 39.3 12/04/2024    MCV 89 12/04/2024     12/04/2024       Chemistry    Lab Results   Component Value Date/Time     12/04/2024 1332    K 4.2 12/04/2024 1332     12/04/2024 1332    CO2 29 12/04/2024 1332    BUN 16 12/04/2024 1332    CREATININE 0.68 12/04/2024 1332    Lab Results   Component Value Date/Time    CALCIUM 9.3 12/04/2024 1332    ALKPHOS 71 12/04/2024 1332    AST 23 12/04/2024 1332    ALT 20 12/04/2024 1332    BILITOT 0.4 12/04/2024 1332             Imaging:  Reviewed above in Onc History    Pathology:  Reviewed above in Onc History    Assessment:     2003: Stage II Left IDC grade 3 with positive LNs and NASH, ER+ OH+; S/p L PM and SLNBx; S/p AC+T; S/p radiation; anastrozole x5 years  2018: Stage IB (Prognostic Stage IA), [id number], grade 2 IDC, ER+95% OH+95% HER2 equivocal, FISH-. S/p R PM and SLNBx; Oncotype 17; S/p radiation; Exemestane since 11/2018, currently on hold  2024: cT1 cN0 L IDC, G3, ER/OH negative and HER2 positive.  Status post bilateral completion mastectomy, pT1b pNx  BRCA2 germline mutation    Plan:    Surgical Plan: S/p L PMSLNBx in 2003; S/p R PMSLNBx 2018; S/p BL completion mastectomy in 2024.  Additional biopsy: No further biopsy indicated  Radiation Plan: S/p L radiation in 2003; S/p R breast radiation 2018  Additional staging scans/DEXA/echo: Baseline staging scans reviewed.  Baseline  echocardiogram reviewed; she has established with [facility]. DEXA updated for 4/2025  Additional Path info (i.e Ki67, PDL1): Not indicated  Gene assays: Not indicated    Systemic treatment plan: Treatment course complicated by chest wall infection for which chemotherapy has been held.  We continued on Herceptin alone to complete 1 year of therapy. Resume Herceptin after treatment break to assess side effects.    Intent: Curative   Clinical trial: Not eligible for our current trials   Endocrine therapy: On exemestane for her ER/NH positive breast cancer diagnosed in 2018   HER2 treatment: As discussed above   Targeted agents: not indicated   Chemotherapy: Received 2 doses of weekly paclitaxel, discontinued given development of breast infection and delayed wound healing.   BMA: Will discuss at future visit.    Access: Removed  Supportive meds: N/A  Genetic testing: gBRCA2 mutation  Fertility preservation: Not indicated  Other active problems/orders:     Mild FDG avid liver lesion without MRI correlate: Short-term follow-up has been recommended per radiologist.  Repeat PET scan completed 9/13/2024 and previously seen lesions are no longer visualized likely representing background heterogeneity  Pancreatic cystic lesions likely IPMN: She has established with Dr. Dodson with gastroenterology.  Will need closer monitoring as patient has germline BRCA mutation.  Partially visualized left uterine lesion likely representing fibroid: Discussed ultrasound with patient down the line.  Chest wall infection: Managed by wound care at [facility], improved. Resolved   Right IJ DVT: Port related. Port removed. Completed course of apixiban.  Conjunctivitis/dry eyes: Improved 95%. Managed by ophthalmology    Surveillance plan: No routine breast imaging indicated    Follow-up: 12 weeks    Patient expressed understanding of the plan outlined above. She had ample time to ask questions. She understands she can  contact us should she have additional questions or issues arise in the interim.    Joanna Bourne MD  Breast Medical Oncology  [facility]    Portions of this note were dictated utilizing voice recognition software.

## 2025-01-06 NOTE — PROGRESS NOTES
FUV completed. POC reviewed with pt. who verbalizes understanding per teach back method. Pt agreeable to call our office for any questions, issues, or concerns. Next FUV with Dr Bourne in 3 months. Pt to resume Herceptin every 3 weeks and continue Exemestane. Pt to scheduling prior to discharge from office.

## 2025-01-07 ENCOUNTER — TREATMENT (OUTPATIENT)
Dept: PHYSICAL THERAPY | Facility: CLINIC | Age: 75
End: 2025-01-07
Payer: MEDICARE

## 2025-01-07 DIAGNOSIS — M25.562 LEFT KNEE PAIN, UNSPECIFIED CHRONICITY: ICD-10-CM

## 2025-01-07 DIAGNOSIS — R29.898 LEFT LEG WEAKNESS: ICD-10-CM

## 2025-01-07 PROCEDURE — 97110 THERAPEUTIC EXERCISES: CPT | Mod: GP

## 2025-01-07 NOTE — PROGRESS NOTES
"Physical Therapy  Physical Therapy Progress Note    Patient Name Raiza Nguyễn   MRN: [id number]  Today's Date: 1/7/2025  Time Calculation  Start Time: 0947  Stop Time: 1028  Time Calculation (min): 41 min    Insurance:    Visit #:   5   Insurance Reviewed  (per information provided by  pre-cert team)   Authorization required:  N  Approved # of visits: MN  Authorization/MCR certification date range:  12/6/2024 to 3/6/2025    Therapy Diagnoses:   Problem List Items Addressed This Visit             ICD-10-CM    Left knee pain M25.562    Left leg weakness R29.898     General:  Reason for visit: left knee pain and weakness   Referred by: Dr Dl Mansfield MD appt: 1/22/2025  Preferred Name:  Raiza  Script:  PT eval and treat  Onset Date:  11/8/2024    Subjective:   Patient reports:  Her left knee feels better, the hip on the right remains the same no change.  Unable to stand on that leg.    Have you fallen since last visit:  no    Medical Hx/  Spondylolisthesis, breast CA and mastectomy, heart disease, HTN, Thyroid disorder, Ulcers, and UTI  Precautions:  Fall Risk:  mod/high risk    Pain:  0/10  Location/Type of pain:  left knee, \"7\" sharp only while bending    HEP compliance/understanding:  good    Objective:   Objective Measurements:    Function:     Posture:   Gait: ambulates with cane  Balance:  Pt missteps in PT and stumbles slightly but regains her balance  ROM:  no change in left knee AROM   Strength:  Flexibility:      Treatment:   **= HEP progression today NV= Next visit  np= not performed  nb= non-billable  G= group HEP= discharged to [facility]  Therapeutic Exercise:     41 minutes  Nu step L2 10'  Left standing HS/HF stretch, 30\" x 2, ea  Airex, 3 way hip, 10x ea, R/L-NP,painful  Standing hip abduction only on R. X 10**  LAQ 3# 2 x 10  SLR, 0#, 2 x 10, left  B. Hip abduction in SL x 10  Sidely clamshell with green TB 2 x 10   Supine hip ball squeeze 5 sec 2 x10   Supine GTB hip abduction GTB 2 x 10-NP  PROM " "left knee flexion x 20 reps with end range stretch, lightly-NP  Side stepping with red TB loop x 4  Shuttle leg presses B. With 50# 10 x 2**    STS from 21\" mat with arms crossed 2 x 10      Education:  Ex technique.    Assessment:  Patient tolerated treatment well, added B. Shuttle leg presses, standing right hip abduction today.  Airex 3 way hip not performed today due to increased symptoms standing on one leg.    Pt will benefit from progression of functional and PRE strengthening ex  to address remaining functional, objective and subjective deficits to allow them to return to prior /optimal level of function with ADLs.  Patient is progressing with goals: not significantly  Skilled care:  ex guidance and manual    Plan:    Continue to progress per poc:   NV add additional functional standing and balance ex    HEP:  Access Code: [email or web address]  URL: [email or web address]  Date: 12/11/2024  Prepared by: Delicia    Exercises  - Seated Long Arc Quad  - 1-2 x daily - 7 x weekly - 1-2 sets - 10 reps  - Standing 3-way Hip with Walker  - 1-2 x daily - 7 x weekly - 1 sets - 10 reps  - Seated Hip Abduction  - 1-2 x daily - 7 x weekly - 1-2 sets - 10 reps  - Seated Hip Adduction Isometrics with Ball  - 1-2 x daily - 7 x weekly - 1-2 sets - 10 reps - 5-10 seconds hold  - Hip Flexion  - 1-2 x daily - 7 x weekly - 1-2 sets - 10 reps - 5 count hold  - Clamshell  - 1 x daily - 7 x weekly - 1-2 sets - 10 reps - 5 hold  - Sit to Stand with Arms Crossed  - 1 x daily - 7 x weekly - 1-2 sets - 10 reps - 5 hold  12/31/24  -Side stepping with red TB loop x 4  -Squats with UE support x 10  1/7/25  SL Hip abduction x 10  -    "

## 2025-01-08 PROCEDURE — RXMED WILLOW AMBULATORY MEDICATION CHARGE

## 2025-01-09 ENCOUNTER — APPOINTMENT (OUTPATIENT)
Dept: PHYSICAL THERAPY | Facility: CLINIC | Age: 75
End: 2025-01-09
Payer: MEDICARE

## 2025-01-10 NOTE — PROGRESS NOTES
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  PET CT recommended for further assessment by radiology.  PET negative.  -s/p bilateral completion mastectomy  -pT1cNx     2.  History of bilateral breast cancer and known BRCA2 mutation     Stage IB (Prognostic Stage IA) Right breast cancer 7/2018, [id number], grade 2 IDC, ER+95% MO+95% HER2 equivocal, FISH-.   - s/p R. magseed pm / SLNB (2mi/3) on 8/27/2018 with a 1.5cm IDC and negative margins. She had 2 out of 3 LNs with micrometastases measuring 0.4mm and 1.1mm, no NASH.   - Oncotype 17   - s/p WBRT 10/11/2018 - 11/1/2018   - taking Exemestane      Stage II Left breast cancer in 2003, 1.5 cm grade 3 IDC with positive LNs and NASH, ER+ MO+ HER2 not defined.   - L.pm/ALND   - ACx4 + Tx4   - WBRT   - Anastrozole x5yr    Clinical breast exam today is normal.  There is no evidence of local recurrence.    Continue follow up with medical oncology as scheduled.    Will follow up in the breast center with my nurse practitioner partner in 6 months for clinical breast exam or sooner if there are any breast concerns.  I will see Raiza  on an as needed basis for any concerns.    Subjective   Raiza Nguyễn is a 74 y.o. female presents today for follow up carcinoma of the bilateral breast.     Treatment history  Surgery: 6/12/2024 bilateral mastectomy  Radiation: NA  Systemic therapy: adjuvant taxol herceptin. Was on a treatment holiday from herceptin.  Resumes treatment today.         Review of Systems   Constitutional symptoms: Denies generalized fatigue.  Denies weight change, fevers/chills, difficulty sleeping   Eyes: Denies double vision, glaucoma, cataracts.  Ear/nose/throat/mouth: Denies hearing changes, sore throat, sinus problems.  Cardiovascular: No chest pain.  Denies irregular heartbeat.  Denies ankle  swelling.  Respiratory: No wheezing, cough, or shortness of breath.  Gastrointestinal: No abdominal pain,  No nausea/vomiting.  No indigestion/heartburn.  No change in bowel habits.  No constipation or diarrhea.   Genitourinary: No urinary incontinence.  No urinary frequency.  No painful urination.  Musculoskeletal: No bone pain, no muscle pain, no joint pain.   Integumentary: No rash. No masses.  No changes in moles.  No easy bruising.  Neurological: No headaches.  No tremors. No numbness/tingling.  Psychiatric: No anxiety. No depression.  Endocrine: No excessive thirst.  Not too hot or too cold.  Not tired or fatigued.    Hematological/lymphatic: No swollen glands or blood clotting problems.  No bruising.    Objective   Physical Exam  General: Alert and oriented x 3.  Mood and affect are appropriate.  HEENT: EOMI, PERRLA.   Neck: supple, no masses, no cervical adenopathy.  Cardiovascular: no lower extremity edema.  Pulmonary: breathing non labored on room air.  GI: Abdomen soft, no masses. No hepatomegaly or splenomegaly.  Lymph nodes: No supraclavicular or axillary adenopathy bilaterally.  Musculoskeletal: Full range of motion in the upper extremities bilaterally.  Neuro: denies dizziness, tremors    Breasts: The breasts were examined in both the seated and supine positions.    RIGHT: surgically absent with no reconstruction.  Incisions well healed.  No masses or skin changes.   LEFT: surgically absent with no reconstruction.  Incisions well healed.  No masses or skin changes.  Lateral aspect of incision focal area which was previously draining is now healed.      Radiology review: All images and reports were personally reviewed.         Jeniffer Langley DO

## 2025-01-13 ENCOUNTER — PHARMACY VISIT (OUTPATIENT)
Dept: PHARMACY | Facility: CLINIC | Age: 75
End: 2025-01-13
Payer: COMMERCIAL

## 2025-01-13 ENCOUNTER — INFUSION (OUTPATIENT)
Dept: HEMATOLOGY/ONCOLOGY | Facility: CLINIC | Age: 75
End: 2025-01-13
Payer: MEDICARE

## 2025-01-13 ENCOUNTER — NUTRITION (OUTPATIENT)
Dept: HEMATOLOGY/ONCOLOGY | Facility: CLINIC | Age: 75
End: 2025-01-13

## 2025-01-13 ENCOUNTER — OFFICE VISIT (OUTPATIENT)
Dept: SURGICAL ONCOLOGY | Facility: HOSPITAL | Age: 75
End: 2025-01-13
Payer: MEDICARE

## 2025-01-13 VITALS
SYSTOLIC BLOOD PRESSURE: 139 MMHG | RESPIRATION RATE: 16 BRPM | DIASTOLIC BLOOD PRESSURE: 73 MMHG | HEART RATE: 84 BPM | WEIGHT: 171.08 LBS | TEMPERATURE: 97.7 F | OXYGEN SATURATION: 97 % | BODY MASS INDEX: 34.55 KG/M2

## 2025-01-13 VITALS
HEART RATE: 82 BPM | RESPIRATION RATE: 16 BRPM | SYSTOLIC BLOOD PRESSURE: 154 MMHG | BODY MASS INDEX: 34.27 KG/M2 | DIASTOLIC BLOOD PRESSURE: 76 MMHG | HEIGHT: 59 IN | WEIGHT: 170 LBS

## 2025-01-13 DIAGNOSIS — C50.112 MALIGNANT NEOPLASM OF CENTRAL PORTION OF LEFT BREAST IN FEMALE, ESTROGEN RECEPTOR NEGATIVE: Primary | ICD-10-CM

## 2025-01-13 DIAGNOSIS — C50.112 MALIGNANT NEOPLASM OF CENTRAL PORTION OF LEFT BREAST IN FEMALE, ESTROGEN RECEPTOR NEGATIVE: ICD-10-CM

## 2025-01-13 DIAGNOSIS — Z17.1 MALIGNANT NEOPLASM OF CENTRAL PORTION OF LEFT BREAST IN FEMALE, ESTROGEN RECEPTOR NEGATIVE: ICD-10-CM

## 2025-01-13 DIAGNOSIS — Z17.1 MALIGNANT NEOPLASM OF CENTRAL PORTION OF LEFT BREAST IN FEMALE, ESTROGEN RECEPTOR NEGATIVE: Primary | ICD-10-CM

## 2025-01-13 PROCEDURE — 99214 OFFICE O/P EST MOD 30 MIN: CPT | Performed by: SURGERY

## 2025-01-13 PROCEDURE — 1036F TOBACCO NON-USER: CPT | Performed by: SURGERY

## 2025-01-13 PROCEDURE — 3078F DIAST BP <80 MM HG: CPT | Performed by: SURGERY

## 2025-01-13 PROCEDURE — 1123F ACP DISCUSS/DSCN MKR DOCD: CPT | Performed by: SURGERY

## 2025-01-13 PROCEDURE — 3008F BODY MASS INDEX DOCD: CPT | Performed by: SURGERY

## 2025-01-13 PROCEDURE — 1159F MED LIST DOCD IN RCRD: CPT | Performed by: SURGERY

## 2025-01-13 PROCEDURE — 1157F ADVNC CARE PLAN IN RCRD: CPT | Performed by: SURGERY

## 2025-01-13 PROCEDURE — 2500000004 HC RX 250 GENERAL PHARMACY W/ HCPCS (ALT 636 FOR OP/ED): Mod: JZ,JG,TB | Performed by: STUDENT IN AN ORGANIZED HEALTH CARE EDUCATION/TRAINING PROGRAM

## 2025-01-13 PROCEDURE — 3077F SYST BP >= 140 MM HG: CPT | Performed by: SURGERY

## 2025-01-13 PROCEDURE — 4010F ACE/ARB THERAPY RXD/TAKEN: CPT | Performed by: SURGERY

## 2025-01-13 PROCEDURE — 96401 CHEMO ANTI-NEOPL SQ/IM: CPT

## 2025-01-13 RX ORDER — DIPHENHYDRAMINE HYDROCHLORIDE 50 MG/ML
50 INJECTION INTRAMUSCULAR; INTRAVENOUS AS NEEDED
Status: DISCONTINUED | OUTPATIENT
Start: 2025-01-13 | End: 2025-01-13 | Stop reason: HOSPADM

## 2025-01-13 RX ORDER — PROCHLORPERAZINE EDISYLATE 5 MG/ML
10 INJECTION INTRAMUSCULAR; INTRAVENOUS EVERY 6 HOURS PRN
Status: DISCONTINUED | OUTPATIENT
Start: 2025-01-13 | End: 2025-01-13 | Stop reason: HOSPADM

## 2025-01-13 RX ORDER — PROCHLORPERAZINE MALEATE 10 MG
10 TABLET ORAL EVERY 6 HOURS PRN
Status: DISCONTINUED | OUTPATIENT
Start: 2025-01-13 | End: 2025-01-13 | Stop reason: HOSPADM

## 2025-01-13 RX ORDER — FAMOTIDINE 10 MG/ML
20 INJECTION INTRAVENOUS ONCE AS NEEDED
Status: DISCONTINUED | OUTPATIENT
Start: 2025-01-13 | End: 2025-01-13 | Stop reason: HOSPADM

## 2025-01-13 RX ORDER — ALBUTEROL SULFATE 0.83 MG/ML
3 SOLUTION RESPIRATORY (INHALATION) AS NEEDED
Status: DISCONTINUED | OUTPATIENT
Start: 2025-01-13 | End: 2025-01-13 | Stop reason: HOSPADM

## 2025-01-13 RX ORDER — EPINEPHRINE 0.3 MG/.3ML
0.3 INJECTION SUBCUTANEOUS EVERY 5 MIN PRN
Status: DISCONTINUED | OUTPATIENT
Start: 2025-01-13 | End: 2025-01-13 | Stop reason: HOSPADM

## 2025-01-13 RX ADMIN — TRASTUZUMAB AND HYALURONIDASE-OYSK 600 MG: 600; 10000 INJECTION, SOLUTION SUBCUTANEOUS at 15:25

## 2025-01-13 ASSESSMENT — PAIN SCALES - GENERAL: PAINLEVEL_OUTOF10: 0-NO PAIN

## 2025-01-13 NOTE — PROGRESS NOTES
NUTRITION Assessment NOTE    Nutrition Assessment       Reason for Visit:  Raiza Nguyễn is a 74 y.o. female with invasive ductal carcinoma, grade 3. There is high-grade DCIS present  (6/2024)   -s/p bilateral completion mastectomies.  -BRCA2 positive   Note:  H/o of breast cancer (initial dx 2003, recurrence 2018)   -Starting treatment with Taxol/herceptin 7/15/24    -Developed breast wound infection, needing antibiotics. Chemo held  -Spoke with patient via phone in Nov 2024, as patient with dietary questions/concerns with diarrhea following Phesgo and Mag supplements.     -Resuming Herceptin 1/13/25    Met with patient today for follow up visit . She is here in infusion for Herceptin, after having a treatment break.       Lab Results   Component Value Date/Time    GLUCOSE 95 12/04/2024 1332     12/04/2024 1332    K 4.2 12/04/2024 1332     12/04/2024 1332    CO2 29 12/04/2024 1332    ANIONGAP 13 12/04/2024 1332    BUN 16 12/04/2024 1332    CREATININE 0.68 12/04/2024 1332    EGFR >90 12/04/2024 1332    CALCIUM 9.3 12/04/2024 1332    ALBUMIN 4.2 12/04/2024 1332    ALKPHOS 71 12/04/2024 1332    PROT 6.9 12/04/2024 1332    AST 23 12/04/2024 1332    BILITOT 0.4 12/04/2024 1332    ALT 20 12/04/2024 1332     Lab Results   Component Value Date/Time    VITD25 89 02/22/2022 0927       Anthropometrics:       Wt down 3 lbs since our phone visit in Nov.  Wt continues to fluctuate. On average stays between 170-174 lb over the last 3 months     Wt Readings from Last 10 Encounters:   01/13/25 77.6 kg (171 lb 1.2 oz)   01/13/25 77.1 kg (170 lb)   01/06/25 77.3 kg (170 lb 6.7 oz)   11/20/24 78.9 kg (174 lb 0.9 oz)   11/13/24 80.3 kg (177 lb)   11/01/24 80.2 kg (176 lb 12.8 oz)   10/31/24 79.8 kg (176 lb)   10/25/24 80.5 kg (177 lb 7.5 oz)   10/24/24 80.7 kg (177 lb 12.8 oz)   10/21/24 78.6 kg (173 lb 4.5 oz)        Food And Nutrient Intake:  Food and Nutrient History  Food and Nutrient History: Patient with fair-good  appetite and oral intake.  She states that diarrhea resolved in early December.  She believes it may have been treatment related, coupled with Mag supplements.  After stopping Magnesium, it was much better.  Now patient states that she is having constipation and is taking Senekot Plus 1 in the am and 2 in the evening.  She is also adding fruits with fruit skin and prunes.   She is trying to drink plenty of fluids: 64+ oz.  and the combination has been working.   She is resuming treatment and unsure how it will impact her bowels this time.  GI Symptoms: constipation                   Nutrition Focused Physical Exam Findings:  Well nourished: Temporal, Buccal Fat Pads, Orbital Fat Pads, Clavicle       Energy Needs  Estimated Energy Needs  Total Energy Estimated Needs (kCal):  (7920-4009 calories)  Estimated Fluid Needs  Total Fluid Estimated Needs (mL):  (1550 mls)  Estimated Protein Needs  Total Protein Estimated Needs (g):  (65-80 g)        Nutrition Diagnosis   Malnutrition Diagnosis  Patient has Malnutrition Diagnosis: No    Nutrition Diagnosis  Patient has Nutrition Diagnosis: Yes  Diagnosis Status (1): Ongoing  Nutrition Diagnosis 1: Increased nutrient needs  Related to (1): resumption of treatment  As Evidenced by (1): increased metabolic demands    Nutrition Interventions/Recommendations   Nutrition Prescription  Individualized Nutrition Prescription Provided for : 64 oz fluid; Increased fiber for constipation    Food and Nutrition Delivery  Food and Nutrition Delivery  Meals & Snacks: Fiber-modified diet, Fluid-modified diet    Nutrition Education  Nutrition Education  Nutrition Education Content: Content related nutrition education  Continue to push fluids and reviewed additional fiber containing foods : Oats, whole wheat/whole grain, bran , fruits/veggies/nuts.  If patient resumes chemo, will monitor for changes in bowel habits and ludwig nutrition intervention accordingly.   Coordination of  Care  Coordination of Nutrition Care by a Nutrition Professional  Collaboration and referral of nutrition care: Collaboration by nutrition professional with other providers    There are no Patient Instructions on file for this visit.    Nutrition Monitoring and Evaluation   Food/Nutrient Related History Monitoring  Monitoring and Evaluation Plan: Fluid intake, Fiber intake  Fluid Intake: Estimated fluid intake  Criteria: 64 oz fluid  Estimated fiber intake: Estimated fiber intake  Criteria: Increase fiber food sources with constipation          Time Spent  Prep time on day of patient encounter: 10 minutes  Time spent directly with patient, family or caregiver: 10 minutes  Additional Time Spent on Patient Care Activities: 0 minutes  Documentation Time: 30 minutes  Other Time Spent: 0 minutes  Total: 50 minutes              Level of Understanding : Good        Time Spent  Prep time on day of patient encounter: 10 minutes  Time spent directly with patient, family or caregiver: 10 minutes  Additional Time Spent on Patient Care Activities: 0 minutes  Documentation Time: 30 minutes  Other Time Spent: 0 minutes  Total: 50 minutes

## 2025-01-13 NOTE — PROGRESS NOTES
Patient is here today for Herceptin Hyecta injection- no complications since last being seen-  Independent double check done prior to injection today-   b/h/ lung sounds not auscultated  Patient tolerated injection well.  No complaints. Call back instructions reviewed.    Patient verbalizes understanding of plan of care.  Ambulated off unit using cane without difficulty, steady gait.

## 2025-01-14 ENCOUNTER — APPOINTMENT (OUTPATIENT)
Dept: PHYSICAL THERAPY | Facility: CLINIC | Age: 75
End: 2025-01-14
Payer: MEDICARE

## 2025-01-16 ENCOUNTER — APPOINTMENT (OUTPATIENT)
Dept: PHARMACY | Facility: HOSPITAL | Age: 75
End: 2025-01-16
Payer: MEDICARE

## 2025-01-16 ENCOUNTER — APPOINTMENT (OUTPATIENT)
Dept: HEMATOLOGY/ONCOLOGY | Facility: CLINIC | Age: 75
End: 2025-01-16
Payer: MEDICARE

## 2025-01-16 ENCOUNTER — APPOINTMENT (OUTPATIENT)
Dept: PHYSICAL THERAPY | Facility: CLINIC | Age: 75
End: 2025-01-16
Payer: MEDICARE

## 2025-01-16 DIAGNOSIS — E11.9 TYPE 2 DIABETES MELLITUS WITHOUT COMPLICATION, WITHOUT LONG-TERM CURRENT USE OF INSULIN (MULTI): ICD-10-CM

## 2025-01-16 NOTE — PROGRESS NOTES
Clinical Pharmacy Appointment    Patient ID: Raiza Nguyễn is a 74 y.o. female who presents for Diabetes.    Pt is here for Follow Up appointment.     Referring Provider: Shoshana Olivarez, *  PCP: Shoshana Olivarez,    Last visit with PCP: 11/1/2024   Next visit with PCP: 1/22/2025      Subjective   HPI  Type II Diabetes  Current  Pharmacotherapy:   Mounjaro 5 mg once weekly injection   Metformin  mg once daily      SECONDARY PREVENTION  - Statin? Yes  - ACE-I/ARB? No   - Aspirin? No     -The ASCVD Risk score (Librado ALLEN, et al., 2019) failed to calculate for the following reasons:    Risk score cannot be calculated because patient has a medical history suggesting prior/existing ASCVD      Current monitoring regimen:   Patient is using: glucometer      SMBG Fasting Readings: average of 130     Hypoglycemia?  - Lowest blood sugar was in the 90's but patient felt signs and symptoms of     Pertinent PMH Review:  - PMH of Pancreatitis: No  - PMH/FH of Medullary Thyroid Cancer: No  - PMH of Retinopathy: No  - PMH of Urinary Tract Infections: No     Diet/Lifestyle:   Does carb counting to help control blood sugar      Patient Assistance Screening (VAF)  - Patient was approved for VAF on 12/16/2024 for Mounjaro     Drug Interactions  No relevant drug interactions were noted.    Medication System Management  Patient's preferred pharmacy: [facility]   Adherence/Organization: None  Affordability/Accessibility: Mounjaro is expensive       Objective   Allergies   Allergen Reactions    Erythromycin GI bleeding    Tetracycline GI bleeding    Cephalexin Unknown    Glucosamine Unknown    Adhesive Itching and Rash     Social History     Social History Narrative    Not on file      Medication Review  Current Outpatient Medications   Medication Instructions    blood sugar diagnostic strip 90 strips, miscellaneous, Daily    calcium carbonate 600 mg, Daily    DULoxetine (CYMBALTA) 30 mg, oral, 2 times daily     gabapentin (NEURONTIN) 900 mg, oral, 3 times daily    hydroCHLOROthiazide (HYDRODIURIL) 25 mg, oral, 2 times daily    levothyroxine (SYNTHROID, LEVOXYL) 125 mcg, oral, Daily    lisinopril 5 mg, oral, Daily    metFORMIN XR (GLUCOPHAGE-XR) 500 mg, oral, Daily    Mounjaro 5 mg, subcutaneous, Weekly    rosuvastatin (CRESTOR) 5 mg, oral, Daily    traMADol (ULTRAM) 100 mg, Nightly    triamcinolone (Kenalog) 0.1 % ointment Apply twice daily to affected areas of fingers (avoid face, groin, body folds) for 2 weeks, then taper to twice daily on weekends only; repeat every 6-8 weeks as needed for flares      Vitals  BP Readings from Last 2 Encounters:   01/13/25 139/73   01/13/25 154/76     BMI Readings from Last 1 Encounters:   01/13/25 34.55 kg/m²      Labs  A1C  Lab Results   Component Value Date    HGBA1C 5.9 11/01/2024    HGBA1C 6.9 (H) 05/29/2024    HGBA1C 6.5 (H) 01/30/2024     BMP  Lab Results   Component Value Date    CALCIUM 9.3 12/04/2024     12/04/2024    K 4.2 12/04/2024    CO2 29 12/04/2024     12/04/2024    BUN 16 12/04/2024    CREATININE 0.68 12/04/2024    EGFR >90 12/04/2024     LFTs  Lab Results   Component Value Date    ALT 20 12/04/2024    AST 23 12/04/2024    ALKPHOS 71 12/04/2024    BILITOT 0.4 12/04/2024     FLP  Lab Results   Component Value Date    TRIG 70 06/19/2024    CHOL 138 06/19/2024    LDLF 91 07/22/2022    LDLCALC 59 06/19/2024    HDL 64.7 06/19/2024     Urine Microalbumin  Lab Results   Component Value Date    MICROALBCREA  08/28/2024      Comment:      One or more analytes used in this calculation is outside of the analytical measurement range. Calculation cannot be performed.     Weight Management  Wt Readings from Last 3 Encounters:   01/13/25 77.6 kg (171 lb 1.2 oz)   01/13/25 77.1 kg (170 lb)   01/06/25 77.3 kg (170 lb 6.7 oz)      There is no height or weight on file to calculate BMI.     Mounjaro Education:     Counseled patient on Mounjaro MOA, expectations, side  effects, duration of therapy, administration, and monitoring parameters.  Provided detailed dosing and administration counseling to ensure proper technique.   Reviewed Mounjaro titration schedule, starting with 2.5 mg once weekly to a goal of 15 mg once weekly if tolerated  Counseled patient on the benefits of GLP-1ra glycemic control and weight loss  Reviewed storage requirements of Mounjaro when not in use, and when to administer the medication if a dose is missed.  Advised patient that they may experience improved satiety after meals and portion sizes of meals may be reduced as doses of Mounjaro increase.      Assessment/Plan   Problem List Items Addressed This Visit       Diabetes mellitus (Multi)     Patient is well controlled with A1C of 6.9%.  The patient test blood sugar daily and readings are usually around 130.  Patient has been taking Mounjaro and metformin to control blood sugar with no side effects.  Since patient is well controlled will continue current regimen.        PLAN:   -   Continue Mounjaro 5 mg once weekly injection. Prescription sent to [facility] pharmacy for cost assistance   - Continue Metformin 500 mg once daily  - Continue to test blood sugar once daily and keep a log to review at next appointment.           Relevant Orders    Referral to Clinical Pharmacy     Clinical Pharmacist follow-up: 1 year for renewal, Telehealth visit    Continue all meds under the continuation of care with the referring provider and clinical pharmacy team.    Thank you,  Charmaine Nj, PharmD   Clinical Pharmacist  [phone]    Verbal consent to manage patient's drug therapy was obtained from the patient. They were informed they may decline to participate or withdraw from participation in pharmacy services at any time.

## 2025-01-16 NOTE — ASSESSMENT & PLAN NOTE
Patient is well controlled with A1C of 6.9%.  The patient test blood sugar daily and readings are usually around 130.  Patient has been taking Mounjaro and metformin to control blood sugar with no side effects.  Since patient is well controlled will continue current regimen.        PLAN:   -   Continue Mounjaro 5 mg once weekly injection. Prescription sent to [facility] pharmacy for cost assistance   - Continue Metformin 500 mg once daily  - Continue to test blood sugar once daily and keep a log to review at next appointment.

## 2025-01-20 ENCOUNTER — APPOINTMENT (OUTPATIENT)
Dept: HEMATOLOGY/ONCOLOGY | Facility: CLINIC | Age: 75
End: 2025-01-20
Payer: MEDICARE

## 2025-01-21 ENCOUNTER — APPOINTMENT (OUTPATIENT)
Dept: PHYSICAL THERAPY | Facility: CLINIC | Age: 75
End: 2025-01-21
Payer: MEDICARE

## 2025-01-22 ENCOUNTER — APPOINTMENT (OUTPATIENT)
Dept: PRIMARY CARE | Facility: CLINIC | Age: 75
End: 2025-01-22
Payer: MEDICARE

## 2025-01-22 VITALS
BODY MASS INDEX: 34.64 KG/M2 | TEMPERATURE: 96.7 F | HEART RATE: 96 BPM | SYSTOLIC BLOOD PRESSURE: 106 MMHG | OXYGEN SATURATION: 96 % | WEIGHT: 171.5 LBS | DIASTOLIC BLOOD PRESSURE: 70 MMHG

## 2025-01-22 DIAGNOSIS — C50.911 MALIGNANT NEOPLASM OF BOTH BREASTS IN FEMALE, ESTROGEN RECEPTOR NEGATIVE, UNSPECIFIED SITE OF BREAST: ICD-10-CM

## 2025-01-22 DIAGNOSIS — Z17.1 MALIGNANT NEOPLASM OF BOTH BREASTS IN FEMALE, ESTROGEN RECEPTOR NEGATIVE, UNSPECIFIED SITE OF BREAST: ICD-10-CM

## 2025-01-22 DIAGNOSIS — C50.912 MALIGNANT NEOPLASM OF BOTH BREASTS IN FEMALE, ESTROGEN RECEPTOR NEGATIVE, UNSPECIFIED SITE OF BREAST: ICD-10-CM

## 2025-01-22 DIAGNOSIS — E03.9 HYPOTHYROIDISM, UNSPECIFIED TYPE: ICD-10-CM

## 2025-01-22 DIAGNOSIS — I10 ESSENTIAL HYPERTENSION: ICD-10-CM

## 2025-01-22 DIAGNOSIS — H04.123 DRY EYES, BILATERAL: ICD-10-CM

## 2025-01-22 DIAGNOSIS — G89.29 OTHER CHRONIC PAIN: ICD-10-CM

## 2025-01-22 DIAGNOSIS — E11.9 TYPE 2 DIABETES MELLITUS WITHOUT COMPLICATION, WITHOUT LONG-TERM CURRENT USE OF INSULIN (MULTI): Primary | ICD-10-CM

## 2025-01-22 PROBLEM — E66.811 CLASS 1 OBESITY WITH SERIOUS COMORBIDITY AND BODY MASS INDEX (BMI) OF 34.0 TO 34.9 IN ADULT: Status: ACTIVE | Noted: 2024-11-01

## 2025-01-22 PROBLEM — D64.9 ANEMIA: Status: RESOLVED | Noted: 2024-06-27 | Resolved: 2025-01-22

## 2025-01-22 PROBLEM — N95.2 VAGINAL ATROPHY: Status: ACTIVE | Noted: 2024-11-21

## 2025-01-22 PROCEDURE — 1123F ACP DISCUSS/DSCN MKR DOCD: CPT | Performed by: FAMILY MEDICINE

## 2025-01-22 PROCEDURE — 99214 OFFICE O/P EST MOD 30 MIN: CPT | Performed by: FAMILY MEDICINE

## 2025-01-22 PROCEDURE — 1157F ADVNC CARE PLAN IN RCRD: CPT | Performed by: FAMILY MEDICINE

## 2025-01-22 PROCEDURE — 3078F DIAST BP <80 MM HG: CPT | Performed by: FAMILY MEDICINE

## 2025-01-22 PROCEDURE — 3074F SYST BP LT 130 MM HG: CPT | Performed by: FAMILY MEDICINE

## 2025-01-22 PROCEDURE — 4010F ACE/ARB THERAPY RXD/TAKEN: CPT | Performed by: FAMILY MEDICINE

## 2025-01-22 PROCEDURE — 1036F TOBACCO NON-USER: CPT | Performed by: FAMILY MEDICINE

## 2025-01-22 PROCEDURE — 1160F RVW MEDS BY RX/DR IN RCRD: CPT | Performed by: FAMILY MEDICINE

## 2025-01-22 PROCEDURE — 1159F MED LIST DOCD IN RCRD: CPT | Performed by: FAMILY MEDICINE

## 2025-01-22 RX ORDER — LISINOPRIL 5 MG/1
5 TABLET ORAL 2 TIMES DAILY
Start: 2025-01-22 | End: 2026-02-26

## 2025-01-22 RX ORDER — HYDROCHLOROTHIAZIDE 25 MG/1
TABLET ORAL
Start: 2025-01-22

## 2025-01-22 NOTE — ASSESSMENT & PLAN NOTE
CURRENT DOSE: Synthroid 125 mcg daily.  9/2024 TSH, T3, T4 all wnl. Continue current Synthroid regimen.  TFTs to be checked annually unless symptomatic.

## 2025-01-22 NOTE — PATIENT INSTRUCTIONS
I recommend considering starting a magesium supplement.  Different types of magnesium and their uses:    For bowels:  To help keep bowels moving (constipation prevention/treatment) magnesium oxide or magnesium citrate (very little magnesium absorbed)     For muscle cramping, sleep, and mood:   I recommend trying the inexpensive option first, this include magnesium chloride or magnesium glycinate 200-500 mg daily (if bowels become too loose back down dose)     If using for sleep, mood, focus and mag chloride or mag glycinate do not seem to be helping:   Can try more expensive ($$$) magnesium  with best CNS (brain) penetration, for sleep, focus, mood  - magnesium L threonate ,or threonine, or Magtein - 2000 mg, between 150 and 450 mg elemental magnesium in that 2000 mg depending on brand. This can be purchased online (amazon has one brand for as little as  $30/mo).    Consider adding low dose mag back on if bowels slow        Lifestyle recommendations for overall wellness, as you are interested or able -     We recommend limitation of refined carbohydrates such as pasta, pretzels, chips, breads, bagels or cereals- particularly white flour containing.   Limit sugar sweetened foods or beverages, alcohol, pastries, candy, sports drinks or sodas.  (Water is best.)  Minimize diet drinks with artificial sugars.  If craving a sweetened food or beverage, two sweeteners from natural sources without substantial calories or glucose-raising properties are monk fruit extract or stevia.     Shoot for drinking 64 oz water daily unless conditions such as heart failure limit your recommended intake (Ask your doctor if you're not sure.)    Whole grain foods can be part of a healthy diet, and include such things as steel cut oats, brown rice, quinoa, millet, Blade or other sprouted breads (often found in the freezer section), amaranth, teff, farrow., etc.  Wheat/buckwheat/spelt can be added if you are not gluten-sensitive, and if  these foods don't cause you bloating or symptoms of inflammation.       We do recommend eating lots of vegetables- 5 -7 servings of veggies daily, which is good fiber source as well as vitamins and minerals. (Think egg bake with spinach, peppers, onions, for breakfast,  celery with nut butter, or hummus and cucumbers as snacks,  salads with lunch and/or dinner, 1 - 2 veggies with lunch and or dinner,  etc)    Eat lean proteins -shoot for 70 - 100 g of protein per day unless you have restrictions from kidney disease, etc.  Think hard boiled eggs, beans, seeds/lentils, cottage cheese, consider protein powder such as whey or pea powder in steel cut oats or a homemade smoothie.     Eat some healthy fat daily, such as a handful of almonds, walnuts, pumpkin seeds, a serving of salmon, a splash of olive oil on your salad, an avocado.       Healthy nutritional choices decrease conditions like elevated cholesterol, elevated sugars, elevated weight, elevated blood pressure, and elevated inflammation.    Healthy choices can also lower risk of events such as strokes, heart attacks, and cancer.     Make most of your food intake plant- based.   Have occasional treats if you like, but try not to overindulge.     Some sort of heart-rate increasing movement or exercise is recommended 4 - 6 days per week, even if in 5 or 10 min increments several times throughout the day.     Include within that exercise time some strength/ resistance exercises at least 3 days per week.  This can be through use of resistance bands, free weights, machines, body weight lifting, heavy outdoor chores such as yardwork, mulching, chopping wood, etc.   Stretching/range of motion exercises should also be included as part of your exercise time - such as yoga, kimberly chi, or even just post- exercise stretching.  Moving exercised muscles beyond day to day usual activities can help you stay limber and flexible, decrease injury risk, and minimize aches and pains  with everyday movements.     Either in combination with exercise or just as a self-care quiet practice, spending time in nature has a wide range of positive effects on your health and wellbeing, including improved mood, improved blood pressure, and improved immune function.   Even 10 - 20 minutes a few days per week can make a difference.     Consider visiting [email or web address]     This site is a new partnership between [facility] and local physicians to include a prescription for nature as part of your self-care and wellness.      Healthy sleep ( 6 - 8 hours if possible) and  involvement in community (neighbors, family, senior center, hobby groups, dominic based realationships, etc) are also important parts of overall well being.      Pick one or two things to focus on per week or month and start building on your wellness promoting activities.   You deserve it!     CONSIDER SALINE GEL OR OINTMENT         Follow up in [place] in April -  call us by end Feb if you don't hear from us. [phone]    Diabetes mellitus (Multi)  Hgb A1c: 5.9 in 11/2024, 6.9 in 5/2024, 6.5 in 1/2024, 6.2 in 7/2023, 6.1 in 3/2023  Albumin: 8/2024 negative  Eye exam: 10/2024    Most recent A1c was 5.9 in office in Nov 2024 - this indicates good control.  DM meds co-managed with clinical pharmacy - currently on Mounjaro 5 mg weekly and Metformin 500 mg daily.  She has form from her insurance with her today about Mounjaro coverage - will forward to clinical pharmacy.    Plan:  Continue Metformin 500 mg daily, take WITH your biggest meal.    Continue Mounjaro 5 mg weekly.   --> previously unable to tolerate the 7.5 mg dose due to nausea, disinterest in food   --> previously experienced too much food noise on the 2.5 mg dose   --> not having any issues or side effects on the 5 mg dose    Repeat A1c 6 months from since well-controlled. Can do at 3 months if has dosage change or any sooner if symptoms warrant.      DM  RECS:  Monitor A1c on routine basis.              --->A1c due every 6 months when under good control              --->A1c due every 3 months when not under good control.  Monitor urine albumin on routine basis.  --->Checking kidneys for leaking of protein should be done yearly.   Diabetic eye exams (with dilation) are recommended once a year.   Foot checks on routine basis  --->Once yearly via PCP or podiatrist  --->Patients should check daily for any cracks, blisters, calluses or skin breakdown if there is any neuropathy.  Good control of diabetes can decrease the risk of kidney damage, neuropathy, and vision loss from diabetes.   Lifestyle recommendations:  --->Make sure you are avoiding refined carbs such as breads, pasta, cereal, candy, soda,  nutrition bars, granola, chips, and sugar sweetened beverages.    --->Eat 5- 7 servings daily of veggies,  healthy protein such as chicken, fish,  beans, and eggs, and include healthy fats in your diet such as seeds, nuts, olive oil, avocados, and salmon.   --->Exercise 4 - 6 days per week as you are able, 150 minutes total weekly divided up is recommended.     Essential hypertension  /70 in office today, reports systolic averaging in 130s at home.  Goal BP is 130/80 or less, ideally 120/80 or less.     Plan:  -Continue Lisinopril 5 mg twice daily  -Continue HCTZ 25 mg as needed, uses a few times per week  -Continue labs for routine med monitoring - last CMP in 12/2024 wnl  -Continue with home monitoring.    Hypothyroidism  CURRENT DOSE: Synthroid 125 mcg daily.  9/2024 TSH, T3, T4 all wnl. Continue current Synthroid regimen.  TFTs to be checked annually unless symptomatic.     Chronic pain  Currently on Tramadol and Gabapentin  Controlled substance rx'ed and managed by Pain Management provider.  Reports uses Senna for bowels as she experienced diarrhea on MiraLAX in past.    Dry eyes, bilateral  Reports following with specialist. Had been doing 90% better before  receiving most recent injection with oncology now having some issues again but management. Previously evaluated by ophthal, she will continue regimen as they have recommended. Consider saline gel or ointment at night for the dryness.

## 2025-01-22 NOTE — ASSESSMENT & PLAN NOTE
Reports following with specialist. Had been doing 90% better before receiving most recent injection with oncology now having some issues again but management. Previously evaluated by ophthal, she will continue regimen as they have recommended. Consider saline gel or ointment at night for the dryness.

## 2025-01-22 NOTE — ASSESSMENT & PLAN NOTE
Following and receiving treatment under direction of oncology.  CCM as per oncology/breast specialist.

## 2025-01-22 NOTE — PROGRESS NOTES
Subjective   Patient ID: Raiza Nguyễn is a 74 y.o. female who presents for Follow-up (Pt presents for follow up DM- pt states that she would like to discuss her dry eyes with you today. ).    HPI     Patient presents today for 3 month follow-up.    Patient concerns:    # Skin issues  Has spot on tip of R index finger  Currently following with derm  Plans to do biopsy if not improved at their next follow-up    ROUTINE VISIT  CHRONIC CONDITIONS:   DM, type 2  Previously lifestyle managed, started on medical therapies in spring 2024 with A1c trending upward.    Co-managed with clinical pharmacist. Last visit 1/16/2025, to follow-up in 1 year for renewal.     Hgb A1c: 5.9 in 11/2024, 6.9 in 5/2024, 6.5 in 1/2024, 6.2 in 7/2023, 6.1 in 3/2023  Albumin: 8/2024 negative  Eye exam: 10/2024    Current regimen:  -Metformin 500 mg daily, started 4/2024  -Mounjaro 5 mg weekly, started 4/2024, decreased at prior visit in 11/2024 due to improved A1c, nausea, and disinterest in food    Sugars running 92 - 108 in AM  Sugar <80 only once in last month, around 72, did feel nausea/lightheaded. Attributes to her not eating.    Reports insurance sent her a from about not covering the Mounjaro.  Previously at 7.5 mg daily but experienced nausea and decreased appetite on this.  Decreased to 2.5 mg daily but had too much food noise and cravings on this so went back to the 5 mg.    HTN  Current regimen:  Lisinopril 5 mg twice daily  HCTZ 25 mg daily as needed    States only taking the HCTZ about 2x per weekly    BP running in 130s at home per patient report.    Patient denies chest pain, shortness of breath with exertion, palpitations, lower extremity edema, headache, or dizziness.     HLD/CAD/Aortic atherosclerosis  Taking Rosuvastatin 5 mg daily.  10/2020 CT A/P showed mild aortic atherosclerosis, no CACS.  06/2024 TC/HDL ratio 2.1, LDL 59, Trig 70    Hypothyroidism  CURRENT DOSE: Synthroid 125 mcg daily.  9/2024 TSH, T3, T4 all  wnl    BOLIVAR, severe  12/2021 sleep study (severe with SpO2 88% and AHI 36.1).  Intolerant to CPAP x several tries.     She had inspire consult with ENT Dr. Stephens in September 2023 however she joined a group with people who had the inspire and based off this experience she chose not to proceed with this but has opted to pursue conservative measure of weight reduction prior to having this done.     Given progression into diabetes and comorbidities of obesity, hypertension, hyperlipidemia, coronary artery disease, chronic pain due to numerous ortho issues, and obstructive sleep apnea intolerant to CPAP I do feel patient would benefit from GPL-1 for both better control of sugars and weight reduction.      Prescription for mounjaro sent to pharmacy, see DM section    Chronic Pain  Taking Duloxetine 60 mg + Gabapentin daily.  S/p evaluation with [facility] rheumatology.  Plan per rheum pre 10/2022 OV NOTE:  --For Pseudogout flares of the wrist - Prednisone 12 day taper  --Continue Gapabentin 600 mg three times a day  --Tylenol 1000 mg up to 3x/day  --Voltaren gel up to 4x/day  --Continue home occupational therapy exercises   --Follow up as needed      She follows with spinal specialist @ [facility], seeing him in 2 weeks     7/2023 CT A/P showed multilevel degenerative changes of the lower cervical and thoracic spine are present, with likely at least mild-to-moderate spinal canal narrowing present in the thoracic spine,: The least moderate to severe spinal canal stenosis present at L3-L4 and L4-L5 due to bulging disc, hypertrophic facet changes and ligamentum flavum thickening, incompletely characterized on current exam.     PCP Plan per 4/11/2024 OV:  Patient with limited mobility secondary to low back pain, multiple ortho issues.     Reviewed CT A/P pelvis from July 2023 as noted below. Patient advised to follow-up with her ortho @ [facility] regarding latest imaging and current state of mobility as it is worsening and very  limiting for ADLs. Copy of CT A/P provided for patient to take to her ortho to review as well. She has appointment with ortho in 2 weeks per patient. Continue current medication regimen as previously prescribed other than the diabetic meds that were added today.    She is currently on Tramadol 50 mg 2 tabs daily - gets rx'ed by outside provider (PM) who is managing  Having to use Senna with this, had diarrhea on MiraLAX in past.      Breast Cancer  Followed by heme/onc, previously Dr. Wilson, now Dr. Bourne  Follows with breast surgeon (Dr. Jeniffer Langley)     Left breast IDC g3 ER0% MA 0% HER2 positive at 3:00 10cmFN measuring 0.9cm on MRI  -concerning level 3 lymph node not amenable to biopsy.  PET CT recommended for further assessment by radiology.  PET negative.  -s/p bilateral completion mastectomy  -pT1cNx     2.  History of bilateral breast cancer and known BRCA2 mutation     Stage IB (Prognostic Stage IA) Right breast cancer 7/2018, [id number], grade 2 IDC, ER+95% MA+95% HER2 equivocal, FISH-.   - s/p R. magseed pm / SLNB (2mi/3) on 8/27/2018 with a 1.5cm IDC and negative margins. She had 2 out of 3 LNs with micrometastases measuring 0.4mm and 1.1mm, no NASH.   - Oncotype 17   - s/p WBRT 10/11/2018 - 11/1/2018   - taking Exemestane      Stage II Left breast cancer in 2003, 1.5 cm grade 3 IDC with positive LNs and NASH, ER+ MA+ HER2 not defined.   - L.pm/ALND   - ACx4 + Tx4   - WBRT   - Anastrozole x5yr    Was previously receiving taxol/herceptin under direction of hematology. Treatment course complicated by chest wall infection for which chemotherapy has been held.  We continued on Herceptin alone to complete 1 year of therapy. Resume Herceptin after treatment break to assess side effects. Received 2 doses of weekly paclitaxel, discontinued given development of breast infection and delayed wound healing.       Review of Systems   All other systems reviewed and are negative.      Objective   /70 (BP  Location: Right arm, Patient Position: Sitting, BP Cuff Size: Adult)   Pulse 96   Temp 35.9 °C (96.7 °F) (Temporal)   Wt 77.8 kg (171 lb 8 oz)   SpO2 96%   BMI 34.64 kg/m²     Physical Exam  Vitals and nursing note reviewed.   Constitutional:       General: She is not in acute distress.     Appearance: Normal appearance. She is not toxic-appearing.   HENT:      Head: Normocephalic and atraumatic.   Eyes:      Conjunctiva/sclera:      Right eye: Right conjunctiva is injected.      Left eye: Left conjunctiva is injected.   Cardiovascular:      Rate and Rhythm: Normal rate and regular rhythm.      Heart sounds: Normal heart sounds. No murmur heard.     No friction rub. No gallop.   Pulmonary:      Effort: Pulmonary effort is normal.      Breath sounds: Normal breath sounds. No wheezing, rhonchi or rales.   Musculoskeletal:      Right lower leg: No edema.      Left lower leg: No edema.   Skin:     General: Skin is warm and dry.      Comments: R index finger 3 mm x 4 mm ulceration to tip.   Neurological:      General: No focal deficit present.      Mental Status: She is alert and oriented to person, place, and time.   Psychiatric:         Mood and Affect: Mood normal.         Behavior: Behavior normal.         Assessment/Plan   Problem List Items Addressed This Visit             ICD-10-CM    Chronic pain G89.29     Currently on Tramadol and Gabapentin  Controlled substance rx'ed and managed by Pain Management provider.  Reports uses Senna for bowels as she experienced diarrhea on MiraLAX in past.         Diabetes mellitus (Multi) - Primary E11.9     Hgb A1c: 5.9 in 11/2024, 6.9 in 5/2024, 6.5 in 1/2024, 6.2 in 7/2023, 6.1 in 3/2023  Albumin: 8/2024 negative  Eye exam: 10/2024    Most recent A1c was 5.9 in office in Nov 2024 - this indicates good control.  DM meds co-managed with clinical pharmacy - currently on Mounjaro 5 mg weekly and Metformin 500 mg daily.  She has form from her insurance with her today about  Mounjaro coverage - will forward to clinical pharmacy.    Plan:  Continue Metformin 500 mg daily, take WITH your biggest meal.    Continue Mounjaro 5 mg weekly.   --> previously unable to tolerate the 7.5 mg dose due to nausea, disinterest in food   --> previously experienced too much food noise on the 2.5 mg dose   --> not having any issues or side effects on the 5 mg dose    Repeat A1c 6 months from since well-controlled. Can do at 3 months if has dosage change or any sooner if symptoms warrant.      DM RECS:  Monitor A1c on routine basis.              --->A1c due every 6 months when under good control              --->A1c due every 3 months when not under good control.  Monitor urine albumin on routine basis.  --->Checking kidneys for leaking of protein should be done yearly.   Diabetic eye exams (with dilation) are recommended once a year.   Foot checks on routine basis  --->Once yearly via PCP or podiatrist  --->Patients should check daily for any cracks, blisters, calluses or skin breakdown if there is any neuropathy.  Good control of diabetes can decrease the risk of kidney damage, neuropathy, and vision loss from diabetes.   Lifestyle recommendations:  --->Make sure you are avoiding refined carbs such as breads, pasta, cereal, candy, soda,  nutrition bars, granola, chips, and sugar sweetened beverages.    --->Eat 5- 7 servings daily of veggies,  healthy protein such as chicken, fish,  beans, and eggs, and include healthy fats in your diet such as seeds, nuts, olive oil, avocados, and salmon.   --->Exercise 4 - 6 days per week as you are able, 150 minutes total weekly divided up is recommended.          Essential hypertension I10     /70 in office today, reports systolic averaging in 130s at home.  Goal BP is 130/80 or less, ideally 120/80 or less.     Plan:  -Continue Lisinopril 5 mg twice daily  -Continue HCTZ 25 mg as needed, uses a few times per week  -Continue labs for routine med monitoring - last  CMP in 12/2024 wnl  -Continue with home monitoring.         Relevant Medications    hydroCHLOROthiazide (HYDRODiuril) 25 mg tablet    lisinopril 5 mg tablet    Hypothyroidism E03.9     CURRENT DOSE: Synthroid 125 mcg daily.  9/2024 TSH, T3, T4 all wnl. Continue current Synthroid regimen.  TFTs to be checked annually unless symptomatic.          Dry eyes, bilateral H04.123     Reports following with specialist. Had been doing 90% better before receiving most recent injection with oncology now having some issues again but management. Previously evaluated by ophthal, she will continue regimen as they have recommended. Consider saline gel or ointment at night for the dryness.            Follow-up in with me in 2-3 months for recheck DM, HTN, other issues.  Labs to be done prior.   Call for sooner follow-up if needed.       Scribe Attestation  By signing my name below, IClaudia Scribe   attest that this documentation has been prepared under the direction and in the presence of Shoshana Olivarez DO.

## 2025-01-22 NOTE — ASSESSMENT & PLAN NOTE
/70 in office today, reports systolic averaging in 130s at home.  Goal BP is 130/80 or less, ideally 120/80 or less.     Plan:  -Continue Lisinopril 5 mg twice daily  -Continue HCTZ 25 mg as needed, uses a few times per week  -Continue labs for routine med monitoring - last CMP in 12/2024 wnl  -Continue with home monitoring.

## 2025-01-22 NOTE — ASSESSMENT & PLAN NOTE
Hgb A1c: 5.9 in 11/2024, 6.9 in 5/2024, 6.5 in 1/2024, 6.2 in 7/2023, 6.1 in 3/2023  Albumin: 8/2024 negative  Eye exam: 10/2024    Most recent A1c was 5.9 in office in Nov 2024 - this indicates good control.  DM meds co-managed with clinical pharmacy - currently on Mounjaro 5 mg weekly and Metformin 500 mg daily.  She has form from her insurance with her today about Mounjaro coverage - will forward to clinical pharmacy.    Plan:  Continue Metformin 500 mg daily, take WITH your biggest meal.    Continue Mounjaro 5 mg weekly.   --> previously unable to tolerate the 7.5 mg dose due to nausea, disinterest in food   --> previously experienced too much food noise on the 2.5 mg dose   --> not having any issues or side effects on the 5 mg dose    Repeat A1c 6 months from since well-controlled. Can do at 3 months if has dosage change or any sooner if symptoms warrant.      DM RECS:  Monitor A1c on routine basis.              --->A1c due every 6 months when under good control              --->A1c due every 3 months when not under good control.  Monitor urine albumin on routine basis.  --->Checking kidneys for leaking of protein should be done yearly.   Diabetic eye exams (with dilation) are recommended once a year.   Foot checks on routine basis  --->Once yearly via PCP or podiatrist  --->Patients should check daily for any cracks, blisters, calluses or skin breakdown if there is any neuropathy.  Good control of diabetes can decrease the risk of kidney damage, neuropathy, and vision loss from diabetes.   Lifestyle recommendations:  --->Make sure you are avoiding refined carbs such as breads, pasta, cereal, candy, soda,  nutrition bars, granola, chips, and sugar sweetened beverages.    --->Eat 5- 7 servings daily of veggies,  healthy protein such as chicken, fish,  beans, and eggs, and include healthy fats in your diet such as seeds, nuts, olive oil, avocados, and salmon.   --->Exercise 4 - 6 days per week as you are able,  150 minutes total weekly divided up is recommended.

## 2025-01-23 ENCOUNTER — APPOINTMENT (OUTPATIENT)
Dept: HEMATOLOGY/ONCOLOGY | Facility: CLINIC | Age: 75
End: 2025-01-23
Payer: MEDICARE

## 2025-01-23 ENCOUNTER — APPOINTMENT (OUTPATIENT)
Dept: PHYSICAL THERAPY | Facility: CLINIC | Age: 75
End: 2025-01-23
Payer: MEDICARE

## 2025-01-28 ENCOUNTER — APPOINTMENT (OUTPATIENT)
Dept: PHYSICAL THERAPY | Facility: CLINIC | Age: 75
End: 2025-01-28
Payer: MEDICARE

## 2025-01-30 ENCOUNTER — APPOINTMENT (OUTPATIENT)
Dept: PHYSICAL THERAPY | Facility: CLINIC | Age: 75
End: 2025-01-30
Payer: MEDICARE

## 2025-01-31 DIAGNOSIS — I10 ESSENTIAL HYPERTENSION: ICD-10-CM

## 2025-01-31 RX ORDER — LISINOPRIL 5 MG/1
5 TABLET ORAL 2 TIMES DAILY
Qty: 180 TABLET | Refills: 3 | Status: SHIPPED | OUTPATIENT
Start: 2025-01-31 | End: 2026-03-07

## 2025-02-03 ENCOUNTER — HOSPITAL ENCOUNTER (OUTPATIENT)
Dept: HEMATOLOGY/ONCOLOGY | Facility: CLINIC | Age: 75
Discharge: HOME | End: 2025-02-03
Payer: MEDICARE

## 2025-02-03 ENCOUNTER — APPOINTMENT (OUTPATIENT)
Dept: DERMATOLOGY | Facility: CLINIC | Age: 75
End: 2025-02-03
Payer: MEDICARE

## 2025-02-03 VITALS
OXYGEN SATURATION: 98 % | WEIGHT: 170.97 LBS | RESPIRATION RATE: 16 BRPM | TEMPERATURE: 97 F | BODY MASS INDEX: 34.53 KG/M2 | DIASTOLIC BLOOD PRESSURE: 67 MMHG | SYSTOLIC BLOOD PRESSURE: 123 MMHG | HEART RATE: 93 BPM

## 2025-02-03 DIAGNOSIS — Z17.1 MALIGNANT NEOPLASM OF CENTRAL PORTION OF LEFT BREAST IN FEMALE, ESTROGEN RECEPTOR NEGATIVE: ICD-10-CM

## 2025-02-03 DIAGNOSIS — C50.112 MALIGNANT NEOPLASM OF CENTRAL PORTION OF LEFT BREAST IN FEMALE, ESTROGEN RECEPTOR NEGATIVE: ICD-10-CM

## 2025-02-03 PROCEDURE — 96401 CHEMO ANTI-NEOPL SQ/IM: CPT

## 2025-02-03 PROCEDURE — 2500000004 HC RX 250 GENERAL PHARMACY W/ HCPCS (ALT 636 FOR OP/ED): Mod: JZ,TB | Performed by: STUDENT IN AN ORGANIZED HEALTH CARE EDUCATION/TRAINING PROGRAM

## 2025-02-03 RX ORDER — PROCHLORPERAZINE MALEATE 10 MG
10 TABLET ORAL EVERY 6 HOURS PRN
Status: DISCONTINUED | OUTPATIENT
Start: 2025-02-03 | End: 2025-02-04 | Stop reason: HOSPADM

## 2025-02-03 RX ORDER — ALBUTEROL SULFATE 0.83 MG/ML
3 SOLUTION RESPIRATORY (INHALATION) AS NEEDED
Status: DISCONTINUED | OUTPATIENT
Start: 2025-02-03 | End: 2025-02-04 | Stop reason: HOSPADM

## 2025-02-03 RX ORDER — PROCHLORPERAZINE EDISYLATE 5 MG/ML
10 INJECTION INTRAMUSCULAR; INTRAVENOUS EVERY 6 HOURS PRN
Status: DISCONTINUED | OUTPATIENT
Start: 2025-02-03 | End: 2025-02-04 | Stop reason: HOSPADM

## 2025-02-03 RX ORDER — EPINEPHRINE 0.3 MG/.3ML
0.3 INJECTION SUBCUTANEOUS EVERY 5 MIN PRN
Status: DISCONTINUED | OUTPATIENT
Start: 2025-02-03 | End: 2025-02-04 | Stop reason: HOSPADM

## 2025-02-03 RX ORDER — FAMOTIDINE 10 MG/ML
20 INJECTION INTRAVENOUS ONCE AS NEEDED
Status: DISCONTINUED | OUTPATIENT
Start: 2025-02-03 | End: 2025-02-04 | Stop reason: HOSPADM

## 2025-02-03 RX ORDER — DIPHENHYDRAMINE HYDROCHLORIDE 50 MG/ML
50 INJECTION INTRAMUSCULAR; INTRAVENOUS AS NEEDED
Status: DISCONTINUED | OUTPATIENT
Start: 2025-02-03 | End: 2025-02-04 | Stop reason: HOSPADM

## 2025-02-03 RX ADMIN — TRASTUZUMAB AND HYALURONIDASE-OYSK 600 MG: 600; 10000 INJECTION, SOLUTION SUBCUTANEOUS at 11:41

## 2025-02-03 ASSESSMENT — PAIN SCALES - GENERAL: PAINLEVEL_OUTOF10: 0-NO PAIN

## 2025-02-03 NOTE — PROGRESS NOTES
Patient is here today for C6D1 Herceptin Hylecta injection - no complications since last being seen-  Independent double check done prior to injection today-   b/h/ lung sounds not auscultated  Patient tolerated injection well.  No complaints. Call back instructions reviewed.    Patient verbalizes understanding of plan of care.  Ambulated off unit using cane without difficulty, slow/steady gait.

## 2025-02-04 ENCOUNTER — DOCUMENTATION (OUTPATIENT)
Dept: GASTROENTEROLOGY | Facility: HOSPITAL | Age: 75
End: 2025-02-04
Payer: MEDICARE

## 2025-02-06 ENCOUNTER — APPOINTMENT (OUTPATIENT)
Dept: GASTROENTEROLOGY | Facility: HOSPITAL | Age: 75
End: 2025-02-06
Payer: MEDICARE

## 2025-02-12 ENCOUNTER — PATIENT OUTREACH (OUTPATIENT)
Dept: PRIMARY CARE | Facility: CLINIC | Age: 75
End: 2025-02-12
Payer: MEDICARE

## 2025-02-12 PROCEDURE — RXMED WILLOW AMBULATORY MEDICATION CHARGE

## 2025-02-13 ENCOUNTER — PHARMACY VISIT (OUTPATIENT)
Dept: PHARMACY | Facility: CLINIC | Age: 75
End: 2025-02-13
Payer: COMMERCIAL

## 2025-02-24 ENCOUNTER — APPOINTMENT (OUTPATIENT)
Dept: HEMATOLOGY/ONCOLOGY | Facility: CLINIC | Age: 75
End: 2025-02-24
Payer: MEDICARE

## 2025-02-24 ENCOUNTER — INFUSION (OUTPATIENT)
Dept: HEMATOLOGY/ONCOLOGY | Facility: CLINIC | Age: 75
End: 2025-02-24
Payer: MEDICARE

## 2025-02-24 VITALS
BODY MASS INDEX: 34.75 KG/M2 | TEMPERATURE: 97.2 F | DIASTOLIC BLOOD PRESSURE: 70 MMHG | HEART RATE: 90 BPM | RESPIRATION RATE: 16 BRPM | WEIGHT: 172.07 LBS | OXYGEN SATURATION: 93 % | SYSTOLIC BLOOD PRESSURE: 114 MMHG

## 2025-02-24 DIAGNOSIS — Z17.1 MALIGNANT NEOPLASM OF CENTRAL PORTION OF LEFT BREAST IN FEMALE, ESTROGEN RECEPTOR NEGATIVE: ICD-10-CM

## 2025-02-24 DIAGNOSIS — C50.112 MALIGNANT NEOPLASM OF CENTRAL PORTION OF LEFT BREAST IN FEMALE, ESTROGEN RECEPTOR NEGATIVE: ICD-10-CM

## 2025-02-24 PROCEDURE — 2500000004 HC RX 250 GENERAL PHARMACY W/ HCPCS (ALT 636 FOR OP/ED): Mod: JZ,TB | Performed by: STUDENT IN AN ORGANIZED HEALTH CARE EDUCATION/TRAINING PROGRAM

## 2025-02-24 PROCEDURE — 96401 CHEMO ANTI-NEOPL SQ/IM: CPT

## 2025-02-24 RX ORDER — PROCHLORPERAZINE MALEATE 10 MG
10 TABLET ORAL EVERY 6 HOURS PRN
Status: DISCONTINUED | OUTPATIENT
Start: 2025-02-24 | End: 2025-02-24 | Stop reason: HOSPADM

## 2025-02-24 RX ORDER — PROCHLORPERAZINE EDISYLATE 5 MG/ML
10 INJECTION INTRAMUSCULAR; INTRAVENOUS EVERY 6 HOURS PRN
Status: DISCONTINUED | OUTPATIENT
Start: 2025-02-24 | End: 2025-02-24 | Stop reason: HOSPADM

## 2025-02-24 RX ORDER — DIPHENHYDRAMINE HYDROCHLORIDE 50 MG/ML
50 INJECTION INTRAMUSCULAR; INTRAVENOUS AS NEEDED
Status: DISCONTINUED | OUTPATIENT
Start: 2025-02-24 | End: 2025-02-24 | Stop reason: HOSPADM

## 2025-02-24 RX ORDER — ALBUTEROL SULFATE 0.83 MG/ML
3 SOLUTION RESPIRATORY (INHALATION) AS NEEDED
Status: DISCONTINUED | OUTPATIENT
Start: 2025-02-24 | End: 2025-02-24 | Stop reason: HOSPADM

## 2025-02-24 RX ORDER — EPINEPHRINE 0.3 MG/.3ML
0.3 INJECTION SUBCUTANEOUS EVERY 5 MIN PRN
Status: DISCONTINUED | OUTPATIENT
Start: 2025-02-24 | End: 2025-02-24 | Stop reason: HOSPADM

## 2025-02-24 RX ORDER — FAMOTIDINE 10 MG/ML
20 INJECTION, SOLUTION INTRAVENOUS ONCE AS NEEDED
Status: DISCONTINUED | OUTPATIENT
Start: 2025-02-24 | End: 2025-02-24 | Stop reason: HOSPADM

## 2025-02-24 RX ADMIN — TRASTUZUMAB AND HYALURONIDASE-OYSK 600 MG: 600; 10000 INJECTION, SOLUTION SUBCUTANEOUS at 11:11

## 2025-02-24 ASSESSMENT — PAIN SCALES - GENERAL: PAINLEVEL_OUTOF10: 0-NO PAIN

## 2025-02-24 NOTE — PROGRESS NOTES
Patient is here today for Traztuzumab injection - no complication since last being seen.  Independent double check done prior to chemotherapy today.  B/L lung sounds not auscultated.  Denies current chest pain. No acute distress noted.  Patient verbalizes understanding of plan of care. Patient tolerated injection well.  Ambulated off unit with cane - gait steady.  no complaints. Call back instructions reviewed.  Verbalized understanding.

## 2025-03-06 DIAGNOSIS — I10 ESSENTIAL HYPERTENSION: ICD-10-CM

## 2025-03-10 DIAGNOSIS — I10 ESSENTIAL HYPERTENSION: ICD-10-CM

## 2025-03-10 RX ORDER — LISINOPRIL 5 MG/1
5 TABLET ORAL 2 TIMES DAILY
Qty: 180 TABLET | Refills: 1 | Status: SHIPPED | OUTPATIENT
Start: 2025-03-10 | End: 2026-04-14

## 2025-03-10 RX ORDER — LISINOPRIL 5 MG/1
5 TABLET ORAL DAILY
Qty: 90 TABLET | Refills: 1 | OUTPATIENT
Start: 2025-03-10

## 2025-03-17 ENCOUNTER — INFUSION (OUTPATIENT)
Dept: HEMATOLOGY/ONCOLOGY | Facility: CLINIC | Age: 75
End: 2025-03-17
Payer: MEDICARE

## 2025-03-17 VITALS
TEMPERATURE: 97 F | BODY MASS INDEX: 34.78 KG/M2 | SYSTOLIC BLOOD PRESSURE: 135 MMHG | DIASTOLIC BLOOD PRESSURE: 77 MMHG | OXYGEN SATURATION: 95 % | HEART RATE: 92 BPM | WEIGHT: 172.18 LBS

## 2025-03-17 DIAGNOSIS — C50.112 MALIGNANT NEOPLASM OF CENTRAL PORTION OF LEFT BREAST IN FEMALE, ESTROGEN RECEPTOR NEGATIVE: ICD-10-CM

## 2025-03-17 DIAGNOSIS — Z17.1 MALIGNANT NEOPLASM OF CENTRAL PORTION OF LEFT BREAST IN FEMALE, ESTROGEN RECEPTOR NEGATIVE: ICD-10-CM

## 2025-03-17 PROCEDURE — 96401 CHEMO ANTI-NEOPL SQ/IM: CPT

## 2025-03-17 PROCEDURE — RXMED WILLOW AMBULATORY MEDICATION CHARGE

## 2025-03-17 PROCEDURE — 2500000004 HC RX 250 GENERAL PHARMACY W/ HCPCS (ALT 636 FOR OP/ED): Mod: JZ,TB | Performed by: STUDENT IN AN ORGANIZED HEALTH CARE EDUCATION/TRAINING PROGRAM

## 2025-03-17 RX ORDER — FAMOTIDINE 10 MG/ML
20 INJECTION, SOLUTION INTRAVENOUS ONCE AS NEEDED
Status: DISCONTINUED | OUTPATIENT
Start: 2025-03-17 | End: 2025-03-17 | Stop reason: HOSPADM

## 2025-03-17 RX ORDER — PROCHLORPERAZINE MALEATE 10 MG
10 TABLET ORAL EVERY 6 HOURS PRN
Status: DISCONTINUED | OUTPATIENT
Start: 2025-03-17 | End: 2025-03-17 | Stop reason: HOSPADM

## 2025-03-17 RX ORDER — DIPHENHYDRAMINE HYDROCHLORIDE 50 MG/ML
50 INJECTION, SOLUTION INTRAMUSCULAR; INTRAVENOUS AS NEEDED
Status: DISCONTINUED | OUTPATIENT
Start: 2025-03-17 | End: 2025-03-17 | Stop reason: HOSPADM

## 2025-03-17 RX ORDER — PROCHLORPERAZINE EDISYLATE 5 MG/ML
10 INJECTION INTRAMUSCULAR; INTRAVENOUS EVERY 6 HOURS PRN
Status: DISCONTINUED | OUTPATIENT
Start: 2025-03-17 | End: 2025-03-17 | Stop reason: HOSPADM

## 2025-03-17 RX ORDER — EPINEPHRINE 0.3 MG/.3ML
0.3 INJECTION SUBCUTANEOUS EVERY 5 MIN PRN
Status: DISCONTINUED | OUTPATIENT
Start: 2025-03-17 | End: 2025-03-17 | Stop reason: HOSPADM

## 2025-03-17 RX ORDER — ALBUTEROL SULFATE 0.83 MG/ML
3 SOLUTION RESPIRATORY (INHALATION) AS NEEDED
Status: DISCONTINUED | OUTPATIENT
Start: 2025-03-17 | End: 2025-03-17 | Stop reason: HOSPADM

## 2025-03-17 RX ADMIN — TRASTUZUMAB AND HYALURONIDASE-OYSK 600 MG: 600; 10000 INJECTION, SOLUTION SUBCUTANEOUS at 12:03

## 2025-03-17 ASSESSMENT — PAIN SCALES - GENERAL: PAINLEVEL_OUTOF10: 0-NO PAIN

## 2025-03-17 NOTE — SIGNIFICANT EVENT

## 2025-03-18 ENCOUNTER — PHARMACY VISIT (OUTPATIENT)
Dept: PHARMACY | Facility: CLINIC | Age: 75
End: 2025-03-18
Payer: COMMERCIAL

## 2025-03-20 ENCOUNTER — APPOINTMENT (OUTPATIENT)
Dept: PRIMARY CARE | Facility: CLINIC | Age: 75
End: 2025-03-20
Payer: MEDICARE

## 2025-03-27 ENCOUNTER — HOSPITAL ENCOUNTER (OUTPATIENT)
Dept: GASTROENTEROLOGY | Facility: HOSPITAL | Age: 75
Discharge: HOME | End: 2025-03-27
Payer: MEDICARE

## 2025-03-27 ENCOUNTER — ANESTHESIA EVENT (OUTPATIENT)
Dept: GASTROENTEROLOGY | Facility: HOSPITAL | Age: 75
End: 2025-03-27
Payer: MEDICARE

## 2025-03-27 ENCOUNTER — ANESTHESIA (OUTPATIENT)
Dept: GASTROENTEROLOGY | Facility: HOSPITAL | Age: 75
End: 2025-03-27
Payer: MEDICARE

## 2025-03-27 ENCOUNTER — APPOINTMENT (OUTPATIENT)
Dept: CARDIOLOGY | Facility: CLINIC | Age: 75
End: 2025-03-27
Payer: MEDICARE

## 2025-03-27 VITALS
SYSTOLIC BLOOD PRESSURE: 109 MMHG | DIASTOLIC BLOOD PRESSURE: 67 MMHG | HEART RATE: 77 BPM | RESPIRATION RATE: 14 BRPM | OXYGEN SATURATION: 98 % | TEMPERATURE: 96.3 F

## 2025-03-27 DIAGNOSIS — Z15.01 BRCA2 POSITIVE: ICD-10-CM

## 2025-03-27 DIAGNOSIS — K86.1 CHRONIC PANCREATITIS, UNSPECIFIED PANCREATITIS TYPE (MULTI): ICD-10-CM

## 2025-03-27 DIAGNOSIS — Z15.01 BRCA2 POSITIVE: Primary | ICD-10-CM

## 2025-03-27 DIAGNOSIS — Z15.09 BRCA2 POSITIVE: ICD-10-CM

## 2025-03-27 DIAGNOSIS — Z15.09 BRCA2 POSITIVE: Primary | ICD-10-CM

## 2025-03-27 DIAGNOSIS — K86.2 PANCREAS CYST (HHS-HCC): ICD-10-CM

## 2025-03-27 DIAGNOSIS — D37.9 NEOPLASM OF UNCERTAIN BEHAVIOR OF DIGESTIVE ORGAN, UNSPECIFIED: ICD-10-CM

## 2025-03-27 DIAGNOSIS — K22.70 BARRETT'S ESOPHAGUS WITHOUT DYSPLASIA: ICD-10-CM

## 2025-03-27 PROBLEM — I50.9 CHF (CONGESTIVE HEART FAILURE): Status: ACTIVE | Noted: 2025-03-27

## 2025-03-27 LAB — GLUCOSE BLD MANUAL STRIP-MCNC: 99 MG/DL (ref 74–99)

## 2025-03-27 PROCEDURE — 7100000010 HC PHASE TWO TIME - EACH INCREMENTAL 1 MINUTE

## 2025-03-27 PROCEDURE — 3700000002 HC GENERAL ANESTHESIA TIME - EACH INCREMENTAL 1 MINUTE

## 2025-03-27 PROCEDURE — A43239 PR EDG TRANSORAL BIOPSY SINGLE/MULTIPLE: Performed by: ANESTHESIOLOGY

## 2025-03-27 PROCEDURE — 3700000001 HC GENERAL ANESTHESIA TIME - INITIAL BASE CHARGE

## 2025-03-27 PROCEDURE — 82947 ASSAY GLUCOSE BLOOD QUANT: CPT

## 2025-03-27 PROCEDURE — 43239 EGD BIOPSY SINGLE/MULTIPLE: CPT | Performed by: INTERNAL MEDICINE

## 2025-03-27 PROCEDURE — 2500000004 HC RX 250 GENERAL PHARMACY W/ HCPCS (ALT 636 FOR OP/ED): Performed by: ANESTHESIOLOGIST ASSISTANT

## 2025-03-27 PROCEDURE — 99100 ANES PT EXTEME AGE<1 YR&>70: CPT | Performed by: ANESTHESIOLOGY

## 2025-03-27 PROCEDURE — 2720000007 HC OR 272 NO HCPCS

## 2025-03-27 PROCEDURE — A43239 PR EDG TRANSORAL BIOPSY SINGLE/MULTIPLE: Performed by: ANESTHESIOLOGIST ASSISTANT

## 2025-03-27 PROCEDURE — 43237 ENDOSCOPIC US EXAM ESOPH: CPT | Performed by: INTERNAL MEDICINE

## 2025-03-27 PROCEDURE — 7100000009 HC PHASE TWO TIME - INITIAL BASE CHARGE

## 2025-03-27 RX ORDER — PHENYLEPHRINE HYDROCHLORIDE 10 MG/ML
INJECTION INTRAVENOUS AS NEEDED
Status: DISCONTINUED | OUTPATIENT
Start: 2025-03-27 | End: 2025-03-27

## 2025-03-27 RX ORDER — SODIUM CHLORIDE, SODIUM LACTATE, POTASSIUM CHLORIDE, CALCIUM CHLORIDE 600; 310; 30; 20 MG/100ML; MG/100ML; MG/100ML; MG/100ML
100 INJECTION, SOLUTION INTRAVENOUS CONTINUOUS
Status: DISCONTINUED | OUTPATIENT
Start: 2025-03-27 | End: 2025-03-28 | Stop reason: HOSPADM

## 2025-03-27 RX ORDER — PROPOFOL 10 MG/ML
INJECTION, EMULSION INTRAVENOUS AS NEEDED
Status: DISCONTINUED | OUTPATIENT
Start: 2025-03-27 | End: 2025-03-27

## 2025-03-27 RX ORDER — LIDOCAINE HCL/PF 100 MG/5ML
SYRINGE (ML) INTRAVENOUS AS NEEDED
Status: DISCONTINUED | OUTPATIENT
Start: 2025-03-27 | End: 2025-03-27

## 2025-03-27 RX ORDER — ONDANSETRON HYDROCHLORIDE 2 MG/ML
4 INJECTION, SOLUTION INTRAVENOUS ONCE AS NEEDED
Status: DISCONTINUED | OUTPATIENT
Start: 2025-03-27 | End: 2025-03-28 | Stop reason: HOSPADM

## 2025-03-27 RX ORDER — LIDOCAINE IN NACL,ISO-OSMOT/PF 30 MG/3 ML
0.1 SYRINGE (ML) INJECTION ONCE
Status: DISCONTINUED | OUTPATIENT
Start: 2025-03-27 | End: 2025-03-28 | Stop reason: HOSPADM

## 2025-03-27 RX ORDER — ACETAMINOPHEN 325 MG/1
650 TABLET ORAL EVERY 4 HOURS PRN
Status: DISCONTINUED | OUTPATIENT
Start: 2025-03-27 | End: 2025-03-28 | Stop reason: HOSPADM

## 2025-03-27 RX ADMIN — PROPOFOL 50 MG: 10 INJECTION, EMULSION INTRAVENOUS at 09:35

## 2025-03-27 RX ADMIN — SODIUM CHLORIDE, SODIUM LACTATE, POTASSIUM CHLORIDE, AND CALCIUM CHLORIDE: 600; 310; 30; 20 INJECTION, SOLUTION INTRAVENOUS at 09:23

## 2025-03-27 RX ADMIN — LIDOCAINE HYDROCHLORIDE 100 MG: 20 INJECTION, SOLUTION INTRAVENOUS at 09:35

## 2025-03-27 RX ADMIN — SODIUM CHLORIDE, SODIUM LACTATE, POTASSIUM CHLORIDE, AND CALCIUM CHLORIDE: 600; 310; 30; 20 INJECTION, SOLUTION INTRAVENOUS at 10:26

## 2025-03-27 RX ADMIN — PROPOFOL 200 MCG/KG/MIN: 10 INJECTION, EMULSION INTRAVENOUS at 09:36

## 2025-03-27 RX ADMIN — PROPOFOL 50 MG: 10 INJECTION, EMULSION INTRAVENOUS at 09:37

## 2025-03-27 RX ADMIN — PHENYLEPHRINE HYDROCHLORIDE 120 MCG: 10 INJECTION INTRAVENOUS at 10:11

## 2025-03-27 RX ADMIN — SODIUM CHLORIDE, SODIUM LACTATE, POTASSIUM CHLORIDE, AND CALCIUM CHLORIDE: 600; 310; 30; 20 INJECTION, SOLUTION INTRAVENOUS at 10:27

## 2025-03-27 ASSESSMENT — PAIN - FUNCTIONAL ASSESSMENT
PAIN_FUNCTIONAL_ASSESSMENT: 0-10

## 2025-03-27 ASSESSMENT — COLUMBIA-SUICIDE SEVERITY RATING SCALE - C-SSRS
2. HAVE YOU ACTUALLY HAD ANY THOUGHTS OF KILLING YOURSELF?: NO
1. IN THE PAST MONTH, HAVE YOU WISHED YOU WERE DEAD OR WISHED YOU COULD GO TO SLEEP AND NOT WAKE UP?: NO
6. HAVE YOU EVER DONE ANYTHING, STARTED TO DO ANYTHING, OR PREPARED TO DO ANYTHING TO END YOUR LIFE?: NO

## 2025-03-27 ASSESSMENT — PAIN SCALES - GENERAL
PAINLEVEL_OUTOF10: 0 - NO PAIN
PAINLEVEL_OUTOF10: 5 - MODERATE PAIN
PAINLEVEL_OUTOF10: 0 - NO PAIN
PAINLEVEL_OUTOF10: 0 - NO PAIN

## 2025-03-27 NOTE — ANESTHESIA POSTPROCEDURE EVALUATION
Patient: Raiza Nguyễn    Procedure Summary       Date: 03/27/25 Room / Location: [facility]    Anesthesia Start: 0923 Anesthesia Stop: 1026    Procedure: ENDOSCOPIC ULTRASOUND (UPPER) Diagnosis:       BRCA2 positive      Smith's esophagus without dysplasia    Scheduled Providers: Rikki Montez MD Responsible Provider: Yamel Hunter MD    Anesthesia Type: MAC ASA Status: 3            Anesthesia Type: MAC    Vitals Value Taken Time   /67 03/27/25 1049   Temp 35.7 °C (96.3 °F) 03/27/25 1019   Pulse 77 03/27/25 1049   Resp 14 03/27/25 1049   SpO2 98 % 03/27/25 1049       Anesthesia Post Evaluation    Patient location during evaluation: PACU  Patient participation: complete - patient participated  Level of consciousness: awake  Pain management: adequate  Airway patency: patent  Cardiovascular status: acceptable  Respiratory status: acceptable  Hydration status: acceptable  Postoperative Nausea and Vomiting: none        There were no known notable events for this encounter.

## 2025-03-27 NOTE — ANESTHESIA PREPROCEDURE EVALUATION
Patient: Raiza Nguyễn    Procedure Information       Date/Time: 03/27/25 0938    Scheduled providers: Rikki Montez MD    Procedure: ENDOSCOPIC ULTRASOUND (UPPER)    Location: [facility] (formerly [facility]3]            Relevant Problems   Anesthesia (within normal limits)  O family hx MH      Cardiac   (+) Abnormal EKG   (+) Arteriosclerotic coronary artery disease   (+) CHF (congestive heart failure)   (+) Essential hypertension   (+) Hyperlipidemia      Pulmonary   (+) BOLIVAR (obstructive sleep apnea)      Neuro   (+) Bilateral carpal tunnel syndrome   (+) Brachial neuritis   (+) Lumbosacral plexus lesion   (+) Mixed anxiety depressive disorder      GI   (+) Gastroesophageal reflux disease      /Renal (within normal limits)      Liver   (+) Cholelithiasis      Endocrine   (+) Class 1 obesity with serious comorbidity and body mass index (BMI) of 34.0 to 34.9 in adult   (+) Diabetes mellitus, type 2 (Multi)   (+) Hypothyroidism      Hematology   (+) Acute deep vein thrombosis (DVT) of non-extremity vein      Musculoskeletal   (+) Bilateral carpal tunnel syndrome   (+) Cervical spinal stenosis   (+) DDD (degenerative disc disease), lumbosacral   (+) Intervertebral disc disorder of lumbar region with myelopathy   (+) Lumbar stenosis   (+) Osteoarthritis of shoulder   (+) Spinal stenosis of lumbar region with neurogenic claudication      HEENT (within normal limits)      ID   (+) Acute viral conjunctivitis of left eye      Skin (within normal limits)      GYN   (+) Bilateral breast cancer (Multi)   (+) Malignant neoplasm of central portion of left breast in female, estrogen receptor negative     CONCLUSIONS:   1. The left ventricular systolic function is normal, with a visually estimated ejection fraction of 60-65%.   2. Spectral Doppler shows a Grade I (impaired relaxation pattern) of left ventricular diastolic filling with normal left atrial filling pressure.   3. There is normal right ventricular global systolic  function.   4. Left Ventricular Global Longitudinal Strain - 20.1 %.  Clinical information reviewed:                   NPO Detail:  No data recorded     Physical Exam    Airway  Mallampati: III  TM distance: >3 FB  Neck ROM: full     Cardiovascular - normal exam     Dental   Comments: intact   Pulmonary - normal exam     Abdominal          Vitals:    03/27/25 0900   BP: 142/76   Pulse: 88   Resp: 18   Temp: 36.1 °C (97 °F)   SpO2: 96%       Past Surgical History:   Procedure Laterality Date    APPENDECTOMY      BREAST BIOPSY  2010    BREAST LUMPECTOMY  07/17/2013    Breast Surgery Lumpectomy    CHOLECYSTECTOMY  10/03/2017    Cholecystectomy Laparoscopic    COLONOSCOPY      JOINT REPLACEMENT  2021    LYMPH NODE BIOPSY  2004    MASTECTOMY COMPLETE / SIMPLE Bilateral 06/12/2024    Procedure: BILATERAL SIMPLE MASTECTOMY;  Surgeon: Jeniffer Langley DO;  Location: [facility] OR;  Service: General Surgery Oncology;  Laterality: Bilateral;    OOPHORECTOMY  2012    OTHER SURGICAL HISTORY  05/14/2021    Knee replacement    OVARY SURGERY  07/17/2013    Ovarian Surgery    SKIN CANCER EXCISION      SMALL INTESTINE SURGERY       Past Medical History:   Diagnosis Date    Amnesia 02/26/2024    Anemia 06/27/2024    Anxiety     Arthritis     Smith esophagus 2015    Breast cancer (Multi) 2002    Cancer (Multi)     Chest wall abscess 08/12/2024    Chronic pain disorder     Colon polyp 2016    Coronary artery disease     Depression     Diabetes mellitus (Multi)     Disease of thyroid gland     Diverticulitis of colon 2022    Diverticulosis 2019    GERD (gastroesophageal reflux disease)     Heart disease     Heart failure     History of total knee replacement 02/26/2024    History of total left knee replacement 01/18/2023    Hyperlipidemia     Hypersomnia, unspecified 11/30/2021    Hypersomnolence disorder, persistent    Hypertension     Hypothyroid     Lumbar disc herniation 01/18/2023    History of    Melanoma (Multi) 2016    Obesity 1959     Osteoporosis     Other conditions influencing health status     Breast Cancer    Other intervertebral disc degeneration, lumbar region 06/27/2014    Disc degeneration, lumbar    Other intervertebral disc displacement, lumbar region 06/27/2014    Lumbar disc herniation    Personal history of irradiation 2004    Personal history of malignant melanoma of skin 10/16/2019    History of malignant melanoma of skin    Personal history of other diseases of the digestive system     History of esophageal reflux    Personal history of other endocrine, nutritional and metabolic disease 02/20/2022    History of vitamin D deficiency    Personal history of other endocrine, nutritional and metabolic disease     History of hyperglycemia    PONV (postoperative nausea and vomiting) 2002    They give me a shot to prevent vomiting    Sleep apnea     [place] [facility] [place] [facility] [place]    Thyrotoxicosis, unspecified without thyrotoxic crisis or storm     Hyperthyroidism    Type 2 diabetes mellitus        Current Outpatient Medications:     calcium carbonate 600 mg calcium (1,500 mg) tablet, Take 600 mg by mouth once daily., Disp: , Rfl:     DULoxetine (Cymbalta) 30 mg DR capsule, Take 1 capsule (30 mg) by mouth 2 times a day., Disp: , Rfl:     gabapentin (Neurontin) 300 mg capsule, Take 3 capsules (900 mg) by mouth 3 times a day., Disp: , Rfl:     hydroCHLOROthiazide (HYDRODiuril) 25 mg tablet, 1 tab po 3 x weekly as needed for swelling, Disp: , Rfl:     levothyroxine (Synthroid, Levoxyl) 125 mcg tablet, Take 1 tablet (125 mcg) by mouth early in the morning.., Disp: 90 tablet, Rfl: 3    lisinopril 5 mg tablet, Take 1 tablet (5 mg) by mouth 2 times a day., Disp: 180 tablet, Rfl: 1    metFORMIN  mg 24 hr tablet, Take 1 tablet (500 mg) by mouth once daily., Disp: 90 tablet, Rfl: 1    rosuvastatin (Crestor) 5 mg tablet, TAKE 1 TABLET ONE TIME DAILY, Disp: 90 tablet, Rfl: 3    traMADol (Ultram) 50 mg tablet, Take 2 tablets (100  mg) by mouth once daily at bedtime., Disp: , Rfl:     blood sugar diagnostic strip, 90 strips once daily., Disp: 100 strip, Rfl: 3    tirzepatide (Mounjaro) 5 mg/0.5 mL pen injector, Inject 5 mg under the skin 1 (one) time per week., Disp: 2 mL, Rfl: 3    triamcinolone (Kenalog) 0.1 % ointment, Apply twice daily to affected areas of fingers (avoid face, groin, body folds) for 2 weeks, then taper to twice daily on weekends only; repeat every 6-8 weeks as needed for flares, Disp: 80 g, Rfl: 0  Prior to Admission medications    Medication Sig Start Date End Date Taking? Authorizing Provider   calcium carbonate 600 mg calcium (1,500 mg) tablet Take 600 mg by mouth once daily.   Yes Historical Provider, MD   DULoxetine (Cymbalta) 30 mg DR capsule Take 1 capsule (30 mg) by mouth 2 times a day. 4/11/24  Yes Shoshana Olivarez, DO   gabapentin (Neurontin) 300 mg capsule Take 3 capsules (900 mg) by mouth 3 times a day. 7/23/24  Yes Shoshana Olivarez DO   hydroCHLOROthiazide (HYDRODiuril) 25 mg tablet 1 tab po 3 x weekly as needed for swelling 1/22/25  Yes Shoshana Olivarez DO   levothyroxine (Synthroid, Levoxyl) 125 mcg tablet Take 1 tablet (125 mcg) by mouth early in the morning.. 9/9/24  Yes Shoshana Olivarez DO   lisinopril 5 mg tablet Take 1 tablet (5 mg) by mouth 2 times a day. 3/10/25 4/14/26 Yes Shoshana Olivarez DO   metFORMIN  mg 24 hr tablet Take 1 tablet (500 mg) by mouth once daily. 9/11/24  Yes Shoshana Olivarez DO   rosuvastatin (Crestor) 5 mg tablet TAKE 1 TABLET ONE TIME DAILY 4/7/24  Yes Shoshana Olivarez DO   traMADol (Ultram) 50 mg tablet Take 2 tablets (100 mg) by mouth once daily at bedtime.   Yes Historical Provider, MD   blood sugar diagnostic strip 90 strips once daily. 6/25/24   Shoshnaa Olivarez DO   tirzepatide (Mounjaro) 5 mg/0.5 mL pen injector Inject 5 mg under the skin 1 (one) time per week. 12/9/24   Shoshana Olivarez DO   triamcinolone (Kenalog) 0.1 %  ointment Apply twice daily to affected areas of fingers (avoid face, groin, body folds) for 2 weeks, then taper to twice daily on weekends only; repeat every 6-8 weeks as needed for flares 24   Rufino Matson MD     Allergies   Allergen Reactions    Erythromycin GI bleeding    Tetracycline GI bleeding    Cephalexin Unknown    Glucosamine Unknown    Adhesive Itching and Rash     Social History     Tobacco Use    Smoking status: Former     Current packs/day: 0.00     Average packs/day: 1.7 packs/day for 47.5 years (80.0 ttl pk-yrs)     Types: Cigarettes     Start date: 1968     Quit date: 1983     Years since quittin.2     Passive exposure: Past    Smokeless tobacco: Never   Substance Use Topics    Alcohol use: Not Currently     Alcohol/week: 1.0 standard drink of alcohol     Types: 1 Glasses of wine per week         Chemistry    Lab Results   Component Value Date/Time     2024 1332    K 4.2 2024 1332     2024 1332    CO2 29 2024 1332    BUN 16 2024 1332    CREATININE 0.68 2024 1332    Lab Results   Component Value Date/Time    CALCIUM 9.3 2024 1332    ALKPHOS 71 2024 1332    AST 23 2024 1332    ALT 20 2024 1332    BILITOT 0.4 2024 1332          Lab Results   Component Value Date/Time    WBC 6.4 2024 1332    HGB 12.5 2024 1332    HCT 39.3 2024 1332     2024 1332     Lab Results   Component Value Date/Time    PROTIME 15.0 (H) 11/15/2024 0611    INR 1.3 (H) 11/15/2024 0611     No results found. However, due to the size of the patient record, not all encounters were searched. Please check Results Review for a complete set of results.  No results found for this or any previous visit from the past 1095 days.     Anesthesia Plan    History of general anesthesia?: yes  History of complications of general anesthesia?: no    ASA 3     MAC     intravenous induction   Anesthetic plan and risks discussed  with patient.  Use of blood products discussed with patient who consented to blood products.    Plan discussed with [facility]AA.

## 2025-03-27 NOTE — H&P
Subjective     History of Present Illness:   Raiza Nguyễn is a 74 y.o. female who presents to endoscopy    Physical Exam  General: not in acute distress  CV: regular rate and rhythm  Resp: non-labored breathing

## 2025-03-27 NOTE — H&P
History Of Present Illness  Raiza Nguyễn is a 74 y.o. female presenting with PMH of BRCA2 mutation and Smith's esophagus, who presents for EUS for screening for pancreatic cancer and EGD for Smith's surveillance.     Past Medical History  Past Medical History:   Diagnosis Date    Amnesia 02/26/2024    Anemia 06/27/2024    Anxiety     Arthritis     Smith esophagus 2015    Breast cancer (Multi) 2002    Cancer (Multi)     Chest wall abscess 08/12/2024    Chronic pain disorder     Colon polyp 2016    Coronary artery disease     Depression     Diabetes mellitus (Multi)     Disease of thyroid gland     Diverticulitis of colon 2022    Diverticulosis 2019    GERD (gastroesophageal reflux disease)     Heart disease     Heart failure     History of total knee replacement 02/26/2024    History of total left knee replacement 01/18/2023    Hyperlipidemia     Hypersomnia, unspecified 11/30/2021    Hypersomnolence disorder, persistent    Hypertension     Hypothyroid     Lumbar disc herniation 01/18/2023    History of    Melanoma (Multi) 2016    Obesity 1959    Osteoporosis     Other conditions influencing health status     Breast Cancer    Other intervertebral disc degeneration, lumbar region 06/27/2014    Disc degeneration, lumbar    Other intervertebral disc displacement, lumbar region 06/27/2014    Lumbar disc herniation    Personal history of irradiation 2004    Personal history of malignant melanoma of skin 10/16/2019    History of malignant melanoma of skin    Personal history of other diseases of the digestive system     History of esophageal reflux    Personal history of other endocrine, nutritional and metabolic disease 02/20/2022    History of vitamin D deficiency    Personal history of other endocrine, nutritional and metabolic disease     History of hyperglycemia    PONV (postoperative nausea and vomiting) 2002    They give me a shot to prevent vomiting    Sleep apnea     Thrombosis 2004    [facility]    Thyrotoxicosis, unspecified without thyrotoxic crisis or storm     Hyperthyroidism    Type 2 diabetes mellitus      Surgical History  Past Surgical History:   Procedure Laterality Date    APPENDECTOMY      BREAST BIOPSY  2010    BREAST LUMPECTOMY  07/17/2013    Breast Surgery Lumpectomy    CHOLECYSTECTOMY  10/03/2017    Cholecystectomy Laparoscopic    COLONOSCOPY      JOINT REPLACEMENT  2021    LYMPH NODE BIOPSY  2004    MASTECTOMY COMPLETE / SIMPLE Bilateral 06/12/2024    Procedure: BILATERAL SIMPLE MASTECTOMY;  Surgeon: Jeniffer Langley DO;  [facility]: [facility] OR;  Service: General Surgery Oncology;  Laterality: Bilateral;    OOPHORECTOMY  2012    OTHER SURGICAL HISTORY  05/14/2021    Knee replacement    OVARY SURGERY  07/17/2013    Ovarian Surgery    SKIN CANCER EXCISION      SMALL INTESTINE SURGERY       Social History  She reports that she quit smoking about 42 years ago. Her smoking use included cigarettes. She started smoking about 57 years ago. She has a 80 pack-year smoking history. She has been exposed to tobacco smoke. She has never used smokeless tobacco. She reports that she does not currently use alcohol after a past usage of about 1.0 standard drink of alcohol per week. She reports that she does not use drugs.    Family History  Family History   Problem Relation Name Age of Onset    Breast cancer Mother Dorothea     Alcohol abuse Mother Dorothea     Cancer Mother Dorothea     Cataracts Mother Dorothea     Lung cancer Father Yuriy     Cancer Father Yuriy     Thyroid disease Father Yuriy     Cancer Sister Korin     Cancer Mother's Brother Kyrie Valadez     Colon cancer Mother's Brother Igor Rory     Ovarian cysts Maternal Grandmother Maddy     Diabetes Maternal Grandmother Maddy     Stomach cancer Maternal Cousin      Colon cancer Other Materal Uncle     Stomach cancer Other Materal Uncle         Allergies  Allergies   Allergen Reactions    Erythromycin GI bleeding    Tetracycline GI bleeding     Cephalexin Unknown    Glucosamine Unknown    Adhesive Itching and Rash     Review of Systems     Physical Exam  Constitutional:       Appearance: Normal appearance.   HENT:      Head: Normocephalic and atraumatic.      Mouth/Throat:      Mouth: Mucous membranes are moist.   Eyes:      Extraocular Movements: Extraocular movements intact.      Conjunctiva/sclera: Conjunctivae normal.   Pulmonary:      Effort: Pulmonary effort is normal. No respiratory distress.   Abdominal:      General: There is no distension.      Palpations: Abdomen is soft.   Skin:     General: Skin is warm and dry.   Neurological:      General: No focal deficit present.      Mental Status: She is alert and oriented to person, place, and time.          Last Recorded Vitals  Blood pressure 142/76, pulse 88, temperature 36.1 °C (97 °F), temperature source Temporal, resp. rate 18, SpO2 96%.    Assessment/Plan   Raiza Nguyễn is a 74 y.o. female presenting with PMH of BRCA2 mutation and Smith's esophagus, who presents for EUS for screening for pancreatic cancer and EGD for Smith's surveillance.     Jair Spencer MD

## 2025-04-01 ENCOUNTER — PATIENT MESSAGE (OUTPATIENT)
Dept: HEMATOLOGY/ONCOLOGY | Facility: CLINIC | Age: 75
End: 2025-04-01
Payer: MEDICARE

## 2025-04-01 DIAGNOSIS — Z17.1 MALIGNANT NEOPLASM OF CENTRAL PORTION OF LEFT BREAST IN FEMALE, ESTROGEN RECEPTOR NEGATIVE: ICD-10-CM

## 2025-04-01 DIAGNOSIS — C50.112 MALIGNANT NEOPLASM OF CENTRAL PORTION OF LEFT BREAST IN FEMALE, ESTROGEN RECEPTOR NEGATIVE: ICD-10-CM

## 2025-04-01 RX ORDER — EXEMESTANE 25 MG/1
25 TABLET ORAL DAILY
Qty: 90 TABLET | Refills: 1 | Status: SHIPPED | OUTPATIENT
Start: 2025-04-01

## 2025-04-01 NOTE — TELEPHONE ENCOUNTER
Endocrine therapy: On exemestane for her ER/NE positive breast cancer diagnosed in 2018     Examestane refill forwarded to provider to send.

## 2025-04-02 ENCOUNTER — APPOINTMENT (OUTPATIENT)
Dept: CARDIOLOGY | Facility: CLINIC | Age: 75
End: 2025-04-02
Payer: MEDICARE

## 2025-04-02 ENCOUNTER — HOSPITAL ENCOUNTER (OUTPATIENT)
Dept: CARDIOLOGY | Facility: CLINIC | Age: 75
Discharge: HOME | End: 2025-04-02
Payer: MEDICARE

## 2025-04-02 DIAGNOSIS — Z79.899 ENCOUNTER FOR MONITORING CARDIOTOXIC DRUG THERAPY: ICD-10-CM

## 2025-04-02 DIAGNOSIS — Z51.81 ENCOUNTER FOR MONITORING CARDIOTOXIC DRUG THERAPY: ICD-10-CM

## 2025-04-02 LAB
EJECTION FRACTION APICAL 4 CHAMBER: 69
EJECTION FRACTION: 65 %
LEFT ATRIUM VOLUME AREA LENGTH INDEX BSA: 24.7 ML/M2
LEFT VENTRICLE INTERNAL DIMENSION DIASTOLE: 3.72 CM (ref 3.5–6)
LEFT VENTRICULAR OUTFLOW TRACT DIAMETER: 1.94 CM
MITRAL VALVE E/A RATIO: 0.55
RIGHT VENTRICLE FREE WALL PEAK S': 8.53 CM/S
TRICUSPID ANNULAR PLANE SYSTOLIC EXCURSION: 1.7 CM

## 2025-04-02 PROCEDURE — 93308 TTE F-UP OR LMTD: CPT | Performed by: INTERNAL MEDICINE

## 2025-04-02 PROCEDURE — 93321 DOPPLER ECHO F-UP/LMTD STD: CPT | Performed by: INTERNAL MEDICINE

## 2025-04-02 PROCEDURE — 93308 TTE F-UP OR LMTD: CPT

## 2025-04-02 PROCEDURE — 93325 DOPPLER ECHO COLOR FLOW MAPG: CPT | Performed by: INTERNAL MEDICINE

## 2025-04-02 PROCEDURE — 93356 MYOCRD STRAIN IMG SPCKL TRCK: CPT | Performed by: INTERNAL MEDICINE

## 2025-04-02 PROCEDURE — 76376 3D RENDER W/INTRP POSTPROCES: CPT | Performed by: INTERNAL MEDICINE

## 2025-04-03 ENCOUNTER — TELEPHONE (OUTPATIENT)
Dept: CARDIOLOGY | Facility: CLINIC | Age: 75
End: 2025-04-03
Payer: MEDICARE

## 2025-04-03 LAB — CANCER AG19-9 SERPL-ACNC: 11 U/ML

## 2025-04-03 NOTE — TELEPHONE ENCOUNTER
----- Message from Nurse Marcia DIAZ sent at 4/3/2025 11:27 AM EDT -----  Thank you.  Will have her scheduled for follow up.  ----- Message -----  From: Sarah E Markley, LPN  Sent: 4/3/2025  10:03 AM EDT  To: [address] Clinical Support Staff    Please see enclosed echo results. Thank you.  ----- Message -----  From: Rui Albrecht MD  Sent: 4/2/2025   8:02 PM EDT  To: [id number] [email or web address] Clinical Support Staff    Please let the patient know that I have reviewed this test and the result was normal. The patient should keep current medication and normal activities at this point.  ----- Message -----  From: Kelin Syngo - Cardiology Results In  Sent: 4/2/2025   7:44 PM EDT  To: Rui Albrecht MD

## 2025-04-07 ENCOUNTER — INFUSION (OUTPATIENT)
Dept: HEMATOLOGY/ONCOLOGY | Facility: CLINIC | Age: 75
End: 2025-04-07
Payer: MEDICARE

## 2025-04-07 ENCOUNTER — LAB (OUTPATIENT)
Dept: LAB | Facility: CLINIC | Age: 75
End: 2025-04-07
Payer: MEDICARE

## 2025-04-07 ENCOUNTER — OFFICE VISIT (OUTPATIENT)
Dept: HEMATOLOGY/ONCOLOGY | Facility: CLINIC | Age: 75
End: 2025-04-07
Payer: MEDICARE

## 2025-04-07 ENCOUNTER — APPOINTMENT (OUTPATIENT)
Dept: LAB | Facility: CLINIC | Age: 75
End: 2025-04-07
Payer: MEDICARE

## 2025-04-07 VITALS
OXYGEN SATURATION: 98 % | TEMPERATURE: 96.3 F | BODY MASS INDEX: 34.6 KG/M2 | DIASTOLIC BLOOD PRESSURE: 83 MMHG | SYSTOLIC BLOOD PRESSURE: 149 MMHG | WEIGHT: 171.3 LBS | HEART RATE: 73 BPM | RESPIRATION RATE: 16 BRPM

## 2025-04-07 DIAGNOSIS — Z17.1 MALIGNANT NEOPLASM OF CENTRAL PORTION OF LEFT BREAST IN FEMALE, ESTROGEN RECEPTOR NEGATIVE: ICD-10-CM

## 2025-04-07 DIAGNOSIS — R30.0 DYSURIA: ICD-10-CM

## 2025-04-07 DIAGNOSIS — Z17.1 MALIGNANT NEOPLASM OF CENTRAL PORTION OF LEFT BREAST IN FEMALE, ESTROGEN RECEPTOR NEGATIVE: Primary | ICD-10-CM

## 2025-04-07 DIAGNOSIS — Z09 ENCOUNTER FOR FOLLOW-UP EXAMINATION AFTER COMPLETED TREATMENT FOR CONDITIONS OTHER THAN MALIGNANT NEOPLASM: ICD-10-CM

## 2025-04-07 DIAGNOSIS — C50.112 MALIGNANT NEOPLASM OF CENTRAL PORTION OF LEFT BREAST IN FEMALE, ESTROGEN RECEPTOR NEGATIVE: Primary | ICD-10-CM

## 2025-04-07 DIAGNOSIS — C50.112 MALIGNANT NEOPLASM OF CENTRAL PORTION OF LEFT BREAST IN FEMALE, ESTROGEN RECEPTOR NEGATIVE: ICD-10-CM

## 2025-04-07 PROCEDURE — 99215 OFFICE O/P EST HI 40 MIN: CPT | Mod: 25 | Performed by: STUDENT IN AN ORGANIZED HEALTH CARE EDUCATION/TRAINING PROGRAM

## 2025-04-07 PROCEDURE — 1123F ACP DISCUSS/DSCN MKR DOCD: CPT | Performed by: STUDENT IN AN ORGANIZED HEALTH CARE EDUCATION/TRAINING PROGRAM

## 2025-04-07 PROCEDURE — 1157F ADVNC CARE PLAN IN RCRD: CPT | Performed by: STUDENT IN AN ORGANIZED HEALTH CARE EDUCATION/TRAINING PROGRAM

## 2025-04-07 PROCEDURE — 2500000004 HC RX 250 GENERAL PHARMACY W/ HCPCS (ALT 636 FOR OP/ED): Mod: JZ,TB | Performed by: STUDENT IN AN ORGANIZED HEALTH CARE EDUCATION/TRAINING PROGRAM

## 2025-04-07 PROCEDURE — 4010F ACE/ARB THERAPY RXD/TAKEN: CPT | Performed by: STUDENT IN AN ORGANIZED HEALTH CARE EDUCATION/TRAINING PROGRAM

## 2025-04-07 PROCEDURE — 3077F SYST BP >= 140 MM HG: CPT | Performed by: STUDENT IN AN ORGANIZED HEALTH CARE EDUCATION/TRAINING PROGRAM

## 2025-04-07 PROCEDURE — 1159F MED LIST DOCD IN RCRD: CPT | Performed by: STUDENT IN AN ORGANIZED HEALTH CARE EDUCATION/TRAINING PROGRAM

## 2025-04-07 PROCEDURE — 96401 CHEMO ANTI-NEOPL SQ/IM: CPT

## 2025-04-07 PROCEDURE — 3079F DIAST BP 80-89 MM HG: CPT | Performed by: STUDENT IN AN ORGANIZED HEALTH CARE EDUCATION/TRAINING PROGRAM

## 2025-04-07 PROCEDURE — 81003 URINALYSIS AUTO W/O SCOPE: CPT

## 2025-04-07 PROCEDURE — 1125F AMNT PAIN NOTED PAIN PRSNT: CPT | Performed by: STUDENT IN AN ORGANIZED HEALTH CARE EDUCATION/TRAINING PROGRAM

## 2025-04-07 PROCEDURE — 99215 OFFICE O/P EST HI 40 MIN: CPT | Performed by: STUDENT IN AN ORGANIZED HEALTH CARE EDUCATION/TRAINING PROGRAM

## 2025-04-07 RX ORDER — PROCHLORPERAZINE EDISYLATE 5 MG/ML
10 INJECTION INTRAMUSCULAR; INTRAVENOUS EVERY 6 HOURS PRN
OUTPATIENT
Start: 2025-06-30

## 2025-04-07 RX ORDER — ALBUTEROL SULFATE 0.83 MG/ML
3 SOLUTION RESPIRATORY (INHALATION) AS NEEDED
OUTPATIENT
Start: 2025-06-09

## 2025-04-07 RX ORDER — PROCHLORPERAZINE EDISYLATE 5 MG/ML
10 INJECTION INTRAMUSCULAR; INTRAVENOUS EVERY 6 HOURS PRN
Status: DISCONTINUED | OUTPATIENT
Start: 2025-04-07 | End: 2025-04-07 | Stop reason: HOSPADM

## 2025-04-07 RX ORDER — ALBUTEROL SULFATE 0.83 MG/ML
3 SOLUTION RESPIRATORY (INHALATION) AS NEEDED
Status: DISCONTINUED | OUTPATIENT
Start: 2025-04-07 | End: 2025-04-07 | Stop reason: HOSPADM

## 2025-04-07 RX ORDER — DIPHENHYDRAMINE HYDROCHLORIDE 50 MG/ML
50 INJECTION, SOLUTION INTRAMUSCULAR; INTRAVENOUS AS NEEDED
OUTPATIENT
Start: 2025-05-19

## 2025-04-07 RX ORDER — FAMOTIDINE 10 MG/ML
20 INJECTION, SOLUTION INTRAVENOUS ONCE AS NEEDED
OUTPATIENT
Start: 2025-09-01

## 2025-04-07 RX ORDER — DIPHENHYDRAMINE HYDROCHLORIDE 50 MG/ML
50 INJECTION, SOLUTION INTRAMUSCULAR; INTRAVENOUS AS NEEDED
OUTPATIENT
Start: 2025-06-09

## 2025-04-07 RX ORDER — EPINEPHRINE 0.3 MG/.3ML
0.3 INJECTION SUBCUTANEOUS EVERY 5 MIN PRN
OUTPATIENT
Start: 2025-06-30

## 2025-04-07 RX ORDER — EPINEPHRINE 0.3 MG/.3ML
0.3 INJECTION SUBCUTANEOUS EVERY 5 MIN PRN
OUTPATIENT
Start: 2025-05-19

## 2025-04-07 RX ORDER — PROCHLORPERAZINE MALEATE 10 MG
10 TABLET ORAL EVERY 6 HOURS PRN
OUTPATIENT
Start: 2025-06-30

## 2025-04-07 RX ORDER — FAMOTIDINE 10 MG/ML
20 INJECTION, SOLUTION INTRAVENOUS ONCE AS NEEDED
OUTPATIENT
Start: 2025-06-09

## 2025-04-07 RX ORDER — FAMOTIDINE 10 MG/ML
20 INJECTION, SOLUTION INTRAVENOUS ONCE AS NEEDED
Status: DISCONTINUED | OUTPATIENT
Start: 2025-04-07 | End: 2025-04-07 | Stop reason: HOSPADM

## 2025-04-07 RX ORDER — DIPHENHYDRAMINE HYDROCHLORIDE 50 MG/ML
50 INJECTION, SOLUTION INTRAMUSCULAR; INTRAVENOUS AS NEEDED
OUTPATIENT
Start: 2025-08-11

## 2025-04-07 RX ORDER — PROCHLORPERAZINE EDISYLATE 5 MG/ML
10 INJECTION INTRAMUSCULAR; INTRAVENOUS EVERY 6 HOURS PRN
OUTPATIENT
Start: 2025-06-09

## 2025-04-07 RX ORDER — EPINEPHRINE 0.3 MG/.3ML
0.3 INJECTION SUBCUTANEOUS EVERY 5 MIN PRN
OUTPATIENT
Start: 2025-07-21

## 2025-04-07 RX ORDER — DIPHENHYDRAMINE HYDROCHLORIDE 50 MG/ML
50 INJECTION, SOLUTION INTRAMUSCULAR; INTRAVENOUS AS NEEDED
Status: DISCONTINUED | OUTPATIENT
Start: 2025-04-07 | End: 2025-04-07 | Stop reason: HOSPADM

## 2025-04-07 RX ORDER — DIPHENHYDRAMINE HYDROCHLORIDE 50 MG/ML
50 INJECTION, SOLUTION INTRAMUSCULAR; INTRAVENOUS AS NEEDED
OUTPATIENT
Start: 2025-09-01

## 2025-04-07 RX ORDER — DIPHENHYDRAMINE HYDROCHLORIDE 50 MG/ML
50 INJECTION, SOLUTION INTRAMUSCULAR; INTRAVENOUS AS NEEDED
OUTPATIENT
Start: 2025-06-30

## 2025-04-07 RX ORDER — DIPHENHYDRAMINE HYDROCHLORIDE 50 MG/ML
50 INJECTION, SOLUTION INTRAMUSCULAR; INTRAVENOUS AS NEEDED
OUTPATIENT
Start: 2025-04-28

## 2025-04-07 RX ORDER — PROCHLORPERAZINE EDISYLATE 5 MG/ML
10 INJECTION INTRAMUSCULAR; INTRAVENOUS EVERY 6 HOURS PRN
OUTPATIENT
Start: 2025-07-21

## 2025-04-07 RX ORDER — DIPHENHYDRAMINE HYDROCHLORIDE 50 MG/ML
50 INJECTION, SOLUTION INTRAMUSCULAR; INTRAVENOUS AS NEEDED
OUTPATIENT
Start: 2025-07-21

## 2025-04-07 RX ORDER — ALBUTEROL SULFATE 0.83 MG/ML
3 SOLUTION RESPIRATORY (INHALATION) AS NEEDED
OUTPATIENT
Start: 2025-09-01

## 2025-04-07 RX ORDER — PROCHLORPERAZINE MALEATE 10 MG
10 TABLET ORAL EVERY 6 HOURS PRN
OUTPATIENT
Start: 2025-05-19

## 2025-04-07 RX ORDER — PROCHLORPERAZINE EDISYLATE 5 MG/ML
10 INJECTION INTRAMUSCULAR; INTRAVENOUS EVERY 6 HOURS PRN
OUTPATIENT
Start: 2025-08-11

## 2025-04-07 RX ORDER — PROCHLORPERAZINE MALEATE 10 MG
10 TABLET ORAL EVERY 6 HOURS PRN
OUTPATIENT
Start: 2025-07-21

## 2025-04-07 RX ORDER — FAMOTIDINE 10 MG/ML
20 INJECTION, SOLUTION INTRAVENOUS ONCE AS NEEDED
OUTPATIENT
Start: 2025-04-28

## 2025-04-07 RX ORDER — ALBUTEROL SULFATE 0.83 MG/ML
3 SOLUTION RESPIRATORY (INHALATION) AS NEEDED
OUTPATIENT
Start: 2025-08-11

## 2025-04-07 RX ORDER — PROCHLORPERAZINE EDISYLATE 5 MG/ML
10 INJECTION INTRAMUSCULAR; INTRAVENOUS EVERY 6 HOURS PRN
OUTPATIENT
Start: 2025-09-01

## 2025-04-07 RX ORDER — FAMOTIDINE 10 MG/ML
20 INJECTION, SOLUTION INTRAVENOUS ONCE AS NEEDED
OUTPATIENT
Start: 2025-08-11

## 2025-04-07 RX ORDER — PROCHLORPERAZINE EDISYLATE 5 MG/ML
10 INJECTION INTRAMUSCULAR; INTRAVENOUS EVERY 6 HOURS PRN
OUTPATIENT
Start: 2025-04-28

## 2025-04-07 RX ORDER — FAMOTIDINE 10 MG/ML
20 INJECTION, SOLUTION INTRAVENOUS ONCE AS NEEDED
OUTPATIENT
Start: 2025-06-30

## 2025-04-07 RX ORDER — PROCHLORPERAZINE MALEATE 10 MG
10 TABLET ORAL EVERY 6 HOURS PRN
OUTPATIENT
Start: 2025-06-09

## 2025-04-07 RX ORDER — PROCHLORPERAZINE MALEATE 10 MG
10 TABLET ORAL EVERY 6 HOURS PRN
OUTPATIENT
Start: 2025-09-01

## 2025-04-07 RX ORDER — PROCHLORPERAZINE MALEATE 10 MG
10 TABLET ORAL EVERY 6 HOURS PRN
OUTPATIENT
Start: 2025-04-28

## 2025-04-07 RX ORDER — PROCHLORPERAZINE MALEATE 10 MG
10 TABLET ORAL EVERY 6 HOURS PRN
Status: DISCONTINUED | OUTPATIENT
Start: 2025-04-07 | End: 2025-04-07 | Stop reason: HOSPADM

## 2025-04-07 RX ORDER — PROCHLORPERAZINE MALEATE 10 MG
10 TABLET ORAL EVERY 6 HOURS PRN
OUTPATIENT
Start: 2025-08-11

## 2025-04-07 RX ORDER — ALBUTEROL SULFATE 0.83 MG/ML
3 SOLUTION RESPIRATORY (INHALATION) AS NEEDED
OUTPATIENT
Start: 2025-05-19

## 2025-04-07 RX ORDER — EPINEPHRINE 0.3 MG/.3ML
0.3 INJECTION SUBCUTANEOUS EVERY 5 MIN PRN
OUTPATIENT
Start: 2025-04-28

## 2025-04-07 RX ORDER — ALBUTEROL SULFATE 0.83 MG/ML
3 SOLUTION RESPIRATORY (INHALATION) AS NEEDED
OUTPATIENT
Start: 2025-07-21

## 2025-04-07 RX ORDER — EPINEPHRINE 0.3 MG/.3ML
0.3 INJECTION SUBCUTANEOUS EVERY 5 MIN PRN
OUTPATIENT
Start: 2025-08-11

## 2025-04-07 RX ORDER — EPINEPHRINE 0.3 MG/.3ML
0.3 INJECTION SUBCUTANEOUS EVERY 5 MIN PRN
OUTPATIENT
Start: 2025-09-01

## 2025-04-07 RX ORDER — EPINEPHRINE 0.3 MG/.3ML
0.3 INJECTION SUBCUTANEOUS EVERY 5 MIN PRN
Status: DISCONTINUED | OUTPATIENT
Start: 2025-04-07 | End: 2025-04-07 | Stop reason: HOSPADM

## 2025-04-07 RX ORDER — ALBUTEROL SULFATE 0.83 MG/ML
3 SOLUTION RESPIRATORY (INHALATION) AS NEEDED
OUTPATIENT
Start: 2025-04-28

## 2025-04-07 RX ORDER — PROCHLORPERAZINE EDISYLATE 5 MG/ML
10 INJECTION INTRAMUSCULAR; INTRAVENOUS EVERY 6 HOURS PRN
OUTPATIENT
Start: 2025-05-19

## 2025-04-07 RX ORDER — FAMOTIDINE 10 MG/ML
20 INJECTION, SOLUTION INTRAVENOUS ONCE AS NEEDED
OUTPATIENT
Start: 2025-07-21

## 2025-04-07 RX ORDER — EPINEPHRINE 0.3 MG/.3ML
0.3 INJECTION SUBCUTANEOUS EVERY 5 MIN PRN
OUTPATIENT
Start: 2025-06-09

## 2025-04-07 RX ORDER — FAMOTIDINE 10 MG/ML
20 INJECTION, SOLUTION INTRAVENOUS ONCE AS NEEDED
OUTPATIENT
Start: 2025-05-19

## 2025-04-07 RX ORDER — ALBUTEROL SULFATE 0.83 MG/ML
3 SOLUTION RESPIRATORY (INHALATION) AS NEEDED
OUTPATIENT
Start: 2025-06-30

## 2025-04-07 RX ADMIN — TRASTUZUMAB AND HYALURONIDASE-OYSK 600 MG: 600; 10000 INJECTION, SOLUTION SUBCUTANEOUS at 13:17

## 2025-04-07 ASSESSMENT — PAIN SCALES - GENERAL: PAINLEVEL_OUTOF10: 4

## 2025-04-07 NOTE — PROGRESS NOTES
She is  Breast Medical Oncology Clinic  Location: [facility]    Visit Type: Follow-up Visit    Oncologic History:    She was diagnosed in January 2003 with a left-sided 1.5 cm infiltrating ductal carcinoma with positive axillary lymph nodes and extranodal extension. It was a grade 3 tumor with hormone receptors positive and HER-2/anabell not defined.   Four cycles of Adriamycin and cyclophosphamide were followed by 4 cycles of paclitaxel in March 2004.   Left lumpectomy and axillary node dissection followed by left breast radiation is also completed in 2004.   She had bilateral oophorectomies in May 2008.   Anastrozole through March 2009.   She had a lesion in her left lower back removed, and confirmed to be a 0.35 mm melanoma. It was not ulcerative and she did not require a sentinel node evaluation. She has since had multiple excisions of atypical myelomonocytic lesions without disease  discovered.   She did well until screening mammogram 8/2018 confirmed finding on PET (done related melanoma dx) .  She had excision of 1.5cm IDC with 1/2 SN with microscopic involvement. She did not want to receive chemotherapy and the lesion was strongly ER and TX  positive, HER 2 negative.   Started on exemestane November 2018.   She is BRCA2 positive   4/18/24: MRI breast screening: An irregular rim enhancing mass with irregular margins measuring 0.8 x 0.8 x 0.9 cm is seen in the superolateral breast at middle depth A prominent level III axillary lymph node measures 1.3 x 0.9 x 0.8 cm, and may have slightly increased in size when compared to breast MRI 10/03/2016 (previously measuring 1.0 x 0.8 x 0.8 cm). An internal mammary lymph node measuring 0.4 cm is noted and demonstrates a fatty hilum (series 401, image 136 of 232). This was not definitively seen on the prior MRI.  4/23/24: L breast mass 3:00 bx IDC G3, ER/TX negative, HER2 positive. L breast mass 2:00 bx: fibrous scar with associated calcifications.   5/8/24: PET scan: mildly  hypermetabolic soft tissue density measuring 1.6 x 0.8 cm at left outer breast.  Mild heterogeneous activity is noted in the region of the liver with a suggestion of a small focus along the medial tip of segment 2 of the left hepatic lobe.   5/9/24: TB discussion: Plan for surgery first approach.   5/17/2024: MRI of liver: No definite discrete hepatic lesion to correlate with mild FDG avid fit lesion seen on PET scan.  Few subcentimeter pancreatic cystic lesions represent IPMN most likely.  Partially visualized left uterine lesion likely representing a fibroid.  6/12/2024: Left breast simple mastectomy showed invasive ductal carcinoma, grade 3, single focus measuring 1.1 cm.  There is high-grade DCIS present.  All margins are negative.  No lymph nodes submitted.  Right breast simple completion mastectomy also performed negative for carcinoma.  7/15/2024: Patient started adjuvant weekly Taxol/Herceptin  Patient developed breast wound infection requiring antibiotics.  Open wound with delayed healing managed by wound care.  Chemotherapy held.  10/24/24: Paused herceptin due to question of side effects?  11/20/2024: Resumed Herceptin    Subjective: Interval History    Patient presents today for follow-up visit.     No major health related events since she was last seen.    Enrolled on pancreatic screening trial.     Sees cardiology regularly.    Tolerating herceptin doses much better- reports bloody noses have improved. Continues to have some dry eyes but managed with drops.     Reports continues chronic pains- planning for L knee surgery, needs should replacement at some point. Was told she may need neck surgery 10 years ago.     Denies weight loss, changes in the breast and/or chest wall, new aches or pains, changes in appetite or energy level.     Overall, feels good today.       Pertinent Family history:    Mother having bilateral breast cancer and a first cousin having breast cancer later in life. Her sister had a  GYN malignancy, now metastatic and a maternal aunt with stomach cancer. A maternal uncle had colon  cancer.     Social History  Raiza Nguyễn  reports that she quit smoking about 42 years ago. Her smoking use included cigarettes. She started smoking about 57 years ago. She has a 80 pack-year smoking history. She has been exposed to tobacco smoke. She has never used smokeless tobacco.  She  reports that she does not currently use alcohol after a past usage of about 1.0 standard drink of alcohol per week.  She  reports no history of drug use.    ROS:     Review of Systems   All other systems reviewed and are negative.       Physical Examination:    /83   Pulse 73   Temp 35.7 °C (96.3 °F)   Resp 16   Wt 77.7 kg (171 lb 4.8 oz)   SpO2 98%   BMI 34.60 kg/m²     Physical Exam  Vitals and nursing note reviewed.   Constitutional:       General: She is not in acute distress.     Appearance: Normal appearance. She is not toxic-appearing.   HENT:      Head: Normocephalic and atraumatic.   Eyes:      Conjunctiva/sclera: Conjunctivae normal.   Cardiovascular:      Rate and Rhythm: Normal rate.   Pulmonary:      Effort: Pulmonary effort is normal. No respiratory distress.   Musculoskeletal:         General: No swelling. Normal range of motion.      Cervical back: Normal range of motion.   Skin:     General: Skin is warm and dry.   Neurological:      General: No focal deficit present.      Mental Status: She is alert.   Psychiatric:         Mood and Affect: Mood normal.         Breast Examination:    S/p bilateral mastectomies. Chest wall without any concerns.     ECOG Performance Status:     [x] 0 Fully active, able to carry on all pre-disease performance without restriction  [] 1 Restricted in physically strenuous activity but ambulatory and able to carry out work of a light or sedentary nature, e.g., light house work, office work  [] 2 Ambulatory and capable of all selfcare but unable to carry out any work  activities; up and about more than 50% of waking hours  [] 3 Capable of only limited selfcare; confined to bed or chair more than 50% of waking hours  [] 4 Completely disabled; cannot carry on any selfcare; totally confined to bed or chair  [] 5 Dead     Results:    Labs:      Lab Results   Component Value Date    WBC 6.4 12/04/2024    HGB 12.5 12/04/2024    HCT 39.3 12/04/2024    MCV 89 12/04/2024     12/04/2024       Chemistry    Lab Results   Component Value Date/Time     12/04/2024 1332    K 4.2 12/04/2024 1332     12/04/2024 1332    CO2 29 12/04/2024 1332    BUN 16 12/04/2024 1332    CREATININE 0.68 12/04/2024 1332    Lab Results   Component Value Date/Time    CALCIUM 9.3 12/04/2024 1332    ALKPHOS 71 12/04/2024 1332    AST 23 12/04/2024 1332    ALT 20 12/04/2024 1332    BILITOT 0.4 12/04/2024 1332             Imaging:  Reviewed above in Onc History    Pathology:  Reviewed above in Onc History    Assessment:     2003: Stage II Left IDC grade 3 with positive LNs and NASH, ER+ VT+; S/p L PM and SLNBx; S/p AC+T; S/p radiation; anastrozole x5 years  2018: Stage IB (Prognostic Stage IA), [id number], grade 2 IDC, ER+95% VT+95% HER2 equivocal, FISH-. S/p R PM and SLNBx; Oncotype 17; S/p radiation; Exemestane since 11/2018.   2024: cT1 cN0 L IDC, G3, ER/VT negative and HER2 positive.  Status post bilateral completion mastectomy, pT1b pNx  BRCA2 germline mutation    Tolerating treatments well. No evidence of breast cancer recurrence per patient history, physical examination and imaging findings.     Plan:    Surgical Plan: S/p L PMSLNBx in 2003; S/p R PMSLNBx 2018; S/p BL completion mastectomy in 2024.  Additional biopsy: No further biopsy indicated  Radiation Plan: S/p L radiation in 2003; S/p R breast radiation 2018  Additional staging scans/DEXA/echo: She has established with onco cardiology. DEXA pending for 4/2025. Repeat follow-up PET scan ordered today.   Additional Path info (i.e Ki67, PDL1): Not  indicated  Gene assays: Not indicated    Systemic treatment plan: Treatment course complicated by chest wall infection for which chemotherapy has been held.  We continued on Herceptin alone to complete 1 year of therapy. She is also on exemestane for a prior ER/AZ positive breast cacncer   Intent: Curative   Clinical trial: Not eligible for our current trials   Endocrine therapy: On exemestane for her ER/AZ positive breast cancer diagnosed in 2018   HER2 treatment: Herceptin/Hylecta q3 weeks to complete 1 year.    Targeted agents: not indicated   Chemotherapy: Received 2 doses of weekly paclitaxel, discontinued given development of breast infection and delayed wound healing. We did NOT resume.    BMA: Will discuss at future visit.    Access: Removed  Supportive meds: N/A  Genetic testing: gBRCA2 mutation  Fertility preservation: Not indicated  Other active problems/orders:     Focal appearing FDG avidity in the right inferolateral chest wall  and nonenlarged minimally FDG avid right axillary lymph node: Likely reactive, seen on PET 6 months ago. Follow-up PET scan ordered today for surveillance.   Dysuria: UA reflex to culture ordered today.  Mild FDG avid liver lesion without MRI correlate: Short-term follow-up has been recommended per radiologist.  Repeat PET scan completed 9/13/2024 and previously seen lesions are no longer visualized likely representing background heterogeneity  Pancreatic cystic lesions likely IPMN: She has established with Dr. Dodson with gastroenterology.  Will need closer monitoring as patient has germline BRCA mutation. She is on pancreatic screening trial.   Partially visualized left uterine lesion likely representing fibroid: Not seen on follow-up imaging and was not PET avid.   Chest wall infection: Managed by wound care at [facility], improved. Resolved   Right IJ DVT: Port related. Port removed. Completed course of apixiban.  Conjunctivitis/dry eyes: Improved 95%.  Managed by ophthalmology    Surveillance plan: No routine breast imaging indicated    Follow-up: Will call with PET results. Otherwise, 3 months with NP. Follow-up MD 3 months later at [place] location.     Patient expressed understanding of the plan outlined above. She had ample time to ask questions. She understands she can contact us should she have additional questions or issues arise in the interim.    Joanna Bourne MD  Breast Medical Oncology  [facility]    Portions of this note were dictated utilizing voice recognition software.

## 2025-04-07 NOTE — PROGRESS NOTES
Patient is here today for C9D1 Herceptin Hylecta injection- no complications since last being seen-  Independent double check done prior to injection today-   b/h/ lung sounds not auscultated  Patient tolerated injection well.  No complaints. Call back instructions reviewed.    Patient verbalizes understanding of plan of care.  Ambulated off unit using cane without difficulty, slow/steady gait.

## 2025-04-08 LAB
APPEARANCE UR: CLEAR
BILIRUB UR STRIP.AUTO-MCNC: NEGATIVE MG/DL
COLOR UR: NORMAL
GLUCOSE UR STRIP.AUTO-MCNC: NORMAL MG/DL
HOLD SPECIMEN: NORMAL
KETONES UR STRIP.AUTO-MCNC: NEGATIVE MG/DL
LEUKOCYTE ESTERASE UR QL STRIP.AUTO: NEGATIVE
NITRITE UR QL STRIP.AUTO: NEGATIVE
PH UR STRIP.AUTO: 6.5 [PH]
PROT UR STRIP.AUTO-MCNC: NEGATIVE MG/DL
RBC # UR STRIP.AUTO: NEGATIVE MG/DL
SP GR UR STRIP.AUTO: 1.01
UROBILINOGEN UR STRIP.AUTO-MCNC: NORMAL MG/DL

## 2025-04-10 DIAGNOSIS — E11.9 TYPE 2 DIABETES MELLITUS WITHOUT COMPLICATION, WITHOUT LONG-TERM CURRENT USE OF INSULIN: ICD-10-CM

## 2025-04-10 PROCEDURE — RXMED WILLOW AMBULATORY MEDICATION CHARGE

## 2025-04-10 RX ORDER — TIRZEPATIDE 5 MG/.5ML
5 INJECTION, SOLUTION SUBCUTANEOUS WEEKLY
Qty: 2 ML | Refills: 3 | Status: SHIPPED | OUTPATIENT
Start: 2025-04-10

## 2025-04-11 ENCOUNTER — HOSPITAL ENCOUNTER (OUTPATIENT)
Dept: RADIOLOGY | Facility: CLINIC | Age: 75
Discharge: HOME | End: 2025-04-11
Payer: MEDICARE

## 2025-04-11 DIAGNOSIS — Z09 ENCOUNTER FOR FOLLOW-UP EXAMINATION AFTER COMPLETED TREATMENT FOR CONDITIONS OTHER THAN MALIGNANT NEOPLASM: ICD-10-CM

## 2025-04-11 DIAGNOSIS — C50.112 MALIGNANT NEOPLASM OF CENTRAL PORTION OF LEFT BREAST IN FEMALE, ESTROGEN RECEPTOR NEGATIVE: ICD-10-CM

## 2025-04-11 DIAGNOSIS — Z17.1 MALIGNANT NEOPLASM OF CENTRAL PORTION OF LEFT BREAST IN FEMALE, ESTROGEN RECEPTOR NEGATIVE: ICD-10-CM

## 2025-04-11 LAB
LAB AP ASR DISCLAIMER: NORMAL
LABORATORY COMMENT REPORT: NORMAL
PATH REPORT.FINAL DX SPEC: NORMAL
PATH REPORT.GROSS SPEC: NORMAL
PATH REPORT.TOTAL CANCER: NORMAL

## 2025-04-11 PROCEDURE — 77080 DXA BONE DENSITY AXIAL: CPT

## 2025-04-14 ENCOUNTER — PHARMACY VISIT (OUTPATIENT)
Dept: PHARMACY | Facility: CLINIC | Age: 75
End: 2025-04-14
Payer: COMMERCIAL

## 2025-04-14 ENCOUNTER — OFFICE VISIT (OUTPATIENT)
Dept: CARDIOLOGY | Facility: HOSPITAL | Age: 75
End: 2025-04-14
Payer: MEDICARE

## 2025-04-14 VITALS
RESPIRATION RATE: 20 BRPM | WEIGHT: 174.6 LBS | TEMPERATURE: 97.3 F | OXYGEN SATURATION: 100 % | HEART RATE: 82 BPM | SYSTOLIC BLOOD PRESSURE: 135 MMHG | DIASTOLIC BLOOD PRESSURE: 73 MMHG | BODY MASS INDEX: 35.27 KG/M2

## 2025-04-14 DIAGNOSIS — Z51.81 ENCOUNTER FOR MONITORING CARDIOTOXIC DRUG THERAPY: Primary | ICD-10-CM

## 2025-04-14 DIAGNOSIS — C50.912 MALIGNANT NEOPLASM OF BOTH BREASTS IN FEMALE, ESTROGEN RECEPTOR NEGATIVE, UNSPECIFIED SITE OF BREAST: ICD-10-CM

## 2025-04-14 DIAGNOSIS — Z79.899 HIGH RISK MEDICATION USE: ICD-10-CM

## 2025-04-14 DIAGNOSIS — Z17.1 MALIGNANT NEOPLASM OF BOTH BREASTS IN FEMALE, ESTROGEN RECEPTOR NEGATIVE, UNSPECIFIED SITE OF BREAST: ICD-10-CM

## 2025-04-14 DIAGNOSIS — I10 ESSENTIAL HYPERTENSION: ICD-10-CM

## 2025-04-14 DIAGNOSIS — R42 DIZZINESS AFTER EXTENSION OF NECK: Primary | ICD-10-CM

## 2025-04-14 DIAGNOSIS — C50.911 MALIGNANT NEOPLASM OF BOTH BREASTS IN FEMALE, ESTROGEN RECEPTOR NEGATIVE, UNSPECIFIED SITE OF BREAST: ICD-10-CM

## 2025-04-14 DIAGNOSIS — Z79.899 ENCOUNTER FOR MONITORING CARDIOTOXIC DRUG THERAPY: Primary | ICD-10-CM

## 2025-04-14 PROCEDURE — 1123F ACP DISCUSS/DSCN MKR DOCD: CPT | Performed by: INTERNAL MEDICINE

## 2025-04-14 PROCEDURE — 1159F MED LIST DOCD IN RCRD: CPT | Performed by: INTERNAL MEDICINE

## 2025-04-14 PROCEDURE — 4010F ACE/ARB THERAPY RXD/TAKEN: CPT | Performed by: INTERNAL MEDICINE

## 2025-04-14 PROCEDURE — 1160F RVW MEDS BY RX/DR IN RCRD: CPT | Performed by: INTERNAL MEDICINE

## 2025-04-14 PROCEDURE — 99214 OFFICE O/P EST MOD 30 MIN: CPT | Performed by: INTERNAL MEDICINE

## 2025-04-14 PROCEDURE — 1126F AMNT PAIN NOTED NONE PRSNT: CPT | Performed by: INTERNAL MEDICINE

## 2025-04-14 PROCEDURE — 1036F TOBACCO NON-USER: CPT | Performed by: INTERNAL MEDICINE

## 2025-04-14 PROCEDURE — 3075F SYST BP GE 130 - 139MM HG: CPT | Performed by: INTERNAL MEDICINE

## 2025-04-14 PROCEDURE — 3078F DIAST BP <80 MM HG: CPT | Performed by: INTERNAL MEDICINE

## 2025-04-14 PROCEDURE — 1157F ADVNC CARE PLAN IN RCRD: CPT | Performed by: INTERNAL MEDICINE

## 2025-04-14 ASSESSMENT — PAIN SCALES - GENERAL: PAINLEVEL_OUTOF10: 0-NO PAIN

## 2025-04-14 NOTE — PATIENT INSTRUCTIONS
We will do a carotid duplex; to schedule, call [phone]  Continue your routine echocardiograms  Follow up in 6 months with Beatriz Galdamez

## 2025-04-14 NOTE — PROGRESS NOTES
[facility] Cardio-Oncology Clinic  Primary Care Physician: Shoshana Olivarez DO  Referring Oncologist: Tayla   Primary Cardiologist: n/a    Date of Visit: 2025 11:15 AM EDT  Location of visit: [facility]     HPI:   Ms. Nguyễn is a 74F with a PMHx sig for breast CA previously treated with adriamycin/cyclophosphamide with recurrence (ER/VT negative, HER2 positive) s/p b/l mastectomy currently on trastuzumab, essential HTN, and hypothyroidism who returns to the Cardio-Oncology clinic for ongoing evaluation and management.     Interval hx:   She currently denies chest pain, pressure, SOB/SIMONS, PND, orthopnea, LE edema, or palpitations. She notes occasional light headedness/dizziness, without syncope when turning her head.     Serial echos with stable biventricular function.         Social History     Tobacco Use    Smoking status: Former     Current packs/day: 0.00     Average packs/day: 1.7 packs/day for 47.5 years (80.0 ttl pk-yrs)     Types: Cigarettes     Start date: 1968     Quit date: 1983     Years since quittin.3     Passive exposure: Past    Smokeless tobacco: Never   Vaping Use    Vaping status: Never Used   Substance Use Topics    Alcohol use: Not Currently     Alcohol/week: 1.0 standard drink of alcohol     Types: 1 Glasses of wine per week    Drug use: Never       Family History   Problem Relation Name Age of Onset    Breast cancer Mother Dorothea     Alcohol abuse Mother Dorothea     Cancer Mother Dorothea     Cataracts Mother Dorothea     Lung cancer Father Yuriy     Cancer Father Yuriy     Thyroid disease Father Yuriy     Cancer Sister Korin     Cancer Mother's Brother Kyrie Charninimesh     Colon cancer Mother's Brother Igor Charninimesh     Ovarian cysts Maternal Grandmother Maddy     Diabetes Maternal Grandmother Maddy     Stomach cancer Maternal Cousin      Colon cancer Other Materal Uncle     Stomach cancer Other Materal Uncle          Current  Outpatient Medications   Medication Sig Dispense Refill    blood sugar diagnostic strip 90 strips once daily. 100 strip 3    calcium carbonate 600 mg calcium (1,500 mg) tablet Take 600 mg by mouth once daily.      DULoxetine (Cymbalta) 30 mg DR capsule Take 1 capsule (30 mg) by mouth 2 times a day.      exemestane (Aromasin) 25 mg tablet Take 1 tablet (25 mg total) by mouth once daily.  Take after a meal.  Try to take at the same time each day. 90 tablet 1    gabapentin (Neurontin) 300 mg capsule Take 3 capsules (900 mg) by mouth 3 times a day.      hydroCHLOROthiazide (HYDRODiuril) 25 mg tablet 1 tab po 3 x weekly as needed for swelling      levothyroxine (Synthroid, Levoxyl) 125 mcg tablet Take 1 tablet (125 mcg) by mouth early in the morning.. 90 tablet 3    lisinopril 5 mg tablet Take 1 tablet (5 mg) by mouth 2 times a day. 180 tablet 1    metFORMIN  mg 24 hr tablet Take 1 tablet (500 mg) by mouth once daily. 90 tablet 1    rosuvastatin (Crestor) 5 mg tablet TAKE 1 TABLET ONE TIME DAILY 90 tablet 3    tirzepatide (Mounjaro) 5 mg/0.5 mL pen injector Inject 5 mg under the skin 1 (one) time per week. 2 mL 3    traMADol (Ultram) 50 mg tablet Take 2 tablets (100 mg) by mouth once daily at bedtime.      triamcinolone (Kenalog) 0.1 % ointment Apply twice daily to affected areas of fingers (avoid face, groin, body folds) for 2 weeks, then taper to twice daily on weekends only; repeat every 6-8 weeks as needed for flares 80 g 0     No current facility-administered medications for this visit.       Allergies   Allergen Reactions    Erythromycin GI bleeding    Tetracycline GI bleeding    Cephalexin Unknown    Glucosamine Unknown    Adhesive Itching and Rash         Visit Vitals  /73   Pulse 82   Temp 36.3 °C (97.3 °F) (Core)   Resp 20   Wt 79.2 kg (174 lb 9.7 oz)   SpO2 100%   BMI 35.27 kg/m²   OB Status Postmenopausal   Smoking Status Former   BSA 1.82 m²        Physical Exam:  On exam Ms. Nguyễn appears her  stated age, is alert and oriented x3, and in no acute distress. Her sclera are anicteric and her oropharynx has moist mucous membranes. Her neck is supple and without thyromegaly. The JVP is ~5 cm of water above the right atrium. There are no audible carotid bruits. Her cardiac exam has regular rhythm, normal S1, S2. No S3/4. There are no murmurs. Her lungs are clear to auscultation bilaterally and there is no dullness to percussion. Her abdomen is soft, nontender with normoactive bowel sounds. There is no HJR. The extremities are warm and without edema. The skin is dry. There is no rash present. The distal pulses are 2+ in all four extremities. Her mood and affect are appropriate for todays encounter.       Cardiac Labs/Diagnostics:    Lab Results   Component Value Date    CREATININE 0.68 12/04/2024    BUN 16 12/04/2024     12/04/2024    K 4.2 12/04/2024     12/04/2024    CO2 29 12/04/2024        Recent Labs     09/10/24  1139 10/17/19  0820   BNP 5 8       Onco-echo (4/2/25):  Onco-Cardiology Measurements:  Historical Measurements from Previous Study  2D EF (Biplane)                    66%%  3D EF                              59%%  Global Longitudinal Strain (GLS) -20.8%     Current Measurements  2D EF (Biplane)                    65%%  3D EF                              61%%  Global Longitudinal Strain (GLS) -17.0%     GLS Tracking Quality:      CONCLUSIONS:  1. Left ventricular ejection fraction is normal, calculated by Langley's biplane at 65%.  2. There is mildly reduced right ventricular systolic function.  3. Compared with the prior exam from 1/2/2025, the LVEF is unchanged while the GLS has decreased from -20.8% to-17%.  4. Lipomatous hypertrophy of the atrial septum.    Onco-echo (10/2/24):  Onco-Cardiology Measurements:  Historical Measurements from Previous Study  2D EF (Biplane)                      70%%  Global Longitudinal Strain (GLS) --17.6%%     Current Measurements  2D EF (Biplane)                      60%%  3D EF                               55%%  Global Longitudinal Strain (GLS) -17.4%%     GLS Tracking Quality:     CONCLUSIONS:  1. Poorly visualized anatomical structures due to suboptimal image quality.  2. Left ventricular ejection fraction is normal, by visual estimate at 60-65%.  3. Spectral Doppler shows an impaired relaxation pattern of left ventricular diastolic filling.  4. GLS is borderline, however given technically difficult images, tracking may not be optimal which may compromise the accuracy of the estimation of GLS as well as 3D LVEF.  5. There is normal right ventricular global systolic function.  6. There is moderate mitral annular calcification.  7. Compared with the prior exam from 6/5/2024, today's images are a bit more difficult than prior. The GLS remains similar( previously -17.6%) and upon side by side comparison the LVEF appears similar within the limitations of image quality.         Impression/Plan:  Ms. Nguyễn is a 74F with a PMHx sig for breast CA previously treated with adriamycin/cyclophosphamide with recurrence (ER/OH negative, HER2 positive) s/p b/l mastectomy currently on trastuzumab, essential HTN, and hypothyroidism who returns to the Cardio-Oncology clinic for ongoing evaluation and management.     1) High risk medication use  History of anthracycline, now currently on trastuzumab. Serial echos with stable biventricular function.   -c/w echo surveillance q3mo    2) Light headed/dizzy  Notes symptoms when turning her head side-to-side. No carotid bruits auscultated.   -refer for b/l carotid duplex    3) Essential HTN  BP controlled   -c/w lisinopril and hydrochlorothiazide         F/U: 6 months with Cardio-Onc [facility]      ____________________________________________________________  Norm Hartley DO  Section of Advanced Heart Failure and Cardiac Transplantation  Division of Cardiovascular Medicine  [facility]  [place]  [facility]

## 2025-04-16 ENCOUNTER — HOSPITAL ENCOUNTER (OUTPATIENT)
Dept: VASCULAR MEDICINE | Facility: CLINIC | Age: 75
Discharge: HOME | End: 2025-04-16
Payer: MEDICARE

## 2025-04-16 DIAGNOSIS — R42 DIZZINESS AFTER EXTENSION OF NECK: ICD-10-CM

## 2025-04-16 PROCEDURE — 93880 EXTRACRANIAL BILAT STUDY: CPT | Performed by: SURGERY

## 2025-04-16 PROCEDURE — 93880 EXTRACRANIAL BILAT STUDY: CPT

## 2025-04-24 ENCOUNTER — APPOINTMENT (OUTPATIENT)
Dept: RADIOLOGY | Facility: CLINIC | Age: 75
End: 2025-04-24
Payer: MEDICARE

## 2025-04-24 ENCOUNTER — HOSPITAL ENCOUNTER (OUTPATIENT)
Dept: RADIOLOGY | Facility: CLINIC | Age: 75
Discharge: HOME | End: 2025-04-24
Payer: MEDICARE

## 2025-04-24 DIAGNOSIS — C50.112 MALIGNANT NEOPLASM OF CENTRAL PORTION OF LEFT BREAST IN FEMALE, ESTROGEN RECEPTOR NEGATIVE: ICD-10-CM

## 2025-04-24 DIAGNOSIS — Z17.1 MALIGNANT NEOPLASM OF CENTRAL PORTION OF LEFT BREAST IN FEMALE, ESTROGEN RECEPTOR NEGATIVE: ICD-10-CM

## 2025-04-24 PROCEDURE — A9552 F18 FDG: HCPCS | Performed by: STUDENT IN AN ORGANIZED HEALTH CARE EDUCATION/TRAINING PROGRAM

## 2025-04-24 PROCEDURE — 78815 PET IMAGE W/CT SKULL-THIGH: CPT | Mod: PS

## 2025-04-24 PROCEDURE — 3430000001 HC RX 343 DIAGNOSTIC RADIOPHARMACEUTICALS: Performed by: STUDENT IN AN ORGANIZED HEALTH CARE EDUCATION/TRAINING PROGRAM

## 2025-04-24 RX ORDER — FLUDEOXYGLUCOSE F 18 200 MCI/ML
11.7 INJECTION, SOLUTION INTRAVENOUS
Status: COMPLETED | OUTPATIENT
Start: 2025-04-24 | End: 2025-04-24

## 2025-04-24 RX ADMIN — FLUDEOXYGLUCOSE F 18 11.7 MILLICURIE: 200 INJECTION, SOLUTION INTRAVENOUS at 08:35

## 2025-04-28 ENCOUNTER — INFUSION (OUTPATIENT)
Dept: HEMATOLOGY/ONCOLOGY | Facility: CLINIC | Age: 75
End: 2025-04-28
Payer: MEDICARE

## 2025-04-28 ENCOUNTER — TELEPHONE (OUTPATIENT)
Dept: HEMATOLOGY/ONCOLOGY | Facility: CLINIC | Age: 75
End: 2025-04-28

## 2025-04-28 VITALS
DIASTOLIC BLOOD PRESSURE: 81 MMHG | WEIGHT: 171.52 LBS | BODY MASS INDEX: 34.64 KG/M2 | RESPIRATION RATE: 16 BRPM | HEART RATE: 85 BPM | OXYGEN SATURATION: 93 % | TEMPERATURE: 96.4 F | SYSTOLIC BLOOD PRESSURE: 136 MMHG

## 2025-04-28 DIAGNOSIS — E11.9 TYPE 2 DIABETES MELLITUS WITHOUT COMPLICATION, WITHOUT LONG-TERM CURRENT USE OF INSULIN: ICD-10-CM

## 2025-04-28 DIAGNOSIS — Z17.1 MALIGNANT NEOPLASM OF CENTRAL PORTION OF LEFT BREAST IN FEMALE, ESTROGEN RECEPTOR NEGATIVE: ICD-10-CM

## 2025-04-28 DIAGNOSIS — R94.6 ABNORMAL RESULTS OF THYROID FUNCTION STUDIES: ICD-10-CM

## 2025-04-28 DIAGNOSIS — Z17.1 MALIGNANT NEOPLASM OF CENTRAL PORTION OF LEFT BREAST IN FEMALE, ESTROGEN RECEPTOR NEGATIVE: Primary | ICD-10-CM

## 2025-04-28 DIAGNOSIS — C50.112 MALIGNANT NEOPLASM OF CENTRAL PORTION OF LEFT BREAST IN FEMALE, ESTROGEN RECEPTOR NEGATIVE: Primary | ICD-10-CM

## 2025-04-28 DIAGNOSIS — E78.5 HYPERLIPIDEMIA, UNSPECIFIED HYPERLIPIDEMIA TYPE: ICD-10-CM

## 2025-04-28 DIAGNOSIS — C50.112 MALIGNANT NEOPLASM OF CENTRAL PORTION OF LEFT BREAST IN FEMALE, ESTROGEN RECEPTOR NEGATIVE: ICD-10-CM

## 2025-04-28 PROCEDURE — 96401 CHEMO ANTI-NEOPL SQ/IM: CPT

## 2025-04-28 PROCEDURE — 2500000004 HC RX 250 GENERAL PHARMACY W/ HCPCS (ALT 636 FOR OP/ED): Mod: JZ,TB | Performed by: STUDENT IN AN ORGANIZED HEALTH CARE EDUCATION/TRAINING PROGRAM

## 2025-04-28 RX ORDER — ROSUVASTATIN CALCIUM 5 MG/1
5 TABLET, COATED ORAL DAILY
Qty: 90 TABLET | Refills: 3 | Status: SHIPPED | OUTPATIENT
Start: 2025-04-28

## 2025-04-28 RX ORDER — FAMOTIDINE 10 MG/ML
20 INJECTION, SOLUTION INTRAVENOUS ONCE AS NEEDED
Status: DISCONTINUED | OUTPATIENT
Start: 2025-04-28 | End: 2025-04-28 | Stop reason: HOSPADM

## 2025-04-28 RX ORDER — PROCHLORPERAZINE EDISYLATE 5 MG/ML
10 INJECTION INTRAMUSCULAR; INTRAVENOUS EVERY 6 HOURS PRN
Status: DISCONTINUED | OUTPATIENT
Start: 2025-04-28 | End: 2025-04-28 | Stop reason: HOSPADM

## 2025-04-28 RX ORDER — DIPHENHYDRAMINE HYDROCHLORIDE 50 MG/ML
50 INJECTION, SOLUTION INTRAMUSCULAR; INTRAVENOUS AS NEEDED
Status: DISCONTINUED | OUTPATIENT
Start: 2025-04-28 | End: 2025-04-28 | Stop reason: HOSPADM

## 2025-04-28 RX ORDER — METFORMIN HYDROCHLORIDE 500 MG/1
500 TABLET, EXTENDED RELEASE ORAL DAILY
Qty: 90 TABLET | Refills: 1 | Status: SHIPPED | OUTPATIENT
Start: 2025-04-28

## 2025-04-28 RX ORDER — EPINEPHRINE 0.3 MG/.3ML
0.3 INJECTION SUBCUTANEOUS EVERY 5 MIN PRN
Status: DISCONTINUED | OUTPATIENT
Start: 2025-04-28 | End: 2025-04-28 | Stop reason: HOSPADM

## 2025-04-28 RX ORDER — ALBUTEROL SULFATE 0.83 MG/ML
3 SOLUTION RESPIRATORY (INHALATION) AS NEEDED
Status: DISCONTINUED | OUTPATIENT
Start: 2025-04-28 | End: 2025-04-28 | Stop reason: HOSPADM

## 2025-04-28 RX ORDER — PROCHLORPERAZINE MALEATE 10 MG
10 TABLET ORAL EVERY 6 HOURS PRN
Status: DISCONTINUED | OUTPATIENT
Start: 2025-04-28 | End: 2025-04-28 | Stop reason: HOSPADM

## 2025-04-28 RX ADMIN — TRASTUZUMAB AND HYALURONIDASE-OYSK 600 MG: 600; 10000 INJECTION, SOLUTION SUBCUTANEOUS at 15:10

## 2025-04-28 ASSESSMENT — PAIN SCALES - GENERAL: PAINLEVEL_OUTOF10: 0-NO PAIN

## 2025-04-28 NOTE — PROGRESS NOTES
Patient is here today for C10 Trastuzumab injection- no complications since last being seen-  Independent double check done prior to injection today-   b/h/ lung sounds not auscultated  Patient tolerated injection well.  No complaints. Call back instructions reviewed.    Patient verbalizes understanding of plan of care.  Ambulated off unit using cane without difficulty, steady gait.

## 2025-04-28 NOTE — TELEPHONE ENCOUNTER
I called patient to review results of PET scan. We discussed these findings in detail. No evidence of cancer recurrence. I have recommended consideration of CT chest in 3-6 months to follow-up on ground-glass haziness within apex of left lung,  likely inflammatory, will further discuss at her future visits. We also will check updated thyroid studies to follow-up on diffuse FDG uptake within thyroid gland, orders placed. Patient was appreciative of the call. All of the patient's questions were answered and concerns were addressed. The patient has no further needs at this time. Follow-up in place for 7/8/2025 for surveillance visit with Kelsey Mccollum NP.

## 2025-04-30 ENCOUNTER — OFFICE VISIT (OUTPATIENT)
Dept: GASTROENTEROLOGY | Facility: HOSPITAL | Age: 75
End: 2025-04-30
Payer: MEDICARE

## 2025-04-30 VITALS
SYSTOLIC BLOOD PRESSURE: 126 MMHG | BODY MASS INDEX: 34.74 KG/M2 | OXYGEN SATURATION: 98 % | WEIGHT: 172 LBS | HEART RATE: 97 BPM | DIASTOLIC BLOOD PRESSURE: 74 MMHG | TEMPERATURE: 96.6 F | RESPIRATION RATE: 18 BRPM

## 2025-04-30 DIAGNOSIS — K22.710 BARRETT'S ESOPHAGUS WITH LOW GRADE DYSPLASIA: Primary | ICD-10-CM

## 2025-04-30 PROCEDURE — 99213 OFFICE O/P EST LOW 20 MIN: CPT | Performed by: INTERNAL MEDICINE

## 2025-04-30 RX ORDER — EXEMESTANE 25 MG/1
25 TABLET ORAL DAILY
COMMUNITY

## 2025-04-30 ASSESSMENT — PAIN SCALES - GENERAL: PAINLEVEL_OUTOF10: 0-NO PAIN

## 2025-04-30 NOTE — PROGRESS NOTES
Patient here to discuss diagnosis of BE iwht LGD. She has had BE for years and getting surveyed. Recent EGD showed LGD.     Discussed LGD and different options. Risk benefits of surveillance vs. Ablation. Will enroll in SURVENT. Total time 18 minutes.

## 2025-05-06 PROCEDURE — RXMED WILLOW AMBULATORY MEDICATION CHARGE

## 2025-05-08 ENCOUNTER — PHARMACY VISIT (OUTPATIENT)
Dept: PHARMACY | Facility: CLINIC | Age: 75
End: 2025-05-08
Payer: COMMERCIAL

## 2025-05-08 DIAGNOSIS — K22.710 BARRETT'S ESOPHAGUS WITH LOW GRADE DYSPLASIA: Primary | ICD-10-CM

## 2025-05-08 RX ORDER — LANSOPRAZOLE 30 MG/1
30 CAPSULE, DELAYED RELEASE ORAL
Qty: 180 CAPSULE | Refills: 3 | Status: SHIPPED | OUTPATIENT
Start: 2025-05-08 | End: 2026-05-08

## 2025-05-12 ENCOUNTER — PATIENT MESSAGE (OUTPATIENT)
Dept: SURGICAL ONCOLOGY | Facility: HOSPITAL | Age: 75
End: 2025-05-12
Payer: MEDICARE

## 2025-05-15 DIAGNOSIS — I10 ESSENTIAL HYPERTENSION: ICD-10-CM

## 2025-05-15 RX ORDER — HYDROCHLOROTHIAZIDE 25 MG/1
25 TABLET ORAL 2 TIMES DAILY
Qty: 180 TABLET | Refills: 1 | Status: SHIPPED | OUTPATIENT
Start: 2025-05-15

## 2025-05-19 ENCOUNTER — INFUSION (OUTPATIENT)
Dept: HEMATOLOGY/ONCOLOGY | Facility: CLINIC | Age: 75
End: 2025-05-19
Payer: MEDICARE

## 2025-05-19 VITALS
DIASTOLIC BLOOD PRESSURE: 65 MMHG | OXYGEN SATURATION: 95 % | HEIGHT: 59 IN | WEIGHT: 172.51 LBS | TEMPERATURE: 97.9 F | RESPIRATION RATE: 16 BRPM | BODY MASS INDEX: 34.78 KG/M2 | SYSTOLIC BLOOD PRESSURE: 108 MMHG | HEART RATE: 83 BPM

## 2025-05-19 DIAGNOSIS — C50.112 MALIGNANT NEOPLASM OF CENTRAL PORTION OF LEFT BREAST IN FEMALE, ESTROGEN RECEPTOR NEGATIVE: ICD-10-CM

## 2025-05-19 DIAGNOSIS — Z17.1 MALIGNANT NEOPLASM OF CENTRAL PORTION OF LEFT BREAST IN FEMALE, ESTROGEN RECEPTOR NEGATIVE: ICD-10-CM

## 2025-05-19 PROCEDURE — 96401 CHEMO ANTI-NEOPL SQ/IM: CPT

## 2025-05-19 PROCEDURE — 2500000004 HC RX 250 GENERAL PHARMACY W/ HCPCS (ALT 636 FOR OP/ED): Mod: JZ,TB | Performed by: STUDENT IN AN ORGANIZED HEALTH CARE EDUCATION/TRAINING PROGRAM

## 2025-05-19 RX ORDER — FAMOTIDINE 10 MG/ML
20 INJECTION, SOLUTION INTRAVENOUS ONCE AS NEEDED
Status: DISCONTINUED | OUTPATIENT
Start: 2025-05-19 | End: 2025-05-19 | Stop reason: HOSPADM

## 2025-05-19 RX ORDER — EPINEPHRINE 0.3 MG/.3ML
0.3 INJECTION SUBCUTANEOUS EVERY 5 MIN PRN
Status: DISCONTINUED | OUTPATIENT
Start: 2025-05-19 | End: 2025-05-19 | Stop reason: HOSPADM

## 2025-05-19 RX ORDER — PROCHLORPERAZINE MALEATE 10 MG
10 TABLET ORAL EVERY 6 HOURS PRN
Status: DISCONTINUED | OUTPATIENT
Start: 2025-05-19 | End: 2025-05-19 | Stop reason: HOSPADM

## 2025-05-19 RX ORDER — ALBUTEROL SULFATE 0.83 MG/ML
3 SOLUTION RESPIRATORY (INHALATION) AS NEEDED
Status: DISCONTINUED | OUTPATIENT
Start: 2025-05-19 | End: 2025-05-19 | Stop reason: HOSPADM

## 2025-05-19 RX ORDER — DIPHENHYDRAMINE HYDROCHLORIDE 50 MG/ML
50 INJECTION, SOLUTION INTRAMUSCULAR; INTRAVENOUS AS NEEDED
Status: DISCONTINUED | OUTPATIENT
Start: 2025-05-19 | End: 2025-05-19 | Stop reason: HOSPADM

## 2025-05-19 RX ORDER — PROCHLORPERAZINE EDISYLATE 5 MG/ML
10 INJECTION INTRAMUSCULAR; INTRAVENOUS EVERY 6 HOURS PRN
Status: DISCONTINUED | OUTPATIENT
Start: 2025-05-19 | End: 2025-05-19 | Stop reason: HOSPADM

## 2025-05-19 RX ADMIN — TRASTUZUMAB AND HYALURONIDASE-OYSK 600 MG: 600; 10000 INJECTION, SOLUTION SUBCUTANEOUS at 15:22

## 2025-05-19 ASSESSMENT — PAIN SCALES - GENERAL: PAINLEVEL_OUTOF10: 0-NO PAIN

## 2025-05-19 NOTE — PROGRESS NOTES
"[facility] Infusion Note      Raiza Nguyễn is a 74 y.o. year old female here today,05/19/25,  in the [facility] infusion center for  following treatment regimen:  Medications given today :    No results for input(s): \"NEUTROABS\", \"PLT\", \"AST\", \"ALT\", \"BILITOT\", \"CREATININE\", \"HCGQUANT\", \"HGB\" in the last 72 hours.   CrCl cannot be calculated (Patient's most recent lab result is older than the maximum 7 days allowed.).  BP Readings from Last 2 Encounters:   05/19/25 108/65   04/30/25 126/74       Hold treatment and notify provider if:,  LVEF LESS THAN 50%          Vitals:    05/19/25 1433   BP: 108/65   Pulse: 83   Resp: 16   Temp: 36.6 °C (97.9 °F)   SpO2: 95%    on intake  Vitals:    05/19/25 1433   Weight: 78.3 kg (172 lb 8.2 oz)     LV EF   Date Value Ref Range Status   04/02/2025 65 %    01/02/2025 63 %    10/02/2024 63 %       Body surface area is 1.81 meters squared.      Phesgo subcutaneous right thigh no complication   Pt tolerated and was discharged to home in stable condition. Pt ambulated  off the unit with a slow and steady gait. Pt had no further questions or concerns at this time. Has follow up appointment appropriately scheduled. Verbalized understanding of follow up. Made aware that appointments and times are always available online on my chart.           "

## 2025-05-21 DIAGNOSIS — I10 ESSENTIAL HYPERTENSION: ICD-10-CM

## 2025-05-21 RX ORDER — HYDROCHLOROTHIAZIDE 25 MG/1
25 TABLET ORAL 2 TIMES DAILY
Qty: 180 TABLET | Refills: 1 | Status: SHIPPED | OUTPATIENT
Start: 2025-05-21

## 2025-05-21 RX ORDER — LISINOPRIL 5 MG/1
5 TABLET ORAL 2 TIMES DAILY
Qty: 180 TABLET | Refills: 1 | Status: SHIPPED | OUTPATIENT
Start: 2025-05-21 | End: 2026-06-25

## 2025-05-24 LAB
ALBUMIN SERPL-MCNC: 4.5 G/DL (ref 3.6–5.1)
ALP SERPL-CCNC: 75 U/L (ref 37–153)
ALT SERPL-CCNC: 26 U/L (ref 6–29)
ANION GAP SERPL CALCULATED.4IONS-SCNC: 8 MMOL/L (CALC) (ref 7–17)
AST SERPL-CCNC: 24 U/L (ref 10–35)
BILIRUB SERPL-MCNC: 0.4 MG/DL (ref 0.2–1.2)
BUN SERPL-MCNC: 19 MG/DL (ref 7–25)
CALCIUM SERPL-MCNC: 9.8 MG/DL (ref 8.6–10.4)
CHLORIDE SERPL-SCNC: 96 MMOL/L (ref 98–110)
CO2 SERPL-SCNC: 31 MMOL/L (ref 20–32)
CREAT SERPL-MCNC: 0.75 MG/DL (ref 0.6–1)
EGFRCR SERPLBLD CKD-EPI 2021: 85 ML/MIN/1.73M2
EST. AVERAGE GLUCOSE BLD GHB EST-MCNC: 117 MG/DL
EST. AVERAGE GLUCOSE BLD GHB EST-SCNC: 6.5 MMOL/L
GLUCOSE SERPL-MCNC: 96 MG/DL (ref 65–99)
HBA1C MFR BLD: 5.7 %
POTASSIUM SERPL-SCNC: 4.2 MMOL/L (ref 3.5–5.3)
PROT SERPL-MCNC: 7.1 G/DL (ref 6.1–8.1)
SODIUM SERPL-SCNC: 135 MMOL/L (ref 135–146)

## 2025-05-28 ENCOUNTER — APPOINTMENT (OUTPATIENT)
Facility: CLINIC | Age: 75
End: 2025-05-28
Payer: MEDICARE

## 2025-05-28 VITALS
OXYGEN SATURATION: 98 % | TEMPERATURE: 97.5 F | HEART RATE: 104 BPM | BODY MASS INDEX: 34.78 KG/M2 | WEIGHT: 173 LBS | RESPIRATION RATE: 16 BRPM | SYSTOLIC BLOOD PRESSURE: 103 MMHG | DIASTOLIC BLOOD PRESSURE: 65 MMHG

## 2025-05-28 DIAGNOSIS — I11.0 HYPERTENSIVE HEART DISEASE WITH HEART FAILURE: ICD-10-CM

## 2025-05-28 DIAGNOSIS — E11.628 TYPE 2 DIABETES MELLITUS WITH OTHER SKIN COMPLICATIONS: ICD-10-CM

## 2025-05-28 DIAGNOSIS — E11.69 TYPE 2 DIABETES MELLITUS WITH OTHER SPECIFIED COMPLICATION, WITHOUT LONG-TERM CURRENT USE OF INSULIN: ICD-10-CM

## 2025-05-28 DIAGNOSIS — I10 ESSENTIAL HYPERTENSION: ICD-10-CM

## 2025-05-28 DIAGNOSIS — G89.29 OTHER CHRONIC PAIN: ICD-10-CM

## 2025-05-28 DIAGNOSIS — M54.14 SPINAL STENOSIS OF THORACIC REGION WITH RADICULOPATHY: ICD-10-CM

## 2025-05-28 DIAGNOSIS — Z17.1 MALIGNANT NEOPLASM OF BOTH BREASTS IN FEMALE, ESTROGEN RECEPTOR NEGATIVE, UNSPECIFIED SITE OF BREAST: ICD-10-CM

## 2025-05-28 DIAGNOSIS — C50.911 MALIGNANT NEOPLASM OF BOTH BREASTS IN FEMALE, ESTROGEN RECEPTOR NEGATIVE, UNSPECIFIED SITE OF BREAST: ICD-10-CM

## 2025-05-28 DIAGNOSIS — M48.04 SPINAL STENOSIS OF THORACIC REGION WITH RADICULOPATHY: ICD-10-CM

## 2025-05-28 DIAGNOSIS — E11.9 TYPE 2 DIABETES MELLITUS WITHOUT COMPLICATION, WITHOUT LONG-TERM CURRENT USE OF INSULIN: ICD-10-CM

## 2025-05-28 DIAGNOSIS — M48.062 SPINAL STENOSIS, LUMBAR REGION WITH NEUROGENIC CLAUDICATION: ICD-10-CM

## 2025-05-28 DIAGNOSIS — C50.912 MALIGNANT NEOPLASM OF BOTH BREASTS IN FEMALE, ESTROGEN RECEPTOR NEGATIVE, UNSPECIFIED SITE OF BREAST: ICD-10-CM

## 2025-05-28 DIAGNOSIS — E66.01 MORBID (SEVERE) OBESITY DUE TO EXCESS CALORIES (MULTI): ICD-10-CM

## 2025-05-28 DIAGNOSIS — G47.33 OSA (OBSTRUCTIVE SLEEP APNEA): ICD-10-CM

## 2025-05-28 DIAGNOSIS — I50.9 HEART FAILURE, UNSPECIFIED HF CHRONICITY, UNSPECIFIED HEART FAILURE TYPE: ICD-10-CM

## 2025-05-28 DIAGNOSIS — E03.9 HYPOTHYROIDISM, UNSPECIFIED TYPE: Primary | ICD-10-CM

## 2025-05-28 RX ORDER — HYDROCHLOROTHIAZIDE 25 MG/1
25 TABLET ORAL DAILY
Start: 2025-05-28

## 2025-05-28 NOTE — ASSESSMENT & PLAN NOTE
12/2021 sleep study (severe with SpO2 88% and AHI 36.1).  Intolerant to CPAP x several tries.    PLAN:  Previously had consult for Jose with ENT but has chosen not to further pursue at this time. Does not wish to undergo any medical therapies for the sleep apnea. She is currently on GLP-1 for DM and advised that weight loss can help with sleep apnea as well.

## 2025-05-28 NOTE — PROGRESS NOTES
[facility]    [address]  [place] [address]    Phone: [phone]    Progress Note     Subjective   Patient ID: Raiza Nguyễn is a 74 y.o. female who presents for Follow-up (Diabetes,  HTN, thyroid, ).    HPI    Patient presents today for routine follow-up.    Interim Hx:    # Smith's esophagus  Follows with GI - Dr. Rikki Montez @   She has had BE for years and getting surveyed.   Recent EGD showed LGD.   Patient enrolled in SURVENT 4/2025 per GI    Chronic Conditions Reviewed at Visit Today:   DM, type 2  Previously lifestyle managed, started on medical therapies in spring 2024 with A1c trending upward.    Co-managed with clinical pharmacist. Last visit 1/16/2025, to follow-up in 1 year for renewal.     Hgb A1c: 5.7 in 5/2025, 5.9 in 11/2024, 6.9 in 5/2024, 6.5 in 1/2024, 6.2 in 7/2023  Albumin: 8/2024 negative  Eye exam: 10/2024    Current regimen:  -Metformin 500 mg daily, started 4/2024  -Mounjaro 5 mg weekly, started 4/2024, decreased at prior visit in 11/2024 due to improved A1c, nausea, and disinterest in food    Prior medications:   None    Recent fasting glucose averaging in 90s in AM. Highest sugar in last month was 165 post prandially. Lowest blood sugars was 74, found she had vision changes, dizziness when this low.    She notes some constipation that she is able to manage with night time snack of dried prunes. Unsure if this is related to GLP-1, narcotics, or her baseline bowel habits. Since able to manage with the prunes does not wish to further address.    HTN  Current regimen:  Lisinopril 5 mg twice daily  HCTZ 25 mg daily - holds if feeling dehydrated.    Medications are being taken and tolerated well. No side effects are reported.    Patient is taking blood pressure outside of office and reports good control - typically in 120-30s so /65 is outlier per patient report.    Patient denies chest pain, shortness of breath with  exertion, palpitations, lower extremity edema, headache, or dizziness.     HLD/CAD/Aortic atherosclerosis  Taking Rosuvastatin 5 mg daily.  10/2020 CT A/P showed mild aortic atherosclerosis, no CACS.  06/2024 TC/HDL ratio 2.1, LDL 59, Trig 70    Hypothyroidism  CURRENT DOSE: Synthroid 125 mcg daily.  9/2024 TSH, T3, T4 all wnl    Medication is being taken as directed and is well-tolerated.   Patient denies heat or cold intolerance, diarrhea, coarsening of hair, or palpitations.     BOLIVAR, severe  12/2021 sleep study (severe with SpO2 88% and AHI 36.1).  Intolerant to CPAP x several tries.     She had inspire consult with ENT Dr. Stephens in September 2023 however she joined a group with people who had the inspire and based off this experience she chose not to proceed with this but has opted to pursue conservative measure of weight reduction prior to having this done.     Given progression into diabetes and comorbidities of obesity, hypertension, hyperlipidemia, coronary artery disease, chronic pain due to numerous ortho issues, and obstructive sleep apnea intolerant to CPAP I do feel patient would benefit from GPL-1 for both better control of sugars and weight reduction.      Prescription for mounjaro sent to pharmacy, see DM section    Disinclined to pursue Inspire at this time.    Chronic Pain  Taking Duloxetine 60 mg + Gabapentin daily.  S/p evaluation with [facility] rheumatology.  Plan per rheum pre 10/2022 OV NOTE:  --For Pseudogout flares of the wrist - Prednisone 12 day taper  --Continue Gapabentin 600 mg three times a day  --Tylenol 1000 mg up to 3x/day  --Voltaren gel up to 4x/day  --Continue home occupational therapy exercises   --Follow up as needed      She follows with spinal specialist @ [facility], seeing him in 2 weeks     7/2023 CT A/P showed multilevel degenerative changes of the lower cervical and thoracic spine are present, with likely at least mild-to-moderate spinal canal narrowing present in the thoracic  spine,: The least moderate to severe spinal canal stenosis present at L3-L4 and L4-L5 due to bulging disc, hypertrophic facet changes and ligamentum flavum thickening, incompletely characterized on current exam.     PCP Plan per 4/11/2024 OV:  Patient with limited mobility secondary to low back pain, multiple ortho issues.     Reviewed CT A/P pelvis from July 2023 as noted below. Patient advised to follow-up with her ortho @ [facility] regarding latest imaging and current state of mobility as it is worsening and very limiting for ADLs. Copy of CT A/P provided for patient to take to her ortho to review as well. She has appointment with ortho in 2 weeks per patient. Continue current medication regimen as previously prescribed other than the diabetic meds that were added today.    She is currently on Tramadol 50 mg 2 tabs daily - gets rx'ed by outside provider (PM) who is managing  Having to use Senna with this, had diarrhea on MiraLAX in past.    She endorses back pain and weakness in all 4 extremities, baseline for her condition. She is undergoing MRI of cervical spine per pain management. She rates her pain as 7 out of 10 at the worst. Her pain and discomfort does limit her, particularly with activities such as lifting, gardening, or anything involving kneeling. She typically takes the Tramadol 2-3 times per day, this along with other pain meds brings her pain down pugh 4-5 out of 10. Occasionally tries to back down on dose of gabapentin when feeling well but can tell she is on lower dose as pain is more noticeable.     Patient lives alone and manages all her own cooking, cleaning, and shopping. She reports she does the cleaning tasks such as vacuuming in small increments. I.e. she will vacuum for 10 minutes then rest for 10 minutes. She notes she is limited secondary to pain, weakness, poor balance. She notes last fall was 1 year ago, very cautious with ambulating and uses cane 95% of the time 2/2 inability to bear  weight on right knee. Has to lead with LLE up stairs or else unable to navigate. Unable to move beyond 90 degree flexion on left knee post-operatively. Initially had better ROM after left knee replacement but then decreased.    No new bowel or bladder incontinence, no saddle anaesthesia.     Breast Cancer  Followed by heme/onc, previously Dr. Wilson, now Dr. Bourne  Follows with breast surgeon (Dr. Jeniffer Langley)     Left breast IDC g3 ER0% CO 0% HER2 positive at 3:00 10cmFN measuring 0.9cm on MRI  -concerning level 3 lymph node not amenable to biopsy.  PET CT recommended for further assessment by radiology.  PET negative.  -s/p bilateral completion mastectomy  -pT1cNx     2.  History of bilateral breast cancer and known BRCA2 mutation     Stage IB (Prognostic Stage IA) Right breast cancer 7/2018, [id number], grade 2 IDC, ER+95% CO+95% HER2 equivocal, FISH-.   - s/p R. magseed pm / SLNB (2mi/3) on 8/27/2018 with a 1.5cm IDC and negative margins. She had 2 out of 3 LNs with micrometastases measuring 0.4mm and 1.1mm, no NASH.   - Oncotype 17   - s/p WBRT 10/11/2018 - 11/1/2018   - taking Exemestane      Stage II Left breast cancer in 2003, 1.5 cm grade 3 IDC with positive LNs and NASH, ER+ CO+ HER2 not defined.   - L.pm/ALND   - ACx4 + Tx4   - WBRT   - Anastrozole x5yr    Was previously receiving taxol/herceptin under direction of hematology. Treatment course complicated by chest wall infection for which chemotherapy has been held.  We continued on Herceptin alone to complete 1 year of therapy. Resume Herceptin after treatment break to assess side effects. Received 2 doses of weekly paclitaxel, discontinued given development of breast infection and delayed wound healing.     Per 4/14/2025 visit note with cardio-onc, Dr. Hartley:  Ms. Nguyễn is a 74F with a PMHx sig for breast CA previously treated with adriamycin/cyclophosphamide with recurrence (ER/CO negative, HER2 positive) s/p b/l mastectomy currently on  trastuzumab, essential HTN, and hypothyroidism who returns to the Cardio-Oncology clinic for ongoing evaluation and management.      1) High risk medication use  History of anthracycline, now currently on trastuzumab. Serial echos with stable biventricular function.   -c/w echo surveillance q3mo     2) Light headed/dizzy  Notes symptoms when turning her head side-to-side. No carotid bruits auscultated.   -refer for b/l carotid duplex     3) Essential HTN  BP controlled   -c/w lisinopril and hydrochlorothiazide      F/U: 6 months with Cardio-Onc [facility]    Per 4/28/2025 telephone note with heme-onc, Dr. Bourne:  I called patient to review results of PET scan. We discussed these findings in detail. No evidence of cancer recurrence. I have recommended consideration of CT chest in 3-6 months to follow-up on ground-glass haziness within apex of left lung,  likely inflammatory, will further discuss at her future visits. We also will check updated thyroid studies to follow-up on diffuse FDG uptake within thyroid gland, orders placed. Patient was appreciative of the call. All of the patient's questions were answered and concerns were addressed. The patient has no further needs at this time. Follow-up in place for 7/8/2025 for surveillance visit with Kelsey Mccollum NP.     Review of Systems  The full, multi-organ review of systems, is within normal limits with the exception of what is noted above in HPI.      RX Allergies[1]    Current Medications[2]    Problem List[3]    Objective   /65 (BP Location: Right arm, Patient Position: Sitting, BP Cuff Size: Adult)   Pulse 104   Temp 36.4 °C (97.5 °F) (Temporal)   Resp 16   Wt 78.5 kg (173 lb)   SpO2 98%   BMI 34.78 kg/m²      Physical Exam  Vitals and nursing note reviewed.   Constitutional:       General: She is not in acute distress.     Appearance: Normal appearance. She is not toxic-appearing.   HENT:      Head: Normocephalic and atraumatic.   Cardiovascular:       Rate and Rhythm: Normal rate and regular rhythm.      Heart sounds: Normal heart sounds. No murmur heard.     No friction rub. No gallop.   Pulmonary:      Effort: Pulmonary effort is normal.      Breath sounds: Normal breath sounds. No wheezing, rhonchi or rales.   Musculoskeletal:      Right lower leg: No edema.      Left lower leg: No edema.      Comments:   Diminished sensation mid to low thoracic bilaterally, left greater than right.  Diminished sensation and muscle atrophy noted right quad.  Strength 4/5 hip flexors on right, 5/5 on left.  Strength 4/5 plantar flexion on right, 5/5 on left.  Strength 5/5 dorsiflexion and EHL bilaterally.    Good ROM bilateral hips.    Unable to go past 90 degree flexion left knee.    Thenar muscle atrophy noted bilaterally.  Insensate to index finger right hand  Decreased sensation to thumb, middle and part of ring finger bilateral hands.    Gait leaning heavily on cane, unable to bear full weight RLE due to weakness.   Skin:     General: Skin is warm and dry.   Neurological:      General: No focal deficit present.      Mental Status: She is alert and oriented to person, place, and time.   Psychiatric:         Mood and Affect: Mood normal.         Behavior: Behavior normal.         Assessment/Plan   Assessment & Plan  Essential hypertension  /70 in office today, reports systolic averaging in 130s at home.  Goal BP is 130/80 or less, ideally 120/80 or less.     Plan:  -Continue Lisinopril 5 mg twice daily  -Continue HCTZ 25 mg as needed, uses most days unless feeling dehydrated.  -Continue with home monitoring, if finds BP running consistently below 110/70s can decrease the Lisinopril to from 5 mg twice daily to 5 mg once daily.      Patient to check BP regularly at home and keep a diary - DO THIS 1 HOUR AFTER TAKING MEDS.   This should be taken after sitting with feet flat on floor and resting for 5 minutes.   Arm should be level with your heart.   New guidelines  recommend goal for blood pressure less than 130/80.   Ideally for stroke and heart attack risk reduction the systolic, or top, blood pressure number should be in the 110's or 120's.    PLEASE BRING BP CUFF IN TO NEXT VISIT FOR VALIDATION - TAKE YOUR BP @ HOME BEFORE YOU COME!     Orders:  •  hydroCHLOROthiazide (HYDRODiuril) 25 mg tablet; Take 1 tablet (25 mg) by mouth once daily.    Hypothyroidism, unspecified type  CURRENT DOSE: Synthroid 125 mcg daily.    PLAN:  -9/2024 TSH, T3, T4 all wnl. Continue current Synthroid regimen.  -Oncology rechecking TFTs since patient symptomatic, results to publish to portal.         Spinal stenosis of thoracic region with radiculopathy  Known spinal stenosis in lumbar and thoracic spine per previous imaging. Patient now with numbness in trunk not just in areas of prior surgeries. She is also having worsening of gait and balance with weakness in her legs with recurrent falls. Awaiting MR cervical spine via pain management but feel would be prudent to update imaging of entire spine. She will be getting MR c-spine at [facility] - will call to see if pain management if can see MR of lumbar and thoracic spine if also done there.    Orders:  •  MR thoracic spine wo IV contrast; Future    Spinal stenosis, lumbar region with neurogenic claudication  Known spinal stenosis in lumbar and thoracic spine per previous imaging. Patient now with numbness in trunk not just in areas of prior surgeries. She is also having worsening of gait and balance with weakness in her legs with recurrent falls. Awaiting MR cervical spine via pain management but feel would be prudent to update imaging of entire spine. She will be getting MR c-spine at [facility] - will call to see if pain management if can see MR of lumbar and thoracic spine if also done there.    Orders:  •  MR lumbar spine wo IV contrast; Future    Malignant neoplasm of both breasts in female, estrogen receptor negative, unspecified site of breast  Per  4/14/2025 visit note with cardio-onc, Dr. Hartley:  Ms. Nguyễn is a 74F with a PMHx sig for breast CA previously treated with adriamycin/cyclophosphamide with recurrence (ER/[place] negative, HER2 positive) s/p b/l mastectomy currently on trastuzumab, essential HTN, and hypothyroidism who returns to the Cardio-Oncology clinic for ongoing evaluation and management.      1) High risk medication use  History of anthracycline, now currently on trastuzumab. Serial echos with stable biventricular function.   -c/w echo surveillance q3mo     2) Light headed/dizzy  Notes symptoms when turning her head side-to-side. No carotid bruits auscultated.   -refer for b/l carotid duplex     3) Essential HTN  BP controlled   -c/w lisinopril and hydrochlorothiazide      F/U: 6 months with Cardio-Onc [facility]    Per 4/28/2025 telephone note with heme-onc, Dr. Bourne:  I called patient to review results of PET scan. We discussed these findings in detail. No evidence of cancer recurrence. I have recommended consideration of CT chest in 3-6 months to follow-up on ground-glass haziness within apex of left lung,  likely inflammatory, will further discuss at her future visits. We also will check updated thyroid studies to follow-up on diffuse FDG uptake within thyroid gland, orders placed. Patient was appreciative of the call. All of the patient's questions were answered and concerns were addressed. The patient has no further needs at this time. Follow-up in place for 7/8/2025 for surveillance visit with Kelsey Mccollum NP.     PLAN:  Reviewed oncology and cardio-oncology notes as noted above.   Continue current management as per specialist.            Other chronic pain  Currently on Tramadol and Gabapentin  Controlled substance rx'ed and managed by Pain Management provider.  Reports uses Senna and prunes for bowels as she experienced diarrhea on MiraLAX in past.         Type 2 diabetes mellitus without complication, without long-term current  use of insulin  Hgb A1c: 5.7 in 1/2025, 5.9 in 11/2024, 6.9 in 5/2024, 6.5 in 1/2024, 6.2 in 7/2023  Albumin: 8/2024 negative  Eye exam: 10/2024    PLAN:  Most recent A1c was 5.7 in office in May 2025 - this is GREAT control :)  Since doing well she will continue on current regimen of Mounjaro 5 mg weekly and Metformin 500 mg daily.  DM meds co-managed with clinical pharmacy - continue to follow-up with them as they have recommended.  Repeat A1c 6 months from since well-controlled. Can do at 3 months if has dosage change or any sooner if symptoms warrant.      DM RECS:  Monitor A1c on routine basis.              --->A1c due every 6 months when under good control              --->A1c due every 3 months when not under good control.  Monitor urine albumin on routine basis.  --->Checking kidneys for leaking of protein should be done yearly.   Diabetic eye exams (with dilation) are recommended once a year.   Foot checks on routine basis  --->Once yearly via PCP or podiatrist  --->Patients should check daily for any cracks, blisters, calluses or skin breakdown if there is any neuropathy.  Good control of diabetes can decrease the risk of kidney damage, neuropathy, and vision loss from diabetes.   Lifestyle recommendations:  --->Make sure you are avoiding refined carbs such as breads, pasta, cereal, candy, soda,  nutrition bars, granola, chips, and sugar sweetened beverages.    --->Eat 5- 7 servings daily of veggies,  healthy protein such as chicken, fish,  beans, and eggs, and include healthy fats in your diet such as seeds, nuts, olive oil, avocados, and salmon.   --->Exercise 4 - 6 days per week as you are able, 150 minutes total weekly divided up is recommended.          BOLIVAR (obstructive sleep apnea)  12/2021 sleep study (severe with SpO2 88% and AHI 36.1).  Intolerant to CPAP x several tries.    PLAN:  Previously had consult for Jose with ENT but has chosen not to further pursue at this time. Does not wish to  undergo any medical therapies for the sleep apnea. She is currently on GLP-1 for DM and advised that weight loss can help with sleep apnea as well.             Follow-up:  Follow-up with me in 3 months for routine care.  Call for sooner follow-up if needed.       Time Spent  Prep time on day of patient encounter: 7 minutes  Time spent directly with patient, family or caregiver: 50 minutes  Additional Time Spent on Patient Care Activities: 10 minutes  Documentation Time: 13 minutes  Other Time Spent: 0 minutes  Total: 80 minutes            Attestation:  Scribe Attestation  By signing my name below, I Claudia John Hansen   attest that this documentation has been prepared under the direction and in the presence of Shoshana Olivarez DO.             [1]  Allergies  Allergen Reactions   • Erythromycin GI bleeding   • Tetracycline GI bleeding   • Cephalexin Unknown   • Glucosamine Unknown   • Adhesive Itching and Rash   [2]    Current Outpatient Medications:   •  blood sugar diagnostic strip, 90 strips once daily., Disp: 100 strip, Rfl: 3  •  calcium carbonate 600 mg calcium (1,500 mg) tablet, Take 600 mg by mouth once daily., Disp: , Rfl:   •  DULoxetine (Cymbalta) 30 mg DR capsule, Take 1 capsule (30 mg) by mouth 2 times a day., Disp: , Rfl:   •  exemestane (Aromasin) 25 mg tablet, Take 1 tablet (25 mg total) by mouth once daily.  Take after a meal.  Try to take at the same time each day., Disp: , Rfl:   •  gabapentin (Neurontin) 300 mg capsule, Take 3 capsules (900 mg) by mouth 3 times a day., Disp: , Rfl:   •  lansoprazole (Prevacid) 30 mg DR capsule, Take 1 capsule (30 mg) by mouth 2 times a day before meals. Do not crush or chew., Disp: 180 capsule, Rfl: 3  •  levothyroxine (Synthroid, Levoxyl) 125 mcg tablet, Take 1 tablet (125 mcg) by mouth early in the morning.., Disp: 90 tablet, Rfl: 3  •  lisinopril 5 mg tablet, Take 1 tablet (5 mg) by mouth 2 times a day., Disp: 180 tablet, Rfl: 1  •  metFORMIN   mg 24 hr tablet, Take 1 tablet (500 mg) by mouth once daily., Disp: 90 tablet, Rfl: 1  •  rosuvastatin (Crestor) 5 mg tablet, TAKE 1 TABLET EVERY DAY, Disp: 90 tablet, Rfl: 3  •  tirzepatide (Mounjaro) 5 mg/0.5 mL pen injector, Inject 5 mg under the skin 1 (one) time per week., Disp: 2 mL, Rfl: 3  •  traMADol (Ultram) 50 mg tablet, Take 2 tablets (100 mg) by mouth once daily at bedtime., Disp: , Rfl:   •  hydroCHLOROthiazide (HYDRODiuril) 25 mg tablet, Take 1 tablet (25 mg) by mouth once daily., Disp: , Rfl: [3]  Patient Active Problem List  Diagnosis   • Abnormal EKG   • Arteriosclerotic coronary artery disease   • Atherosclerosis of aorta   • Smith's esophagus   • Bilateral breast cancer   • Bilateral carpal tunnel syndrome   • BRCA2 positive   • Chronic pain   • Diabetes mellitus, type 2 (Multi)   • Essential hypertension   • Hyperlipidemia   • Hypothyroidism   • Low back pain   • BOLIVAR (obstructive sleep apnea)   • Osteoarthritis of shoulder   • Spondylolisthesis of lumbar region   • Complication of ventilation therapy   • Abnormal MRI, breast   • Brachial neuritis   • Cervical spinal stenosis   • Cervical spondylosis   • Lumbar stenosis   • Lumbosacral spondylosis   • Gastroesophageal reflux disease   • Homocystinemia   • Intervertebral disc disorder of lumbar region with myelopathy   • Osteoporosis   • Chronic rhinitis   • Trigger finger of left thumb   • Personal history of malignant melanoma of skin   • Mixed anxiety depressive disorder   • Lumbosacral plexus lesion   • Cystocele with prolapse   • Cholelithiasis   • Asymptomatic postmenopausal status   • Spinal stenosis of lumbar region with neurogenic claudication   • DDD (degenerative disc disease), lumbosacral   • Arthritis of knee   • Long term (current) use of aromatase inhibitors   • Skin changes due to chronic exposure to nonionizing radiation   • Medicare annual wellness visit, subsequent   • Diverticulosis of large intestine without perforation or  abscess without bleeding   • Malignant neoplasm of central portion of left breast in female, estrogen receptor negative   • S/P mastectomy, bilateral   • Poor balance   • Acute deep vein thrombosis (DVT) of non-extremity vein   • Tremor   • Acute viral conjunctivitis of left eye   • Dry eyes, bilateral   • Class 1 obesity with serious comorbidity and body mass index (BMI) of 34.0 to 34.9 in adult   • Left knee pain   • Left leg weakness   • Vaginal atrophy   • CHF (congestive heart failure)   • Spinal stenosis of thoracic region with radiculopathy   • Spinal stenosis, lumbar region with neurogenic claudication   Patient was identified as a fall risk. Risk prevention instructions provided.

## 2025-05-28 NOTE — ASSESSMENT & PLAN NOTE
Known spinal stenosis in lumbar and thoracic spine per previous imaging. Patient now with numbness in trunk not just in areas of prior surgeries. She is also having worsening of gait and balance with weakness in her legs with recurrent falls. Awaiting MR cervical spine via pain management but feel would be prudent to update imaging of entire spine. She will be getting MR c-spine at [facility] - will call to see if pain management if can see MR of lumbar and thoracic spine if also done there.    Orders:    MR thoracic spine wo IV contrast; Future

## 2025-05-28 NOTE — ASSESSMENT & PLAN NOTE
Known spinal stenosis in lumbar and thoracic spine per previous imaging. Patient now with numbness in trunk not just in areas of prior surgeries. She is also having worsening of gait and balance with weakness in her legs with recurrent falls. Awaiting MR cervical spine via pain management but feel would be prudent to update imaging of entire spine. She will be getting [place] [facility] [place] will call to see if pain management if can see MR of lumbar and thoracic spine if also done there.    Orders:    MR lumbar spine wo IV contrast; Future

## 2025-05-28 NOTE — ASSESSMENT & PLAN NOTE
Per 4/14/2025 visit note with cardio-onc, Dr. Hartley:  Ms. Nguyễn is a 74F with a PMHx sig for breast CA previously treated with adriamycin/cyclophosphamide with recurrence (ER/OR negative, HER2 positive) s/p b/l mastectomy currently on trastuzumab, essential HTN, and hypothyroidism who returns to the Cardio-Oncology clinic for ongoing evaluation and management.      1) High risk medication use  History of anthracycline, now currently on trastuzumab. Serial echos with stable biventricular function.   -c/w echo surveillance q3mo     2) Light headed/dizzy  Notes symptoms when turning her head side-to-side. No carotid bruits auscultated.   -refer for b/l carotid duplex     3) Essential HTN  BP controlled   -c/w lisinopril and hydrochlorothiazide      F/U: 6 months with Cardio-Onc [facility]    Per 4/28/2025 telephone note with heme-onc, Dr. Bourne:  I called patient to review results of PET scan. We discussed these findings in detail. No evidence of cancer recurrence. I have recommended consideration of CT chest in 3-6 months to follow-up on ground-glass haziness within apex of left lung,  likely inflammatory, will further discuss at her future visits. We also will check updated thyroid studies to follow-up on diffuse FDG uptake within thyroid gland, orders placed. Patient was appreciative of the call. All of the patient's questions were answered and concerns were addressed. The patient has no further needs at this time. Follow-up in place for 7/8/2025 for surveillance visit with Kelsey Mccollum NP.     PLAN:  Reviewed oncology and cardio-oncology notes as noted above.   Continue current management as per specialist.

## 2025-05-28 NOTE — PATIENT INSTRUCTIONS
For weakness/ falls -   ortho to evaluate knee early June (SWG)     Spinal stenosis c, thoracic, and l spine -     pain mgt at [facility] ordered MRI c spine (hand weakness, thenar muscle atrophy, known spinal stenosis c, t, and l spine) to be done at [facility]     With weakness in right leg and numbness along mid trunk left side more than right, would add thoracic and lumbar spine to MRI     I'll message Dr. Sánchez (neurosurg) to see if SWG ok to do imaging and let pt know     Next blood work patient does for any provider, would add in thyroid for recheck, though normal last fall    Diabetes and blood pressure -   stable, continue current medications,   break fast with protein in mornings with your meds to avoid low sugars,   hold second dose lisinopril 5 mg in afternoon if blood pressure top number less than 110's     Follow up in 6 - 8 weeks to review MRI's,  notes from pain mgt and ortho at [facility] (patient to request that [facility] doc send me their notes and studies)           Ways to Help Prevent Falls at Home    Quick Tips   ? Ask for help if you need it. Most people want to help!   ? Get up slowly after sitting or laying down   ? Wear a medical alert device or keep cell phone in your pocket   ? Use night lights, especially areas near a bathroom   ? Keep the items you use often within reach on a small stool or end table   ? Use an assistive device such as walker or cane, as directed by provider/physical therapy   ? Use a non-slip mat and grab bars in your bathroom. Look for home health sections for best options     Other Areas to Focus On   ? Exercise and nutrition: Regular exercise or taking a falls prevention class are great ways improve strength and balance. Don’t forget to stay hydrated and bring a snack!   ? Medicine side effects: Some medicines can make you sleepy or dizzy, which could cause a fall. Ask your healthcare provider about the side effects your medicines could cause. Be sure to let them know if you take any vitamins  or supplements as well.   ? Tripping hazards: Remove items you could trip on, such as loose mats, rugs, cords, and clutter. Wear closed toe shoes with rubber soles.   ? Health and wellness: Get regular checkups with your healthcare provider, plus routine vision and hearing screenings. Talk with your healthcare provider about:   o Your medicines and the possible side effects - bring them in a bag if that is easier!   o Problems with balance or feeling dizzy   o Ways to promote bone health, such as Vitamin D and calcium supplements   o Questions or concerns about falling     *Ask your healthcare team if you have questions     ©[facility], 2022

## 2025-05-28 NOTE — ASSESSMENT & PLAN NOTE
Hgb A1c: 5.7 in 1/2025, 5.9 in 11/2024, 6.9 in 5/2024, 6.5 in 1/2024, 6.2 in 7/2023  Albumin: 8/2024 negative  Eye exam: 10/2024    PLAN:  Most recent A1c was 5.7 in office in May 2025 - this is GREAT control :)  Since doing well she will continue on current regimen of Mounjaro 5 mg weekly and Metformin 500 mg daily.  DM meds co-managed with clinical pharmacy - continue to follow-up with them as they have recommended.  Repeat A1c 6 months from since well-controlled. Can do at 3 months if has dosage change or any sooner if symptoms warrant.      DM RECS:  Monitor A1c on routine basis.              --->A1c due every 6 months when under good control              --->A1c due every 3 months when not under good control.  Monitor urine albumin on routine basis.  --->Checking kidneys for leaking of protein should be done yearly.   Diabetic eye exams (with dilation) are recommended once a year.   Foot checks on routine basis  --->Once yearly via PCP or podiatrist  --->Patients should check daily for any cracks, blisters, calluses or skin breakdown if there is any neuropathy.  Good control of diabetes can decrease the risk of kidney damage, neuropathy, and vision loss from diabetes.   Lifestyle recommendations:  --->Make sure you are avoiding refined carbs such as breads, pasta, cereal, candy, soda,  nutrition bars, granola, chips, and sugar sweetened beverages.    --->Eat 5- 7 servings daily of veggies,  healthy protein such as chicken, fish,  beans, and eggs, and include healthy fats in your diet such as seeds, nuts, olive oil, avocados, and salmon.   --->Exercise 4 - 6 days per week as you are able, 150 minutes total weekly divided up is recommended.

## 2025-05-28 NOTE — ASSESSMENT & PLAN NOTE
Currently on Tramadol and Gabapentin  Controlled substance rx'ed and managed by Pain Management provider.  Reports uses Senna and prunes for bowels as she experienced diarrhea on MiraLAX in past.

## 2025-05-28 NOTE — ASSESSMENT & PLAN NOTE
/70 in office today, reports systolic averaging in 130s at home.  Goal BP is 130/80 or less, ideally 120/80 or less.     Plan:  -Continue Lisinopril 5 mg twice daily  -Continue HCTZ 25 mg as needed, uses most days unless feeling dehydrated.  -Continue with home monitoring, if finds BP running consistently below 110/70s can decrease the Lisinopril to from 5 mg twice daily to 5 mg once daily.      Patient to check BP regularly at home and keep a diary - DO THIS 1 HOUR AFTER TAKING MEDS.   This should be taken after sitting with feet flat on floor and resting for 5 minutes.   Arm should be level with your heart.   New guidelines recommend goal for blood pressure less than 130/80.   Ideally for stroke and heart attack risk reduction the systolic, or top, blood pressure number should be in the 110's or 120's.    PLEASE BRING BP CUFF IN TO NEXT VISIT FOR VALIDATION - TAKE YOUR BP @ HOME BEFORE YOU COME!     Orders:    hydroCHLOROthiazide (HYDRODiuril) 25 mg tablet; Take 1 tablet (25 mg) by mouth once daily.

## 2025-05-28 NOTE — ASSESSMENT & PLAN NOTE
CURRENT DOSE: Synthroid 125 mcg daily.    PLAN:  -9/2024 TSH, T3, T4 all wnl. Continue current Synthroid regimen.  -Oncology rechecking TFTs since patient symptomatic, results to publish to portal.

## 2025-05-29 DIAGNOSIS — K22.710 BARRETT'S ESOPHAGUS WITH LOW GRADE DYSPLASIA: ICD-10-CM

## 2025-05-29 RX ORDER — CALC/MAG/B COMPLEX/D3/HERB 61
15 TABLET ORAL
Qty: 180 CAPSULE | Refills: 3 | Status: SHIPPED | OUTPATIENT
Start: 2025-05-29 | End: 2025-05-30

## 2025-06-03 PROCEDURE — RXMED WILLOW AMBULATORY MEDICATION CHARGE

## 2025-06-05 ENCOUNTER — PHARMACY VISIT (OUTPATIENT)
Dept: PHARMACY | Facility: CLINIC | Age: 75
End: 2025-06-05
Payer: COMMERCIAL

## 2025-06-06 ENCOUNTER — APPOINTMENT (OUTPATIENT)
Dept: RADIOLOGY | Facility: CLINIC | Age: 75
End: 2025-06-06
Payer: MEDICARE

## 2025-06-09 ENCOUNTER — INFUSION (OUTPATIENT)
Dept: HEMATOLOGY/ONCOLOGY | Facility: CLINIC | Age: 75
End: 2025-06-09
Payer: MEDICARE

## 2025-06-09 VITALS
TEMPERATURE: 96.6 F | HEART RATE: 93 BPM | SYSTOLIC BLOOD PRESSURE: 122 MMHG | BODY MASS INDEX: 34.53 KG/M2 | OXYGEN SATURATION: 95 % | RESPIRATION RATE: 16 BRPM | WEIGHT: 171.74 LBS | DIASTOLIC BLOOD PRESSURE: 75 MMHG

## 2025-06-09 DIAGNOSIS — C50.112 MALIGNANT NEOPLASM OF CENTRAL PORTION OF LEFT BREAST IN FEMALE, ESTROGEN RECEPTOR NEGATIVE: ICD-10-CM

## 2025-06-09 DIAGNOSIS — Z17.1 MALIGNANT NEOPLASM OF CENTRAL PORTION OF LEFT BREAST IN FEMALE, ESTROGEN RECEPTOR NEGATIVE: ICD-10-CM

## 2025-06-09 PROCEDURE — 96401 CHEMO ANTI-NEOPL SQ/IM: CPT

## 2025-06-09 PROCEDURE — 2500000004 HC RX 250 GENERAL PHARMACY W/ HCPCS (ALT 636 FOR OP/ED): Mod: JZ,TB | Performed by: STUDENT IN AN ORGANIZED HEALTH CARE EDUCATION/TRAINING PROGRAM

## 2025-06-09 RX ORDER — EPINEPHRINE 0.3 MG/.3ML
0.3 INJECTION SUBCUTANEOUS EVERY 5 MIN PRN
Status: DISCONTINUED | OUTPATIENT
Start: 2025-06-09 | End: 2025-06-09 | Stop reason: HOSPADM

## 2025-06-09 RX ORDER — ALBUTEROL SULFATE 0.83 MG/ML
3 SOLUTION RESPIRATORY (INHALATION) AS NEEDED
Status: DISCONTINUED | OUTPATIENT
Start: 2025-06-09 | End: 2025-06-09 | Stop reason: HOSPADM

## 2025-06-09 RX ORDER — PROCHLORPERAZINE EDISYLATE 5 MG/ML
10 INJECTION INTRAMUSCULAR; INTRAVENOUS EVERY 6 HOURS PRN
Status: DISCONTINUED | OUTPATIENT
Start: 2025-06-09 | End: 2025-06-09 | Stop reason: HOSPADM

## 2025-06-09 RX ORDER — FAMOTIDINE 10 MG/ML
20 INJECTION, SOLUTION INTRAVENOUS ONCE AS NEEDED
Status: DISCONTINUED | OUTPATIENT
Start: 2025-06-09 | End: 2025-06-09 | Stop reason: HOSPADM

## 2025-06-09 RX ORDER — PROCHLORPERAZINE MALEATE 10 MG
10 TABLET ORAL EVERY 6 HOURS PRN
Status: DISCONTINUED | OUTPATIENT
Start: 2025-06-09 | End: 2025-06-09 | Stop reason: HOSPADM

## 2025-06-09 RX ORDER — DIPHENHYDRAMINE HYDROCHLORIDE 50 MG/ML
50 INJECTION, SOLUTION INTRAMUSCULAR; INTRAVENOUS AS NEEDED
Status: DISCONTINUED | OUTPATIENT
Start: 2025-06-09 | End: 2025-06-09 | Stop reason: HOSPADM

## 2025-06-09 RX ADMIN — TRASTUZUMAB AND HYALURONIDASE-OYSK 600 MG: 600; 10000 INJECTION, SOLUTION SUBCUTANEOUS at 10:30

## 2025-06-09 ASSESSMENT — PAIN SCALES - GENERAL: PAINLEVEL_OUTOF10: 7

## 2025-06-09 NOTE — PROGRESS NOTES
"LV EF   Date Value Ref Range Status   04/02/2025 65 %    01/02/2025 63 %    10/02/2024 63 %     Scheduled next echo July 14th-   Denies swelling  [facility] Infusion Note      Raiza Nguyễn is a 74 y.o. year old female here today,06/09/25,  in the [facility] infusion center for  following treatment regimen:  Medications given today :    No results for input(s): \"NEUTROABS\", \"PLT\", \"AST\", \"ALT\", \"BILITOT\", \"CREATININE\", \"HCGQUANT\", \"HGB\" in the last 72 hours.   CrCl cannot be calculated (Patient's most recent lab result is older than the maximum 7 days allowed.).  BP Readings from Last 2 Encounters:   06/09/25 122/75   05/28/25 103/65       Hold treatment and notify provider if:,  LVEF LESS THAN 50%          Vitals:    06/09/25 0952   BP: 122/75   Pulse: 93   Resp: 16   Temp: 35.9 °C (96.6 °F)   SpO2: 95%    on intake  Vitals:    06/09/25 0952   Weight: 77.9 kg (171 lb 11.8 oz)       Body surface area is 1.8 meters squared.    Pt tolerated and was discharged to home in stable condition. Pt ambulated  off the unit with a slow and steady gait. Pt had no further questions or concerns at this time. Has follow up appointment appropriately scheduled. Verbalized understanding of follow up. Made aware that appointments and times are always available online on my chart.           "

## 2025-06-11 LAB
NON-UH HIE C-REACTIVE PROTEIN, QUANTITATIVE: <0.5 MG/DL (ref 0–0.5)
NON-UH HIE SED RATE WESTERGREN: 9 MM/HR (ref 0–30)

## 2025-06-12 ENCOUNTER — APPOINTMENT (OUTPATIENT)
Dept: RADIOLOGY | Facility: CLINIC | Age: 75
End: 2025-06-12
Payer: MEDICARE

## 2025-06-26 ENCOUNTER — APPOINTMENT (OUTPATIENT)
Dept: RADIOLOGY | Facility: CLINIC | Age: 75
End: 2025-06-26
Payer: MEDICARE

## 2025-06-26 ENCOUNTER — HOSPITAL ENCOUNTER (OUTPATIENT)
Dept: RADIOLOGY | Facility: CLINIC | Age: 75
Discharge: HOME | End: 2025-06-26
Payer: MEDICARE

## 2025-06-26 DIAGNOSIS — M48.062 SPINAL STENOSIS, LUMBAR REGION WITH NEUROGENIC CLAUDICATION: ICD-10-CM

## 2025-06-26 DIAGNOSIS — M54.14 SPINAL STENOSIS OF THORACIC REGION WITH RADICULOPATHY: ICD-10-CM

## 2025-06-26 DIAGNOSIS — M48.04 SPINAL STENOSIS OF THORACIC REGION WITH RADICULOPATHY: ICD-10-CM

## 2025-06-26 PROCEDURE — 72146 MRI CHEST SPINE W/O DYE: CPT

## 2025-06-26 PROCEDURE — 72148 MRI LUMBAR SPINE W/O DYE: CPT

## 2025-06-27 NOTE — PROGRESS NOTES
Chief Complaint:   Raiza Nguyễn is a 74 y.o. woman here for low back pain, lumbar spinal stenosis     HPI  NPV - Referred by Dr. Shoshana Olivarez for evaluation of back pain and weakness in all 4 extremities. She typically takes Tramadol 2-3 times per day and occasionally takes Gabapentin. She presents for evalution of spinal cord compression. MRI T/L spine done 6/26/25. She did physical therapy last year at [facility] and continues to do aquatic therapy now.      She had history of breast ca initially diagnosed 20+ years ago and has had chronic bilateral hand numbness from chemo. Over the last 6-7 years, however, she has had increased fine motor control issues - trouble with button, earrings, necklace. She hasn't been able to do these things for years now and this has gotten progressively worse. She has had reduced balance over this same time frame. She uses a cane. She has low back pain as well that has gotten worse. This is worse with standing and walking and better with rest. She has a positive shopping cart sign. She has no significant leg pain, but feels like she has trouble lifting her left foot all the way to clear a step and her right leg is going to buckle/give out at times when she is standing. She has no bowel/bladder incontinence.     Review of Systems  All other systems reviewed and negative other than what is already stated in this note.      Medical History[1]    Problem List[2]    Surgical History[3]    Family History[4]    Social History[5]    Current Medications[6]        Objective   There were no vitals filed for this visit.    no acute distress, well developed woman appearing her  stated age  normal sclera  moist mucus membranes  no peripheral edema   symmetric chest rise  nondistended abdomen  alert and oriented, pupils equal and round, extraocular movements intact,grossly full strength in all extremities, normal sensation to light touch throughout, reduced but symmetric  reflexes, no Coe/clonus, no dysmetria  normal mood      Assessment/Plan   I personally reviewed MRI of the thoracic and lumbar spine done on 6/26/2025 and compared to the prior MRI L spine done in 2020.  In the lumbar spine, there is significantly worse multilevel degenerative disc disease throughout. There is multilevel broad-based disc bulging, facet hypertrophy, and ligamentous hypertrophy.  This causes severe spinal canal stenosis at the L2-3 and L3-4 levels.  There is severe bilateral lateral recess stenosis at the L4-5 level.  There is at least moderate foraminal stenosis bilaterally at the L2-3 and L3-4 levels. In the thoracic spine, there is multi-level degenerative disc disease throughout as well, but no severe spinal cord compression. The T spine MRI does go high enough (and on the sagittal  images) that we can see that there clearly severe cervical spinal stenosis with spinal cord compression from C4 to C7. I also ordered and personally reviewed upright x-rays of the cervical spine and lumbar spine.  This does not show any instability/mobile spondylolisthesis.    Raiza Nguyễn has symptoms of cervical spondylotic myelopathy and neurogenic claudication due to lumbar spinal stenosis.  She will need surgery for both given the severity of neural compression, failure of conservative measures, progressive fine motor control and balance problems.  I recommended starting with the cervical spine.  She will likely need a C4-C6 cervical laminoplasty and partial C7 superior laminectomy, but we will confirm this after MRI c spine is complete, which we ordered.  After she heals and recovers from this we will plan to do a L2-L5 lumbar laminectomy, partial medial facetectomies, foraminotomies.  I explained the surgery and risks of the surgery including weakness, numbness, csf leak, infection, and spinal instability.          Nikita Sánchez MD               [1]   Past Medical History:  Diagnosis Date     Abnormal ECG 2022    ADD (attention deficit disorder) 1970    Adverse effect of anesthesia     Amblyopia     Amnesia 02/26/2024    Anemia 06/27/2024    Anxiety     Arthritis     Smith esophagus 2015    BRCA2 gene mutation positive 2004    Breast cancer 2002    Breast cyst 2000    Cancer (Multi)     Cervical disc disease 2000    Chest wall abscess 08/12/2024    Chronic pain disorder     Colon polyp 2016    Coronary artery disease     Depression     Developmental delay     Diabetes mellitus (Multi)     Disease of thyroid gland     Diverticulitis of colon 2022    Diverticulosis 2019    Dizziness 1/1/2023    Dry eyes     Esophageal disease 2012    GERD    Fibroid     Gait abnormality     GERD (gastroesophageal reflux disease)     Gestational diabetes 1983    Heart disease     Heart failure     History of total knee replacement 02/26/2024    History of total left knee replacement 01/18/2023    Hx antineoplastic chemo     Hyperlipidemia     Hypersomnia, unspecified 11/30/2021    Hypersomnolence disorder, persistent    Hypertension     Hyperthyroidism 1/1/1984    Hypothyroid     Joint pain 2000    Low back pain     Lumbar disc herniation 01/18/2023    History of    Lumbosacral disc disease     Melanoma (Multi) 2016    Obesity 1959    Osteoporosis     Other conditions influencing health status     Breast Cancer    Other intervertebral disc degeneration, lumbar region 06/27/2014    Disc degeneration, lumbar    Other intervertebral disc displacement, lumbar region 06/27/2014    Lumbar disc herniation    Personal history of irradiation 2004    Personal history of malignant melanoma of skin 10/16/2019    History of malignant melanoma of skin    Personal history of other diseases of the digestive system     History of esophageal reflux    Personal history of other endocrine, nutritional and metabolic disease 02/20/2022    History of vitamin D deficiency    Personal history of other endocrine, nutritional and metabolic disease      History of hyperglycemia    PONV (postoperative nausea and vomiting) 2002    They give me a shot to prevent vomiting    Scoliosis 2014    Sleep apnea     Spinal stenosis 2000    [place]romb[place] [facility] [place] [facility]    Thyrotoxicosis, unspecified without thyrotoxic crisis or storm     Hyperthyroidism    Type 2 diabetes mellitus    [2]   Patient Active Problem List  Diagnosis    Abnormal EKG    Arteriosclerotic coronary artery disease    Atherosclerosis of aorta    Smith's esophagus    Bilateral breast cancer    Bilateral carpal tunnel syndrome    BRCA2 positive    Chronic pain    Diabetes mellitus, type 2 (Multi)    Essential hypertension    Hyperlipidemia    Hypothyroidism    Low back pain    BOLIVAR (obstructive sleep apnea)    Osteoarthritis of shoulder    Spondylolisthesis of lumbar region    Complication of ventilation therapy    Abnormal MRI, breast    Brachial neuritis    Cervical spinal stenosis    Cervical spondylosis    Lumbar stenosis    Lumbosacral spondylosis    Gastroesophageal reflux disease    Homocystinemia    Intervertebral disc disorder of lumbar region with myelopathy    Osteoporosis    Chronic rhinitis    Trigger finger of left thumb    Personal history of malignant melanoma of skin    Mixed anxiety depressive disorder    Lumbosacral plexus lesion    Cystocele with prolapse    Cholelithiasis    Asymptomatic postmenopausal status    Spinal stenosis of lumbar region with neurogenic claudication    DDD (degenerative disc disease), lumbosacral    Arthritis of knee    Long term (current) use of aromatase inhibitors    Skin changes due to chronic exposure to nonionizing radiation    Medicare annual wellness visit, subsequent    Diverticulosis of large intestine without perforation or abscess without bleeding    Malignant neoplasm of central portion of left breast in female, estrogen receptor negative    S/P mastectomy, bilateral    Poor balance    Acute deep vein thrombosis (DVT) of non-extremity  vein    Tremor    Acute viral conjunctivitis of left eye    Dry eyes, bilateral    Morbid (severe) obesity due to excess calories (Multi)    Left knee pain    Left leg weakness    Vaginal atrophy    CHF (congestive heart failure)    Spinal stenosis of thoracic region with radiculopathy    Spinal stenosis, lumbar region with neurogenic claudication    Type 2 diabetes mellitus with other skin complications   [3]   Past Surgical History:  Procedure Laterality Date    APPENDECTOMY      BREAST BIOPSY  2010    BREAST LUMPECTOMY  07/17/2013    Breast Surgery Lumpectomy    CHOLECYSTECTOMY  10/03/2017    Cholecystectomy Laparoscopic    COLONOSCOPY      JOINT REPLACEMENT  2021    LYMPH NODE BIOPSY  2004    MASTECTOMY COMPLETE / SIMPLE Bilateral 06/12/2024    Procedure: BILATERAL SIMPLE MASTECTOMY;  Surgeon: Jeniffer Langley DO;  Location: [facility] OR;  Service: General Surgery Oncology;  Laterality: Bilateral;    OOPHORECTOMY  2012    OTHER SURGICAL HISTORY  05/14/2021    Knee replacement    OVARY SURGERY  07/17/2013    Ovarian Surgery    SKIN CANCER EXCISION      SMALL INTESTINE SURGERY      TONSILLECTOMY  1968    UPPER GASTROINTESTINAL ENDOSCOPY  2022    [facility]   [4]   Family History  Problem Relation Name Age of Onset    Breast cancer Mother Dorothea     Alcohol abuse Mother Dorothea     Cancer Mother Dorothea     Cataracts Mother Dorothea     Miscarriages / Stillbirths Mother Dorothea     Lung cancer Father Yuriy     Cancer Father Yuriy     Thyroid disease Father Yuriy     Cancer Sister Korin     Endometrial cancer Sister Korin     Ovarian cancer Sister Korin     Cancer Mother's Brother Kyrie Valadez     Colon cancer Mother's Brother Igor Valadez     Ovarian cysts Maternal Grandmother Maddy     Diabetes Maternal Grandmother Maddy     Colon cancer Maternal Grandmother Maddy     Stomach cancer Maternal Cousin      Colon cancer Other Materal Uncle     Stomach cancer Other Materal Uncle     Macular degeneration Father's  Sister Loreto Ferrara    [5]   Social History  Tobacco Use    Smoking status: Former     Current packs/day: 0.00     Average packs/day: 1.7 packs/day for 47.9 years (80.7 ttl pk-yrs)     Types: Cigarettes     Start date: 1968     Quit date: 1983     Years since quittin.5     Passive exposure: Past    Smokeless tobacco: Never   Vaping Use    Vaping status: Never Used   Substance Use Topics    Alcohol use: Yes     Alcohol/week: 1.0 standard drink of alcohol     Types: 1 Glasses of wine per week    Drug use: Never   [6]   Current Outpatient Medications:     blood sugar diagnostic, 90 strips once daily., Disp: 100 strip, Rfl: 3    calcium carbonate 600 mg calcium (1,500 mg) tablet, Take 600 mg by mouth once daily., Disp: , Rfl:     DULoxetine (Cymbalta) 30 mg DR capsule, Take 1 capsule (30 mg) by mouth 2 times a day., Disp: , Rfl:     exemestane (Aromasin) 25 mg tablet, Take 1 tablet (25 mg total) by mouth once daily.  Take after a meal.  Try to take at the same time each day., Disp: , Rfl:     gabapentin (Neurontin) 300 mg capsule, Take 3 capsules (900 mg) by mouth 3 times a day., Disp: , Rfl:     hydroCHLOROthiazide (HYDRODiuril) 25 mg tablet, Take 1 tablet (25 mg) by mouth once daily., Disp: , Rfl:     levothyroxine (Synthroid, Levoxyl) 125 mcg tablet, Take 1 tablet (125 mcg) by mouth early in the morning.., Disp: 90 tablet, Rfl: 3    lisinopril 5 mg tablet, Take 1 tablet (5 mg) by mouth 2 times a day., Disp: 180 tablet, Rfl: 1    metFORMIN  mg 24 hr tablet, Take 1 tablet (500 mg) by mouth once daily., Disp: 90 tablet, Rfl: 1    omeprazole (PriLOSEC) 40 mg DR capsule, Take 1 capsule (40 mg) by mouth 2 times a day before meals., Disp: 180 capsule, Rfl: 3    rosuvastatin (Crestor) 5 mg tablet, TAKE 1 TABLET EVERY DAY, Disp: 90 tablet, Rfl: 3    tirzepatide (Mounjaro) 5 mg/0.5 mL pen injector, Inject 5 mg under the skin 1 (one) time per week., Disp: 2 mL, Rfl: 3    traMADol (Ultram) 50 mg tablet, Take 2  tablets (100 mg) by mouth once daily at bedtime., Disp: , Rfl:   No current facility-administered medications for this visit.

## 2025-06-29 DIAGNOSIS — E03.9 HYPOTHYROIDISM, UNSPECIFIED TYPE: ICD-10-CM

## 2025-06-30 ENCOUNTER — INFUSION (OUTPATIENT)
Dept: HEMATOLOGY/ONCOLOGY | Facility: CLINIC | Age: 75
End: 2025-06-30
Payer: MEDICARE

## 2025-06-30 VITALS
DIASTOLIC BLOOD PRESSURE: 78 MMHG | OXYGEN SATURATION: 95 % | WEIGHT: 171.52 LBS | RESPIRATION RATE: 16 BRPM | TEMPERATURE: 97.7 F | SYSTOLIC BLOOD PRESSURE: 129 MMHG | BODY MASS INDEX: 34.49 KG/M2

## 2025-06-30 DIAGNOSIS — E03.9 HYPOTHYROIDISM, UNSPECIFIED TYPE: ICD-10-CM

## 2025-06-30 DIAGNOSIS — Z17.1 MALIGNANT NEOPLASM OF CENTRAL PORTION OF LEFT BREAST IN FEMALE, ESTROGEN RECEPTOR NEGATIVE: ICD-10-CM

## 2025-06-30 DIAGNOSIS — C50.112 MALIGNANT NEOPLASM OF CENTRAL PORTION OF LEFT BREAST IN FEMALE, ESTROGEN RECEPTOR NEGATIVE: ICD-10-CM

## 2025-06-30 DIAGNOSIS — E11.9 TYPE 2 DIABETES MELLITUS WITHOUT COMPLICATION, WITHOUT LONG-TERM CURRENT USE OF INSULIN: ICD-10-CM

## 2025-06-30 PROCEDURE — 2500000004 HC RX 250 GENERAL PHARMACY W/ HCPCS (ALT 636 FOR OP/ED): Mod: JZ,TB | Performed by: STUDENT IN AN ORGANIZED HEALTH CARE EDUCATION/TRAINING PROGRAM

## 2025-06-30 PROCEDURE — 96401 CHEMO ANTI-NEOPL SQ/IM: CPT

## 2025-06-30 RX ORDER — ALBUTEROL SULFATE 0.83 MG/ML
3 SOLUTION RESPIRATORY (INHALATION) AS NEEDED
Status: DISCONTINUED | OUTPATIENT
Start: 2025-06-30 | End: 2025-06-30 | Stop reason: HOSPADM

## 2025-06-30 RX ORDER — DIPHENHYDRAMINE HYDROCHLORIDE 50 MG/ML
50 INJECTION, SOLUTION INTRAMUSCULAR; INTRAVENOUS AS NEEDED
Status: DISCONTINUED | OUTPATIENT
Start: 2025-06-30 | End: 2025-06-30 | Stop reason: HOSPADM

## 2025-06-30 RX ORDER — LEVOTHYROXINE SODIUM 125 UG/1
TABLET ORAL
Qty: 90 TABLET | Refills: 3 | OUTPATIENT
Start: 2025-06-30

## 2025-06-30 RX ORDER — EPINEPHRINE 0.3 MG/.3ML
0.3 INJECTION SUBCUTANEOUS EVERY 5 MIN PRN
Status: DISCONTINUED | OUTPATIENT
Start: 2025-06-30 | End: 2025-06-30 | Stop reason: HOSPADM

## 2025-06-30 RX ORDER — PROCHLORPERAZINE MALEATE 10 MG
10 TABLET ORAL EVERY 6 HOURS PRN
Status: DISCONTINUED | OUTPATIENT
Start: 2025-06-30 | End: 2025-06-30 | Stop reason: HOSPADM

## 2025-06-30 RX ORDER — LEVOTHYROXINE SODIUM 125 UG/1
125 TABLET ORAL DAILY
Qty: 90 TABLET | Refills: 3 | Status: SHIPPED | OUTPATIENT
Start: 2025-06-30

## 2025-06-30 RX ORDER — FAMOTIDINE 10 MG/ML
20 INJECTION, SOLUTION INTRAVENOUS ONCE AS NEEDED
Status: DISCONTINUED | OUTPATIENT
Start: 2025-06-30 | End: 2025-06-30 | Stop reason: HOSPADM

## 2025-06-30 RX ORDER — PROCHLORPERAZINE EDISYLATE 5 MG/ML
10 INJECTION INTRAMUSCULAR; INTRAVENOUS EVERY 6 HOURS PRN
Status: DISCONTINUED | OUTPATIENT
Start: 2025-06-30 | End: 2025-06-30 | Stop reason: HOSPADM

## 2025-06-30 RX ADMIN — TRASTUZUMAB AND HYALURONIDASE-OYSK 600 MG: 600; 10000 INJECTION, SOLUTION SUBCUTANEOUS at 15:02

## 2025-06-30 ASSESSMENT — PAIN SCALES - GENERAL: PAINLEVEL_OUTOF10: 2

## 2025-06-30 NOTE — PROGRESS NOTES
[facility] Infusion Note     Raiza Nguyễn is a 74 y.o. year old female here today,06/30/25,  in [facility] for cycle 13 day 1 of the following treatment regimen:  Trastuzumab, 21 Day Cycles            Medications given:  Administrations This Visit       trastuzumab hyaluronidase (Herceptin Hylecta) 600 mg-10,000 units/5 mL subQ injection       Admin Date  06/30/2025 Action  Given Dose  600 mg Route  subcutaneous Documented By  Loraine Gallego, RN                    Pt tolerated injection well and was discharged to home  in stable condition. Pt ambulated  off the unit independently with cane  with a slow and steady gait. Pt had no further questions or concerns at this time.

## 2025-07-01 ENCOUNTER — HOSPITAL ENCOUNTER (OUTPATIENT)
Dept: RADIOLOGY | Facility: CLINIC | Age: 75
Discharge: HOME | End: 2025-07-01
Payer: MEDICARE

## 2025-07-01 ENCOUNTER — OFFICE VISIT (OUTPATIENT)
Dept: NEUROSURGERY | Facility: CLINIC | Age: 75
End: 2025-07-01
Payer: MEDICARE

## 2025-07-01 VITALS
TEMPERATURE: 96.4 F | HEART RATE: 88 BPM | SYSTOLIC BLOOD PRESSURE: 116 MMHG | BODY MASS INDEX: 34.47 KG/M2 | DIASTOLIC BLOOD PRESSURE: 73 MMHG | WEIGHT: 171 LBS | HEIGHT: 59 IN

## 2025-07-01 DIAGNOSIS — G99.2 STENOSIS OF CERVICAL SPINE WITH MYELOPATHY: ICD-10-CM

## 2025-07-01 DIAGNOSIS — M47.816 LUMBAR SPONDYLOSIS: ICD-10-CM

## 2025-07-01 DIAGNOSIS — M48.02 STENOSIS OF CERVICAL SPINE WITH MYELOPATHY: ICD-10-CM

## 2025-07-01 DIAGNOSIS — M47.12 CERVICAL SPONDYLOSIS WITH MYELOPATHY: ICD-10-CM

## 2025-07-01 DIAGNOSIS — M48.062 NEUROGENIC CLAUDICATION DUE TO LUMBAR SPINAL STENOSIS: Primary | ICD-10-CM

## 2025-07-01 PROCEDURE — 72040 X-RAY EXAM NECK SPINE 2-3 VW: CPT

## 2025-07-01 PROCEDURE — 72100 X-RAY EXAM L-S SPINE 2/3 VWS: CPT

## 2025-07-01 PROCEDURE — 99215 OFFICE O/P EST HI 40 MIN: CPT | Performed by: NEUROLOGICAL SURGERY

## 2025-07-01 ASSESSMENT — ENCOUNTER SYMPTOMS
LOSS OF SENSATION IN FEET: 0
OCCASIONAL FEELINGS OF UNSTEADINESS: 1

## 2025-07-01 ASSESSMENT — PAIN SCALES - GENERAL: PAINLEVEL_OUTOF10: 5

## 2025-07-02 ENCOUNTER — APPOINTMENT (OUTPATIENT)
Dept: RADIOLOGY | Facility: CLINIC | Age: 75
End: 2025-07-02
Payer: MEDICARE

## 2025-07-06 PROCEDURE — RXMED WILLOW AMBULATORY MEDICATION CHARGE

## 2025-07-08 ENCOUNTER — OFFICE VISIT (OUTPATIENT)
Dept: HEMATOLOGY/ONCOLOGY | Facility: CLINIC | Age: 75
End: 2025-07-08
Payer: MEDICARE

## 2025-07-08 VITALS
HEART RATE: 83 BPM | TEMPERATURE: 97.6 F | BODY MASS INDEX: 34.38 KG/M2 | SYSTOLIC BLOOD PRESSURE: 148 MMHG | WEIGHT: 170.19 LBS | DIASTOLIC BLOOD PRESSURE: 80 MMHG | OXYGEN SATURATION: 97 %

## 2025-07-08 DIAGNOSIS — C50.919 HER2-POSITIVE CARCINOMA OF BREAST: ICD-10-CM

## 2025-07-08 DIAGNOSIS — G44.52 NEW PERSISTENT DAILY HEADACHE: ICD-10-CM

## 2025-07-08 DIAGNOSIS — R94.2 ABNORMAL PET SCAN, LUNG: ICD-10-CM

## 2025-07-08 DIAGNOSIS — Z17.31 HER2-POSITIVE CARCINOMA OF BREAST: ICD-10-CM

## 2025-07-08 DIAGNOSIS — C50.912 RECURRENT MALIGNANT NEOPLASM OF LEFT BREAST: ICD-10-CM

## 2025-07-08 DIAGNOSIS — C50.112 MALIGNANT NEOPLASM OF CENTRAL PORTION OF LEFT BREAST IN FEMALE, ESTROGEN RECEPTOR NEGATIVE: ICD-10-CM

## 2025-07-08 DIAGNOSIS — Z17.1 MALIGNANT NEOPLASM OF CENTRAL PORTION OF LEFT BREAST IN FEMALE, ESTROGEN RECEPTOR NEGATIVE: ICD-10-CM

## 2025-07-08 PROCEDURE — 99215 OFFICE O/P EST HI 40 MIN: CPT | Performed by: NURSE PRACTITIONER

## 2025-07-08 RX ORDER — EXEMESTANE 25 MG/1
25 TABLET ORAL DAILY
Qty: 90 TABLET | Refills: 3 | Status: SHIPPED | OUTPATIENT
Start: 2025-07-08 | End: 2026-07-08

## 2025-07-08 ASSESSMENT — PATIENT HEALTH QUESTIONNAIRE - PHQ9
SUM OF ALL RESPONSES TO PHQ9 QUESTIONS 1 & 2: 0
10. IF YOU CHECKED OFF ANY PROBLEMS, HOW DIFFICULT HAVE THESE PROBLEMS MADE IT FOR YOU TO DO YOUR WORK, TAKE CARE OF THINGS AT HOME, OR GET ALONG WITH OTHER PEOPLE: NOT DIFFICULT AT ALL
1. LITTLE INTEREST OR PLEASURE IN DOING THINGS: NOT AT ALL
2. FEELING DOWN, DEPRESSED OR HOPELESS: NOT AT ALL

## 2025-07-08 ASSESSMENT — PAIN SCALES - GENERAL: PAINLEVEL_OUTOF10: 0-NO PAIN

## 2025-07-08 NOTE — PATIENT INSTRUCTIONS
Continue maintenance Herceptin every 3 weeks through final dose 9/3/25. I have placed labs for 7/21/25 with next shot     CT chest follow up 3-6 months from PET to follow up on incidental finding- please complete in the next 3-4 weeks and I will call with results     New 2 month concerning finding of daily significant headaches. Given recurrent breast cancer and HER2+ breast cancer hx will proceed with Stat Brain MRI - I will call you once resulted    Dr Bourne follow up Late October / Early Nov 2025 at [place]    Dr Plata Thyroid Ultrasound follow up 9/18/25    Breast surgery follow up Arielle Mcdermott CNP 7/10/25    Follow up Echo 7/14/25 with Beatriz Galdamez CNP onco cardiology follow up 10/14/25    PCP annually Dr Ok Lizama 7/16/25    Please call [phone] with any questions or concerns prior to your next office visit.

## 2025-07-08 NOTE — PROGRESS NOTES
Breast Medical Oncology Clinic  Location: [facility]    Visit Type: Follow-up Visit    Oncologic History:    She was diagnosed in January 2003 with a left-sided 1.5 cm infiltrating ductal carcinoma with positive axillary lymph nodes and extranodal extension. It was a grade 3 tumor with hormone receptors positive and HER-2/anabell not defined.   Four cycles of Adriamycin and cyclophosphamide were followed by 4 cycles of paclitaxel in March 2004.   Left lumpectomy and axillary node dissection followed by left breast radiation is also completed in 2004.   She had bilateral oophorectomies in May 2008.   Anastrozole through March 2009.   She had a lesion in her left lower back removed, and confirmed to be a 0.35 mm melanoma. It was not ulcerative and she did not require a sentinel node evaluation. She has since had multiple excisions of atypical myelomonocytic lesions without disease  discovered.   She did well until screening mammogram 8/2018 confirmed finding on PET (done related melanoma dx) .  She had excision of 1.5cm IDC with 1/2 SN with microscopic involvement. She did not want to receive chemotherapy and the lesion was strongly ER and SD  positive, HER 2 negative.   Started on exemestane November 2018.   She is BRCA2 positive   4/18/24: MRI breast screening: An irregular rim enhancing mass with irregular margins measuring 0.8 x 0.8 x 0.9 cm is seen in the superolateral breast at middle depth A prominent level III axillary lymph node measures 1.3 x 0.9 x 0.8 cm, and may have slightly increased in size when compared to breast MRI 10/03/2016 (previously measuring 1.0 x 0.8 x 0.8 cm). An internal mammary lymph node measuring 0.4 cm is noted and demonstrates a fatty hilum (series 401, image 136 of 232). This was not definitively seen on the prior MRI.  4/23/24: L breast mass 3:00 bx IDC G3, ER/SD negative, HER2 positive. L breast mass 2:00 bx: fibrous scar with associated calcifications.   5/8/24: PET scan: mildly  hypermetabolic soft tissue density measuring 1.6 x 0.8 cm at left outer breast.  Mild heterogeneous activity is noted in the region of the liver with a suggestion of a small focus along the medial tip of segment 2 of the left hepatic lobe.   5/9/24: TB discussion: Plan for surgery first approach.   5/17/2024: MRI of liver: No definite discrete hepatic lesion to correlate with mild FDG avid fit lesion seen on PET scan.  Few subcentimeter pancreatic cystic lesions represent IPMN most likely.  Partially visualized left uterine lesion likely representing a fibroid.  6/12/2024: Left breast simple mastectomy showed invasive ductal carcinoma, grade 3, single focus measuring 1.1 cm.  There is high-grade DCIS present.  All margins are negative.  No lymph nodes submitted.  Right breast simple completion mastectomy also performed negative for carcinoma.  7/15/2024: Patient started adjuvant weekly Taxol/Herceptin  Patient developed breast wound infection requiring antibiotics.  Open wound with delayed healing managed by wound care.  Chemotherapy held.  10/24/24: Paused herceptin due to question of side effects?  11/20/2024: Resumed Herceptin    Subjective: Interval History  Raiza presents today for breast cancer treatment follow up. Today I have reviewed with the patient I will be conducting a clinical physical breast exam. Patient has declined a second medical professional today during the exam as a chapperone.     Patient presents today for follow-up visit, she is doing well today. She has 3 doses left of herceptin. Biggest concern is headaches since Spring and she attributes this to seasonal allergies. She has had multiple MRI's of her spine for degenerative changes. She reports headaches are daily, low dull ache and will as needed used Claritin and will make thee headache improve. She is only doing this about every 3 weeks. She denies any issues with her vision with these headaches. She denies the headaches wake her up at  "night. She reports specifically pain is left occiput and still will be present even when lying down. She denies any changes in coordination or motor strength. She at baseline has balance and ambulation issues with spinal degeneration- she is on gabapentin for her spine three times per day. She reports tremors came on about 9 months ago. She notices a \"rush of cold air\" with deep breaths in where the headache originates from. She reports some baseline dizzy spells and attributes this to BP meds and on day she does not get enough water this will improve with adequate hydration. She denies any recent falls.     Tolerating herceptin well. Denies any major concerns with exception to continued dry eyes but managed with drops.     She denies any chest pain or breathing issues, no cough or sob.     She denies any skin lesions or masses, oral sores lesions or infections    She reports a normal appetite. She struggles with constipation and has a hemorrhoid. She continues to use as needed senokot and prunes. She denies any issues with urination.      She reports baseline issues with sleep and is not compliant with CPAP for knows BOLIVAR. She reports baseline fatigue    Pertinent Family history:    Mother having bilateral breast cancer and a first cousin having breast cancer later in life. Her sister had a GYN malignancy, now metastatic and a maternal aunt with stomach cancer. A maternal uncle had colon  cancer.     Social History  Raiza AVALOS Josephniles  reports that she quit smoking about 42 years ago. Her smoking use included cigarettes. She started smoking about 57 years ago. She has a 81.4 pack-year smoking history. She has been exposed to tobacco smoke. She has never used smokeless tobacco.  She  reports current alcohol use of about 1.0 standard drink of alcohol per week.  She  reports no history of drug use.    She lives alone, son Fredi is her main support person, son Kurt also local. 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     Review of Systems  ROS 14 points performed, See HPI for exceptions      Physical Examination:    /80 (BP Location: Right arm, Patient Position: Sitting, BP Cuff Size: Small adult)   Pulse 83   Temp 36.4 °C (97.6 °F) (Temporal)   Wt 77.2 kg (170 lb 3.1 oz)   SpO2 97%   BMI 34.38 kg/m²       Physical Exam  Constitutional: Well developed, awake/alert/oriented x4, no distress, alert and cooperative  EYES: Sclera clear  ENMT: mucous membranes moist, no apparent injury, no lesions seen  Head/Neck: Neck supple, no apparent injury, thyroid without mass or tenderness, No JVD, trachea midline, no bruits  Respiratory / Thoracic: Patent airways, clear to all lobes, normal breath sounds with good chest expansion, thorax symmetric.  Cardiovascular: Regular, rate and rhythm, no murmurs, 2+ equal pulses of the extremities, normal auscultated S 1and S 2  GI: Nondistended, soft, non-tender, no rebound tenderness or guarding, no masses palpable, no organomegaly, +BS, no bruits  Musculoskeletal: ROM intact, no joint swelling, normal strength, no spinal tenderness  Extremities: normal extremities, no cyanosis edema, contusions or wounds, no clubbing  Neurological: alert and oriented x4, intact senses, motor, response and reflexes, normal strength  Breast: S/p bilateral mastectomies. Chest wall without any palpable masses or lesions.   Lymphatic: No cervical, supraclavicular, infraclavicular or axillary lymphadenopathy  Psychological: Appropriate and talkative mood and behavior  Skin: Warm and dry, no lesions, no rashes, no jaundice    ECOG Performance Status:     [x] 0 Fully active, able to carry on all pre-disease performance without restriction  [] 1 Restricted in physically strenuous activity but ambulatory and able to carry out work of a light or sedentary nature, e.g., light house work, office work  [] 2 Ambulatory and capable of all selfcare but unable to carry out any work activities; up and about more  than 50% of waking hours  [] 3 Capable of only limited selfcare; confined to bed or chair more than 50% of waking hours  [] 4 Completely disabled; cannot carry on any selfcare; totally confined to bed or chair  [] 5 Dead     Results:    Labs:  Lab Results   Component Value Date    WBC 6.4 12/04/2024    HGB 12.5 12/04/2024    HCT 39.3 12/04/2024    MCV 89 12/04/2024     12/04/2024       Chemistry    Lab Results   Component Value Date/Time     05/23/2025 0936    K 4.2 05/23/2025 0936    CL 96 (L) 05/23/2025 0936    CO2 31 05/23/2025 0936    BUN 19 05/23/2025 0936    CREATININE 0.75 05/23/2025 0936    Lab Results   Component Value Date/Time    CALCIUM 9.8 05/23/2025 0936    ALKPHOS 75 05/23/2025 0936    AST 24 05/23/2025 0936    ALT 26 05/23/2025 0936    BILITOT 0.4 05/23/2025 0936             Imaging:  Narrative & Impression   Interpreted By:  Stefano Washington and Kaur Arashdeep   STUDY:  NM PET CT FDG ONCOLOGY;  4/24/2025 10:26 am      INDICATION:  Signs/Symptoms:follow-up breast cancer, restaging. 74-year-old female  with a history of left infiltrating ductal carcinoma with axillary  lymph nodes status post left lumpectomy axillary lymph node  dissection followed by radiation in 2004. Status post Adriamycin and  cyclophosphamide therapy in March 2004, status post anastrozole in  March 2009. History of left lower back melanoma status post wide  local excision. Started on exemestane in November 2018. MRI breast  dated 04/18/2024 showed irregular rim enhancing lesion within left  breast, biopsy consistent with fibrous scar with the associated  calcifications. Status post left breast mastectomy on 06/12/2024.  Patient on Herceptin.      COMPARISON:  FDG PET-CT dated 09/13/2024..      ACCESSION NUMBER(S):  [id number]      ORDERING CLINICIAN:  VENITA GREENBERG      TECHNIQUE:  DIVISION OF NUCLEAR MEDICINE  POSITRON EMISSION TOMOGRAPHY (PET-CT)      The patient received an intravenous dose of 11.7 mCi of  Fluorine-18  fluorodeoxyglucose (FDG).  Positron emission tomographic (PET) images  from mid thigh to skull base were then acquired after a one hour  delay. Also acquired was a contemporaneous low dose non-contrast CT  scan performed for attenuation correction of PET images and anatomic  localization.  The PET and CT images were digitally fused for  display.  All images were acquired on a combined PET-CT scanner unit.  Some areas of FDG accumulation may be described in standardized  uptake value (SUV) units.      CODING:  Subsequent Treatment Strategy (PS)      CALIBRATION:  Dose Injection-to-Scan Interval (mins): 58 min  Mediastinal bloodpool SUV (normal 1.5-2.5): 2.1  Blood glucose: 93 mg/dL      FINDINGS:  Limited evaluation due to diffuse muscular and soft tissue FDG  uptake. Within this limitation: HEAD AND NECK:  * No evidence of focal FDG avid lesion in the partially visualized  brain parenchyma, noting that evaluation is limited because of the  expected physiologic diffuse FDG uptake in the brain. * No focal FDG  avid  soft tissue lesion is seen in the neck. * No FDG avid  cervical  lymphadenopathy is present. *Diffusely increased FDG uptake within  thyroid gland with a SUV max 11.3 on right and 5.1 on left.          CHEST:  *Non FDG avid ground-glass haziness seen within left apical region.  Otherwise, no FDG avid lung parenchymal lesion. * No evidence of FDG  avid mediastinal, hilar or axillary lymphadenopathy. *Status post  bilateral mastectomies with interval decrease in previously seen FDG  avidity along bilateral chest wall with a SUV max 2.7, previously 3.5  on right and 1.8, previously 4.1 on left. Otherwise, no focal FDG  uptake to suggest local recurrence. *Note is made of a hiatal hernia.          ABDOMEN AND PELVIS:  * No FDG avid  soft tissue lesion is present in the abdomen and  pelvis. * No evidence of FDG avid  lymphadenopathy.  *Bilateral adrenal glands are unremarkable.  *Physiologic  radiotracer uptake is present in the liver and spleen  with excretion into the bowel loops and the genitourinary tract. Note  is made of cholecystectomy. A pessary is noted.          MUSCULOSKELETAL:  *No focal FDG avid  lesion is seen in the axial or appendicular to  suggest osseous metastasis. Status post left knee arthroplasty.          IMPRESSION:  Limited evaluation due to diffuse muscular and soft tissue FDG  uptake. Within this limitation:  1. Status post bilateral mastectomies with interval decrease in FDG  avidity within bilateral chest walls when compared to prior PET.  Otherwise, no focal FDG uptake to suggest local recurrence.  2. No FDG avid nguyen or distant metastatic disease.  3. Non FDG avid ground-glass haziness within apex of left lung,  likely inflammatory. Follow-up with diagnostic CT chest to document  interval resolution/stability.  4. Diffuse FDG uptake within thyroid gland, likely thyroiditis.  Correlate with thyroid function test.         === 04/11/25 ===    DEXA BONE DENSITY    - Impression -  DEXA:  According to World Health Organization criteria,  classification is normal.  FINDINGS:  SPINE L1-L4  Bone Mineral Density: 1.784  T-Score 4.9  Z-Score 6.6  Classification:  Not reported  Bone Mineral Density change vs baseline:  Not reported  Bone Mineral Density change vs previous: Not reported      LEFT FEMUR -TOTAL  Bone Mineral Density: 1.010  T-Score 0.0   Z-Score  1.6  Classification:  Not reported  Bone Mineral Density change vs baseline: Not reported  Bone Mineral Density change vs previous: Not reported      LEFT FEMUR -NECK  Bone Mineral Density: 0.945  T-Score -0.7  Z-Score 1.1  Classification:  Not reported    Followup recommended in 2 years or sooner as clinically warranted.    All images and detailed analysis are available on the  Radiology  PACS.    MACRO:  None    Signed by: Betzaida Chaidez 4/14/2025 3:02 PM  Dictation workstation:   [id number]       Assessment:     2003:  Stage II Left IDC grade 3 with positive LNs and NASH, ER+ VA+; S/p L PM and SLNBx; S/p AC+T; S/p radiation; anastrozole x5 years  2018: Stage IB (Prognostic Stage IA), [id number], grade 2 IDC, ER+95% VA+95% HER2 equivocal, FISH-. S/p R PM and SLNBx; Oncotype 17; S/p radiation; Exemestane since 11/2018.   2024: cT1 cN0 L IDC, G3, ER/VA negative and HER2 positive.  Status post bilateral completion mastectomy, pT1b pNx  BRCA2 germline mutation    Tolerating treatments well.     New concerns today for 2+ months significant daily headaches without any neurological deficits. Given recurrent breast cancer, most recent with HER2+ will proceed with Stat Brain MRI. She understands I will call once resulted.     Plan:    Surgical Plan: S/p L PMSLNBx in 2003; S/p R PMSLNBx 2018; S/p BL completion mastectomy in 2024.  Additional biopsy: No further biopsy indicated  Radiation Plan: S/p L radiation in 2003; S/p R breast radiation 2018  Additional staging scans/DEXA/echo: She has established with [facility]. DEXA  4/2025 with normal findings T-score -0.4. Repeat follow-up PET scan April 2025 without any concerning uptake per Dr Bourne.    Additional Path info (i.e Ki67, PDL1): Not indicated  Gene assays: Not indicated    Systemic treatment plan: Treatment course complicated by chest wall infection for which chemotherapy has been held.  We continued on Herceptin alone to complete 1 year of therapy. She is also on exemestane for a prior ER/VA positive breast cacncer   Intent: Curative   Clinical trial: Not eligible for our current trials   Endocrine therapy: On exemestane for her ER/VA positive breast cancer diagnosed in 2018- planned 10 year course   HER2 treatment: Hylecta q3 weeks to complete 1 year 9/3/25   Targeted agents: not indicated   Chemotherapy: Received 2 doses of weekly paclitaxel, discontinued given development of breast infection and delayed wound healing. We did NOT resume.    BMA: Will discuss at future  visit.    Access: Removed  Supportive meds: N/A  Genetic testing: gBRCA2 mutation  Fertility preservation: Not indicated  Other active problems/orders:     Focal appearing FDG avidity in the right inferolateral chest wall  and nonenlarged minimally FDG avid right axillary lymph node: No concerning uptake with April 2025 PET scan ordered today for surveillance. Ordered CT Chest without contrast to follow up on ground glass opacities mention- she understands I will call once resulted   Mild FDG avid liver lesion without MRI correlate:  Repeat PET scan completed 9/13/2024 and previously seen lesions are no longer visualized likely representing background heterogeneity  Pancreatic cystic lesions likely IPMN: She has established with Dr. Dodson with gastroenterology.  Will need closer monitoring as patient has germline BRCA mutation. She is on pancreatic screening trial.   Partially visualized left uterine lesion likely representing fibroid: Not seen on follow-up imaging and was not PET avid. No further imaging follow up  Chest wall infection: Managed by wound care at [facility], improved. Resolved   Right IJ DVT: Port related. Port removed. Completed course of apixiban.  Conjunctivitis/dry eyes: Improved 95%. Managed by ophthalmology    Surveillance plan: No routine breast imaging indicated- breast surgery follow up with Arielle Mcdermott CNP on 7/10/25    Follow-up: Follow-up Dr Tayla LANDRY 3-4 months at [place] location.     At least 50 minutes of direct consultation was spent with the patient today reviewing her cancer care plan, cancer features, educating and answering questions regarding ongoing follow up, greater than 50% in counseling and coordination of care          Thank you for the opportunity to be involved your care.   We discussed the clinical significance of diagnosis, goals of care and treatment plan in detail.   Please do not hesitate to reach out with any questions.       Kelsey  JOSH Mccollum MSN, APRN, FNP-C  [facility]  Division of Medical Oncology- Breast   Collaborating Physician Dr. Adriano Jones   [facility] [place] [address]  Phone: [phone]  Fax: [phone]  Available via Secure Chat    Confidential Peer Review Document-  Privilege  Privileged Pursuant to [place] Revised Code Section 2305.24, .25, .251 & .252

## 2025-07-10 ENCOUNTER — PHARMACY VISIT (OUTPATIENT)
Dept: PHARMACY | Facility: CLINIC | Age: 75
End: 2025-07-10
Payer: COMMERCIAL

## 2025-07-10 ENCOUNTER — OFFICE VISIT (OUTPATIENT)
Dept: SURGICAL ONCOLOGY | Facility: HOSPITAL | Age: 75
End: 2025-07-10
Payer: MEDICARE

## 2025-07-10 VITALS
DIASTOLIC BLOOD PRESSURE: 62 MMHG | HEIGHT: 59 IN | BODY MASS INDEX: 34.27 KG/M2 | SYSTOLIC BLOOD PRESSURE: 120 MMHG | HEART RATE: 86 BPM | RESPIRATION RATE: 16 BRPM | WEIGHT: 170 LBS

## 2025-07-10 DIAGNOSIS — Z15.01 BRCA2 POSITIVE: ICD-10-CM

## 2025-07-10 DIAGNOSIS — Z90.13 S/P MASTECTOMY, BILATERAL: ICD-10-CM

## 2025-07-10 DIAGNOSIS — Z85.3 ENCOUNTER FOR FOLLOW-UP SURVEILLANCE OF BREAST CANCER: Primary | ICD-10-CM

## 2025-07-10 DIAGNOSIS — Z08 ENCOUNTER FOR FOLLOW-UP SURVEILLANCE OF BREAST CANCER: Primary | ICD-10-CM

## 2025-07-10 DIAGNOSIS — Z15.09 BRCA2 POSITIVE: ICD-10-CM

## 2025-07-10 PROCEDURE — 99213 OFFICE O/P EST LOW 20 MIN: CPT | Performed by: NURSE PRACTITIONER

## 2025-07-10 ASSESSMENT — ENCOUNTER SYMPTOMS
MYALGIAS: 1
EYES NEGATIVE: 1
ARTHRALGIAS: 1
GASTROINTESTINAL NEGATIVE: 1
ENDOCRINE NEGATIVE: 1
CARDIOVASCULAR NEGATIVE: 1
HEMATOLOGIC/LYMPHATIC NEGATIVE: 1
FATIGUE: 1
RESPIRATORY NEGATIVE: 1
HEADACHES: 1
PSYCHIATRIC NEGATIVE: 1

## 2025-07-10 NOTE — PROGRESS NOTES
[facility]  Raiza Nguyễn female   1950 74 y.o.   [id number]      Chief Complaint  Follow up clinical exam, history bilateral breast cancer, BRCA2 positive.    History Of Present Illness  Raiza Nguyễn is a pleasant 74 y.o.  female diagnosed in  with left breast invasive ductal carcinoma (IDC), grade 3, ER+/NY+/HER2 not defined, s/p left partial mastectomy and lymph node dissection with positive lymph nodes, completed chemotherapy, radiation, and 5 years of Anastrozole. She was found to have the BRCA2 mutation. 2018 she was diagnosed with right breast IDC, grade 2, ER+95%, NY+95%, HER2-. On 2018 Dr. Marcia Griffin performed a right magseed partial mastectomy and sentinel lymph node biopsy (2mi/3) and negative margins. Oncotype low score of 17. She completed adjuvant whole breast radiation 10/11/2018- 2018 and was started on Exemestane and tolerating it well (10 year plan) and sees heme/onc. On 2024 she was status post bilateral mastectomy for left breast IDC, grade 3, ER/NY negative and HER2 positive. She had a concerning level 3 lymph node not amendable to biopsy and PET scan for further evaluation was negative. She opted for bilateral mastectomy with her known BRCA2 mutation. She completed Taxol/Herceptin. She has a personal history of melanoma and follows with dermatology. She presents today for clinical exam.   Left breast [id number]  Right breast Stage IA [id number]  Left breast Stage II in     REPRODUCTIVE HISTORY: menarche age 11, , first birth age 25,  x 3 months, OCP's x 1.5 years, natural menopause age 52, no HRT, scattered fibroglandular tissue     FAMILY CANCER HISTORY:  Mother with breast cancer age 36, became metastatic,  age 62.  Father with lung cancer, smoker  MGM with ovarian cancer  Mat uncle with stomach cancer  Mat uncle with colon cancer  Mat cousin with stomach cancer      Review of Systems  Review of Systems    Constitutional:  Positive for fatigue.   HENT:  Negative.     Eyes: Negative.    Respiratory: Negative.     Cardiovascular: Negative.    Gastrointestinal: Negative.    Endocrine: Negative.         Heat/cold intolerance   Genitourinary: Negative.     Musculoskeletal:  Positive for arthralgias and myalgias.   Skin: Negative.    Neurological:  Positive for headaches.   Hematological: Negative.    Psychiatric/Behavioral: Negative.       Surgical History  She has a past surgical history that includes Breast lumpectomy (07/17/2013); Ovary surgery (07/17/2013); Other surgical history (05/14/2021); Cholecystectomy (10/03/2017); Breast biopsy (2010); Lymph node biopsy (2004); Appendectomy; Colonoscopy; Skin cancer excision; Mastectomy complete / simple (Bilateral, 06/12/2024); Joint replacement (2021); Small intestine surgery; Oophorectomy (2012); Upper gastrointestinal endoscopy (2022); and Tonsillectomy (1968).    Family History  Cancer-related family history includes Breast cancer in her mother; Cancer in her father, mother, mother's brother, and sister; Colon cancer in her maternal grandmother, mother's brother, and another family member; Endometrial cancer in her sister; Lung cancer in her father; Ovarian cancer in her sister; Stomach cancer in her maternal cousin and another family member.     Social History  She reports that she quit smoking about 42 years ago. Her smoking use included cigarettes. She started smoking about 57 years ago. She has a 81.4 pack-year smoking history. She has been exposed to tobacco smoke. She has never used smokeless tobacco. She reports current alcohol use of about 1.0 standard drink of alcohol per week. She reports that she does not use drugs.    Allergies  Erythromycin, Tetracycline, Cephalexin, Glucosamine, and Adhesive    Medications  Current Outpatient Medications   Medication Instructions    blood sugar diagnostic 90 strips, miscellaneous, Daily    calcium carbonate 600 mg, Daily     DULoxetine (CYMBALTA) 30 mg, oral, 2 times daily    exemestane (AROMASIN) 25 mg, oral, Daily, Take after a meal.  Try to take at the same time each day.    gabapentin (NEURONTIN) 900 mg, oral, 3 times daily    hydroCHLOROthiazide (HYDRODIURIL) 25 mg, oral, Daily    levothyroxine (SYNTHROID, LEVOXYL) 125 mcg, oral, Daily    lisinopril 5 mg, oral, 2 times daily    metFORMIN XR (GLUCOPHAGE-XR) 500 mg, oral, Daily    Mounjaro 5 mg, subcutaneous, Weekly    omeprazole (PRILOSEC) 40 mg, oral, 2 times daily before meals    rosuvastatin (CRESTOR) 5 mg, oral, Daily    traMADol (ULTRAM) 100 mg, Nightly       Last Recorded Vitals  Vitals:    07/10/25 1100   BP: 120/62   Pulse: 86   Resp: 16        Physical Exam  Chest:            Patient is alert and oriented x3 and in a relaxed and appropriate mood. Her gait is steady and hand grasps are equal. Sclera is clear. The bilateral breasts have been removed with well-healed mastectomy incisions. Overlying tissue soft without palpable abnormalities, discrete nodules or masses. The skin appears normal. There are 2 right chest wall incisions s/p port placement. There is no cervical, supraclavicular or axillary lymphadenopathy.       Visit Diagnosis  1. Encounter for follow-up surveillance of breast cancer  Clinic Appointment Request Follow Up      2. BRCA2 positive        3. S/P mastectomy, bilateral              Assessment/Plan  Breast cancer surveillance, normal clinical exam, history bilateral breast cancer s/p bilateral mastectomy, adjuvant chemotherapy, Exemestane, BRCA2 positive, family history of breast cancer     Plan/Patient Discussion/Summary  Return July 2026 for clinical exam and office visit.     You can see your health information, review clinical summaries from office visits & test results online when you follow your health with MY  Chart, a personal health record. To sign up go to [email or web address]. If you need assistance with signing up or trouble getting  into your account call [facility] Patient Line 24/7 at [phone].    My office phone number is [phone] if you need to get in touch with me or have additional questions or concerns. Thank you for choosing [facility] and trusting me as your healthcare provider. I look forward to seeing you again at your next office visit. I am honored to be a provider on your health care team and I remain dedicated to helping you achieve your health goals.    Arielle Mcdermott, AARON-CNP

## 2025-07-13 ENCOUNTER — HOSPITAL ENCOUNTER (OUTPATIENT)
Dept: RADIOLOGY | Facility: HOSPITAL | Age: 75
Discharge: HOME | End: 2025-07-13
Payer: MEDICARE

## 2025-07-13 DIAGNOSIS — G44.52 NEW PERSISTENT DAILY HEADACHE: ICD-10-CM

## 2025-07-13 DIAGNOSIS — C50.912 RECURRENT MALIGNANT NEOPLASM OF LEFT BREAST: ICD-10-CM

## 2025-07-13 DIAGNOSIS — Z17.1 MALIGNANT NEOPLASM OF CENTRAL PORTION OF LEFT BREAST IN FEMALE, ESTROGEN RECEPTOR NEGATIVE: ICD-10-CM

## 2025-07-13 DIAGNOSIS — C50.919 HER2-POSITIVE CARCINOMA OF BREAST: ICD-10-CM

## 2025-07-13 DIAGNOSIS — Z17.31 HER2-POSITIVE CARCINOMA OF BREAST: ICD-10-CM

## 2025-07-13 DIAGNOSIS — C50.112 MALIGNANT NEOPLASM OF CENTRAL PORTION OF LEFT BREAST IN FEMALE, ESTROGEN RECEPTOR NEGATIVE: ICD-10-CM

## 2025-07-13 PROCEDURE — A9575 INJ GADOTERATE MEGLUMI 0.1ML: HCPCS | Performed by: NURSE PRACTITIONER

## 2025-07-13 PROCEDURE — 2550000001 HC RX 255 CONTRASTS: Performed by: NURSE PRACTITIONER

## 2025-07-13 PROCEDURE — 70553 MRI BRAIN STEM W/O & W/DYE: CPT

## 2025-07-13 RX ORDER — GADOTERATE MEGLUMINE 376.9 MG/ML
15 INJECTION INTRAVENOUS
Status: COMPLETED | OUTPATIENT
Start: 2025-07-13 | End: 2025-07-13

## 2025-07-13 RX ADMIN — GADOTERATE MEGLUMINE 15 ML: 376.9 INJECTION INTRAVENOUS at 12:19

## 2025-07-14 ENCOUNTER — HOSPITAL ENCOUNTER (OUTPATIENT)
Dept: CARDIOLOGY | Facility: CLINIC | Age: 75
Discharge: HOME | End: 2025-07-14
Payer: MEDICARE

## 2025-07-14 ENCOUNTER — TELEPHONE (OUTPATIENT)
Dept: HEMATOLOGY/ONCOLOGY | Facility: HOSPITAL | Age: 75
End: 2025-07-14
Payer: MEDICARE

## 2025-07-14 ENCOUNTER — PATIENT MESSAGE (OUTPATIENT)
Facility: CLINIC | Age: 75
End: 2025-07-14

## 2025-07-14 DIAGNOSIS — Z79.899 ENCOUNTER FOR MONITORING CARDIOTOXIC DRUG THERAPY: ICD-10-CM

## 2025-07-14 DIAGNOSIS — G89.29 OTHER CHRONIC PAIN: ICD-10-CM

## 2025-07-14 DIAGNOSIS — Z51.81 ENCOUNTER FOR MONITORING CARDIOTOXIC DRUG THERAPY: ICD-10-CM

## 2025-07-14 DIAGNOSIS — E11.9 TYPE 2 DIABETES MELLITUS WITHOUT COMPLICATION, WITHOUT LONG-TERM CURRENT USE OF INSULIN: Primary | ICD-10-CM

## 2025-07-14 LAB
EJECTION FRACTION APICAL 4 CHAMBER: 52.1
EJECTION FRACTION: 55 %
LEFT ATRIUM VOLUME AREA LENGTH INDEX BSA: 24.3 ML/M2
LEFT VENTRICLE INTERNAL DIMENSION DIASTOLE: 3.3 CM (ref 3.5–6)
MITRAL VALVE E/A RATIO: 0.72
RIGHT VENTRICLE PEAK SYSTOLIC PRESSURE: 24 MMHG
TRICUSPID ANNULAR PLANE SYSTOLIC EXCURSION: 2.4 CM

## 2025-07-14 PROCEDURE — 76376 3D RENDER W/INTRP POSTPROCES: CPT

## 2025-07-14 RX ORDER — DULOXETIN HYDROCHLORIDE 30 MG/1
30 CAPSULE, DELAYED RELEASE ORAL 2 TIMES DAILY
Qty: 180 CAPSULE | Refills: 3 | Status: SHIPPED | OUTPATIENT
Start: 2025-07-14 | End: 2026-07-14

## 2025-07-14 NOTE — TELEPHONE ENCOUNTER
Call to patient to review negative brain MRI. We have reviewed likely is from allergies / sinus issues. Reviewed apts. Questions answered

## 2025-07-15 DIAGNOSIS — E11.9 TYPE 2 DIABETES MELLITUS WITHOUT COMPLICATION, WITHOUT LONG-TERM CURRENT USE OF INSULIN: ICD-10-CM

## 2025-07-15 RX ORDER — BLOOD SUGAR DIAGNOSTIC
STRIP MISCELLANEOUS
Qty: 100 STRIP | Refills: 3 | Status: SHIPPED | OUTPATIENT
Start: 2025-07-15 | End: 2025-07-16 | Stop reason: ALTCHOICE

## 2025-07-15 ASSESSMENT — ENCOUNTER SYMPTOMS
SWEATS: 0
SHORTNESS OF BREATH: 0
HEADACHES: 1
NECK PAIN: 0
BLURRED VISION: 1
ORTHOPNEA: 0
PND: 0
PALPITATIONS: 0
HYPERTENSION: 1

## 2025-07-16 ENCOUNTER — APPOINTMENT (OUTPATIENT)
Facility: CLINIC | Age: 75
End: 2025-07-16
Payer: MEDICARE

## 2025-07-16 VITALS
SYSTOLIC BLOOD PRESSURE: 131 MMHG | HEART RATE: 76 BPM | HEIGHT: 59 IN | WEIGHT: 172 LBS | BODY MASS INDEX: 34.68 KG/M2 | DIASTOLIC BLOOD PRESSURE: 76 MMHG | TEMPERATURE: 98.1 F | OXYGEN SATURATION: 96 % | RESPIRATION RATE: 17 BRPM

## 2025-07-16 DIAGNOSIS — G47.33 OSA (OBSTRUCTIVE SLEEP APNEA): ICD-10-CM

## 2025-07-16 DIAGNOSIS — J31.0 CHRONIC RHINITIS: Primary | ICD-10-CM

## 2025-07-16 DIAGNOSIS — E11.9 TYPE 2 DIABETES MELLITUS WITHOUT COMPLICATION, WITHOUT LONG-TERM CURRENT USE OF INSULIN: Primary | ICD-10-CM

## 2025-07-16 DIAGNOSIS — G89.29 OTHER CHRONIC PAIN: ICD-10-CM

## 2025-07-16 DIAGNOSIS — K64.9 HEMORRHOIDS, UNSPECIFIED HEMORRHOID TYPE: ICD-10-CM

## 2025-07-16 DIAGNOSIS — I10 ESSENTIAL HYPERTENSION: ICD-10-CM

## 2025-07-16 DIAGNOSIS — E03.9 HYPOTHYROIDISM, UNSPECIFIED TYPE: ICD-10-CM

## 2025-07-16 DIAGNOSIS — E11.9 TYPE 2 DIABETES MELLITUS WITHOUT COMPLICATION, WITHOUT LONG-TERM CURRENT USE OF INSULIN: ICD-10-CM

## 2025-07-16 PROBLEM — G44.52 NEW PERSISTENT DAILY HEADACHE: Status: RESOLVED | Noted: 2025-07-08 | Resolved: 2025-07-16

## 2025-07-16 RX ORDER — BLOOD SUGAR DIAGNOSTIC
STRIP MISCELLANEOUS
Qty: 100 STRIP | Refills: 3 | OUTPATIENT
Start: 2025-07-16

## 2025-07-16 RX ORDER — DEXTROSE 4 G
TABLET,CHEWABLE ORAL
Qty: 1 EACH | Refills: 0 | Status: SHIPPED | OUTPATIENT
Start: 2025-07-16

## 2025-07-16 RX ORDER — IBUPROFEN 200 MG
CAPSULE ORAL
Qty: 100 STRIP | Refills: 3 | Status: SHIPPED | OUTPATIENT
Start: 2025-07-16

## 2025-07-16 RX ORDER — LANCETS
EACH MISCELLANEOUS
Qty: 100 EACH | Refills: 3 | Status: SHIPPED | OUTPATIENT
Start: 2025-07-16

## 2025-07-16 RX ORDER — TIRZEPATIDE 7.5 MG/.5ML
7.5 INJECTION, SOLUTION SUBCUTANEOUS WEEKLY
Qty: 2 ML | Refills: 11 | Status: SHIPPED | OUTPATIENT
Start: 2025-07-16 | End: 2025-07-17 | Stop reason: SDUPTHER

## 2025-07-16 ASSESSMENT — PAIN SCALES - GENERAL: PAINLEVEL_OUTOF10: 0-NO PAIN

## 2025-07-16 NOTE — ASSESSMENT & PLAN NOTE
CURRENT DOSE: Synthroid 125 mcg daily.    PLAN:  -9/2024 TSH, T3, T4 all wnl. Continue current Synthroid regimen.  -Will be due for recheck TFTs prior to next visit. Ordered today.    Orders:    Thyroxine, Free; Future    Triiodothyronine, Free; Future    Thyroid Stimulating Hormone; Future

## 2025-07-16 NOTE — ASSESSMENT & PLAN NOTE
Currently follows with GI and is involved in research study. She is due to follow-up with them regarding both upper and lower scopes, reports she plans to call their office tomorrow. Concern for susceptibility to medication induced constipation since on narcotic pain medication (tramadol) and GLP-1 (Mounjaro). Patient reports fairly well controlled with prunes/diet, however, would like to have easy bowel movements in setting of hemorrhoids. She is not taking any magnesium as experienced diarrhea on this int he past. I have encouraged that she retry the magnesium at a lower dose.    PLAN:   I recommend considering starting a magesium supplement.  For bowels:  To help keep bowels moving (constipation prevention/treatment) magnesium oxide or magnesium citrate (very little magnesium absorbed)

## 2025-07-16 NOTE — ASSESSMENT & PLAN NOTE
/78 in office today, goal BP is 130/80 or less, ideally 120/80 or less.     PLAN:  Continue Lisinopril 5 mg twice daily  Continue HCTZ 25 mg as needed, uses most days unless feeling dehydrated.  Continue with home monitoring      Patient to check BP regularly at home and keep a diary - DO THIS 1 HOUR AFTER TAKING MEDS.   This should be taken after sitting with feet flat on floor and resting for 5 minutes.   Arm should be level with your heart.   New guidelines recommend goal for blood pressure less than 130/80.   Ideally for stroke and heart attack risk reduction the systolic, or top, blood pressure number should be in the 110's or 120's.    PLEASE BRING BP CUFF IN TO NEXT VISIT FOR VALIDATION - TAKE YOUR BP @ HOME BEFORE YOU COME!     Orders:    Lipid Panel; Future    Comprehensive Metabolic Panel; Future    CBC; Future

## 2025-07-16 NOTE — ASSESSMENT & PLAN NOTE
Currently on Tramadol, Gabapentin, and Duloxetine. Controlled substance rx'ed and managed by Pain Management provider. Gabapentin and Duloxetine rx'ed by PCP.    PLAN:  Reports stable and pain managed on current regimen.  Continue Duloxetine 60 mg daily for both mood and pain.  Continue Gabapentin 900 mg TID  Commended on signing up for chair yoga class.  Encouraged to use Therabands while seated to help work out large muscles while physical movement is limited.  She recently met with spinal surgeon @ , Dr. Sánchez which discussed her cervical and lumbar spine issues. He advised surgery given severity but patient deferred and plans to reconsider next year after breast cancer treatment.

## 2025-07-16 NOTE — ASSESSMENT & PLAN NOTE
Feels allergies overall controlled on current regimen with oral OTC antihistamine and Flonase nasal spray. We discussed adding on prescription medication called Singulair which works by utilizing a different pathway for which there is not an over-the-counter comparable available. She declines at this time, but will message or call for rx if needed. Of note, patient's prior records were reviewed and in Sept 2023 Singular was discontinued as patient did not notice any changes in her chronic rhinitis on this medication.

## 2025-07-16 NOTE — PROGRESS NOTES
Formerly [facility]    [address]  [place] [address]    Phone: [phone]    Progress Note     Subjective   Patient ID: Raiza Nguyễn is a 74 y.o. female who presents for Follow-up (MRI from knee, neck, and back. Feels pretty good, a little discomfort).    HPI    Patient presents today for routine follow-up.    Patient concerns:    She reports allergies are problematic at this time. Her allergy regimen includes oral OTC antihistamine and Flonase. She does feel well controlled on current regimen. She has never tried singulair in past.       Chronic Conditions Reviewed at Visit Today:   DM, type 2  Previously lifestyle managed, started on medical therapies in spring 2024 with A1c trending upward.    Co-managed with clinical pharmacist. Last visit 1/16/2025, to follow-up in 1 year for renewal.     Hgb A1c: 5.7 in 5/2025, 5.9 in 11/2024, 6.9 in 5/2024, 6.5 in 1/2024, 6.2 in 7/2023  Albumin: 8/2024 negative  Eye exam: 10/2024    Current regimen:  -Metformin 500 mg daily, started 4/2024  -Mounjaro 5 mg weekly, started 4/2024, decreased at prior visit in 11/2024 due to improved A1c, nausea, and disinterest in food    Prior medications:   None    She notes some constipation that she is able to manage with night time snack of dried prunes. Unsure if this is related to GLP-1, narcotics, or her baseline bowel habits. Since able to manage with the prunes does not wish to further address.    HTN  Current regimen:  Lisinopril 5 mg twice daily  HCTZ 25 mg daily - holds if feeling dehydrated.    HLD/CAD/Aortic atherosclerosis  Taking Rosuvastatin 5 mg daily.  10/2020 CT A/P showed mild aortic atherosclerosis, no CACS.  06/2024 TC/HDL ratio 2.1, LDL 59, Trig 70    Hypothyroidism  CURRENT DOSE: Synthroid 125 mcg daily.  9/2024 TSH, T3, T4 all wnl    BOLIVAR, severe  12/2021 sleep study (severe with SpO2 88% and AHI 36.1).  Intolerant to CPAP x several tries.     She had inspire consult  with ENT Dr. Stephens in September 2023 however she joined a group with people who had the inspire and based off this experience she chose not to proceed with this but has opted to pursue conservative measure of weight reduction prior to having this done.     Given progression into diabetes and comorbidities of obesity, hypertension, hyperlipidemia, coronary artery disease, chronic pain due to numerous ortho issues, and obstructive sleep apnea intolerant to CPAP I do feel patient would benefit from GPL-1 for both better control of sugars and weight reduction. Started on Mounjaro in April 2024, see DM section.    Disinclined to return to sleep med for further eval of Inspire, wants to see how she does on increased dose of GLP-1.    Chronic Pain  Taking Duloxetine 60 mg + Gabapentin daily.  Tramadol is prescribed by outside provider.    S/p evaluation with [facility] rheumatology.  Plan per rheum pre 10/2022 OV NOTE:  --For Pseudogout flares of the wrist - Prednisone 12 day taper  --Continue Gapabentin 600 mg three times a day  --Tylenol 1000 mg up to 3x/day  --Voltaren gel up to 4x/day  --Continue home occupational therapy exercises   --Follow up as needed      She previously followed with spinal specialist @ [facility].    S/p evaluation with  neurosurgery, Dr. Sánchez, on 7/1/2025, summary copied from that note:   I personally reviewed MRI of the thoracic and lumbar spine done on 6/26/2025 and compared to the prior MRI L spine done in 2020.  In the lumbar spine, there is significantly worse multilevel degenerative disc disease throughout. There is multilevel broad-based disc bulging, facet hypertrophy, and ligamentous hypertrophy.  This causes severe spinal canal stenosis at the L2-3 and L3-4 levels.  There is severe bilateral lateral recess stenosis at the L4-5 level.  There is at least moderate foraminal stenosis bilaterally at the L2-3 and L3-4 levels. In the thoracic spine, there is multi-level degenerative disc disease  throughout as well, but no severe spinal cord compression. The T spine MRI does go high enough (and on the sagittal  images) that we can see that there clearly severe cervical spinal stenosis with spinal cord compression from C4 to C7. I also ordered and personally reviewed upright x-rays of the cervical spine and lumbar spine.  This does not show any instability/mobile spondylolisthesis.     Raiza Nguyễn has symptoms of cervical spondylotic myelopathy and neurogenic claudication due to lumbar spinal stenosis.  She will need surgery for both given the severity of neural compression, failure of conservative measures, progressive fine motor control and balance problems.  I recommended starting with the cervical spine.  She will likely need a C4-C6 cervical laminoplasty and partial C7 superior laminectomy, but we will confirm this after MRI c spine is complete, which we ordered.  After she heals and recovers from this we will plan to do a L2-L5 lumbar laminectomy, partial medial facetectomies, foraminotomies.  I explained the surgery and risks of the surgery including weakness, numbness, csf leak, infection, and spinal instability.    She notes some dribbling of urine with placing of pessary, unsure if related to that or compression of spine. She denies any urine or bowel incontinence otherwise. She denies any side effects from her pain medications, no chronic issues with constipation. She does have hemorrhoids and finds that magnesium caused her to diarrhea in the past. Uses cane for ambulation 24/7.    Breast Cancer  Followed by heme/onc, previously Dr. Wilson, now Dr. Bourne  Follows with breast surgeon (Dr. Jeniffer Langley)     Left breast IDC g3 ER0% ME 0% HER2 positive at 3:00 10cmFN measuring 0.9cm on MRI  -concerning level 3 lymph node not amenable to biopsy.  PET CT recommended for further assessment by radiology.  PET negative.  -s/p bilateral completion mastectomy  -pT1cNx     2.  History of  bilateral breast cancer and known BRCA2 mutation     Stage IB (Prognostic Stage IA) Right breast cancer 7/2018, [id number], grade 2 IDC, ER+95% PA+95% HER2 equivocal, FISH-.   - s/p R. magseed pm / SLNB (2mi/3) on 8/27/2018 with a 1.5cm IDC and negative margins. She had 2 out of 3 LNs with micrometastases measuring 0.4mm and 1.1mm, no NASH.   - Oncotype 17   - s/p WBRT 10/11/2018 - 11/1/2018   - taking Exemestane      Stage II Left breast cancer in 2003, 1.5 cm grade 3 IDC with positive LNs and NASH, ER+ PA+ HER2 not defined.   - L.pm/ALND   - ACx4 + Tx4   - WBRT   - Anastrozole x5yr    Was previously receiving taxol/herceptin under direction of hematology. Treatment course complicated by chest wall infection for which chemotherapy has been held.  We continued on Herceptin alone to complete 1 year of therapy. Resume Herceptin after treatment break to assess side effects. Received 2 doses of weekly paclitaxel, discontinued given development of breast infection and delayed wound healing.     Per 4/14/2025 visit note with cardio-onc, Dr. Hartley:  Ms. Nguyễn is a 74F with a PMHx sig for breast CA previously treated with adriamycin/cyclophosphamide with recurrence (ER/PA negative, HER2 positive) s/p b/l mastectomy currently on trastuzumab, essential HTN, and hypothyroidism who returns to the Cardio-Oncology clinic for ongoing evaluation and management.      1) High risk medication use  History of anthracycline, now currently on trastuzumab. Serial echos with stable biventricular function.   -c/w echo surveillance q3mo     2) Light headed/dizzy  Notes symptoms when turning her head side-to-side. No carotid bruits auscultated.   -refer for b/l carotid duplex     3) Essential HTN  BP controlled   -c/w lisinopril and hydrochlorothiazide      F/U: 6 months with Cardio-Onc [facility]    Per 4/28/2025 telephone note with heme-onc, Dr. Bourne:  I called patient to review results of PET scan. We discussed these findings in detail. No  "evidence of cancer recurrence. I have recommended consideration of CT chest in 3-6 months to follow-up on ground-glass haziness within apex of left lung,  likely inflammatory, will further discuss at her future visits. We also will check updated thyroid studies to follow-up on diffuse FDG uptake within thyroid gland, orders placed. Patient was appreciative of the call. All of the patient's questions were answered and concerns were addressed. The patient has no further needs at this time. Follow-up in place for 7/8/2025 for surveillance visit with Kelsey Mccollum NP.     Per 7/8/2025 visit note with heme-onc NP, Kelsey Mccollum:  New concerns today for 2+ months significant daily headaches without any neurological deficits. Given recurrent breast cancer, most recent with HER2+ will proceed with Stat Brain MRI. She understands I will call once resulted.     Surveillance plan: No routine breast imaging indicated- breast surgery follow up with Arielle Mcdermott CNP on 7/10/25     Follow-up: Follow-up Dr Tayla LANDRY 3-4 months at [place] location.       7/14/2025 MRI Brain: No acute intracranial abnormality. No evidence of intracranial metastases.    Review of Systems  The full, multi-organ review of systems, is within normal limits with the exception of what is noted above in HPI.      RX Allergies[1]    Current Medications[2]    Problem List[3]    Objective   /76 (BP Location: Right arm, Patient Position: Sitting, BP Cuff Size: Adult)   Pulse 76   Temp 36.7 °C (98.1 °F) (Temporal)   Resp 17   Ht (!) 1.499 m (4' 11\")   Wt 78 kg (172 lb)   SpO2 96%   BMI 34.74 kg/m²      Physical Exam  Vitals and nursing note reviewed.   Constitutional:       General: She is not in acute distress.     Appearance: Normal appearance. She is not toxic-appearing.   HENT:      Head: Normocephalic and atraumatic.     Cardiovascular:      Rate and Rhythm: Normal rate and regular rhythm.      Heart sounds: Normal heart sounds. " No murmur heard.     No friction rub. No gallop.   Pulmonary:      Effort: Pulmonary effort is normal.      Breath sounds: Normal breath sounds. No wheezing, rhonchi or rales.   Abdominal:      General: There is no distension.      Palpations: Abdomen is soft. There is no mass.      Tenderness: There is no abdominal tenderness. There is no guarding or rebound.     Musculoskeletal:      Right lower leg: No edema.      Left lower leg: No edema.      Comments: No decreased sensation in lower extremities or saddle region.     Skin:     General: Skin is warm and dry.     Neurological:      General: No focal deficit present.      Mental Status: She is alert and oriented to person, place, and time.     Psychiatric:         Mood and Affect: Mood normal.         Behavior: Behavior normal.         Assessment/Plan   Assessment & Plan  Chronic rhinitis  Feels allergies overall controlled on current regimen with oral OTC antihistamine and Flonase nasal spray. We discussed adding on prescription medication called Singulair which works by utilizing a different pathway for which there is not an over-the-counter comparable available. She declines at this time, but will message or call for rx if needed. Of note, patient's prior records were reviewed and in Sept 2023 Singular was discontinued as patient did not notice any changes in her chronic rhinitis on this medication.         Type 2 diabetes mellitus without complication, without long-term current use of insulin  Hgb A1c: 5.7 in 1/2025, 5.9 in 11/2024, 6.9 in 5/2024, 6.5 in 1/2024, 6.2 in 7/2023  Albumin: 8/2024 negative  Eye exam: 10/2024    PLAN:  Most recent A1c was 5.7 in office in May 2025 - this is GREAT control :)  She is tolerating current medications well, however, notes weight plateau on current dose of GLP-1 and wishes to try increasing dose. Will increase the Mounjaro from 5 mg to 7.5 mg weekly, new rx sent to pharmacy today.  Continue the Metformin as prescribed.  DM  meds co-managed with clinical pharmacy - continue to follow-up with them as they have recommended.  Repeat A1c 6 months from since well-controlled. Can do at 3 months if has dosage change or any sooner if symptoms warrant.      DM RECS:  Monitor A1c on routine basis.              --->A1c due every 6 months when under good control              --->A1c due every 3 months when not under good control.  Monitor urine albumin on routine basis.  --->Checking kidneys for leaking of protein should be done yearly.   Diabetic eye exams (with dilation) are recommended once a year.   Foot checks on routine basis  --->Once yearly via PCP or podiatrist  --->Patients should check daily for any cracks, blisters, calluses or skin breakdown if there is any neuropathy.  Good control of diabetes can decrease the risk of kidney damage, neuropathy, and vision loss from diabetes.   Lifestyle recommendations:  --->Make sure you are avoiding refined carbs such as breads, pasta, cereal, candy, soda,  nutrition bars, granola, chips, and sugar sweetened beverages.    --->Eat 5- 7 servings daily of veggies,  healthy protein such as chicken, fish,  beans, and eggs, and include healthy fats in your diet such as seeds, nuts, olive oil, avocados, and salmon.   --->Exercise 4 - 6 days per week as you are able, 150 minutes total weekly divided up is recommended.     Orders:    tirzepatide (Mounjaro) 7.5 mg/0.5 mL pen injector; Inject 7.5 mg under the skin 1 (one) time per week.    Hemoglobin A1C; Future    Albumin-Creatinine Ratio, Urine Random; Future    Lipid Panel; Future    Comprehensive Metabolic Panel; Future    CBC; Future    Essential hypertension  /76 in office today, goal BP is 130/80 or less, ideally 120/80 or less.     PLAN:  Continue Lisinopril 5 mg twice daily  Continue HCTZ 25 mg as needed, uses most days unless feeling dehydrated.  Continue with home monitoring      Patient to check BP regularly at home and keep a diary - DO THIS  1 HOUR AFTER TAKING MEDS.   This should be taken after sitting with feet flat on floor and resting for 5 minutes.   Arm should be level with your heart.   New guidelines recommend goal for blood pressure less than 130/80.   Ideally for stroke and heart attack risk reduction the systolic, or top, blood pressure number should be in the 110's or 120's.    PLEASE BRING BP CUFF IN TO NEXT VISIT FOR VALIDATION - TAKE YOUR BP @ HOME BEFORE YOU COME!     Orders:    Lipid Panel; Future    Comprehensive Metabolic Panel; Future    CBC; Future    Hypothyroidism, unspecified type  CURRENT DOSE: Synthroid 125 mcg daily.    PLAN:  -9/2024 TSH, T3, T4 all wnl. Continue current Synthroid regimen.  -Will be due for recheck TFTs prior to next visit. Ordered today.    Orders:    Thyroxine, Free; Future    Triiodothyronine, Free; Future    Thyroid Stimulating Hormone; Future    BOLIVAR (obstructive sleep apnea)  12/2021 sleep study (severe with SpO2 88% and AHI 36.1).  Intolerant to CPAP x several tries.    PLAN:  Previously had consult for Jose with ENT but has chosen not to further pursue at this time. Does not wish to undergo any medical therapies for the sleep apnea. She is currently on GLP-1 for DM and advised that weight loss can help with sleep apnea as well. Would like to see how she does on increased dose of GLP-1 before returning to sleep med.         Other chronic pain  Currently on Tramadol, Gabapentin, and Duloxetine. Controlled substance rx'ed and managed by Pain Management provider. Gabapentin and Duloxetine rx'ed by PCP.    PLAN:  Reports stable and pain managed on current regimen.  Continue Duloxetine 60 mg daily for both mood and pain.  Continue Gabapentin 900 mg TID  Commended on signing up for chair yoga class.  Encouraged to use Therabands while seated to help work out large muscles while physical movement is limited.  She recently met with spinal surgeon @ , Dr. Sánchez which discussed her cervical and lumbar spine  issues. He advised surgery given severity but patient deferred and plans to reconsider next year after breast cancer treatment.         Hemorrhoids, unspecified hemorrhoid type  Currently follows with GI and is involved in research study. She is due to follow-up with them regarding both upper and lower scopes, reports she plans to call their office tomorrow. Concern for susceptibility to medication induced constipation since on narcotic pain medication (tramadol) and GLP-1 (Mounjaro). Patient reports fairly well controlled with prunes/diet, however, would like to have easy bowel movements in setting of hemorrhoids. She is not taking any magnesium as experienced diarrhea on this int he past. I have encouraged that she retry the magnesium at a lower dose.    PLAN:   I recommend considering starting a magesium supplement.  For bowels:  To help keep bowels moving (constipation prevention/treatment) magnesium oxide or magnesium citrate (very little magnesium absorbed)                Follow-up:  Follow up in 6 mo , labs before   Call for sooner follow-up if needed.                  Attestation:  Scribe Attestation  By signing my name below, I, John Ely   attest that this documentation has been prepared under the direction and in the presence of Shoshana Olivarez DO.           [1]   Allergies  Allergen Reactions    Erythromycin GI bleeding    Tetracycline GI bleeding    Cephalexin Unknown    Glucosamine Unknown    Adhesive Itching and Rash   [2]   Current Outpatient Medications:     blood sugar diagnostic (Blood Glucose Test), Use as directed to test blood sugar once daily, Disp: 100 strip, Rfl: 3    blood-glucose meter misc, Use daily or as directed for monitoring of diabetes., Disp: 1 each, Rfl: 0    calcium carbonate 600 mg calcium (1,500 mg) tablet, Take 600 mg by mouth once daily., Disp: , Rfl:     DULoxetine (Cymbalta) 30 mg DR capsule, Take 1 capsule (30 mg) by mouth 2 times a day., Disp: 180  capsule, Rfl: 3    exemestane (Aromasin) 25 mg tablet, Take 1 tablet (25 mg total) by mouth once daily.  Take after a meal.  Try to take at the same time each day., Disp: 90 tablet, Rfl: 3    gabapentin (Neurontin) 300 mg capsule, Take 3 capsules (900 mg) by mouth 3 times a day., Disp: , Rfl:     hydroCHLOROthiazide (HYDRODiuril) 25 mg tablet, Take 1 tablet (25 mg) by mouth once daily., Disp: , Rfl:     lancets misc, Use as directed to test blood sugar once daily, Disp: 100 each, Rfl: 3    levothyroxine (Synthroid, Levoxyl) 125 mcg tablet, Take 1 tablet (125 mcg) by mouth early in the morning.., Disp: 90 tablet, Rfl: 3    lisinopril 5 mg tablet, Take 1 tablet (5 mg) by mouth 2 times a day., Disp: 180 tablet, Rfl: 1    metFORMIN  mg 24 hr tablet, Take 1 tablet (500 mg) by mouth once daily., Disp: 90 tablet, Rfl: 1    omeprazole (PriLOSEC) 40 mg DR capsule, Take 1 capsule (40 mg) by mouth 2 times a day before meals., Disp: 180 capsule, Rfl: 3    rosuvastatin (Crestor) 5 mg tablet, TAKE 1 TABLET EVERY DAY, Disp: 90 tablet, Rfl: 3    tirzepatide (Mounjaro) 7.5 mg/0.5 mL pen injector, Inject 7.5 mg under the skin 1 (one) time per week., Disp: 2 mL, Rfl: 11    traMADol (Ultram) 50 mg tablet, Take 2 tablets (100 mg) by mouth once daily at bedtime., Disp: , Rfl:   [3]   Patient Active Problem List  Diagnosis    Abnormal EKG    Arteriosclerotic coronary artery disease    Atherosclerosis of aorta    Smith's esophagus    Bilateral breast cancer    Bilateral carpal tunnel syndrome    BRCA2 positive    Chronic pain    Diabetes mellitus, type 2 (Multi)    Essential hypertension    Hyperlipidemia    Hypothyroidism    Low back pain    BOLIVAR (obstructive sleep apnea)    Osteoarthritis of shoulder    Spondylolisthesis of lumbar region    Complication of ventilation therapy    Abnormal MRI, breast    Brachial neuritis    Cervical spinal stenosis    Cervical spondylosis    Lumbar stenosis    Lumbosacral spondylosis     Gastroesophageal reflux disease    Homocystinemia    Intervertebral disc disorder of lumbar region with myelopathy    Osteoporosis    Chronic rhinitis    Trigger finger of left thumb    Personal history of malignant melanoma of skin    Mixed anxiety depressive disorder    Lumbosacral plexus lesion    Cystocele with prolapse    Cholelithiasis    Asymptomatic postmenopausal status    Spinal stenosis of lumbar region with neurogenic claudication    DDD (degenerative disc disease), lumbosacral    Arthritis of knee    Long term (current) use of aromatase inhibitors    Skin changes due to chronic exposure to nonionizing radiation    Medicare annual wellness visit, subsequent    Diverticulosis of large intestine without perforation or abscess without bleeding    Malignant neoplasm of central portion of left breast in female, estrogen receptor negative    S/P mastectomy, bilateral    Poor balance    Acute deep vein thrombosis (DVT) of non-extremity vein    Tremor    Acute viral conjunctivitis of left eye    Dry eyes, bilateral    Morbid (severe) obesity due to excess calories (Multi)    Left knee pain    Left leg weakness    Vaginal atrophy    CHF (congestive heart failure)    Spinal stenosis of thoracic region with radiculopathy    Spinal stenosis, lumbar region with neurogenic claudication    HER2-positive carcinoma of breast    Recurrent malignant neoplasm of left breast    Abnormal PET scan, lung    Encounter for follow-up surveillance of breast cancer    Hemorrhoids

## 2025-07-16 NOTE — PATIENT INSTRUCTIONS
Thank you for allowing me to be a part of you care. As you know this is more of an intimate office style setting and in order to stream line office tasks I strongly encourage use of the [employer] portal for requesting refills, non-urgent appointment requests, or any non-urgent quests or concerns you may have. Calls regarding non-emergent issues during our office hours of Mon-Friday 8:30-4:30 are fine if you are disinclined to use the portal. We do have someone from the office on call at all times but this is only one person and not an answering service so we discourage non-emergent calls outside of business hours.     Diabetes mellitus, type 2 (Multi)  Hgb A1c: 5.7 in 1/2025, 5.9 in 11/2024, 6.9 in 5/2024, 6.5 in 1/2024, 6.2 in 7/2023  Albumin: 8/2024 negative  Eye exam: 10/2024    PLAN:  Most recent A1c was 5.7 in office in May 2025 - this is GREAT control :)  She is tolerating current medications well, however, notes weight plateau on current dose of GLP-1 and wishes to try increasing dose. Will increase the Mounjaro from 5 mg to 7.5 mg weekly, new rx sent to pharmacy today.  Continue the Metformin as prescribed.  DM meds co-managed with clinical pharmacy - continue to follow-up with them as they have recommended.  Repeat A1c 6 months from since well-controlled. Can do at 3 months if has dosage change or any sooner if symptoms warrant.      DM RECS:  Monitor A1c on routine basis.              --->A1c due every 6 months when under good control              --->A1c due every 3 months when not under good control.  Monitor urine albumin on routine basis.  --->Checking kidneys for leaking of protein should be done yearly.   Diabetic eye exams (with dilation) are recommended once a year.   Foot checks on routine basis  --->Once yearly via PCP or podiatrist  --->Patients should check daily for any cracks, blisters, calluses or skin breakdown if there is any neuropathy.  Good control of diabetes can decrease the risk of  kidney damage, neuropathy, and vision loss from diabetes.   Lifestyle recommendations:  --->Make sure you are avoiding refined carbs such as breads, pasta, cereal, candy, soda,  nutrition bars, granola, chips, and sugar sweetened beverages.    --->Eat 5- 7 servings daily of veggies,  healthy protein such as chicken, fish,  beans, and eggs, and include healthy fats in your diet such as seeds, nuts, olive oil, avocados, and salmon.   --->Exercise 4 - 6 days per week as you are able, 150 minutes total weekly divided up is recommended.     Orders:    tirzepatide (Mounjaro) 7.5 mg/0.5 mL pen injector; Inject 7.5 mg under the skin 1 (one) time per week.      Chronic rhinitis  Feels allergies overall controlled on current regimen with oral OTC antihistamine and Flonase nasal spray. We discussed adding on prescription medication called Singulair which works by utilizing a different pathway for which there is not an over-the-counter comparable available. She declines at this time, but will message or call for rx if needed. Of note, patient's prior records were reviewed and in Sept 2023 Singular was discontinued as patient did not notice any changes in her chronic rhinitis on this medication.           Essential hypertension  /76 in office today, goal BP is 130/80 or less, ideally 120/80 or less.     PLAN:  Continue Lisinopril 5 mg twice daily  Continue HCTZ 25 mg as needed, uses most days unless feeling dehydrated.  Continue with home monitoring      Patient to check BP regularly at home and keep a diary - DO THIS 1 HOUR AFTER TAKING MEDS.   This should be taken after sitting with feet flat on floor and resting for 5 minutes.   Arm should be level with your heart.   New guidelines recommend goal for blood pressure less than 130/80.   Ideally for stroke and heart attack risk reduction the systolic, or top, blood pressure number should be in the 110's or 120's.    PLEASE BRING BP CUFF IN TO NEXT VISIT FOR VALIDATION -  TAKE YOUR BP @ HOME BEFORE YOU COME!            Hypothyroidism  CURRENT DOSE: Synthroid 125 mcg daily.    PLAN:  -9/2024 TSH, T3, T4 all wnl. Continue current Synthroid regimen.  -Will be due for recheck TFTs prior to next visit. Ordered today.           BOLIVAR (obstructive sleep apnea)  12/2021 sleep study (severe with SpO2 88% and AHI 36.1).  Intolerant to CPAP x several tries.    PLAN:  Previously had consult for [facility] with ENT but has chosen not to further pursue at this time. Does not wish to undergo any medical therapies for the sleep apnea. She is currently on GLP-1 for DM and advised that weight loss can help with sleep apnea as well. Would like to see how she does on increased dose of GLP-1 before returning to sleep med.           Chronic pain  Currently on Tramadol, Gabapentin, and Duloxetine. Controlled substance rx'ed and managed by Pain Management provider. Gabapentin and Duloxetine rx'ed by PCP.    PLAN:  Reports stable and pain managed on current regimen.  Continue Duloxetine 60 mg daily for both mood and pain.  Continue Gabapentin 900 mg TID  Commended on signing up for chair yoga class.  Encouraged to use Therabands while seated to help work out large muscles while physical movement is limited.  She recently met with spinal surgeon @ , Dr. Sánchez which discussed her cervical and lumbar spine issues. He advised surgery given severity but patient deferred and plans to reconsider next year after breast cancer treatment.           Hemorrhoids  Currently follows with GI and is involved in research study. She is due to follow-up with them regarding both upper and lower scopes, reports she plans to call their office tomorrow. Concern for susceptibility to medication induced constipation since on narcotic pain medication (tramadol) and GLP-1 (Mounjaro). Patient reports fairly well controlled with prunes/diet, however, would like to have easy bowel movements in setting of hemorrhoids. She is not taking any  magnesium as experienced diarrhea on this int he past. I have encouraged that she retry the magnesium at a lower dose.    PLAN:   I recommend considering starting a magesium supplement.  For bowels:  To help keep bowels moving (constipation prevention/treatment) magnesium oxide or magnesium citrate (very little magnesium absorbed)                  Constipation -  add in magnesium oxide or magnesium citrate -   start with 250 mg nightly or mag     Be on the lookout for increased numbness /weakness inner thighs,   incontinence of stool or urine -  to ER if progression ([facility])     Increased tirzepatide to 7.5 mg     Follow up in 6 mo  for lab review

## 2025-07-16 NOTE — ASSESSMENT & PLAN NOTE
12/2021 sleep study (severe with SpO2 88% and AHI 36.1).  Intolerant to CPAP x several tries.    PLAN:  Previously had consult for [facility] with ENT but has chosen not to further pursue at this time. Does not wish to undergo any medical therapies for the sleep apnea. She is currently on GLP-1 for DM and advised that weight loss can help with sleep apnea as well. Would like to see how she does on increased dose of GLP-1 before returning to sleep med.

## 2025-07-16 NOTE — ASSESSMENT & PLAN NOTE
Hgb A1c: 5.7 in 1/2025, 5.9 in 11/2024, 6.9 in 5/2024, 6.5 in 1/2024, 6.2 in 7/2023  Albumin: 8/2024 negative  Eye exam: 10/2024    PLAN:  Most recent A1c was 5.7 in office in May 2025 - this is GREAT control :)  She is tolerating current medications well, however, notes weight plateau on current dose of GLP-1 and wishes to try increasing dose. Will increase the Mounjaro from 5 mg to 7.5 mg weekly, new rx sent to pharmacy today.  Continue the Metformin as prescribed.  DM meds co-managed with clinical pharmacy - continue to follow-up with them as they have recommended.  Repeat A1c 6 months from since well-controlled. Can do at 3 months if has dosage change or any sooner if symptoms warrant.      DM RECS:  Monitor A1c on routine basis.              --->A1c due every 6 months when under good control              --->A1c due every 3 months when not under good control.  Monitor urine albumin on routine basis.  --->Checking kidneys for leaking of protein should be done yearly.   Diabetic eye exams (with dilation) are recommended once a year.   Foot checks on routine basis  --->Once yearly via PCP or podiatrist  --->Patients should check daily for any cracks, blisters, calluses or skin breakdown if there is any neuropathy.  Good control of diabetes can decrease the risk of kidney damage, neuropathy, and vision loss from diabetes.   Lifestyle recommendations:  --->Make sure you are avoiding refined carbs such as breads, pasta, cereal, candy, soda,  nutrition bars, granola, chips, and sugar sweetened beverages.    --->Eat 5- 7 servings daily of veggies,  healthy protein such as chicken, fish,  beans, and eggs, and include healthy fats in your diet such as seeds, nuts, olive oil, avocados, and salmon.   --->Exercise 4 - 6 days per week as you are able, 150 minutes total weekly divided up is recommended.     Orders:    tirzepatide (Mounjaro) 7.5 mg/0.5 mL pen injector; Inject 7.5 mg under the skin 1 (one) time per week.     Hemoglobin A1C; Future    Albumin-Creatinine Ratio, Urine Random; Future    Lipid Panel; Future    Comprehensive Metabolic Panel; Future    CBC; Future

## 2025-07-17 DIAGNOSIS — E11.9 TYPE 2 DIABETES MELLITUS WITHOUT COMPLICATION, WITHOUT LONG-TERM CURRENT USE OF INSULIN: ICD-10-CM

## 2025-07-17 PROCEDURE — RXMED WILLOW AMBULATORY MEDICATION CHARGE

## 2025-07-17 RX ORDER — TIRZEPATIDE 7.5 MG/.5ML
7.5 INJECTION, SOLUTION SUBCUTANEOUS WEEKLY
Qty: 2 ML | Refills: 11 | Status: SHIPPED | OUTPATIENT
Start: 2025-07-17

## 2025-07-21 ENCOUNTER — INFUSION (OUTPATIENT)
Dept: HEMATOLOGY/ONCOLOGY | Facility: CLINIC | Age: 75
End: 2025-07-21
Payer: MEDICARE

## 2025-07-21 ENCOUNTER — PHARMACY VISIT (OUTPATIENT)
Dept: PHARMACY | Facility: CLINIC | Age: 75
End: 2025-07-21
Payer: COMMERCIAL

## 2025-07-21 VITALS
TEMPERATURE: 96.8 F | BODY MASS INDEX: 34.84 KG/M2 | DIASTOLIC BLOOD PRESSURE: 78 MMHG | WEIGHT: 172.51 LBS | HEART RATE: 93 BPM | OXYGEN SATURATION: 95 % | SYSTOLIC BLOOD PRESSURE: 130 MMHG | RESPIRATION RATE: 16 BRPM

## 2025-07-21 DIAGNOSIS — Z17.1 MALIGNANT NEOPLASM OF CENTRAL PORTION OF LEFT BREAST IN FEMALE, ESTROGEN RECEPTOR NEGATIVE: ICD-10-CM

## 2025-07-21 DIAGNOSIS — C50.112 MALIGNANT NEOPLASM OF CENTRAL PORTION OF LEFT BREAST IN FEMALE, ESTROGEN RECEPTOR NEGATIVE: ICD-10-CM

## 2025-07-21 PROCEDURE — 96401 CHEMO ANTI-NEOPL SQ/IM: CPT

## 2025-07-21 PROCEDURE — 2500000004 HC RX 250 GENERAL PHARMACY W/ HCPCS (ALT 636 FOR OP/ED): Mod: JZ,TB | Performed by: STUDENT IN AN ORGANIZED HEALTH CARE EDUCATION/TRAINING PROGRAM

## 2025-07-21 RX ORDER — EPINEPHRINE 0.3 MG/.3ML
0.3 INJECTION SUBCUTANEOUS EVERY 5 MIN PRN
Status: DISCONTINUED | OUTPATIENT
Start: 2025-07-21 | End: 2025-07-21 | Stop reason: HOSPADM

## 2025-07-21 RX ORDER — ALBUTEROL SULFATE 0.83 MG/ML
3 SOLUTION RESPIRATORY (INHALATION) AS NEEDED
Status: DISCONTINUED | OUTPATIENT
Start: 2025-07-21 | End: 2025-07-21 | Stop reason: HOSPADM

## 2025-07-21 RX ORDER — FAMOTIDINE 10 MG/ML
20 INJECTION, SOLUTION INTRAVENOUS ONCE AS NEEDED
Status: DISCONTINUED | OUTPATIENT
Start: 2025-07-21 | End: 2025-07-21 | Stop reason: HOSPADM

## 2025-07-21 RX ORDER — PROCHLORPERAZINE EDISYLATE 5 MG/ML
10 INJECTION INTRAMUSCULAR; INTRAVENOUS EVERY 6 HOURS PRN
Status: DISCONTINUED | OUTPATIENT
Start: 2025-07-21 | End: 2025-07-21 | Stop reason: HOSPADM

## 2025-07-21 RX ORDER — PROCHLORPERAZINE MALEATE 10 MG
10 TABLET ORAL EVERY 6 HOURS PRN
Status: DISCONTINUED | OUTPATIENT
Start: 2025-07-21 | End: 2025-07-21 | Stop reason: HOSPADM

## 2025-07-21 RX ORDER — DIPHENHYDRAMINE HYDROCHLORIDE 50 MG/ML
50 INJECTION, SOLUTION INTRAMUSCULAR; INTRAVENOUS AS NEEDED
Status: DISCONTINUED | OUTPATIENT
Start: 2025-07-21 | End: 2025-07-21 | Stop reason: HOSPADM

## 2025-07-21 RX ADMIN — TRASTUZUMAB AND HYALURONIDASE-OYSK 600 MG: 600; 10000 INJECTION, SOLUTION SUBCUTANEOUS at 14:38

## 2025-07-21 ASSESSMENT — PAIN SCALES - GENERAL: PAINLEVEL_OUTOF10: 0-NO PAIN

## 2025-07-21 NOTE — PROGRESS NOTES
Patient is here today for C14D1 Herceptin hylecta injection - no complications since last being seen-  Independent double check done prior to injection today-   b/h/ lung sounds not auscultated  Patient tolerated injection well.  No complaints. Call back instructions reviewed.    Patient verbalizes understanding of plan of care.  Ambulated off unit using cane without difficulty, slow/steady gait.

## 2025-07-23 DIAGNOSIS — K22.710: Primary | ICD-10-CM

## 2025-07-28 ENCOUNTER — APPOINTMENT (OUTPATIENT)
Dept: RADIOLOGY | Facility: CLINIC | Age: 75
End: 2025-07-28
Payer: MEDICARE

## 2025-07-28 DIAGNOSIS — M47.12 CERVICAL SPONDYLOSIS WITH MYELOPATHY: ICD-10-CM

## 2025-07-28 PROCEDURE — 72141 MRI NECK SPINE W/O DYE: CPT

## 2025-07-30 ENCOUNTER — HOSPITAL ENCOUNTER (OUTPATIENT)
Dept: RADIOLOGY | Facility: CLINIC | Age: 75
Discharge: HOME | End: 2025-07-30
Payer: MEDICARE

## 2025-07-30 DIAGNOSIS — C50.112 MALIGNANT NEOPLASM OF CENTRAL PORTION OF LEFT BREAST IN FEMALE, ESTROGEN RECEPTOR NEGATIVE: ICD-10-CM

## 2025-07-30 DIAGNOSIS — Z17.31 HER2-POSITIVE CARCINOMA OF BREAST: ICD-10-CM

## 2025-07-30 DIAGNOSIS — Z17.1 MALIGNANT NEOPLASM OF CENTRAL PORTION OF LEFT BREAST IN FEMALE, ESTROGEN RECEPTOR NEGATIVE: ICD-10-CM

## 2025-07-30 DIAGNOSIS — R94.2 ABNORMAL PET SCAN, LUNG: ICD-10-CM

## 2025-07-30 DIAGNOSIS — C50.919 HER2-POSITIVE CARCINOMA OF BREAST: ICD-10-CM

## 2025-07-30 PROCEDURE — 71250 CT THORAX DX C-: CPT

## 2025-08-04 ENCOUNTER — TELEPHONE (OUTPATIENT)
Dept: NEUROSURGERY | Facility: HOSPITAL | Age: 75
End: 2025-08-04
Payer: MEDICARE

## 2025-08-04 DIAGNOSIS — Z17.31 HER2-POSITIVE CARCINOMA OF BREAST: ICD-10-CM

## 2025-08-04 DIAGNOSIS — R94.2 ABNORMAL PET SCAN, LUNG: Primary | ICD-10-CM

## 2025-08-04 DIAGNOSIS — C50.919 HER2-POSITIVE CARCINOMA OF BREAST: ICD-10-CM

## 2025-08-04 DIAGNOSIS — R91.8 ABNORMAL CT SCAN OF LUNG: ICD-10-CM

## 2025-08-04 NOTE — TELEPHONE ENCOUNTER
Dr. Nikita Sánchez of Neurosurgery left a voicemail for Raiza Schustersergeyniles in regards to her MRI Brain done 7/13/25 and MRI Cspine done 7/28/25.    Per Dr. Sánchez:  I reviewed MRI C spine and brain that was done. Brain is unremarkable without any evidence of metastasis. C spine shows what we suspected with severe spinal cord compression. I called the patient and left a voicemail that we could do surgery 9/15 at [facility] but if she wants to again continue to wait till January or later, that’s fine… she knows the risks.

## 2025-08-10 DIAGNOSIS — G47.33 OSA (OBSTRUCTIVE SLEEP APNEA): Primary | ICD-10-CM

## 2025-08-11 ENCOUNTER — INFUSION (OUTPATIENT)
Dept: HEMATOLOGY/ONCOLOGY | Facility: CLINIC | Age: 75
End: 2025-08-11
Payer: MEDICARE

## 2025-08-11 ENCOUNTER — TELEPHONE (OUTPATIENT)
Facility: CLINIC | Age: 75
End: 2025-08-11
Payer: MEDICARE

## 2025-08-11 VITALS
WEIGHT: 167.33 LBS | BODY MASS INDEX: 33.8 KG/M2 | SYSTOLIC BLOOD PRESSURE: 119 MMHG | HEART RATE: 89 BPM | OXYGEN SATURATION: 96 % | TEMPERATURE: 97.3 F | RESPIRATION RATE: 16 BRPM | DIASTOLIC BLOOD PRESSURE: 73 MMHG

## 2025-08-11 DIAGNOSIS — Z17.1 MALIGNANT NEOPLASM OF CENTRAL PORTION OF LEFT BREAST IN FEMALE, ESTROGEN RECEPTOR NEGATIVE: ICD-10-CM

## 2025-08-11 DIAGNOSIS — C50.112 MALIGNANT NEOPLASM OF CENTRAL PORTION OF LEFT BREAST IN FEMALE, ESTROGEN RECEPTOR NEGATIVE: ICD-10-CM

## 2025-08-11 PROCEDURE — 96401 CHEMO ANTI-NEOPL SQ/IM: CPT

## 2025-08-11 PROCEDURE — 2500000004 HC RX 250 GENERAL PHARMACY W/ HCPCS (ALT 636 FOR OP/ED): Mod: JZ,TB | Performed by: STUDENT IN AN ORGANIZED HEALTH CARE EDUCATION/TRAINING PROGRAM

## 2025-08-11 PROCEDURE — RXMED WILLOW AMBULATORY MEDICATION CHARGE

## 2025-08-11 RX ORDER — PROCHLORPERAZINE EDISYLATE 5 MG/ML
10 INJECTION INTRAMUSCULAR; INTRAVENOUS EVERY 6 HOURS PRN
Status: DISCONTINUED | OUTPATIENT
Start: 2025-08-11 | End: 2025-08-11 | Stop reason: HOSPADM

## 2025-08-11 RX ORDER — PROCHLORPERAZINE MALEATE 10 MG
10 TABLET ORAL EVERY 6 HOURS PRN
Status: DISCONTINUED | OUTPATIENT
Start: 2025-08-11 | End: 2025-08-11 | Stop reason: HOSPADM

## 2025-08-11 RX ORDER — EPINEPHRINE 0.3 MG/.3ML
0.3 INJECTION SUBCUTANEOUS EVERY 5 MIN PRN
Status: DISCONTINUED | OUTPATIENT
Start: 2025-08-11 | End: 2025-08-11 | Stop reason: HOSPADM

## 2025-08-11 RX ORDER — ALBUTEROL SULFATE 0.83 MG/ML
3 SOLUTION RESPIRATORY (INHALATION) AS NEEDED
Status: DISCONTINUED | OUTPATIENT
Start: 2025-08-11 | End: 2025-08-11 | Stop reason: HOSPADM

## 2025-08-11 RX ORDER — DIPHENHYDRAMINE HYDROCHLORIDE 50 MG/ML
50 INJECTION, SOLUTION INTRAMUSCULAR; INTRAVENOUS AS NEEDED
Status: DISCONTINUED | OUTPATIENT
Start: 2025-08-11 | End: 2025-08-11 | Stop reason: HOSPADM

## 2025-08-11 RX ORDER — FAMOTIDINE 10 MG/ML
20 INJECTION, SOLUTION INTRAVENOUS ONCE AS NEEDED
Status: DISCONTINUED | OUTPATIENT
Start: 2025-08-11 | End: 2025-08-11 | Stop reason: HOSPADM

## 2025-08-11 RX ADMIN — TRASTUZUMAB AND HYALURONIDASE-OYSK 600 MG: 600; 10000 INJECTION, SOLUTION SUBCUTANEOUS at 12:47

## 2025-08-11 ASSESSMENT — PAIN SCALES - GENERAL: PAINLEVEL_OUTOF10: 0-NO PAIN

## 2025-08-12 ENCOUNTER — TELEPHONE (OUTPATIENT)
Dept: HEMATOLOGY/ONCOLOGY | Facility: CLINIC | Age: 75
End: 2025-08-12
Payer: MEDICARE

## 2025-08-12 ENCOUNTER — PHARMACY VISIT (OUTPATIENT)
Dept: PHARMACY | Facility: CLINIC | Age: 75
End: 2025-08-12
Payer: COMMERCIAL

## 2025-08-12 ENCOUNTER — TELEPHONE (OUTPATIENT)
Facility: CLINIC | Age: 75
End: 2025-08-12
Payer: MEDICARE

## 2025-08-12 DIAGNOSIS — R30.0 DYSURIA: Primary | ICD-10-CM

## 2025-08-12 RX ORDER — AMOXICILLIN AND CLAVULANATE POTASSIUM 875; 125 MG/1; MG/1
875 TABLET, FILM COATED ORAL 2 TIMES DAILY
Qty: 14 TABLET | Refills: 0 | Status: SHIPPED | OUTPATIENT
Start: 2025-08-12 | End: 2025-08-22

## 2025-08-19 ASSESSMENT — DERMATOLOGY QUALITY OF LIFE (QOL) ASSESSMENT
RATE HOW BOTHERED YOU ARE BY EFFECTS OF YOUR SKIN PROBLEMS ON YOUR ACTIVITIES (EG, GOING OUT, ACCOMPLISHING WHAT YOU WANT, WORK ACTIVITIES OR YOUR RELATIONSHIPS WITH OTHERS): 0 - NEVER BOTHERED
WHAT SINGLE SKIN CONDITION LISTED BELOW IS THE PATIENT ANSWERING THE QUALITY-OF-LIFE ASSESSMENT QUESTIONS ABOUT: NONE OF THE ABOVE
WHAT SINGLE SKIN CONDITION LISTED BELOW IS THE PATIENT ANSWERING THE QUALITY-OF-LIFE ASSESSMENT QUESTIONS ABOUT: NONE OF THE ABOVE
RATE HOW EMOTIONALLY BOTHERED YOU ARE BY YOUR SKIN PROBLEM (FOR EXAMPLE, WORRY, EMBARRASSMENT, FRUSTRATION): 2
RATE HOW BOTHERED YOU ARE BY SYMPTOMS OF YOUR SKIN PROBLEM (EG, ITCHING, STINGING BURNING, HURTING OR SKIN IRRITATION): 6 - ALWAYS BOTHERED
RATE HOW BOTHERED YOU ARE BY SYMPTOMS OF YOUR SKIN PROBLEM (EG, ITCHING, STINGING BURNING, HURTING OR SKIN IRRITATION): 6 - ALWAYS BOTHERED
RATE HOW EMOTIONALLY BOTHERED YOU ARE BY YOUR SKIN PROBLEM (FOR EXAMPLE, WORRY, EMBARRASSMENT, FRUSTRATION): 2
RATE HOW BOTHERED YOU ARE BY EFFECTS OF YOUR SKIN PROBLEMS ON YOUR ACTIVITIES (EG, GOING OUT, ACCOMPLISHING WHAT YOU WANT, WORK ACTIVITIES OR YOUR RELATIONSHIPS WITH OTHERS): 0 - NEVER BOTHERED

## 2025-08-21 ENCOUNTER — APPOINTMENT (OUTPATIENT)
Dept: DERMATOLOGY | Facility: CLINIC | Age: 75
End: 2025-08-21
Payer: MEDICARE

## 2025-08-21 DIAGNOSIS — L82.1 SEBORRHEIC KERATOSIS: ICD-10-CM

## 2025-08-21 DIAGNOSIS — Z12.83 ENCOUNTER FOR SCREENING FOR MALIGNANT NEOPLASM OF SKIN: ICD-10-CM

## 2025-08-21 DIAGNOSIS — D22.5 MELANOCYTIC NEVI OF TRUNK: ICD-10-CM

## 2025-08-21 DIAGNOSIS — D18.01 HEMANGIOMA OF SKIN: ICD-10-CM

## 2025-08-21 DIAGNOSIS — Z85.820 PERSONAL HISTORY OF MALIGNANT MELANOMA OF SKIN: ICD-10-CM

## 2025-08-21 DIAGNOSIS — L30.8 OTHER SPECIFIED DERMATITIS: ICD-10-CM

## 2025-08-21 DIAGNOSIS — D22.60 MELANOCYTIC NEVI OF UNSPECIFIED UPPER LIMB, INCLUDING SHOULDER: ICD-10-CM

## 2025-08-21 DIAGNOSIS — L57.8 PHOTOAGING OF SKIN: Primary | ICD-10-CM

## 2025-08-21 DIAGNOSIS — L81.4 LENTIGO: ICD-10-CM

## 2025-08-21 DIAGNOSIS — D22.70 MELANOCYTIC NEVUS OF LOWER EXTREMITY INCLUDING HIP, UNSPECIFIED LATERALITY: ICD-10-CM

## 2025-08-21 RX ORDER — TIMOLOL HEMIHYDRATE 5 MG/ML
SOLUTION/ DROPS OPHTHALMIC
Qty: 5 ML | Refills: 2 | Status: SHIPPED | OUTPATIENT
Start: 2025-08-21 | End: 2025-08-21

## 2025-08-21 RX ORDER — TIMOLOL HEMIHYDRATE 5 MG/ML
SOLUTION/ DROPS OPHTHALMIC
Qty: 5 ML | Refills: 2 | Status: SHIPPED | OUTPATIENT
Start: 2025-08-21

## 2025-08-22 ENCOUNTER — TELEMEDICINE (OUTPATIENT)
Facility: CLINIC | Age: 75
End: 2025-08-22
Payer: MEDICARE

## 2025-08-22 VITALS — WEIGHT: 170 LBS | HEIGHT: 59 IN | BODY MASS INDEX: 34.27 KG/M2

## 2025-08-22 DIAGNOSIS — G47.8 NON-RESTORATIVE SLEEP: ICD-10-CM

## 2025-08-22 DIAGNOSIS — R40.0 DAYTIME SOMNOLENCE: ICD-10-CM

## 2025-08-22 DIAGNOSIS — G47.33 OSA (OBSTRUCTIVE SLEEP APNEA): Primary | ICD-10-CM

## 2025-08-22 DIAGNOSIS — R06.81 WITNESSED APNEIC SPELLS: ICD-10-CM

## 2025-08-22 DIAGNOSIS — R07.81 RIB PAIN: ICD-10-CM

## 2025-08-22 DIAGNOSIS — R06.83 SNORING: ICD-10-CM

## 2025-08-22 PROBLEM — R29.898 LEFT LEG WEAKNESS: Status: RESOLVED | Noted: 2024-12-11 | Resolved: 2025-08-22

## 2025-08-22 PROBLEM — B30.9 ACUTE VIRAL CONJUNCTIVITIS OF LEFT EYE: Status: RESOLVED | Noted: 2024-10-22 | Resolved: 2025-08-22

## 2025-08-22 PROBLEM — M25.562 LEFT KNEE PAIN: Status: RESOLVED | Noted: 2024-12-11 | Resolved: 2025-08-22

## 2025-08-26 DIAGNOSIS — L30.8 OTHER SPECIFIED DERMATITIS: Primary | ICD-10-CM

## 2025-08-26 RX ORDER — TIMOLOL MALEATE 2.5 MG/ML
SOLUTION/ DROPS OPHTHALMIC
Qty: 5 ML | Refills: 2 | Status: SHIPPED | OUTPATIENT
Start: 2025-08-26

## 2025-08-27 ENCOUNTER — APPOINTMENT (OUTPATIENT)
Dept: DERMATOLOGY | Facility: CLINIC | Age: 75
End: 2025-08-27
Payer: MEDICARE

## 2025-08-28 LAB — NON-UH HIE GP HPV: NEGATIVE

## 2025-09-03 ENCOUNTER — INFUSION (OUTPATIENT)
Dept: HEMATOLOGY/ONCOLOGY | Facility: CLINIC | Age: 75
End: 2025-09-03
Payer: MEDICARE

## 2025-09-03 VITALS
DIASTOLIC BLOOD PRESSURE: 75 MMHG | RESPIRATION RATE: 16 BRPM | OXYGEN SATURATION: 95 % | SYSTOLIC BLOOD PRESSURE: 121 MMHG | BODY MASS INDEX: 34.04 KG/M2 | HEART RATE: 75 BPM | HEIGHT: 59 IN | TEMPERATURE: 96.8 F | WEIGHT: 168.87 LBS

## 2025-09-03 DIAGNOSIS — Z17.1 MALIGNANT NEOPLASM OF CENTRAL PORTION OF LEFT BREAST IN FEMALE, ESTROGEN RECEPTOR NEGATIVE: ICD-10-CM

## 2025-09-03 DIAGNOSIS — C50.112 MALIGNANT NEOPLASM OF CENTRAL PORTION OF LEFT BREAST IN FEMALE, ESTROGEN RECEPTOR NEGATIVE: ICD-10-CM

## 2025-09-03 PROCEDURE — 2500000004 HC RX 250 GENERAL PHARMACY W/ HCPCS (ALT 636 FOR OP/ED): Mod: JZ,TB | Performed by: STUDENT IN AN ORGANIZED HEALTH CARE EDUCATION/TRAINING PROGRAM

## 2025-09-03 PROCEDURE — 96401 CHEMO ANTI-NEOPL SQ/IM: CPT

## 2025-09-03 RX ORDER — ALBUTEROL SULFATE 0.83 MG/ML
3 SOLUTION RESPIRATORY (INHALATION) AS NEEDED
Status: DISCONTINUED | OUTPATIENT
Start: 2025-09-03 | End: 2025-09-03 | Stop reason: HOSPADM

## 2025-09-03 RX ORDER — DIPHENHYDRAMINE HYDROCHLORIDE 50 MG/ML
50 INJECTION, SOLUTION INTRAMUSCULAR; INTRAVENOUS AS NEEDED
Status: DISCONTINUED | OUTPATIENT
Start: 2025-09-03 | End: 2025-09-03 | Stop reason: HOSPADM

## 2025-09-03 RX ORDER — EPINEPHRINE 0.3 MG/.3ML
0.3 INJECTION SUBCUTANEOUS EVERY 5 MIN PRN
Status: DISCONTINUED | OUTPATIENT
Start: 2025-09-03 | End: 2025-09-03 | Stop reason: HOSPADM

## 2025-09-03 RX ORDER — PROCHLORPERAZINE EDISYLATE 5 MG/ML
10 INJECTION INTRAMUSCULAR; INTRAVENOUS EVERY 6 HOURS PRN
Status: DISCONTINUED | OUTPATIENT
Start: 2025-09-03 | End: 2025-09-03 | Stop reason: HOSPADM

## 2025-09-03 RX ORDER — FAMOTIDINE 10 MG/ML
20 INJECTION, SOLUTION INTRAVENOUS ONCE AS NEEDED
Status: DISCONTINUED | OUTPATIENT
Start: 2025-09-03 | End: 2025-09-03 | Stop reason: HOSPADM

## 2025-09-03 RX ORDER — PROCHLORPERAZINE MALEATE 10 MG
10 TABLET ORAL EVERY 6 HOURS PRN
Status: DISCONTINUED | OUTPATIENT
Start: 2025-09-03 | End: 2025-09-03 | Stop reason: HOSPADM

## 2025-09-03 RX ADMIN — TRASTUZUMAB AND HYALURONIDASE-OYSK 600 MG: 600; 10000 INJECTION, SOLUTION SUBCUTANEOUS at 12:05

## 2025-09-03 ASSESSMENT — PAIN SCALES - GENERAL: PAINLEVEL_OUTOF10: 0-NO PAIN

## 2025-11-11 ENCOUNTER — APPOINTMENT (OUTPATIENT)
Facility: CLINIC | Age: 75
End: 2025-11-11
Payer: MEDICARE

## 2025-12-16 ENCOUNTER — APPOINTMENT (OUTPATIENT)
Dept: NEUROSURGERY | Facility: CLINIC | Age: 75
End: 2025-12-16
Payer: MEDICARE

## 2026-01-16 ENCOUNTER — APPOINTMENT (OUTPATIENT)
Facility: CLINIC | Age: 76
End: 2026-01-16
Payer: MEDICARE

## 2026-08-26 ENCOUNTER — APPOINTMENT (OUTPATIENT)
Dept: DERMATOLOGY | Facility: CLINIC | Age: 76
End: 2026-08-26
Payer: MEDICARE

## (undated) DEVICE — EVACUATOR, WOUND, SUCTION, CLOSED, JACKSON-PRATT, 100 CC, SILICONE

## (undated) DEVICE — APPLICATOR, CHLORAPREP, W/ORANGE TINT, 26ML

## (undated) DEVICE — Device

## (undated) DEVICE — PROBE COVER, INTRAOPERATIVE, 13 X 244CM (5 X 96IN)

## (undated) DEVICE — TOWEL PACK, STERILE, 4/PACK, BLUE

## (undated) DEVICE — ADHESIVE, SKIN, MASTISOL, 2/3 CC VIAL

## (undated) DEVICE — GLOVE, SURGICAL, PROTEXIS PI , 5.5, PF, LF

## (undated) DEVICE — COVER, MAYO STAND, W/PAD, 23 IN, DISPOSABLE, PLASTIC, LF, STERILE

## (undated) DEVICE — SUTURE, MONOCRYL, 4-0, 27 IN, PS-2, UNDYED

## (undated) DEVICE — DRESSING, TRANSPARENT, TEGADERM, 4 X 4-3/4 IN

## (undated) DEVICE — SUPPORT, MAMMARY, THERAPEUTIC, SURGI-BRA, LARGE, LF

## (undated) DEVICE — SOLUTION, PREP, PVP IODINE, FLIP TOP, 4OZ

## (undated) DEVICE — DRAIN, JP CHANNEL, 15 FR, RND, W/TROCAR

## (undated) DEVICE — CONTAINER, SPECIMEN, 4 OZ, OR PEEL PACK, STERILE

## (undated) DEVICE — SOLUTION, IRRIGATION, STERILE WATER, 1000 ML, POUR BOTTLE

## (undated) DEVICE — SUTURE, VICRYL, 2-0, 27 IN, SH, UNDYED

## (undated) DEVICE — SUTURE, VICRYL, 3-0, 27 IN, SH

## (undated) DEVICE — MARKER, SKIN, DUAL TIP, W/RULER AND LABEL

## (undated) DEVICE — BANDAGE, GAUZE, 6 PLY, KERLIX, 4.5 IN X 4.1 YD, AMD, STERILE